# Patient Record
Sex: FEMALE | Race: WHITE | Employment: UNEMPLOYED | URBAN - METROPOLITAN AREA
[De-identification: names, ages, dates, MRNs, and addresses within clinical notes are randomized per-mention and may not be internally consistent; named-entity substitution may affect disease eponyms.]

---

## 2020-06-15 ENCOUNTER — HOSPITAL ENCOUNTER (INPATIENT)
Age: 45
LOS: 2 days | Discharge: HOSPICE | DRG: 751 | End: 2020-06-17
Attending: PSYCHIATRY & NEUROLOGY | Admitting: PSYCHIATRY & NEUROLOGY
Payer: MEDICARE

## 2020-06-15 PROBLEM — F23 PSYCHOTIC EPISODE (HCC): Status: ACTIVE | Noted: 2020-06-15

## 2020-06-15 PROCEDURE — 1240000000 HC EMOTIONAL WELLNESS R&B

## 2020-06-15 PROCEDURE — 6370000000 HC RX 637 (ALT 250 FOR IP): Performed by: NURSE PRACTITIONER

## 2020-06-15 RX ORDER — NICOTINE 21 MG/24HR
1 PATCH, TRANSDERMAL 24 HOURS TRANSDERMAL DAILY
Status: DISCONTINUED | OUTPATIENT
Start: 2020-06-16 | End: 2020-06-15

## 2020-06-15 RX ORDER — TRAZODONE HYDROCHLORIDE 50 MG/1
50 TABLET ORAL NIGHTLY PRN
Status: DISCONTINUED | OUTPATIENT
Start: 2020-06-15 | End: 2020-06-17 | Stop reason: HOSPADM

## 2020-06-15 RX ORDER — TRAZODONE HYDROCHLORIDE 50 MG/1
50 TABLET ORAL NIGHTLY PRN
Status: DISCONTINUED | OUTPATIENT
Start: 2020-06-16 | End: 2020-06-15

## 2020-06-15 RX ADMIN — TRAZODONE HYDROCHLORIDE 50 MG: 50 TABLET ORAL at 23:50

## 2020-06-15 ASSESSMENT — SLEEP AND FATIGUE QUESTIONNAIRES
DO YOU USE A SLEEP AID: COMMENT
DO YOU HAVE DIFFICULTY SLEEPING: COMMENT

## 2020-06-15 ASSESSMENT — LIFESTYLE VARIABLES: HISTORY_ALCOHOL_USE: COMMENT

## 2020-06-15 ASSESSMENT — PAIN SCALES - GENERAL: PAINLEVEL_OUTOF10: 0

## 2020-06-16 PROBLEM — Z85.038 H/O MALIGNANT NEOPLASM OF COLON: Status: ACTIVE | Noted: 2020-06-16

## 2020-06-16 PROBLEM — N30.00 ACUTE CYSTITIS WITHOUT HEMATURIA: Status: ACTIVE | Noted: 2020-06-16

## 2020-06-16 LAB
-: ABNORMAL
AMORPHOUS: ABNORMAL
AMPHETAMINE SCREEN URINE: NEGATIVE
BACTERIA: ABNORMAL
BARBITURATE SCREEN URINE: NEGATIVE
BENZODIAZEPINE SCREEN, URINE: NEGATIVE
BILIRUBIN URINE: NEGATIVE
BUPRENORPHINE URINE: NORMAL
CANNABINOID SCREEN URINE: NEGATIVE
CASTS UA: ABNORMAL /LPF
COCAINE METABOLITE, URINE: NEGATIVE
COLOR: YELLOW
COMMENT UA: ABNORMAL
CRYSTALS, UA: ABNORMAL /HPF
EPITHELIAL CELLS UA: ABNORMAL /HPF
GLUCOSE URINE: NEGATIVE
HCG(URINE) PREGNANCY TEST: NEGATIVE
KETONES, URINE: NEGATIVE
LEUKOCYTE ESTERASE, URINE: ABNORMAL
MDMA URINE: NORMAL
METHADONE SCREEN, URINE: NEGATIVE
METHAMPHETAMINE, URINE: NORMAL
MUCUS: ABNORMAL
NITRITE, URINE: NEGATIVE
OPIATES, URINE: NEGATIVE
OTHER OBSERVATIONS UA: ABNORMAL
OXYCODONE SCREEN URINE: NEGATIVE
PH UA: 7.5 (ref 5–8)
PHENCYCLIDINE, URINE: NEGATIVE
PROPOXYPHENE, URINE: NORMAL
PROTEIN UA: ABNORMAL
RBC UA: ABNORMAL /HPF
RENAL EPITHELIAL, UA: ABNORMAL /HPF
SPECIFIC GRAVITY UA: 1 (ref 1–1.03)
TEST INFORMATION: NORMAL
TRICHOMONAS: ABNORMAL
TRICYCLIC ANTIDEPRESSANTS, UR: NORMAL
TURBIDITY: ABNORMAL
URINE HGB: ABNORMAL
UROBILINOGEN, URINE: NORMAL
WBC UA: ABNORMAL /HPF
YEAST: ABNORMAL

## 2020-06-16 PROCEDURE — 6370000000 HC RX 637 (ALT 250 FOR IP): Performed by: NURSE PRACTITIONER

## 2020-06-16 PROCEDURE — 1240000000 HC EMOTIONAL WELLNESS R&B

## 2020-06-16 PROCEDURE — 90792 PSYCH DIAG EVAL W/MED SRVCS: CPT | Performed by: NURSE PRACTITIONER

## 2020-06-16 PROCEDURE — 99254 IP/OBS CNSLTJ NEW/EST MOD 60: CPT | Performed by: INTERNAL MEDICINE

## 2020-06-16 PROCEDURE — 81025 URINE PREGNANCY TEST: CPT

## 2020-06-16 PROCEDURE — 87086 URINE CULTURE/COLONY COUNT: CPT

## 2020-06-16 PROCEDURE — 80307 DRUG TEST PRSMV CHEM ANLYZR: CPT

## 2020-06-16 PROCEDURE — 81001 URINALYSIS AUTO W/SCOPE: CPT

## 2020-06-16 RX ORDER — OLANZAPINE 5 MG/1
5 TABLET ORAL NIGHTLY
Status: DISCONTINUED | OUTPATIENT
Start: 2020-06-16 | End: 2020-06-17 | Stop reason: HOSPADM

## 2020-06-16 RX ORDER — METRONIDAZOLE 500 MG/1
500 TABLET ORAL 3 TIMES DAILY
COMMUNITY
Start: 2020-06-12 | End: 2020-06-22

## 2020-06-16 RX ORDER — PANTOPRAZOLE SODIUM 40 MG/1
40 TABLET, DELAYED RELEASE ORAL DAILY
Status: ON HOLD | COMMUNITY
End: 2020-07-09 | Stop reason: HOSPADM

## 2020-06-16 RX ORDER — CIPROFLOXACIN 500 MG/1
500 TABLET, FILM COATED ORAL 2 TIMES DAILY
COMMUNITY
Start: 2020-06-12 | End: 2020-06-22

## 2020-06-16 RX ORDER — MIRTAZAPINE 15 MG/1
15 TABLET, FILM COATED ORAL NIGHTLY
COMMUNITY

## 2020-06-16 RX ORDER — HYDROXYZINE PAMOATE 50 MG/1
50 CAPSULE ORAL 3 TIMES DAILY PRN
COMMUNITY

## 2020-06-16 RX ORDER — TAMSULOSIN HYDROCHLORIDE 0.4 MG/1
0.4 CAPSULE ORAL DAILY
Status: ON HOLD | COMMUNITY
End: 2020-07-09 | Stop reason: HOSPADM

## 2020-06-16 RX ORDER — TAMSULOSIN HYDROCHLORIDE 0.4 MG/1
0.4 CAPSULE ORAL DAILY
Status: DISCONTINUED | OUTPATIENT
Start: 2020-06-16 | End: 2020-06-17 | Stop reason: HOSPADM

## 2020-06-16 RX ORDER — PAROXETINE HYDROCHLORIDE 20 MG/1
20 TABLET, FILM COATED ORAL EVERY MORNING
Status: ON HOLD | COMMUNITY
End: 2020-07-09 | Stop reason: HOSPADM

## 2020-06-16 RX ORDER — ACETAMINOPHEN 325 MG/1
650 TABLET ORAL EVERY 4 HOURS PRN
Status: DISCONTINUED | OUTPATIENT
Start: 2020-06-16 | End: 2020-06-17 | Stop reason: HOSPADM

## 2020-06-16 RX ORDER — PANTOPRAZOLE SODIUM 40 MG/1
40 TABLET, DELAYED RELEASE ORAL DAILY
Status: DISCONTINUED | OUTPATIENT
Start: 2020-06-16 | End: 2020-06-17 | Stop reason: HOSPADM

## 2020-06-16 RX ORDER — CIPROFLOXACIN 500 MG/1
500 TABLET, FILM COATED ORAL 2 TIMES DAILY
Status: DISCONTINUED | OUTPATIENT
Start: 2020-06-16 | End: 2020-06-17 | Stop reason: HOSPADM

## 2020-06-16 RX ORDER — MIRTAZAPINE 15 MG/1
15 TABLET, FILM COATED ORAL NIGHTLY
Status: DISCONTINUED | OUTPATIENT
Start: 2020-06-16 | End: 2020-06-17 | Stop reason: HOSPADM

## 2020-06-16 RX ORDER — MAGNESIUM HYDROXIDE/ALUMINUM HYDROXICE/SIMETHICONE 120; 1200; 1200 MG/30ML; MG/30ML; MG/30ML
30 SUSPENSION ORAL EVERY 6 HOURS PRN
Status: DISCONTINUED | OUTPATIENT
Start: 2020-06-16 | End: 2020-06-17 | Stop reason: HOSPADM

## 2020-06-16 RX ORDER — PAROXETINE 10 MG/1
10 TABLET, FILM COATED ORAL EVERY MORNING
Status: DISCONTINUED | OUTPATIENT
Start: 2020-06-17 | End: 2020-06-17 | Stop reason: HOSPADM

## 2020-06-16 RX ORDER — BUSPIRONE HYDROCHLORIDE 15 MG/1
15 TABLET ORAL 3 TIMES DAILY
Status: ON HOLD | COMMUNITY
End: 2020-07-09 | Stop reason: HOSPADM

## 2020-06-16 RX ORDER — PAROXETINE HYDROCHLORIDE 20 MG/1
20 TABLET, FILM COATED ORAL EVERY MORNING
Status: DISCONTINUED | OUTPATIENT
Start: 2020-06-16 | End: 2020-06-16

## 2020-06-16 RX ORDER — HYDROXYZINE 50 MG/1
50 TABLET, FILM COATED ORAL 3 TIMES DAILY PRN
Status: DISCONTINUED | OUTPATIENT
Start: 2020-06-16 | End: 2020-06-17 | Stop reason: HOSPADM

## 2020-06-16 RX ORDER — BUSPIRONE HYDROCHLORIDE 15 MG/1
15 TABLET ORAL 3 TIMES DAILY
Status: DISCONTINUED | OUTPATIENT
Start: 2020-06-16 | End: 2020-06-17 | Stop reason: HOSPADM

## 2020-06-16 RX ORDER — SULFAMETHOXAZOLE AND TRIMETHOPRIM 800; 160 MG/1; MG/1
1 TABLET ORAL 2 TIMES DAILY
Status: ON HOLD | COMMUNITY
Start: 2020-06-15 | End: 2020-06-16

## 2020-06-16 RX ORDER — METRONIDAZOLE 500 MG/1
500 TABLET ORAL 3 TIMES DAILY
Status: DISCONTINUED | OUTPATIENT
Start: 2020-06-16 | End: 2020-06-17 | Stop reason: HOSPADM

## 2020-06-16 RX ADMIN — CIPROFLOXACIN HYDROCHLORIDE 500 MG: 500 TABLET, FILM COATED ORAL at 15:36

## 2020-06-16 RX ADMIN — METRONIDAZOLE 500 MG: 500 TABLET ORAL at 21:27

## 2020-06-16 RX ADMIN — OLANZAPINE 5 MG: 5 TABLET, FILM COATED ORAL at 21:27

## 2020-06-16 RX ADMIN — TAMSULOSIN HYDROCHLORIDE 0.4 MG: 0.4 CAPSULE ORAL at 14:33

## 2020-06-16 RX ADMIN — BUSPIRONE HYDROCHLORIDE 15 MG: 15 TABLET ORAL at 21:27

## 2020-06-16 RX ADMIN — METRONIDAZOLE 500 MG: 500 TABLET ORAL at 14:33

## 2020-06-16 RX ADMIN — CIPROFLOXACIN HYDROCHLORIDE 500 MG: 500 TABLET, FILM COATED ORAL at 21:26

## 2020-06-16 RX ADMIN — PAROXETINE HYDROCHLORIDE 20 MG: 20 TABLET, FILM COATED ORAL at 14:33

## 2020-06-16 RX ADMIN — TRAZODONE HYDROCHLORIDE 50 MG: 50 TABLET ORAL at 21:26

## 2020-06-16 RX ADMIN — BUSPIRONE HYDROCHLORIDE 15 MG: 15 TABLET ORAL at 14:33

## 2020-06-16 RX ADMIN — PANTOPRAZOLE SODIUM 40 MG: 40 TABLET, DELAYED RELEASE ORAL at 14:33

## 2020-06-16 RX ADMIN — MIRTAZAPINE 15 MG: 15 TABLET, FILM COATED ORAL at 21:26

## 2020-06-16 NOTE — GROUP NOTE
Group Therapy Note    Date: 6/16/2020    Group Start Time: 0900  Group End Time: 0920  Group Topic: Sheila [id number] , [place]        Group Therapy Note    Attendees: 10    Pt did not attend [employer] d/t resting in room despite staff invitation to attend. 1:1 talk time offered as alternative to group session, pt declined.

## 2020-06-16 NOTE — GROUP NOTE
Group Therapy Note    Date: 6/16/2020    Group Start Time: 1330  Group End Time: 1400  Group Topic: Relaxation    [address], [place]        Group Therapy Note    Attendees: 7    Pt did not attend Therapeutic Recreation d/t resting in room despite staff invitation to attend. 1:1 talk time offered as alternative to group session, pt declined.

## 2020-06-16 NOTE — BH NOTE

## 2020-06-16 NOTE — BH NOTE
Gave patient a urine speciman cup and asked patient to provide a urine sample and bring it up to nurse.

## 2020-06-16 NOTE — PLAN OF CARE
[facility]  Initial Interdisciplinary Treatment Plan NO      Original treatment plan Date & Time: 6/16/2020 0841    Admission Type:  Admission Type: Involuntary    Reason for admission:   Reason for Admission:  Patient involuntary from [facility] ED for agitaiton, combative, and confusion. Patient is religiously preoccupied and thinks she is possessed by the devil. Estimated Length of Stay:  5-7days  Estimated Discharge Date: to be determined by physician    PATIENT STRENGTHS:  Patient Strengths:Strengths: Positive Support, Other(God gives her strength)  Patient Strengths and Limitations:Limitations: Unrealistic self-view, External locus of control  Addictive Behavior: Addictive Behavior  In the past 3 months, have you felt or has someone told you that you have a problem with:  : None  Do you have a history of Chemical Use?: Comment(IAN)  Do you have a history of Alcohol Use?: Comment(IAN)  Do you have a history of Street Drug Abuse?: Comment(IAN)  Histroy of Prescripton Drug Abuse?: Comment(IAN)  Medical Problems:No past medical history on file. Status EXAM:Status and Exam  Normal: No  Facial Expression: Worried  Affect: Unstable  Level of Consciousness: Confused  Mood:Normal: No  Mood: Ambivalent  Motor Activity:Normal: No  Motor Activity: Increased  Interview Behavior: Evasive  Preception: [place] to Person  Attention:Normal: No  Attention: Distractible  Thought Processes: Perseveration, Blocking  Thought Content:Normal: No  Thought Content: Preoccupations, Delusions  Hallucinations:  Auditory (Comment)(talks to god and the devil)  Delusions: Yes  Delusions: Reference  Memory:Normal: No  Memory: Confabulation  Insight and Judgment: No  Insight and Judgment: Poor Judgment, Poor Insight  Present Suicidal Ideation: Other(See comment)(IAN)  Present Homicidal Ideation: Other(See comment)(IAN)    EDUCATION:   Learner Progress Toward Treatment Goals: reviewed group plans and strategies for

## 2020-06-16 NOTE — GROUP NOTE
Group Therapy Note    Date: 6/16/2020    Group Start Time: 8195  Group End Time: 9493  Group Topic: Psychoeducation    [facility]    Patient refused to attend mental health stigma and advocacy group at  after encouragement from staff. 1:1 talk time offered by staff as alternative to group session.

## 2020-06-17 ENCOUNTER — HOSPITAL ENCOUNTER (INPATIENT)
Age: 45
LOS: 2 days | Discharge: PSYCHIATRIC OR SUBSTANCE USE FACILITY | DRG: 518 | End: 2020-06-20
Attending: INTERNAL MEDICINE | Admitting: INTERNAL MEDICINE
Payer: MEDICARE

## 2020-06-17 ENCOUNTER — APPOINTMENT (OUTPATIENT)
Dept: CT IMAGING | Age: 45
DRG: 751 | End: 2020-06-17
Attending: PSYCHIATRY & NEUROLOGY
Payer: MEDICARE

## 2020-06-17 VITALS
RESPIRATION RATE: 14 BRPM | DIASTOLIC BLOOD PRESSURE: 75 MMHG | WEIGHT: 100 LBS | TEMPERATURE: 97.8 F | HEIGHT: 60 IN | HEART RATE: 118 BPM | SYSTOLIC BLOOD PRESSURE: 116 MMHG | BODY MASS INDEX: 19.63 KG/M2

## 2020-06-17 LAB
ABSOLUTE EOS #: 0.1 K/UL (ref 0–0.4)
ABSOLUTE IMMATURE GRANULOCYTE: ABNORMAL K/UL (ref 0–0.3)
ABSOLUTE LYMPH #: 1.9 K/UL (ref 1–4.8)
ABSOLUTE MONO #: 1 K/UL (ref 0.1–1.3)
ALBUMIN SERPL-MCNC: 3.1 G/DL (ref 3.5–5.2)
ALBUMIN/GLOBULIN RATIO: ABNORMAL (ref 1–2.5)
ALP BLD-CCNC: 102 U/L (ref 35–104)
ALT SERPL-CCNC: 14 U/L (ref 5–33)
ANION GAP SERPL CALCULATED.3IONS-SCNC: 12 MMOL/L (ref 9–17)
AST SERPL-CCNC: 10 U/L
BASOPHILS # BLD: 1 % (ref 0–2)
BASOPHILS ABSOLUTE: 0.1 K/UL (ref 0–0.2)
BILIRUB SERPL-MCNC: 0.2 MG/DL (ref 0.3–1.2)
BUN BLDV-MCNC: 10 MG/DL (ref 6–20)
BUN/CREAT BLD: ABNORMAL (ref 9–20)
C-REACTIVE PROTEIN: 111.9 MG/L (ref 0–5)
CALCIUM SERPL-MCNC: 8.8 MG/DL (ref 8.6–10.4)
CHLORIDE BLD-SCNC: 101 MMOL/L (ref 98–107)
CHOLESTEROL/HDL RATIO: 3.6
CHOLESTEROL: 121 MG/DL
CO2: 27 MMOL/L (ref 20–31)
CREAT SERPL-MCNC: 0.86 MG/DL (ref 0.5–0.9)
DIFFERENTIAL TYPE: ABNORMAL
EOSINOPHILS RELATIVE PERCENT: 1 % (ref 0–4)
ESTIMATED AVERAGE GLUCOSE: 114 MG/DL
GFR AFRICAN AMERICAN: >60 ML/MIN
GFR NON-AFRICAN AMERICAN: >60 ML/MIN
GFR SERPL CREATININE-BSD FRML MDRD: ABNORMAL ML/MIN/{1.73_M2}
GFR SERPL CREATININE-BSD FRML MDRD: ABNORMAL ML/MIN/{1.73_M2}
GLUCOSE BLD-MCNC: 130 MG/DL (ref 70–99)
HBA1C MFR BLD: 5.6 % (ref 4–6)
HCT VFR BLD CALC: 30.4 % (ref 36–46)
HDLC SERPL-MCNC: 34 MG/DL
HEMOGLOBIN: 10.3 G/DL (ref 12–16)
IMMATURE GRANULOCYTES: ABNORMAL %
LDL CHOLESTEROL: 73 MG/DL (ref 0–130)
LYMPHOCYTES # BLD: 16 % (ref 24–44)
MAGNESIUM: 2.2 MG/DL (ref 1.6–2.6)
MCH RBC QN AUTO: 29.9 PG (ref 26–34)
MCHC RBC AUTO-ENTMCNC: 33.8 G/DL (ref 31–37)
MCV RBC AUTO: 88.6 FL (ref 80–100)
MONOCYTES # BLD: 8 % (ref 1–7)
NRBC AUTOMATED: ABNORMAL PER 100 WBC
PDW BLD-RTO: 14.6 % (ref 11.5–14.9)
PLATELET # BLD: 466 K/UL (ref 150–450)
PLATELET ESTIMATE: ABNORMAL
PMV BLD AUTO: 7.6 FL (ref 6–12)
POTASSIUM SERPL-SCNC: 3.5 MMOL/L (ref 3.7–5.3)
RBC # BLD: 3.43 M/UL (ref 4–5.2)
RBC # BLD: ABNORMAL 10*6/UL
SEG NEUTROPHILS: 74 % (ref 36–66)
SEGMENTED NEUTROPHILS ABSOLUTE COUNT: 8.9 K/UL (ref 1.3–9.1)
SODIUM BLD-SCNC: 140 MMOL/L (ref 135–144)
THYROXINE, FREE: 1.91 NG/DL (ref 0.93–1.7)
TOTAL PROTEIN: 6.6 G/DL (ref 6.4–8.3)
TRIGL SERPL-MCNC: 72 MG/DL
TSH SERPL DL<=0.05 MIU/L-ACNC: 2.67 MIU/L (ref 0.3–5)
VLDLC SERPL CALC-MCNC: ABNORMAL MG/DL (ref 1–30)
WBC # BLD: 11.9 K/UL (ref 3.5–11)
WBC # BLD: ABNORMAL 10*3/UL

## 2020-06-17 PROCEDURE — 2580000003 HC RX 258: Performed by: INTERNAL MEDICINE

## 2020-06-17 PROCEDURE — 2580000003 HC RX 258: Performed by: STUDENT IN AN ORGANIZED HEALTH CARE EDUCATION/TRAINING PROGRAM

## 2020-06-17 PROCEDURE — 6360000004 HC RX CONTRAST MEDICATION: Performed by: INTERNAL MEDICINE

## 2020-06-17 PROCEDURE — 84134 ASSAY OF PREALBUMIN: CPT

## 2020-06-17 PROCEDURE — 83036 HEMOGLOBIN GLYCOSYLATED A1C: CPT

## 2020-06-17 PROCEDURE — 80061 LIPID PANEL: CPT

## 2020-06-17 PROCEDURE — 83735 ASSAY OF MAGNESIUM: CPT

## 2020-06-17 PROCEDURE — 6360000002 HC RX W HCPCS: Performed by: INTERNAL MEDICINE

## 2020-06-17 PROCEDURE — 99239 HOSP IP/OBS DSCHRG MGMT >30: CPT | Performed by: PSYCHIATRY & NEUROLOGY

## 2020-06-17 PROCEDURE — 6370000000 HC RX 637 (ALT 250 FOR IP): Performed by: NURSE PRACTITIONER

## 2020-06-17 PROCEDURE — 84443 ASSAY THYROID STIM HORMONE: CPT

## 2020-06-17 PROCEDURE — 99232 SBSQ HOSP IP/OBS MODERATE 35: CPT | Performed by: INTERNAL MEDICINE

## 2020-06-17 PROCEDURE — G0379 DIRECT REFER HOSPITAL OBSERV: HCPCS

## 2020-06-17 PROCEDURE — 36415 COLL VENOUS BLD VENIPUNCTURE: CPT

## 2020-06-17 PROCEDURE — G0378 HOSPITAL OBSERVATION PER HR: HCPCS

## 2020-06-17 PROCEDURE — 80053 COMPREHEN METABOLIC PANEL: CPT

## 2020-06-17 PROCEDURE — 85025 COMPLETE CBC W/AUTO DIFF WBC: CPT

## 2020-06-17 PROCEDURE — 84439 ASSAY OF FREE THYROXINE: CPT

## 2020-06-17 PROCEDURE — 6370000000 HC RX 637 (ALT 250 FOR IP): Performed by: INTERNAL MEDICINE

## 2020-06-17 PROCEDURE — 74177 CT ABD & PELVIS W/CONTRAST: CPT

## 2020-06-17 PROCEDURE — 86140 C-REACTIVE PROTEIN: CPT

## 2020-06-17 RX ORDER — 0.9 % SODIUM CHLORIDE 0.9 %
80 INTRAVENOUS SOLUTION INTRAVENOUS ONCE
Status: COMPLETED | OUTPATIENT
Start: 2020-06-17 | End: 2020-06-17

## 2020-06-17 RX ORDER — SODIUM CHLORIDE 0.9 % (FLUSH) 0.9 %
10 SYRINGE (ML) INJECTION PRN
Status: DISCONTINUED | OUTPATIENT
Start: 2020-06-17 | End: 2020-06-20 | Stop reason: HOSPADM

## 2020-06-17 RX ORDER — TAMSULOSIN HYDROCHLORIDE 0.4 MG/1
0.4 CAPSULE ORAL DAILY
Status: DISCONTINUED | OUTPATIENT
Start: 2020-06-18 | End: 2020-06-20 | Stop reason: HOSPADM

## 2020-06-17 RX ORDER — BUSPIRONE HYDROCHLORIDE 15 MG/1
15 TABLET ORAL 3 TIMES DAILY
Status: CANCELLED | OUTPATIENT
Start: 2020-06-17

## 2020-06-17 RX ORDER — TAMSULOSIN HYDROCHLORIDE 0.4 MG/1
0.4 CAPSULE ORAL DAILY
Status: CANCELLED | OUTPATIENT
Start: 2020-06-17

## 2020-06-17 RX ORDER — ACETAMINOPHEN 325 MG/1
650 TABLET ORAL EVERY 6 HOURS PRN
Status: DISCONTINUED | OUTPATIENT
Start: 2020-06-17 | End: 2020-06-20 | Stop reason: HOSPADM

## 2020-06-17 RX ORDER — MIRTAZAPINE 15 MG/1
15 TABLET, FILM COATED ORAL NIGHTLY
Status: CANCELLED | OUTPATIENT
Start: 2020-06-17

## 2020-06-17 RX ORDER — ACETAMINOPHEN 325 MG/1
650 TABLET ORAL EVERY 4 HOURS PRN
Status: CANCELLED | OUTPATIENT
Start: 2020-06-17

## 2020-06-17 RX ORDER — HYDROXYZINE 50 MG/1
50 TABLET, FILM COATED ORAL 3 TIMES DAILY PRN
Status: CANCELLED | OUTPATIENT
Start: 2020-06-17

## 2020-06-17 RX ORDER — POLYETHYLENE GLYCOL 3350 17 G/17G
17 POWDER, FOR SOLUTION ORAL DAILY PRN
Status: DISCONTINUED | OUTPATIENT
Start: 2020-06-17 | End: 2020-06-20 | Stop reason: HOSPADM

## 2020-06-17 RX ORDER — HYDROXYZINE PAMOATE 50 MG/1
50 CAPSULE ORAL 3 TIMES DAILY PRN
Status: CANCELLED | OUTPATIENT
Start: 2020-06-17

## 2020-06-17 RX ORDER — HYDROXYZINE HYDROCHLORIDE 25 MG/1
50 TABLET, FILM COATED ORAL 3 TIMES DAILY PRN
Status: DISCONTINUED | OUTPATIENT
Start: 2020-06-17 | End: 2020-06-20 | Stop reason: HOSPADM

## 2020-06-17 RX ORDER — TAMSULOSIN HYDROCHLORIDE 0.4 MG/1
0.4 CAPSULE ORAL DAILY
Status: CANCELLED | OUTPATIENT
Start: 2020-06-18

## 2020-06-17 RX ORDER — TRAZODONE HYDROCHLORIDE 50 MG/1
50 TABLET ORAL NIGHTLY PRN
Status: CANCELLED | OUTPATIENT
Start: 2020-06-17

## 2020-06-17 RX ORDER — CIPROFLOXACIN 500 MG/1
500 TABLET, FILM COATED ORAL 2 TIMES DAILY
Status: CANCELLED | OUTPATIENT
Start: 2020-06-17

## 2020-06-17 RX ORDER — PAROXETINE HYDROCHLORIDE 20 MG/1
20 TABLET, FILM COATED ORAL EVERY MORNING
Status: DISCONTINUED | OUTPATIENT
Start: 2020-06-18 | End: 2020-06-20 | Stop reason: HOSPADM

## 2020-06-17 RX ORDER — OLANZAPINE 5 MG/1
5 TABLET ORAL NIGHTLY
Status: CANCELLED | OUTPATIENT
Start: 2020-06-17

## 2020-06-17 RX ORDER — SODIUM CHLORIDE 9 MG/ML
INJECTION, SOLUTION INTRAVENOUS CONTINUOUS
Status: DISCONTINUED | OUTPATIENT
Start: 2020-06-17 | End: 2020-06-20 | Stop reason: HOSPADM

## 2020-06-17 RX ORDER — CIPROFLOXACIN 500 MG/1
500 TABLET, FILM COATED ORAL 2 TIMES DAILY
Status: CANCELLED | OUTPATIENT
Start: 2020-06-17 | End: 2020-06-23

## 2020-06-17 RX ORDER — POTASSIUM CHLORIDE 20 MEQ/1
40 TABLET, EXTENDED RELEASE ORAL PRN
Status: DISCONTINUED | OUTPATIENT
Start: 2020-06-17 | End: 2020-06-20 | Stop reason: HOSPADM

## 2020-06-17 RX ORDER — TRAZODONE HYDROCHLORIDE 50 MG/1
50 TABLET ORAL NIGHTLY PRN
Status: DISCONTINUED | OUTPATIENT
Start: 2020-06-17 | End: 2020-06-20 | Stop reason: HOSPADM

## 2020-06-17 RX ORDER — SODIUM CHLORIDE 0.9 % (FLUSH) 0.9 %
10 SYRINGE (ML) INJECTION EVERY 12 HOURS SCHEDULED
Status: DISCONTINUED | OUTPATIENT
Start: 2020-06-17 | End: 2020-06-20 | Stop reason: HOSPADM

## 2020-06-17 RX ORDER — PROMETHAZINE HYDROCHLORIDE 25 MG/1
12.5 TABLET ORAL EVERY 6 HOURS PRN
Status: DISCONTINUED | OUTPATIENT
Start: 2020-06-17 | End: 2020-06-20 | Stop reason: HOSPADM

## 2020-06-17 RX ORDER — MIRTAZAPINE 15 MG/1
15 TABLET, FILM COATED ORAL NIGHTLY
Status: DISCONTINUED | OUTPATIENT
Start: 2020-06-17 | End: 2020-06-20 | Stop reason: HOSPADM

## 2020-06-17 RX ORDER — PANTOPRAZOLE SODIUM 40 MG/1
40 TABLET, DELAYED RELEASE ORAL DAILY
Status: CANCELLED | OUTPATIENT
Start: 2020-06-17

## 2020-06-17 RX ORDER — SODIUM CHLORIDE 0.9 % (FLUSH) 0.9 %
10 SYRINGE (ML) INJECTION PRN
Status: DISCONTINUED | OUTPATIENT
Start: 2020-06-17 | End: 2020-06-17 | Stop reason: HOSPADM

## 2020-06-17 RX ORDER — METRONIDAZOLE 500 MG/1
500 TABLET ORAL 3 TIMES DAILY
Status: CANCELLED | OUTPATIENT
Start: 2020-06-17

## 2020-06-17 RX ORDER — BUSPIRONE HYDROCHLORIDE 15 MG/1
15 TABLET ORAL 3 TIMES DAILY
Status: DISCONTINUED | OUTPATIENT
Start: 2020-06-17 | End: 2020-06-20 | Stop reason: HOSPADM

## 2020-06-17 RX ORDER — POTASSIUM CHLORIDE 7.45 MG/ML
10 INJECTION INTRAVENOUS PRN
Status: DISCONTINUED | OUTPATIENT
Start: 2020-06-17 | End: 2020-06-20 | Stop reason: HOSPADM

## 2020-06-17 RX ORDER — PAROXETINE 10 MG/1
10 TABLET, FILM COATED ORAL EVERY MORNING
Status: CANCELLED | OUTPATIENT
Start: 2020-06-18 | End: 2020-06-20

## 2020-06-17 RX ORDER — PANTOPRAZOLE SODIUM 40 MG/1
40 TABLET, DELAYED RELEASE ORAL DAILY
Status: CANCELLED | OUTPATIENT
Start: 2020-06-18

## 2020-06-17 RX ORDER — PAROXETINE HYDROCHLORIDE 20 MG/1
20 TABLET, FILM COATED ORAL EVERY MORNING
Status: CANCELLED | OUTPATIENT
Start: 2020-06-18

## 2020-06-17 RX ORDER — ACETAMINOPHEN 650 MG/1
650 SUPPOSITORY RECTAL EVERY 6 HOURS PRN
Status: DISCONTINUED | OUTPATIENT
Start: 2020-06-17 | End: 2020-06-20 | Stop reason: HOSPADM

## 2020-06-17 RX ORDER — SODIUM CHLORIDE 0.9 % (FLUSH) 0.9 %
10 SYRINGE (ML) INJECTION PRN
Status: CANCELLED | OUTPATIENT
Start: 2020-06-17

## 2020-06-17 RX ORDER — MAGNESIUM HYDROXIDE/ALUMINUM HYDROXICE/SIMETHICONE 120; 1200; 1200 MG/30ML; MG/30ML; MG/30ML
30 SUSPENSION ORAL EVERY 6 HOURS PRN
Status: CANCELLED | OUTPATIENT
Start: 2020-06-17

## 2020-06-17 RX ORDER — OLANZAPINE 10 MG/1
5 TABLET ORAL NIGHTLY
Status: DISCONTINUED | OUTPATIENT
Start: 2020-06-17 | End: 2020-06-20 | Stop reason: HOSPADM

## 2020-06-17 RX ORDER — ONDANSETRON 2 MG/ML
4 INJECTION INTRAMUSCULAR; INTRAVENOUS EVERY 6 HOURS PRN
Status: DISCONTINUED | OUTPATIENT
Start: 2020-06-17 | End: 2020-06-20 | Stop reason: HOSPADM

## 2020-06-17 RX ORDER — METRONIDAZOLE 500 MG/1
500 TABLET ORAL 3 TIMES DAILY
Status: CANCELLED | OUTPATIENT
Start: 2020-06-17 | End: 2020-06-23

## 2020-06-17 RX ADMIN — Medication 10 ML: at 08:44

## 2020-06-17 RX ADMIN — TAMSULOSIN HYDROCHLORIDE 0.4 MG: 0.4 CAPSULE ORAL at 08:54

## 2020-06-17 RX ADMIN — SODIUM CHLORIDE: 9 INJECTION, SOLUTION INTRAVENOUS at 18:31

## 2020-06-17 RX ADMIN — BUSPIRONE HYDROCHLORIDE 15 MG: 15 TABLET ORAL at 08:54

## 2020-06-17 RX ADMIN — BUSPIRONE HYDROCHLORIDE 15 MG: 15 TABLET ORAL at 20:30

## 2020-06-17 RX ADMIN — IOVERSOL 75 ML: 741 INJECTION INTRA-ARTERIAL; INTRAVENOUS at 08:44

## 2020-06-17 RX ADMIN — BUSPIRONE HYDROCHLORIDE 15 MG: 15 TABLET ORAL at 15:20

## 2020-06-17 RX ADMIN — MIRTAZAPINE 15 MG: 15 TABLET, FILM COATED ORAL at 20:30

## 2020-06-17 RX ADMIN — SODIUM CHLORIDE 80 ML: 9 INJECTION, SOLUTION INTRAVENOUS at 08:44

## 2020-06-17 RX ADMIN — OLANZAPINE 5 MG: 10 TABLET, FILM COATED ORAL at 20:30

## 2020-06-17 RX ADMIN — VORTIOXETINE 10 MG: 10 TABLET, FILM COATED ORAL at 08:54

## 2020-06-17 RX ADMIN — PAROXETINE HYDROCHLORIDE 10 MG: 10 TABLET, FILM COATED ORAL at 08:54

## 2020-06-17 RX ADMIN — METRONIDAZOLE 500 MG: 500 TABLET ORAL at 08:54

## 2020-06-17 RX ADMIN — METRONIDAZOLE 500 MG: 500 TABLET ORAL at 15:20

## 2020-06-17 RX ADMIN — PIPERACILLIN AND TAZOBACTAM 4.5 G: 4; .5 INJECTION, POWDER, LYOPHILIZED, FOR SOLUTION INTRAVENOUS; PARENTERAL at 19:59

## 2020-06-17 RX ADMIN — CIPROFLOXACIN HYDROCHLORIDE 500 MG: 500 TABLET, FILM COATED ORAL at 08:54

## 2020-06-17 RX ADMIN — PANTOPRAZOLE SODIUM 40 MG: 40 TABLET, DELAYED RELEASE ORAL at 08:54

## 2020-06-17 ASSESSMENT — ENCOUNTER SYMPTOMS
COUGH: 0
DIARRHEA: 0
APNEA: 0
SHORTNESS OF BREATH: 0
ABDOMINAL DISTENTION: 0
VOMITING: 0
NAUSEA: 0
ABDOMINAL PAIN: 0
CHEST TIGHTNESS: 0
BACK PAIN: 0

## 2020-06-17 ASSESSMENT — PAIN SCALES - WONG BAKER
WONGBAKER_NUMERICALRESPONSE: 0

## 2020-06-17 ASSESSMENT — PAIN SCALES - GENERAL: PAINLEVEL_OUTOF10: 0

## 2020-06-17 NOTE — PLAN OF CARE
[address] [place] [address]  Day 3 Interdisciplinary Treatment Plan NOTE    Review Date & Time: 6/17//2020 1345    Admission Type:   Admission Type: Involuntary    Reason for admission:  Reason for Admission:  Patient involuntary from [facility] ED for agitaiton, combative, and confusion. Patient is religiously preoccupied and thinks she is possessed by the devil. Estimated Length of Stay:  5-7 days  Estimated Discharge Date Update:   to be determined by physician    PATIENT STRENGTHS:  Patient Strengths:Strengths: (IAN)  Patient Strengths and Limitations:Limitations: (IAN)  Addictive Behavior:Addictive Behavior  In the past 3 months, have you felt or has someone told you that you have a problem with:  : (IAN)  Do you have a history of Chemical Use?: (IAN)  Do you have a history of Alcohol Use?: (IAN)  Do you have a history of Street Drug Abuse?: (IAN)  Histroy of Prescripton Drug Abuse?: (IAN)  Medical Problems:No past medical history on file.     Risk:  Fall RiskTotal: 70  Yoni Scale Yoni Scale Score: 22  BVC Total: 1  Change in scores:  No Changes to plan of Care:  No    Status EXAM:   Status and Exam  Normal: No  Facial Expression: Flat  Affect: Unstable  Level of Consciousness: Confused  Mood:Normal: No  Mood: Depressed, Empty  Motor Activity:Normal: Yes  Motor Activity: Increased  Interview Behavior: Cooperative  Preception: [place] to Person  Attention:Normal: No  Attention: Unable to Concentrate  Thought Processes: Blocking  Thought Content:Normal: No  Thought Content: Poverty of Content  Hallucinations: (patient denies)  Delusions: No  Delusions: Obsessions  Memory:Normal: No  Memory: Confabulation  Insight and Judgment: No  Insight and Judgment: Poor Judgment, Poor Insight  Present Suicidal Ideation: No  Present Homicidal Ideation: No    Daily Assessment Last Entry:   Daily Sleep (WDL): Within Defined Limits         Patient Currently in Pain: No  Daily Nutrition (WDL): Within Defined Limits    Patient Monitoring:  Frequency of Checks: 4 times per hour, close    Psychiatric Symptoms:   Depression Symptoms  Depression Symptoms: Loss of interest, Isolative  Anxiety Symptoms  Anxiety Symptoms: Generalized  Elizabeth Symptoms  Elizabeth Symptoms: No problems reported or observed. Psychosis Symptoms  Delusion Type: No problems reported or observed. Suicide Risk CSSR-S:  1) Within the past month, have you wished you were dead or wished you could go to sleep and not wake up? : No  2) Have you actually had any thoughts of killing yourself? : No  6) Have you ever done anything, started to do anything, or prepared to do anything to end your life?: No  Change in Result:  no Change in Plan of care:  no      EDUCATION:   Learner Progress Toward Treatment Goals:   Reviewed results and recommendations of this team, Reviewed group plan and strategies, Reviewed signs, symptoms and risk of self harm and violent behavior, Reviewed goals and plan of care    Method:  small group, individual verbal education    Outcome:   Verbalized by patient but needs reinforcement to obtain goals    PATIENT GOALS:  Short term:  Stabilize medications, decrease hallucinations. Long term:  Continue medications.      PLAN/TREATMENT RECOMMENDATIONS UPDATE:  continue with group therapies, increased socialization, continue planning for after discharge goals, continue with medication compliance    SHORT-TERM GOALS UPDATE:   Time frame for Short-Term Goals:  5-7 days    LONG-TERM GOALS UPDATE:   Time frame for Long-Term Goals:  6 months    Members Present in Team Meeting:     Nadege Carmen

## 2020-06-17 NOTE — H&P
[address]      [address] [facility]     HISTORY AND PHYSICAL EXAMINATION            Date:   6/17/2020  Patient name:  Cira Wood  Date of admission:  6/17/2020  4:33 PM  MRN:   [id number]  Account:  [de-identified]  YOB: 1975  PCP:    No primary care provider on file. Room:   [address] [place] [address]  Code Status:    Full Code    Chief Complaint:     No chief complaint on file. History Obtained From:     patient    History of Present Illness:     Patient is a 49-year-old  female who was admitted directly from [facility] to progressive unit today on 6/17 in the p.m. for treatment of a pelvic abscess. Patient was initially  admitted to [facility] due to suicidal ideation and disorientation. Patient was taking psych meds at home prior to admission. Today, CT scan of patient's abdomen showed pelvic abscess in the kobe-sacral area and cholelithiasis and a contracted gallbladder. Patient was started on IV Zosyn and IR consulted in order to drain abscess possibly. Patient is also malnourished, states her appetite is fine but when she tries to eat she feels like she cannot swallow her food, is able to drink normally. Patient states she has 2 children cannot remember their names currently patient states she does not have a name but realizes she is at [facility].  Patient states she has suicidal thoughts, and that she is given a name by her sister. Patient denies any chest pain, shortness of breath, abdominal pain, nausea, vomiting. States she sometimes feels like her hands are tightening up. States she has had some sleep disturbance recently. Patient will be continued on IV antibiotics in the hospital and possibly abscess will be drained by IR. Past Medical History:     History reviewed. No pertinent past medical history.      Past SurgicalHistory: History reviewed. No pertinent surgical history. Medications Prior to Admission:        Prior to Admission medications    Medication Sig Start Date End Date Taking? Authorizing Provider   busPIRone (BUSPAR) 15 MG tablet Take 15 mg by mouth 3 times daily    Historical Provider, MD   PARoxetine (PAXIL) 20 MG tablet Take 20 mg by mouth every morning    Historical Provider, MD   tamsulosin (FLOMAX) 0.4 MG capsule Take 0.4 mg by mouth daily    Historical Provider, MD   hydrOXYzine (VISTARIL) 50 MG capsule Take 50 mg by mouth 3 times daily as needed    Historical Provider, MD   mirtazapine (REMERON) 15 MG tablet Take 15 mg by mouth nightly    Historical Provider, MD   pantoprazole (PROTONIX) 40 MG tablet Take 40 mg by mouth daily    Historical Provider, MD   metroNIDAZOLE (FLAGYL) 500 MG tablet Take 500 mg by mouth 3 times daily 6/12/20 6/22/20  Historical Provider, MD   ciprofloxacin (CIPRO) 500 MG tablet Take 500 mg by mouth 2 times daily 6/12/20 6/22/20  Historical Provider, MD        Allergies:     Patient has no known allergies. Social History:     Tobacco:    reports that she has never smoked. She has never used smokeless tobacco.  Alcohol:      has no history on file for alcohol. Drug Use:  has no history on file for drug. Family History:     History reviewed. No pertinent family history. Review of Systems:     Positive and Negative as described in HPI. Review of Systems   Constitutional: Positive for fatigue. Respiratory: Negative for apnea, cough, chest tightness and shortness of breath. Cardiovascular: Negative for chest pain, palpitations and leg swelling. Gastrointestinal: Negative for abdominal distention, abdominal pain, diarrhea, nausea and vomiting. Genitourinary: Negative for difficulty urinating, frequency and urgency. Musculoskeletal: Negative for arthralgias, back pain and neck pain. Neurological: Negative for dizziness, tremors, light-headedness and headaches. NEGATIVE NEGATIVE    Oxycodone Screen, Ur NEGATIVE NEGATIVE    Methamphetamine, Urine NOT REPORTED NEGATIVE    Tricyclic Antidepressants, Urine NOT REPORTED NEGATIVE    MDMA, Urine NOT REPORTED NEGATIVE    Buprenorphine Urine NOT REPORTED NEGATIVE    Test Information       Assay provides medical screening only. The absence of expected drug(s) and/or metabolite(s) may indicate diluted or adulterated urine, limitations of testing or timing of collection. Urinalysis Reflex to Culture    Collection Time: 06/16/20  8:42 PM   Result Value Ref Range    Color, UA YELLOW YELLOW    Turbidity UA SLIGHTLY CLOUDY (A) CLEAR    Glucose, Ur NEGATIVE NEGATIVE    Bilirubin Urine NEGATIVE NEGATIVE    Ketones, Urine NEGATIVE NEGATIVE    Specific Gravity, UA 1.004 1.000 - 1.030    Urine Hgb SMALL (A) NEGATIVE    pH, UA 7.5 5.0 - 8.0    Protein, UA 1+ (A) NEGATIVE    Urobilinogen, Urine Normal Normal    Nitrite, Urine NEGATIVE NEGATIVE    Leukocyte Esterase, Urine LARGE (A) NEGATIVE    Urinalysis Comments NOT REPORTED    Microscopic Urinalysis    Collection Time: 06/16/20  8:42 PM   Result Value Ref Range    -          WBC, UA 20 TO 50 /HPF    RBC, UA 2 TO 5 /HPF    Casts UA NOT REPORTED /LPF    Crystals, UA NOT REPORTED None /HPF    Epithelial Cells UA 5 TO 10 /HPF    Renal Epithelial, UA NOT REPORTED 0 /HPF    Bacteria, UA FEW (A) None    Mucus, UA NOT REPORTED None    Trichomonas, UA NOT REPORTED None    Amorphous, UA NOT REPORTED None    Other Observations UA NOT REPORTED NOT REQ.     Yeast, UA NOT REPORTED None   Comprehensive Metabolic Panel w/ Reflex to MG    Collection Time: 06/17/20  6:25 AM   Result Value Ref Range    Glucose 130 (H) 70 - 99 mg/dL    BUN 10 6 - 20 mg/dL    CREATININE 0.86 0.50 - 0.90 mg/dL    Bun/Cre Ratio NOT REPORTED 9 - 20    Calcium 8.8 8.6 - 10.4 mg/dL    Sodium 140 135 - 144 mmol/L    Potassium 3.5 (L) 3.7 - 5.3 mmol/L    Chloride 101 98 - 107 mmol/L    CO2 27 20 - 31 mmol/L    Anion Gap 12 9 - 17 Morphology NOT REPORTED     RBC Morphology NOT REPORTED     Platelet Estimate NOT REPORTED    Magnesium    Collection Time: 06/17/20  6:25 AM   Result Value Ref Range    Magnesium 2.2 1.6 - 2.6 mg/dL       Imaging/Diagnostics:  Ct Abdomen Pelvis W Iv Contrast Additional Contrast? None    Result Date: 6/17/2020  EXAMINATION: CT OF THE ABDOMEN AND PELVIS WITH CONTRAST 6/17/2020 8:30 am TECHNIQUE: CT of the abdomen and pelvis was performed with the administration of intravenous contrast. Multiplanar reformatted images are provided for review. Dose modulation, iterative reconstruction, and/or weight based adjustment of the mA/kV was utilized to reduce the radiation dose to as low as reasonably achievable. COMPARISON: None. HISTORY: ORDERING SYSTEM PROVIDED HISTORY: Pelvic abcess / Hydronephrosis TECHNOLOGIST PROVIDED HISTORY: Pelvic abcess / Hydronephrosis Reason for Exam: Pelvic abcess / Hydronephrosis, has stent in place Acuity: Acute Type of Exam: Initial FINDINGS: Lower Chest: No acute findings. Organs: Cholelithiasis in a contracted gallbladder. No biliary dilatation. The liver, pancreas, spleen and adrenals appear unremarkable. Right double-J ureteral stent in place. Mild residual hydronephrosis and right renal edema. Mild urothelial enhancement on the right side. No renal calculi. The left kidney is remarkable for a subcentimeter intraparenchymal cyst. GI/Bowel: Status post distal colonic resection. No evidence for bowel obstruction. Moderate stool burden. Pelvis: Right pelvic abscess in the presacral space measures up to 5.7 cm in greatest transverse dimension, approximately 7 cm in craniocaudal dimension and 2.8 cm in AP dimension. The bladder is decompressed. Peritoneum/Retroperitoneum: No free air. No ascites. No lymphadenopathy. The aorta is normal in caliber. Bones/Soft Tissues: No acute osseous abnormality identified.      1.  Pelvic abscess predominates in the presacral space extending to the right side. 2.  Right double-J ureteral stent in place. Mild residual hydronephrosis, urothelial enhancement and right renal edema. 3.  Cholelithiasis in a contracted gallbladder. Assessment :      Primary Problem  <principal problem not specified>    There are no active hospital problems to display for this patient. Plan:     Patient status Admit as inpatient in the  Progressive Unit/Step down    Pelvis Abscess likely associated with radiation for rectal cancer s/p 2015   -Patient transferred from [facility] to inpatient unit  -CT  Scan showed pelvis abscess in presacral space extending to the right side   -IV Zosyn  -IR consulted for abscess drainage   - mL/h  -CRP    Malnourishment with BMI 16.1  -Patient clinically appears to have muscle wasting  -Consult dietitian, appreciate recommendations  -Prealbumin  -TSH unremarkable with free t4 1.91     Diet: NPO effective now  Dispo: social work      Consultations:   [address]    Patient is admitted as inpatient status because of co-morbiditieslisted above, severity of signs and symptoms as outlined, requirement for current medical therapies and most importantly because of direct risk to patient if care not provided in a hospital setting. Kathya Davidson MD  6/17/2020  5:18 PM    Copy sent to Dr. Holloway primary care provider on file. Attending Physician Statement  I have discussed the care of Malena Partida and I have examined the patient myselft and taken ros and hpi , including pertinent history and exam findings,  with the resident. I have reviewed the key elements of all parts of the encounter with the resident. I agree with the assessment, plan and orders as documented by the resident.    ir drain plan in am  surg consult  Iv abx  Check sed rate      Electronically signed by Zuleyka Schmitz MD

## 2020-06-17 NOTE — GROUP NOTE
Group Therapy Note    Date: 6/17/2020    Group Start Time: 1100  Group End Time: 2944  Group Topic: Recreational    [facility]    Moi Day    patient refused to attend recreational therapy group at 1100 after encouragement from staff.   1:1 talk time provided as alternative to group session     Signature:  Hollis Seo

## 2020-06-18 LAB
ABSOLUTE EOS #: 0.1 K/UL (ref 0–0.4)
ABSOLUTE IMMATURE GRANULOCYTE: ABNORMAL K/UL (ref 0–0.3)
ABSOLUTE LYMPH #: 2.9 K/UL (ref 1–4.8)
ABSOLUTE MONO #: 0.8 K/UL (ref 0.1–1.3)
ANION GAP SERPL CALCULATED.3IONS-SCNC: 13 MMOL/L (ref 9–17)
BASOPHILS # BLD: 1 % (ref 0–2)
BASOPHILS ABSOLUTE: 0.1 K/UL (ref 0–0.2)
BUN BLDV-MCNC: 10 MG/DL (ref 6–20)
BUN/CREAT BLD: ABNORMAL (ref 9–20)
CALCIUM SERPL-MCNC: 8.7 MG/DL (ref 8.6–10.4)
CHLORIDE BLD-SCNC: 102 MMOL/L (ref 98–107)
CO2: 27 MMOL/L (ref 20–31)
CREAT SERPL-MCNC: 0.87 MG/DL (ref 0.5–0.9)
CULTURE: NORMAL
DIFFERENTIAL TYPE: ABNORMAL
EOSINOPHILS RELATIVE PERCENT: 1 % (ref 0–4)
GFR AFRICAN AMERICAN: >60 ML/MIN
GFR NON-AFRICAN AMERICAN: >60 ML/MIN
GFR SERPL CREATININE-BSD FRML MDRD: ABNORMAL ML/MIN/{1.73_M2}
GFR SERPL CREATININE-BSD FRML MDRD: ABNORMAL ML/MIN/{1.73_M2}
GLUCOSE BLD-MCNC: 103 MG/DL (ref 70–99)
HCT VFR BLD CALC: 29.9 % (ref 36–46)
HEMOGLOBIN: 10.1 G/DL (ref 12–16)
IMMATURE GRANULOCYTES: ABNORMAL %
INR BLD: 1.1
LYMPHOCYTES # BLD: 27 % (ref 24–44)
Lab: NORMAL
MCH RBC QN AUTO: 29.8 PG (ref 26–34)
MCHC RBC AUTO-ENTMCNC: 33.6 G/DL (ref 31–37)
MCV RBC AUTO: 88.6 FL (ref 80–100)
MONOCYTES # BLD: 7 % (ref 1–7)
NRBC AUTOMATED: ABNORMAL PER 100 WBC
PARTIAL THROMBOPLASTIN TIME: 27.2 SEC (ref 24–36)
PDW BLD-RTO: 14.7 % (ref 11.5–14.9)
PLATELET # BLD: 492 K/UL (ref 150–450)
PLATELET ESTIMATE: ABNORMAL
PMV BLD AUTO: 7.6 FL (ref 6–12)
POTASSIUM SERPL-SCNC: 4.1 MMOL/L (ref 3.7–5.3)
PREALBUMIN: 10.8 MG/DL (ref 20–40)
PROTHROMBIN TIME: 14.5 SEC (ref 11.8–14.6)
RBC # BLD: 3.38 M/UL (ref 4–5.2)
RBC # BLD: ABNORMAL 10*6/UL
SEDIMENTATION RATE, ERYTHROCYTE: 120 MM (ref 0–20)
SEG NEUTROPHILS: 64 % (ref 36–66)
SEGMENTED NEUTROPHILS ABSOLUTE COUNT: 7 K/UL (ref 1.3–9.1)
SODIUM BLD-SCNC: 142 MMOL/L (ref 135–144)
SPECIMEN DESCRIPTION: NORMAL
WBC # BLD: 10.9 K/UL (ref 3.5–11)
WBC # BLD: ABNORMAL 10*3/UL

## 2020-06-18 PROCEDURE — U0003 INFECTIOUS AGENT DETECTION BY NUCLEIC ACID (DNA OR RNA); SEVERE ACUTE RESPIRATORY SYNDROME CORONAVIRUS 2 (SARS-COV-2) (CORONAVIRUS DISEASE [COVID-19]), AMPLIFIED PROBE TECHNIQUE, MAKING USE OF HIGH THROUGHPUT TECHNOLOGIES AS DESCRIBED BY CMS-2020-01-R: HCPCS

## 2020-06-18 PROCEDURE — 6360000002 HC RX W HCPCS: Performed by: STUDENT IN AN ORGANIZED HEALTH CARE EDUCATION/TRAINING PROGRAM

## 2020-06-18 PROCEDURE — 2060000000 HC ICU INTERMEDIATE R&B

## 2020-06-18 PROCEDURE — 80048 BASIC METABOLIC PNL TOTAL CA: CPT

## 2020-06-18 PROCEDURE — 6370000000 HC RX 637 (ALT 250 FOR IP): Performed by: INTERNAL MEDICINE

## 2020-06-18 PROCEDURE — 85651 RBC SED RATE NONAUTOMATED: CPT

## 2020-06-18 PROCEDURE — 85025 COMPLETE CBC W/AUTO DIFF WBC: CPT

## 2020-06-18 PROCEDURE — 85610 PROTHROMBIN TIME: CPT

## 2020-06-18 PROCEDURE — 85730 THROMBOPLASTIN TIME PARTIAL: CPT

## 2020-06-18 PROCEDURE — 99253 IP/OBS CNSLTJ NEW/EST LOW 45: CPT | Performed by: PHYSICIAN ASSISTANT

## 2020-06-18 PROCEDURE — 36415 COLL VENOUS BLD VENIPUNCTURE: CPT

## 2020-06-18 PROCEDURE — 2580000003 HC RX 258: Performed by: STUDENT IN AN ORGANIZED HEALTH CARE EDUCATION/TRAINING PROGRAM

## 2020-06-18 PROCEDURE — 2580000003 HC RX 258: Performed by: PHYSICIAN ASSISTANT

## 2020-06-18 PROCEDURE — 99254 IP/OBS CNSLTJ NEW/EST MOD 60: CPT | Performed by: NURSE PRACTITIONER

## 2020-06-18 RX ORDER — 0.9 % SODIUM CHLORIDE 0.9 %
500 INTRAVENOUS SOLUTION INTRAVENOUS ONCE
Status: COMPLETED | OUTPATIENT
Start: 2020-06-18 | End: 2020-06-18

## 2020-06-18 RX ORDER — HEPARIN SODIUM 5000 [USP'U]/ML
5000 INJECTION, SOLUTION INTRAVENOUS; SUBCUTANEOUS EVERY 8 HOURS SCHEDULED
Status: DISCONTINUED | OUTPATIENT
Start: 2020-06-18 | End: 2020-06-20 | Stop reason: HOSPADM

## 2020-06-18 RX ADMIN — SODIUM CHLORIDE: 9 INJECTION, SOLUTION INTRAVENOUS at 23:36

## 2020-06-18 RX ADMIN — SODIUM CHLORIDE: 9 INJECTION, SOLUTION INTRAVENOUS at 08:20

## 2020-06-18 RX ADMIN — MIRTAZAPINE 15 MG: 15 TABLET, FILM COATED ORAL at 19:57

## 2020-06-18 RX ADMIN — TAMSULOSIN HYDROCHLORIDE 0.4 MG: 0.4 CAPSULE ORAL at 11:40

## 2020-06-18 RX ADMIN — PIPERACILLIN AND TAZOBACTAM 3.38 G: 3; .375 INJECTION, POWDER, FOR SOLUTION INTRAVENOUS at 00:13

## 2020-06-18 RX ADMIN — PIPERACILLIN AND TAZOBACTAM 3.38 G: 3; .375 INJECTION, POWDER, FOR SOLUTION INTRAVENOUS at 08:16

## 2020-06-18 RX ADMIN — PIPERACILLIN AND TAZOBACTAM 3.38 G: 3; .375 INJECTION, POWDER, FOR SOLUTION INTRAVENOUS at 16:11

## 2020-06-18 RX ADMIN — PIPERACILLIN AND TAZOBACTAM 3.38 G: 3; .375 INJECTION, POWDER, FOR SOLUTION INTRAVENOUS at 23:35

## 2020-06-18 RX ADMIN — BUSPIRONE HYDROCHLORIDE 15 MG: 15 TABLET ORAL at 16:10

## 2020-06-18 RX ADMIN — PAROXETINE HYDROCHLORIDE 20 MG: 20 TABLET, FILM COATED ORAL at 11:40

## 2020-06-18 RX ADMIN — VORTIOXETINE 10 MG: 10 TABLET, FILM COATED ORAL at 11:39

## 2020-06-18 RX ADMIN — SODIUM CHLORIDE 500 ML: 9 INJECTION, SOLUTION INTRAVENOUS at 21:32

## 2020-06-18 RX ADMIN — BUSPIRONE HYDROCHLORIDE 15 MG: 15 TABLET ORAL at 19:57

## 2020-06-18 RX ADMIN — BUSPIRONE HYDROCHLORIDE 15 MG: 15 TABLET ORAL at 11:40

## 2020-06-18 RX ADMIN — OLANZAPINE 5 MG: 10 TABLET, FILM COATED ORAL at 19:57

## 2020-06-18 ASSESSMENT — ENCOUNTER SYMPTOMS
VOMITING: 0
BACK PAIN: 0
NAUSEA: 0
DIARRHEA: 0
SHORTNESS OF BREATH: 0
CHEST TIGHTNESS: 0
APNEA: 0
ABDOMINAL DISTENTION: 0
COUGH: 0
ABDOMINAL PAIN: 0

## 2020-06-18 ASSESSMENT — PAIN SCALES - GENERAL: PAINLEVEL_OUTOF10: 0

## 2020-06-18 NOTE — CARE COORDINATION
Name: Maria Luz Pretty    : 1975    Discharge Date:   Primary Auth/Cert #: [id number]  Pt discharged to medical unit at [facility]     Discharge Medications:      Medication List      ASK your doctor about these medications    busPIRone 15 MG tablet  Commonly known as:  BUSPAR     ciprofloxacin 500 MG tablet  Commonly known as:  CIPRO     hydrOXYzine 50 MG capsule  Commonly known as:  VISTARIL     metroNIDAZOLE 500 MG tablet  Commonly known as:  FLAGYL     mirtazapine 15 MG tablet  Commonly known as:  REMERON     pantoprazole 40 MG tablet  Commonly known as:  PROTONIX     PARoxetine 20 MG tablet  Commonly known as:  PAXIL     tamsulosin 0.4 MG capsule  Commonly known as:  FLOMAX            Follow Up Appointment: No follow-up provider specified.
patient was brought to ED by boyfriend, whom she lives with for confusion and odd behavior. Per [facility] patient is prescribed psychiatric medications by Mary Schmitt, ALEXANDRE @ [facility]. Patient urinalysis collected at [facility] indicates no recent drug or alcohol use. Patient unable to provide any further information; no further information available in chart.

## 2020-06-18 NOTE — PROGRESS NOTES
[facility]     PROGRESS NOTE             Date:   6/18/2020  Patient name:  Monserrat Strong  Date of admission:  6/17/2020  4:33 PM  MRN:   [id number]  Account:  [de-identified]  YOB: 1975  PCP:    No primary care provider on file. Room:   [address] [place] [address]  Code Status:    Full Code    Chief Complaint:     No chief complaint on file. History Obtained From:     patient    History of Present Illness:     Patient seen and examined at bedside this morning. No acute events overnight, patient resting comfortably in bed. COVID swab today so patient may get abscess drainage, will set up with IR. Otherwise hemodynamically stable, no mental status change. Afebrile. No current complaints of pain. Past Medical History:     History reviewed. No pertinent past medical history. Past SurgicalHistory:     History reviewed. No pertinent surgical history. Medications Prior to Admission:        Prior to Admission medications    Medication Sig Start Date End Date Taking?  Authorizing Provider   busPIRone (BUSPAR) 15 MG tablet Take 15 mg by mouth 3 times daily    Historical Provider, MD   PARoxetine (PAXIL) 20 MG tablet Take 20 mg by mouth every morning    Historical Provider, MD   tamsulosin (FLOMAX) 0.4 MG capsule Take 0.4 mg by mouth daily    Historical Provider, MD   hydrOXYzine (VISTARIL) 50 MG capsule Take 50 mg by mouth 3 times daily as needed    Historical Provider, MD   mirtazapine (REMERON) 15 MG tablet Take 15 mg by mouth nightly    Historical Provider, MD   pantoprazole (PROTONIX) 40 MG tablet Take 40 mg by mouth daily    Historical Provider, MD   metroNIDAZOLE (FLAGYL) 500 MG tablet Take 500 mg by mouth 3 times daily 6/12/20 6/22/20  Historical Provider, MD   ciprofloxacin (CIPRO) 500 MG tablet Take 500 mg by mouth 2 times daily 6/12/20 abnormality identified. 1.  Pelvic abscess predominates in the presacral space extending to the right side. 2.  Right double-J ureteral stent in place. Mild residual hydronephrosis, urothelial enhancement and right renal edema. 3.  Cholelithiasis in a contracted gallbladder. Assessment :      Primary Problem  <principal problem not specified>    There are no active hospital problems to display for this patient. Plan:     Patient status Admit as inpatient in the  Progressive Unit/Step down    Pelvis Abscess likely associated with radiation for rectal cancer s/p 2015   -Patient transferred from [facility] to inpatient unit  -CT  Scan showed pelvis abscess in presacral space extending to the right side   -IV Zosyn  -IR consulted for abscess drainage   - mL/h  -CRP  -COVID swab pending     Malnourishment with BMI 16.1  -Patient clinically appears to have muscle wasting  -Consult dietitian, appreciate recommendations  -Prealbumin 10.8   -TSH unremarkable with free t4 1.91     Diet: NPO effective now  Dispo: social work      Consultations:   IP CONSULT TO SOCIAL WORK  IP CONSULT TO DIETITIAN  IP CONSULT TO INFECTIOUS DISEASES  [facility]    Patient is admitted as inpatient status because of co-morbiditieslisted above, severity of signs and symptoms as outlined, requirement for current medical therapies and most importantly because of direct risk to patient if care not provided in a hospital setting. Erica Jain MD  6/18/2020  9:33 AM    Copy sent to Dr. Holloway primary care provider on file.

## 2020-06-18 NOTE — CONSULTS
of education: Not on file    Highest education level: Not on file   Occupational History    Not on file   Social Needs    Financial resource strain: Not on file    Food insecurity     Worry: Not on file     Inability: Not on file    Transportation needs     Medical: Not on file     Non-medical: Not on file   Tobacco Use    Smoking status: Never Smoker    Smokeless tobacco: Never Used    Tobacco comment: pt is a non smoker   Substance and Sexual Activity    Alcohol use: Not on file    Drug use: Not on file    Sexual activity: Not on file   Lifestyle    Physical activity     Days per week: Not on file     Minutes per session: Not on file    Stress: Not on file   Relationships    Social connections     Talks on phone: Not on file     Gets together: Not on file     Attends Faith service: Not on file     Active member of club or organization: Not on file     Attends meetings of clubs or organizations: Not on file     Relationship status: Not on file    Intimate partner violence     Fear of current or ex partner: Not on file     Emotionally abused: Not on file     Physically abused: Not on file     Forced sexual activity: Not on file   Other Topics Concern    Not on file   Social History Narrative    Not on file       Family History:   History reviewed. No pertinent family history. Allergies:   Patient has no known allergies. Review of Systems:     Review of Systems   Unable to perform ROS: Psychiatric disorder       Physical Examination :     Patient Vitals for the past 8 hrs:   BP Temp Temp src Pulse Resp SpO2 Weight   06/18/20 0745 135/84 97.7 °F (36.5 °C) Oral 100 20 98 % --   06/18/20 0503 -- -- -- -- -- -- 96 lb 12.5 oz (43.9 kg)       Physical Exam  Vitals signs and nursing note reviewed. Constitutional:       General: She is not in acute distress. Appearance: She is ill-appearing. Comments: Only arouseable with sternal rub. HENT:      Head: Normocephalic and atraumatic.

## 2020-06-18 NOTE — PLAN OF CARE
Pt remained free from injury and falls this shift. Call light within reach, non-slip socks on, bedside table within reach, 2/4 side rails up, and bed in lowest position. Will continue to monitor.     Problem: Infection:  Goal: Will remain free from infection  Description: Will remain free from infection  Outcome: Ongoing     Problem: Safety:  Goal: Free from accidental physical injury  Description: Free from accidental physical injury  Outcome: Ongoing  Goal: Free from intentional harm  Description: Free from intentional harm  Outcome: Ongoing     Problem: Daily Care:  Goal: Daily care needs are met  Description: Daily care needs are met  Outcome: Ongoing     Problem: Pain:  Goal: Patient's pain/discomfort is manageable  Description: Patient's pain/discomfort is manageable  Outcome: Ongoing

## 2020-06-18 NOTE — PROGRESS NOTES
mouth daily   Yes Historical Provider, MD   metroNIDAZOLE (FLAGYL) 500 MG tablet Take 500 mg by mouth 3 times daily 20 Yes Historical Provider, MD   ciprofloxacin (CIPRO) 500 MG tablet Take 500 mg by mouth 2 times daily 20 Yes Historical Provider, MD        Allergies:     Patient has no known allergies. Social History:     Tobacco:    reports that she has never smoked. She has never used smokeless tobacco.  Alcohol:      has no history on file for alcohol. Drug Use:  has no history on file for drug. Family History:     No family history on file. Review of Systems:   Cannot be done because of the patient condition  Physical Exam:     /75   Pulse 118   Temp 97.8 °F (36.6 °C) (Oral)   Resp 14   Ht 5' (1.524 m)   Wt 100 lb (45.4 kg)   BMI 19.53 kg/m²   Temp (24hrs), Av.1 °F (36.7 °C), Min:97.8 °F (36.6 °C), Max:98.3 °F (36.8 °C)    No results for input(s): POCGLU in the last 72 hours. No intake or output data in the 24 hours ending 20    General Appearance:  -Patient is almost nonverbal, thin build  Mental status: Not in acute time place person  Head:  normocephalic, atraumatic. Eye: no icterus, redness, pupils equal and reactive, extraocular eye movements intact, conjunctiva clear  Ear: normal external ear, no discharge, hearing intact  Nose:  no drainage noted  Mouth: mucous membranes moist  Neck: supple, no carotid bruits, thyroid not palpable  Lungs: Bilateral equal air entry, clear to ausculation, no wheezing, rales or rhonchi, normal effort  Cardiovascular: normal rate, regular rhythm, no murmur, gallop, rub.   Abdomen: Soft, nontender, nondistended, normal bowel sounds, no hepatomegaly or splenomegaly  Neurologic: Not cooperative for neurological exam  Skin: No gross lesions, rashes, bruising or bleeding on exposed skin area  Extremities:  peripheral pulses palpable, no pedal edema or calf pain with palpation  Psych: Flat affect    Investigations: Differential Type NOT REPORTED     Seg Neutrophils 74 (H) 36 - 66 %    Lymphocytes 16 (L) 24 - 44 %    Monocytes 8 (H) 1 - 7 %    Eosinophils % 1 0 - 4 %    Basophils 1 0 - 2 %    Immature Granulocytes NOT REPORTED 0 %    Segs Absolute 8.90 1.3 - 9.1 k/uL    Absolute Lymph # 1.90 1.0 - 4.8 k/uL    Absolute Mono # 1.00 0.1 - 1.3 k/uL    Absolute Eos # 0.10 0.0 - 0.4 k/uL    Basophils Absolute 0.10 0.0 - 0.2 k/uL    Absolute Immature Granulocyte NOT REPORTED 0.00 - 0.30 k/uL    WBC Morphology NOT REPORTED     RBC Morphology NOT REPORTED     Platelet Estimate NOT REPORTED    Magnesium    Collection Time: 06/17/20  6:25 AM   Result Value Ref Range    Magnesium 2.2 1.6 - 2.6 mg/dL   C-Reactive Protein    Collection Time: 06/17/20  5:33 PM   Result Value Ref Range    .9 (H) 0.0 - 5.0 mg/L       Imaging/Diagonstics:      Assessment :      Primary Problem  Psychotic episode [facility])    Active Hospital Problems    Diagnosis Date Noted    H/O malignant neoplasm of colon [Z85.038] 06/16/2020    Acute cystitis without hematuria [N30.00] 06/16/2020    Psychotic episode ([facility] [address] [F23] 06/15/2020       Plan:     1. I will repeat CT abdomen pelvis, with IV contrast, to look for extent of pelvic abscess  2. We will continue with ciprofloxacin and Flagyl for now, ordering urinalysis and urine culture, patient likely will need evaluation by urologist, if patient has hydronephrosis  History of colon cancer. 3.  Will follow closely. 6/17  CT abdomen pelvis, concerning for pelvic abscess  We will shift patient to progressive unit,  Started on IV Zosyn  IR guided drainage of pelvic abscess  ID consult  Discussed with RN  Consultations:   GERMÁN [address]  IP CONSULT TO INTERNAL MEDICINE      Loren Blandon MD  6/17/2020  10:21 PM    Copy sent to Dr. Cece Sanchez primary care provider on file. Please note that this chart was generated using voice recognition Dragon dictation software.   Although every effort was made to

## 2020-06-19 ENCOUNTER — APPOINTMENT (OUTPATIENT)
Dept: INTERVENTIONAL RADIOLOGY/VASCULAR | Age: 45
DRG: 518 | End: 2020-06-19
Attending: INTERNAL MEDICINE
Payer: MEDICARE

## 2020-06-19 PROBLEM — E43 SEVERE MALNUTRITION (HCC): Status: ACTIVE | Noted: 2020-06-19

## 2020-06-19 LAB
ABSOLUTE EOS #: 0.1 K/UL (ref 0–0.4)
ABSOLUTE IMMATURE GRANULOCYTE: ABNORMAL K/UL (ref 0–0.3)
ABSOLUTE LYMPH #: 2.7 K/UL (ref 1–4.8)
ABSOLUTE MONO #: 0.9 K/UL (ref 0.1–1.3)
ANION GAP SERPL CALCULATED.3IONS-SCNC: 11 MMOL/L (ref 9–17)
BASOPHILS # BLD: 1 % (ref 0–2)
BASOPHILS ABSOLUTE: 0.1 K/UL (ref 0–0.2)
BUN BLDV-MCNC: 9 MG/DL (ref 6–20)
BUN/CREAT BLD: ABNORMAL (ref 9–20)
CALCIUM SERPL-MCNC: 8.4 MG/DL (ref 8.6–10.4)
CHLORIDE BLD-SCNC: 106 MMOL/L (ref 98–107)
CO2: 26 MMOL/L (ref 20–31)
CREAT SERPL-MCNC: 0.79 MG/DL (ref 0.5–0.9)
DIFFERENTIAL TYPE: ABNORMAL
EOSINOPHILS RELATIVE PERCENT: 1 % (ref 0–4)
GFR AFRICAN AMERICAN: >60 ML/MIN
GFR NON-AFRICAN AMERICAN: >60 ML/MIN
GFR SERPL CREATININE-BSD FRML MDRD: ABNORMAL ML/MIN/{1.73_M2}
GFR SERPL CREATININE-BSD FRML MDRD: ABNORMAL ML/MIN/{1.73_M2}
GLUCOSE BLD-MCNC: 101 MG/DL (ref 70–99)
HCT VFR BLD CALC: 29.3 % (ref 36–46)
HEMOGLOBIN: 9.8 G/DL (ref 12–16)
IMMATURE GRANULOCYTES: ABNORMAL %
LYMPHOCYTES # BLD: 20 % (ref 24–44)
MCH RBC QN AUTO: 30.1 PG (ref 26–34)
MCHC RBC AUTO-ENTMCNC: 33.5 G/DL (ref 31–37)
MCV RBC AUTO: 89.9 FL (ref 80–100)
MONOCYTES # BLD: 6 % (ref 1–7)
NRBC AUTOMATED: ABNORMAL PER 100 WBC
PDW BLD-RTO: 14.8 % (ref 11.5–14.9)
PLATELET # BLD: 440 K/UL (ref 150–450)
PLATELET ESTIMATE: ABNORMAL
PMV BLD AUTO: 7.2 FL (ref 6–12)
POTASSIUM SERPL-SCNC: 3.9 MMOL/L (ref 3.7–5.3)
RBC # BLD: 3.26 M/UL (ref 4–5.2)
RBC # BLD: ABNORMAL 10*6/UL
SARS-COV-2, PCR: NORMAL
SARS-COV-2, RAPID: NORMAL
SARS-COV-2: NOT DETECTED
SEDIMENTATION RATE, ERYTHROCYTE: 87 MM (ref 0–20)
SEG NEUTROPHILS: 72 % (ref 36–66)
SEGMENTED NEUTROPHILS ABSOLUTE COUNT: 10 K/UL (ref 1.3–9.1)
SODIUM BLD-SCNC: 143 MMOL/L (ref 135–144)
SOURCE: NORMAL
WBC # BLD: 13.8 K/UL (ref 3.5–11)
WBC # BLD: ABNORMAL 10*3/UL

## 2020-06-19 PROCEDURE — 80048 BASIC METABOLIC PNL TOTAL CA: CPT

## 2020-06-19 PROCEDURE — 6360000002 HC RX W HCPCS: Performed by: STUDENT IN AN ORGANIZED HEALTH CARE EDUCATION/TRAINING PROGRAM

## 2020-06-19 PROCEDURE — 2580000003 HC RX 258: Performed by: STUDENT IN AN ORGANIZED HEALTH CARE EDUCATION/TRAINING PROGRAM

## 2020-06-19 PROCEDURE — 87077 CULTURE AEROBIC IDENTIFY: CPT

## 2020-06-19 PROCEDURE — 0W9J3ZX DRAINAGE OF PELVIC CAVITY, PERCUTANEOUS APPROACH, DIAGNOSTIC: ICD-10-PCS | Performed by: RADIOLOGY

## 2020-06-19 PROCEDURE — 36415 COLL VENOUS BLD VENIPUNCTURE: CPT

## 2020-06-19 PROCEDURE — 6370000000 HC RX 637 (ALT 250 FOR IP): Performed by: INTERNAL MEDICINE

## 2020-06-19 PROCEDURE — 87070 CULTURE OTHR SPECIMN AEROBIC: CPT

## 2020-06-19 PROCEDURE — 2060000000 HC ICU INTERMEDIATE R&B

## 2020-06-19 PROCEDURE — 2500000003 HC RX 250 WO HCPCS: Performed by: STUDENT IN AN ORGANIZED HEALTH CARE EDUCATION/TRAINING PROGRAM

## 2020-06-19 PROCEDURE — 85651 RBC SED RATE NONAUTOMATED: CPT

## 2020-06-19 PROCEDURE — 2709999900 CT ABSCESS DRAINAGE RETROPERITONEAL OPEN

## 2020-06-19 PROCEDURE — 87205 SMEAR GRAM STAIN: CPT

## 2020-06-19 PROCEDURE — 85025 COMPLETE CBC W/AUTO DIFF WBC: CPT

## 2020-06-19 PROCEDURE — 87186 SC STD MICRODIL/AGAR DIL: CPT

## 2020-06-19 RX ORDER — METOPROLOL TARTRATE 5 MG/5ML
2.5 INJECTION INTRAVENOUS EVERY 4 HOURS PRN
Status: DISCONTINUED | OUTPATIENT
Start: 2020-06-19 | End: 2020-06-20 | Stop reason: HOSPADM

## 2020-06-19 RX ORDER — AMOXICILLIN AND CLAVULANATE POTASSIUM 500; 125 MG/1; MG/1
1 TABLET, FILM COATED ORAL 2 TIMES DAILY
Status: DISCONTINUED | OUTPATIENT
Start: 2020-06-19 | End: 2020-06-20 | Stop reason: HOSPADM

## 2020-06-19 RX ORDER — LEVOFLOXACIN 750 MG/1
750 TABLET ORAL DAILY
Status: DISCONTINUED | OUTPATIENT
Start: 2020-06-19 | End: 2020-06-19

## 2020-06-19 RX ADMIN — PIPERACILLIN AND TAZOBACTAM 3.38 G: 3; .375 INJECTION, POWDER, FOR SOLUTION INTRAVENOUS at 10:18

## 2020-06-19 RX ADMIN — Medication 10 ML: at 20:45

## 2020-06-19 RX ADMIN — AMOXICILLIN AND CLAVULANATE POTASSIUM 1 TABLET: 500; 125 TABLET, FILM COATED ORAL at 23:47

## 2020-06-19 RX ADMIN — BUSPIRONE HYDROCHLORIDE 15 MG: 15 TABLET ORAL at 10:19

## 2020-06-19 RX ADMIN — VORTIOXETINE 10 MG: 10 TABLET, FILM COATED ORAL at 10:19

## 2020-06-19 RX ADMIN — TAMSULOSIN HYDROCHLORIDE 0.4 MG: 0.4 CAPSULE ORAL at 10:18

## 2020-06-19 RX ADMIN — METOPROLOL TARTRATE 2.5 MG: 1 INJECTION, SOLUTION INTRAVENOUS at 23:47

## 2020-06-19 RX ADMIN — SODIUM CHLORIDE: 9 INJECTION, SOLUTION INTRAVENOUS at 17:55

## 2020-06-19 RX ADMIN — MIRTAZAPINE 15 MG: 15 TABLET, FILM COATED ORAL at 20:45

## 2020-06-19 RX ADMIN — BUSPIRONE HYDROCHLORIDE 15 MG: 15 TABLET ORAL at 15:42

## 2020-06-19 RX ADMIN — Medication 10 ML: at 10:23

## 2020-06-19 RX ADMIN — PAROXETINE HYDROCHLORIDE 20 MG: 20 TABLET, FILM COATED ORAL at 10:19

## 2020-06-19 RX ADMIN — BUSPIRONE HYDROCHLORIDE 15 MG: 15 TABLET ORAL at 20:45

## 2020-06-19 RX ADMIN — OLANZAPINE 5 MG: 10 TABLET, FILM COATED ORAL at 20:45

## 2020-06-19 ASSESSMENT — ENCOUNTER SYMPTOMS
SHORTNESS OF BREATH: 0
ABDOMINAL PAIN: 0
APNEA: 0
ABDOMINAL DISTENTION: 0
DIARRHEA: 0
BACK PAIN: 0
VOMITING: 0
CHEST TIGHTNESS: 0
COUGH: 0
NAUSEA: 0

## 2020-06-19 ASSESSMENT — PAIN SCALES - GENERAL
PAINLEVEL_OUTOF10: 0

## 2020-06-19 NOTE — PROGRESS NOTES
Dr Oral Richards paged re: Katia Pickering for baseline EKG prior to Levaquin administration; await response

## 2020-06-19 NOTE — PROGRESS NOTES
acute findings.       Organs: Cholelithiasis in a contracted gallbladder.  No biliary dilatation. The liver, pancreas, spleen and adrenals appear unremarkable.       Right double-J ureteral stent in place.  Mild residual hydronephrosis and   right renal edema.  Mild urothelial enhancement on the right side.  No renal   calculi.  The left kidney is remarkable for a subcentimeter intraparenchymal   cyst.       GI/Bowel: Status post distal colonic resection.  No evidence for bowel   obstruction.  Moderate stool burden.       Pelvis: Right pelvic abscess in the presacral space measures up to 5.7 cm in   greatest transverse dimension, approximately 7 cm in craniocaudal dimension   and 2.8 cm in AP dimension.  The bladder is decompressed.       Peritoneum/Retroperitoneum: No free air.  No ascites.  No lymphadenopathy. The aorta is normal in caliber.       Bones/Soft Tissues: No acute osseous abnormality identified.           Impression   1.  Pelvic abscess predominates in the presacral space extending to the right   side.       2.  Right double-J ureteral stent in place.  Mild residual hydronephrosis,   urothelial enhancement and right renal edema.       3.  Cholelithiasis in a contracted gallbladder.             I have personally reviewed the past medical history, past surgical history, medications, social history, and family history, and I haveupdated the database accordingly. Past Medical History:   History reviewed. No pertinent past medical history. Past Surgical  History:   History reviewed. No pertinent surgical history.     Medications:      [Held by provider] heparin (porcine)  5,000 Units Subcutaneous 3 times per day    sodium chloride flush  10 mL Intravenous 2 times per day    piperacillin-tazobactam (ZOSYN) 3.375 g in dextrose 5% IVPB extended infusion (mini-bag)  3.375 g Intravenous Q8H    busPIRone  15 mg Oral TID    mirtazapine  15 mg Oral Nightly    OLANZapine  5 mg Oral Nightly    PARoxetine  20 mg Oral QAM    tamsulosin  0.4 mg Oral Daily    VORTIoxetine  10 mg Oral Daily       Social History:     Social History     Socioeconomic History    Marital status: Unknown     Spouse name: Not on file    Number of children: Not on file    Years of education: Not on file    Highest education level: Not on file   Occupational History    Not on file   Social Needs    Financial resource strain: Not on file    Food insecurity     Worry: Not on file     Inability: Not on file    Transportation needs     Medical: Not on file     Non-medical: Not on file   Tobacco Use    Smoking status: Never Smoker    Smokeless tobacco: Never Used    Tobacco comment: pt is a non smoker   Substance and Sexual Activity    Alcohol use: Not on file    Drug use: Not on file    Sexual activity: Not on file   Lifestyle    Physical activity     Days per week: Not on file     Minutes per session: Not on file    Stress: Not on file   Relationships    Social connections     Talks on phone: Not on file     Gets together: Not on file     Attends Religion service: Not on file     Active member of club or organization: Not on file     Attends meetings of clubs or organizations: Not on file     Relationship status: Not on file    Intimate partner violence     Fear of current or ex partner: Not on file     Emotionally abused: Not on file     Physically abused: Not on file     Forced sexual activity: Not on file   Other Topics Concern    Not on file   Social History Narrative    Not on file       Family History:   History reviewed. No pertinent family history. Allergies:   Patient has no known allergies.      Review of Systems:     Review of Systems   Unable to perform ROS: Psychiatric disorder       Physical Examination :     Patient Vitals for the past 8 hrs:   BP Temp Temp src Pulse Resp SpO2 Weight   06/19/20 0855 126/77 98.4 °F (36.9 °C) Oral 101 16 99 % --   06/19/20 0510 -- -- -- -- -- -- 95 lb 7.4 oz (43.3

## 2020-06-19 NOTE — PLAN OF CARE
Problem: Safety:  Goal: Free from accidental physical injury  Description: Free from accidental physical injury  6/19/2020 1545 by Jaime Spears RN  Outcome: Met This Shift  6/19/2020 1530 by Jaime Spears RN  Outcome: Met This Shift  6/19/2020 0233 by Nathanael Bray RN  Outcome: Ongoing  Goal: Free from intentional harm  Description: Free from intentional harm  6/19/2020 1545 by Jaime Spears RN  Outcome: Met This Shift  6/19/2020 1530 by Jaime Spears RN  Outcome: Met This Shift  6/19/2020 0233 by Nathanael Bray RN  Outcome: Ongoing     Problem: Daily Care:  Goal: Daily care needs are met  Description: Daily care needs are met  6/19/2020 1545 by Jaime Spears RN  Outcome: Met This Shift  Note: X3 episodes incontinent stool.  Is on clear liquid diet  6/19/2020 1530 by Jaime Spears RN  Outcome: Met This Shift  6/19/2020 0233 by Nathanael Bray RN  Outcome: Ongoing     Problem: Pain:  Goal: Patient's pain/discomfort is manageable  Description: Patient's pain/discomfort is manageable  6/19/2020 1545 by Jaime Spears RN  Outcome: Met This Shift  6/19/2020 1530 by Jaime Spears RN  Outcome: Met This Shift  6/19/2020 0233 by Nathanael Bray RN  Outcome: Ongoing

## 2020-06-19 NOTE — PROGRESS NOTES
[facility]                 PATIENT NAME: Yamile Fong     TODAY'S DATE: 6/19/2020, 9:33 AM    SUBJECTIVE:    Pt seen and examined. Afebrile, mild tachycardia otherwise VSS. Leukocytosis noted. Patient resting comfortably in bed, awakens to voice. She denies abdominal pain. No N/V. COVID negative. Patient to have abscess drained in IR. OBJECTIVE:   VITALS:  /77   Pulse 101   Temp 98.4 °F (36.9 °C) (Oral)   Resp 16   Ht 5' 4\" (1.626 m)   Wt 95 lb 7.4 oz (43.3 kg)   SpO2 99%   BMI 16.39 kg/m²      INTAKE/OUTPUT:      Intake/Output Summary (Last 24 hours) at 6/19/2020 0933  Last data filed at 6/19/2020 0030  Gross per 24 hour   Intake 300 ml   Output --   Net 300 ml                 CONSTITUTIONAL:  awake and alert. No acute distress  HEART:   Mild tachycardia, regular rhythm   LUNGS:   Decreased air entry at bases  ABDOMEN:   Abdomen soft, non-tender, non-distended  EXTREMITIES:   No pedal edema    Data:  CBC:   Lab Results   Component Value Date    WBC 13.8 06/19/2020    RBC 3.26 06/19/2020    HGB 9.8 06/19/2020    HCT 29.3 06/19/2020    MCV 89.9 06/19/2020    MCH 30.1 06/19/2020    MCHC 33.5 06/19/2020    RDW 14.8 06/19/2020     06/19/2020    MPV 7.2 06/19/2020     BMP:    Lab Results   Component Value Date     06/19/2020    K 3.9 06/19/2020     06/19/2020    CO2 26 06/19/2020    BUN 9 06/19/2020    LABALBU 3.1 06/17/2020    CREATININE 0.79 06/19/2020    CALCIUM 8.4 06/19/2020    GFRAA >60 06/19/2020    LABGLOM >60 06/19/2020    GLUCOSE 101 06/19/2020       Radiology Review:  No new images tor eview      ASSESSMENT     Principal Problem:    Pelvic abscess in female  Active Problems:    Calculus of gallbladder without cholecystitis without obstruction    Leukocytosis    Elevated C-reactive protein (CRP)  Resolved Problems:    * No resolved hospital problems. *      Plan  1. NPO  2. IV antibiotics  3.  IR to drain pelvic abscess

## 2020-06-19 NOTE — PLAN OF CARE
Pt remained free from injury and falls this shift. Call light within reach, non-slip socks on, bedside table within reach, 2/4 side rails up, and bed in lowest position. Will continue to monitor. Problem: Infection:  Goal: Will remain free from infection  Description: Will remain free from infection  6/19/2020 0233 by Claudell Hila, RN  Outcome: Ongoing  6/18/2020 1612 by Tatianna Renteria RN  Note: IV antbx continued as ordered.  Low grad tempt this a.m. only     Problem: Safety:  Goal: Free from accidental physical injury  Description: Free from accidental physical injury  6/19/2020 0233 by Claudell Hila, RN  Outcome: Ongoing  6/18/2020 1612 by Tatianna Renteria RN  Outcome: Met This Shift  Goal: Free from intentional harm  Description: Free from intentional harm  6/19/2020 0233 by Claudell Hila, RN  Outcome: Ongoing  6/18/2020 1612 by Tatianna Renteria RN  Outcome: Met This Shift  Note: 1:1 conintued     Problem: Daily Care:  Goal: Daily care needs are met  Description: Daily care needs are met  6/19/2020 0233 by Claudell Hila, RN  Outcome: Ongoing  6/18/2020 1612 by Tatianna Renteria RN  Outcome: Met This Shift     Problem: Pain:  Goal: Patient's pain/discomfort is manageable  Description: Patient's pain/discomfort is manageable  6/19/2020 0233 by Claudell Hila, RN  Outcome: Ongoing  6/18/2020 1612 by Tatianna Renteria RN  Outcome: Met This Shift

## 2020-06-19 NOTE — PROGRESS NOTES
[facility]     PROGRESS NOTE             Date:   6/19/2020  Patient name:  Mick Pierson  Date of admission:  6/17/2020  4:33 PM  MRN:   [id number]  Account:  [de-identified]  YOB: 1975  PCP:    No primary care provider on file. Room:   [address] [place] [id number]  Code Status:    Full Code    Chief Complaint:     No chief complaint on file. History Obtained From:     patient    History of Present Illness:     Patient seen and examined at bedside this morning. No acute events overnight, plan for abscess drainage today by IR. NPO since midnight ,no change is patients orientation to self. Elevated HR this morning, added Lopressor PRN. Past Medical History:     History reviewed. No pertinent past medical history. Past SurgicalHistory:     History reviewed. No pertinent surgical history. Medications Prior to Admission:        Prior to Admission medications    Medication Sig Start Date End Date Taking?  Authorizing Provider   busPIRone (BUSPAR) 15 MG tablet Take 15 mg by mouth 3 times daily    Historical Provider, MD   PARoxetine (PAXIL) 20 MG tablet Take 20 mg by mouth every morning    Historical Provider, MD   tamsulosin (FLOMAX) 0.4 MG capsule Take 0.4 mg by mouth daily    Historical Provider, MD   hydrOXYzine (VISTARIL) 50 MG capsule Take 50 mg by mouth 3 times daily as needed    Historical Provider, MD   mirtazapine (REMERON) 15 MG tablet Take 15 mg by mouth nightly    Historical Provider, MD   pantoprazole (PROTONIX) 40 MG tablet Take 40 mg by mouth daily    Historical Provider, MD   metroNIDAZOLE (FLAGYL) 500 MG tablet Take 500 mg by mouth 3 times daily 6/12/20 6/22/20  Historical Provider, MD   ciprofloxacin (CIPRO) 500 MG tablet Take 500 mg by mouth 2 times daily 6/12/20 6/22/20  Historical Provider, MD        Allergies:     Patient has Seg Neutrophils 72 (H) 36 - 66 %    Lymphocytes 20 (L) 24 - 44 %    Monocytes 6 1 - 7 %    Eosinophils % 1 0 - 4 %    Basophils 1 0 - 2 %    Immature Granulocytes NOT REPORTED 0 %    Segs Absolute 10.00 (H) 1.3 - 9.1 k/uL    Absolute Lymph # 2.70 1.0 - 4.8 k/uL    Absolute Mono # 0.90 0.1 - 1.3 k/uL    Absolute Eos # 0.10 0.0 - 0.4 k/uL    Basophils Absolute 0.10 0.0 - 0.2 k/uL    Absolute Immature Granulocyte NOT REPORTED 0.00 - 0.30 k/uL    WBC Morphology NOT REPORTED     RBC Morphology NOT REPORTED     Platelet Estimate NOT REPORTED    Sedimentation Rate    Collection Time: 06/19/20  5:34 AM   Result Value Ref Range    Sed Rate 87 (H) 0 - 20 mm       Imaging/Diagnostics:  Ct Abdomen Pelvis W Iv Contrast Additional Contrast? None    Result Date: 6/17/2020  EXAMINATION: CT OF THE ABDOMEN AND PELVIS WITH CONTRAST 6/17/2020 8:30 am TECHNIQUE: CT of the abdomen and pelvis was performed with the administration of intravenous contrast. Multiplanar reformatted images are provided for review. Dose modulation, iterative reconstruction, and/or weight based adjustment of the mA/kV was utilized to reduce the radiation dose to as low as reasonably achievable. COMPARISON: None. HISTORY: ORDERING SYSTEM PROVIDED HISTORY: Pelvic abcess / Hydronephrosis TECHNOLOGIST PROVIDED HISTORY: Pelvic abcess / Hydronephrosis Reason for Exam: Pelvic abcess / Hydronephrosis, has stent in place Acuity: Acute Type of Exam: Initial FINDINGS: Lower Chest: No acute findings. Organs: Cholelithiasis in a contracted gallbladder. No biliary dilatation. The liver, pancreas, spleen and adrenals appear unremarkable. Right double-J ureteral stent in place. Mild residual hydronephrosis and right renal edema. Mild urothelial enhancement on the right side. No renal calculi. The left kidney is remarkable for a subcentimeter intraparenchymal cyst. GI/Bowel: Status post distal colonic resection. No evidence for bowel obstruction. Moderate stool burden. Pelvis: Right pelvic abscess in the presacral space measures up to 5.7 cm in greatest transverse dimension, approximately 7 cm in craniocaudal dimension and 2.8 cm in AP dimension. The bladder is decompressed. Peritoneum/Retroperitoneum: No free air. No ascites. No lymphadenopathy. The aorta is normal in caliber. Bones/Soft Tissues: No acute osseous abnormality identified. 1.  Pelvic abscess predominates in the presacral space extending to the right side. 2.  Right double-J ureteral stent in place. Mild residual hydronephrosis, urothelial enhancement and right renal edema. 3.  Cholelithiasis in a contracted gallbladder.        Assessment :      Primary Problem  Pelvic abscess in female    Active Hospital Problems    Diagnosis Date Noted    Pelvic abscess in female [N73.9]     Calculus of gallbladder without cholecystitis without obstruction [K80.20]     Leukocytosis [D72.829]     Elevated C-reactive protein (CRP) [R79.82]        Plan:     Patient status Admit as inpatient in the  Progressive Unit/Step down    Pelvis Abscess likely associated with radiation for rectal cancer s/p 2015   -Patient transferred from [facility] to inpatient unit  -CT Scan showed pelvis abscess in presacral space extending to the right side   -IV Zosyn  -IR consulted for abscess drainage   - mL/h  -ESR 87  -WBC 13.8 from 10.9   -COVID swab negative     Malnourishment with BMI 16.1  -Patient clinically appears to have muscle wasting  -Consult dietitian, appreciate recommendations  -Prealbumin 10.8   -TSH unremarkable with free t4 1.91     Elevated HR likely due to multiple psych meds  -Lopressor 2.5 mg IV Q4h PRN    Diet: NPO effective now  Dispo: social work    Consultations:   IP CONSULT TO SOCIAL WORK  IP CONSULT TO DIETITIAN  IP CONSULT TO INFECTIOUS DISEASES  IP CONSULT TO UROLOGY  IP CONSULT TO GENERAL SURGERY    Patient is admitted as inpatient status because of co-morbiditieslisted above, severity of signs and symptoms as

## 2020-06-19 NOTE — PLAN OF CARE
Nutrition Problem: Severe malnutrition  Intervention: Food and/or Nutrient Delivery: Continue NPO  Nutritional Goals: Initiate oral intake or nutrition support

## 2020-06-19 NOTE — FLOWSHEET NOTE
06/18/20 2126   Provider Notification   Reason for Communication Evaluate   Provider Name Spring Abdi   Provider Notification Physician Assistant   Method of Communication Secure Message   Response See orders   Notification Time 2125   RN messaged Shanique Sutton d/t pt's heart rate staying in the 120-130s at rest and getting up to 160's on exertion. See orders.

## 2020-06-19 NOTE — CONSULTS
 Food insecurity     Worry: Not on file     Inability: Not on file    Transportation needs     Medical: Not on file     Non-medical: Not on file   Tobacco Use    Smoking status: Never Smoker    Smokeless tobacco: Never Used    Tobacco comment: pt is a non smoker   Substance and Sexual Activity    Alcohol use: Not on file    Drug use: Not on file    Sexual activity: Not on file   Lifestyle    Physical activity     Days per week: Not on file     Minutes per session: Not on file    Stress: Not on file   Relationships    Social connections     Talks on phone: Not on file     Gets together: Not on file     Attends Ep[employer]opalian service: Not on file     Active member of club or organization: Not on file     Attends meetings of clubs or organizations: Not on file     Relationship status: Not on file    Intimate partner violence     Fear of current or ex partner: Not on file     Emotionally abused: Not on file     Physically abused: Not on file     Forced sexual activity: Not on file   Other Topics Concern    Not on file   Social History Narrative    Not on file       Family History:  History reviewed. No pertinent family history. Previous Urologic Family history: none  REVIEW OF SYSTEMS:  Constitutional: negative  Eyes: negative  Respiratory: negative  Cardiovascular: negative  Gastrointestinal: negative  Genitourinary: see HPI  Musculoskeletal: negative  Skin: negative   Neurological: negative  Hematological/Lymphatic: negative  Psychological: negative and suicidal ideations.      Physical Exam:      This a 40 y.o. female   Patient Vitals for the past 24 hrs:   BP Temp Temp src Pulse Resp SpO2 Weight   06/19/20 1311 131/80 98.1 °F (36.7 °C) Oral 92 16 100 % --   06/19/20 1248 137/87 -- -- 76 24 100 % --   06/19/20 1235 137/87 -- -- 88 21 100 % --   06/19/20 1232 136/80 -- -- 92 23 100 % --   06/19/20 1227 136/80 -- -- 87 25 100 % --   06/19/20 1217 131/83 -- -- 105 20 100 % --   06/19/20 0855 126/77 98.4 °F

## 2020-06-20 ENCOUNTER — HOSPITAL ENCOUNTER (INPATIENT)
Age: 45
LOS: 5 days | Discharge: ANOTHER HOSPITAL | DRG: 751 | End: 2020-06-25
Attending: PSYCHIATRY & NEUROLOGY | Admitting: PSYCHIATRY & NEUROLOGY
Payer: MEDICARE

## 2020-06-20 ENCOUNTER — APPOINTMENT (OUTPATIENT)
Dept: CT IMAGING | Age: 45
DRG: 518 | End: 2020-06-20
Attending: INTERNAL MEDICINE
Payer: MEDICARE

## 2020-06-20 VITALS
SYSTOLIC BLOOD PRESSURE: 125 MMHG | RESPIRATION RATE: 18 BRPM | BODY MASS INDEX: 17.13 KG/M2 | TEMPERATURE: 98.4 F | HEART RATE: 100 BPM | DIASTOLIC BLOOD PRESSURE: 77 MMHG | OXYGEN SATURATION: 97 % | HEIGHT: 64 IN | WEIGHT: 100.31 LBS

## 2020-06-20 PROBLEM — F32.3 MAJOR DEPRESSION WITH PSYCHOTIC FEATURES (HCC): Status: ACTIVE | Noted: 2020-06-20

## 2020-06-20 LAB
ABSOLUTE EOS #: 0.1 K/UL (ref 0–0.4)
ABSOLUTE IMMATURE GRANULOCYTE: ABNORMAL K/UL (ref 0–0.3)
ABSOLUTE LYMPH #: 2.9 K/UL (ref 1–4.8)
ABSOLUTE MONO #: 0.8 K/UL (ref 0.1–1.3)
ANION GAP SERPL CALCULATED.3IONS-SCNC: 10 MMOL/L (ref 9–17)
BASOPHILS # BLD: 1 % (ref 0–2)
BASOPHILS ABSOLUTE: 0.1 K/UL (ref 0–0.2)
BUN BLDV-MCNC: 8 MG/DL (ref 6–20)
BUN/CREAT BLD: ABNORMAL (ref 9–20)
C DIFF AG + TOXIN: NEGATIVE
CALCIUM SERPL-MCNC: 8.5 MG/DL (ref 8.6–10.4)
CHLORIDE BLD-SCNC: 108 MMOL/L (ref 98–107)
CO2: 25 MMOL/L (ref 20–31)
CREAT SERPL-MCNC: 0.86 MG/DL (ref 0.5–0.9)
DIFFERENTIAL TYPE: ABNORMAL
EOSINOPHILS RELATIVE PERCENT: 1 % (ref 0–4)
GFR AFRICAN AMERICAN: >60 ML/MIN
GFR NON-AFRICAN AMERICAN: >60 ML/MIN
GFR SERPL CREATININE-BSD FRML MDRD: ABNORMAL ML/MIN/{1.73_M2}
GFR SERPL CREATININE-BSD FRML MDRD: ABNORMAL ML/MIN/{1.73_M2}
GLUCOSE BLD-MCNC: 95 MG/DL (ref 70–99)
HCT VFR BLD CALC: 29.8 % (ref 36–46)
HEMOGLOBIN: 9.6 G/DL (ref 12–16)
IMMATURE GRANULOCYTES: ABNORMAL %
LYMPHOCYTES # BLD: 26 % (ref 24–44)
MAGNESIUM: 2 MG/DL (ref 1.6–2.6)
MCH RBC QN AUTO: 28.8 PG (ref 26–34)
MCHC RBC AUTO-ENTMCNC: 32.2 G/DL (ref 31–37)
MCV RBC AUTO: 89.6 FL (ref 80–100)
MONOCYTES # BLD: 7 % (ref 1–7)
NRBC AUTOMATED: ABNORMAL PER 100 WBC
PDW BLD-RTO: 14.7 % (ref 11.5–14.9)
PLATELET # BLD: 482 K/UL (ref 150–450)
PLATELET ESTIMATE: ABNORMAL
PMV BLD AUTO: 7.4 FL (ref 6–12)
POTASSIUM SERPL-SCNC: 3.9 MMOL/L (ref 3.7–5.3)
RBC # BLD: 3.33 M/UL (ref 4–5.2)
RBC # BLD: ABNORMAL 10*6/UL
SEG NEUTROPHILS: 65 % (ref 36–66)
SEGMENTED NEUTROPHILS ABSOLUTE COUNT: 7.5 K/UL (ref 1.3–9.1)
SODIUM BLD-SCNC: 143 MMOL/L (ref 135–144)
SPECIMEN DESCRIPTION: NORMAL
WBC # BLD: 11.4 K/UL (ref 3.5–11)
WBC # BLD: ABNORMAL 10*3/UL

## 2020-06-20 PROCEDURE — 6370000000 HC RX 637 (ALT 250 FOR IP): Performed by: INTERNAL MEDICINE

## 2020-06-20 PROCEDURE — 93005 ELECTROCARDIOGRAM TRACING: CPT | Performed by: STUDENT IN AN ORGANIZED HEALTH CARE EDUCATION/TRAINING PROGRAM

## 2020-06-20 PROCEDURE — 71260 CT THORAX DX C+: CPT

## 2020-06-20 PROCEDURE — 87506 IADNA-DNA/RNA PROBE TQ 6-11: CPT

## 2020-06-20 PROCEDURE — 80048 BASIC METABOLIC PNL TOTAL CA: CPT

## 2020-06-20 PROCEDURE — 2580000003 HC RX 258: Performed by: STUDENT IN AN ORGANIZED HEALTH CARE EDUCATION/TRAINING PROGRAM

## 2020-06-20 PROCEDURE — 87449 NOS EACH ORGANISM AG IA: CPT

## 2020-06-20 PROCEDURE — 87324 CLOSTRIDIUM AG IA: CPT

## 2020-06-20 PROCEDURE — 99232 SBSQ HOSP IP/OBS MODERATE 35: CPT | Performed by: INTERNAL MEDICINE

## 2020-06-20 PROCEDURE — 85025 COMPLETE CBC W/AUTO DIFF WBC: CPT

## 2020-06-20 PROCEDURE — 6360000004 HC RX CONTRAST MEDICATION: Performed by: STUDENT IN AN ORGANIZED HEALTH CARE EDUCATION/TRAINING PROGRAM

## 2020-06-20 PROCEDURE — 36415 COLL VENOUS BLD VENIPUNCTURE: CPT

## 2020-06-20 PROCEDURE — 6370000000 HC RX 637 (ALT 250 FOR IP): Performed by: STUDENT IN AN ORGANIZED HEALTH CARE EDUCATION/TRAINING PROGRAM

## 2020-06-20 PROCEDURE — 90792 PSYCH DIAG EVAL W/MED SRVCS: CPT | Performed by: NURSE PRACTITIONER

## 2020-06-20 PROCEDURE — 83735 ASSAY OF MAGNESIUM: CPT

## 2020-06-20 PROCEDURE — 6370000000 HC RX 637 (ALT 250 FOR IP): Performed by: NURSE PRACTITIONER

## 2020-06-20 PROCEDURE — 1240000000 HC EMOTIONAL WELLNESS R&B

## 2020-06-20 PROCEDURE — 99232 SBSQ HOSP IP/OBS MODERATE 35: CPT | Performed by: NURSE PRACTITIONER

## 2020-06-20 RX ORDER — AMOXICILLIN AND CLAVULANATE POTASSIUM 500; 125 MG/1; MG/1
1 TABLET, FILM COATED ORAL 2 TIMES DAILY
Status: DISCONTINUED | OUTPATIENT
Start: 2020-06-20 | End: 2020-06-25 | Stop reason: HOSPADM

## 2020-06-20 RX ORDER — POTASSIUM CHLORIDE 20 MEQ/1
40 TABLET, EXTENDED RELEASE ORAL PRN
Status: CANCELLED | OUTPATIENT
Start: 2020-06-20

## 2020-06-20 RX ORDER — PANTOPRAZOLE SODIUM 40 MG/1
40 TABLET, DELAYED RELEASE ORAL DAILY
Status: DISCONTINUED | OUTPATIENT
Start: 2020-06-21 | End: 2020-06-25 | Stop reason: HOSPADM

## 2020-06-20 RX ORDER — ACETAMINOPHEN 325 MG/1
650 TABLET ORAL EVERY 6 HOURS PRN
Status: CANCELLED | OUTPATIENT
Start: 2020-06-20

## 2020-06-20 RX ORDER — OLANZAPINE 5 MG/1
5 TABLET ORAL NIGHTLY
Status: ON HOLD | COMMUNITY
End: 2020-07-09 | Stop reason: HOSPADM

## 2020-06-20 RX ORDER — AMOXICILLIN AND CLAVULANATE POTASSIUM 500; 125 MG/1; MG/1
1 TABLET, FILM COATED ORAL 2 TIMES DAILY
COMMUNITY
Start: 2020-06-20 | End: 2020-07-03

## 2020-06-20 RX ORDER — ONDANSETRON 2 MG/ML
4 INJECTION INTRAMUSCULAR; INTRAVENOUS EVERY 6 HOURS PRN
Status: CANCELLED | OUTPATIENT
Start: 2020-06-20

## 2020-06-20 RX ORDER — POTASSIUM CHLORIDE 20 MEQ/1
40 TABLET, EXTENDED RELEASE ORAL PRN
Status: DISCONTINUED | OUTPATIENT
Start: 2020-06-20 | End: 2020-06-25 | Stop reason: HOSPADM

## 2020-06-20 RX ORDER — PANTOPRAZOLE SODIUM 40 MG/1
40 TABLET, DELAYED RELEASE ORAL DAILY
Status: CANCELLED | OUTPATIENT
Start: 2020-06-20

## 2020-06-20 RX ORDER — TAMSULOSIN HYDROCHLORIDE 0.4 MG/1
0.4 CAPSULE ORAL DAILY
Status: DISCONTINUED | OUTPATIENT
Start: 2020-06-21 | End: 2020-06-25 | Stop reason: HOSPADM

## 2020-06-20 RX ORDER — POTASSIUM CHLORIDE 7.45 MG/ML
10 INJECTION INTRAVENOUS PRN
Status: DISCONTINUED | OUTPATIENT
Start: 2020-06-20 | End: 2020-06-25 | Stop reason: HOSPADM

## 2020-06-20 RX ORDER — POLYETHYLENE GLYCOL 3350 17 G/17G
17 POWDER, FOR SOLUTION ORAL DAILY PRN
Status: CANCELLED | OUTPATIENT
Start: 2020-06-20

## 2020-06-20 RX ORDER — SODIUM CHLORIDE 0.9 % (FLUSH) 0.9 %
10 SYRINGE (ML) INJECTION PRN
Status: CANCELLED | OUTPATIENT
Start: 2020-06-20

## 2020-06-20 RX ORDER — HYDROXYZINE HYDROCHLORIDE 25 MG/1
25 TABLET, FILM COATED ORAL 3 TIMES DAILY PRN
Status: DISCONTINUED | OUTPATIENT
Start: 2020-06-20 | End: 2020-06-25 | Stop reason: HOSPADM

## 2020-06-20 RX ORDER — POLYETHYLENE GLYCOL 3350 17 G/17G
17 POWDER, FOR SOLUTION ORAL DAILY PRN
Status: DISCONTINUED | OUTPATIENT
Start: 2020-06-20 | End: 2020-06-25 | Stop reason: HOSPADM

## 2020-06-20 RX ORDER — ONDANSETRON 2 MG/ML
4 INJECTION INTRAMUSCULAR; INTRAVENOUS EVERY 6 HOURS PRN
Status: DISCONTINUED | OUTPATIENT
Start: 2020-06-20 | End: 2020-06-25 | Stop reason: HOSPADM

## 2020-06-20 RX ORDER — SODIUM CHLORIDE 0.9 % (FLUSH) 0.9 %
10 SYRINGE (ML) INJECTION EVERY 12 HOURS SCHEDULED
Status: CANCELLED | OUTPATIENT
Start: 2020-06-20

## 2020-06-20 RX ORDER — SODIUM CHLORIDE 9 MG/ML
INJECTION, SOLUTION INTRAVENOUS CONTINUOUS
Status: CANCELLED | OUTPATIENT
Start: 2020-06-20

## 2020-06-20 RX ORDER — POTASSIUM CHLORIDE 7.45 MG/ML
10 INJECTION INTRAVENOUS PRN
Status: CANCELLED | OUTPATIENT
Start: 2020-06-20

## 2020-06-20 RX ORDER — ACETAMINOPHEN 650 MG/1
650 SUPPOSITORY RECTAL EVERY 6 HOURS PRN
Status: CANCELLED | OUTPATIENT
Start: 2020-06-20

## 2020-06-20 RX ORDER — HYDROXYZINE HYDROCHLORIDE 25 MG/1
50 TABLET, FILM COATED ORAL 3 TIMES DAILY PRN
Status: CANCELLED | OUTPATIENT
Start: 2020-06-20

## 2020-06-20 RX ORDER — OLANZAPINE 5 MG/1
5 TABLET ORAL NIGHTLY
Status: DISCONTINUED | OUTPATIENT
Start: 2020-06-20 | End: 2020-06-23

## 2020-06-20 RX ORDER — BUSPIRONE HYDROCHLORIDE 15 MG/1
15 TABLET ORAL 3 TIMES DAILY
Status: DISCONTINUED | OUTPATIENT
Start: 2020-06-20 | End: 2020-06-25 | Stop reason: HOSPADM

## 2020-06-20 RX ORDER — TRAZODONE HYDROCHLORIDE 50 MG/1
50 TABLET ORAL NIGHTLY PRN
Status: DISCONTINUED | OUTPATIENT
Start: 2020-06-20 | End: 2020-06-21

## 2020-06-20 RX ORDER — OLANZAPINE 10 MG/1
5 TABLET ORAL NIGHTLY
Status: CANCELLED | OUTPATIENT
Start: 2020-06-20

## 2020-06-20 RX ORDER — PROMETHAZINE HYDROCHLORIDE 12.5 MG/1
12.5 TABLET ORAL EVERY 6 HOURS PRN
Status: DISCONTINUED | OUTPATIENT
Start: 2020-06-20 | End: 2020-06-25 | Stop reason: HOSPADM

## 2020-06-20 RX ORDER — SODIUM CHLORIDE 0.9 % (FLUSH) 0.9 %
10 SYRINGE (ML) INJECTION PRN
Status: DISCONTINUED | OUTPATIENT
Start: 2020-06-20 | End: 2020-06-25 | Stop reason: HOSPADM

## 2020-06-20 RX ORDER — AMOXICILLIN AND CLAVULANATE POTASSIUM 500; 125 MG/1; MG/1
1 TABLET, FILM COATED ORAL 2 TIMES DAILY
Status: CANCELLED | OUTPATIENT
Start: 2020-06-20 | End: 2020-07-03

## 2020-06-20 RX ORDER — ACETAMINOPHEN 325 MG/1
650 TABLET ORAL EVERY 4 HOURS PRN
Status: DISCONTINUED | OUTPATIENT
Start: 2020-06-20 | End: 2020-06-25 | Stop reason: HOSPADM

## 2020-06-20 RX ORDER — TRAZODONE HYDROCHLORIDE 50 MG/1
50 TABLET ORAL NIGHTLY PRN
Status: CANCELLED | OUTPATIENT
Start: 2020-06-20

## 2020-06-20 RX ORDER — MIRTAZAPINE 15 MG/1
15 TABLET, FILM COATED ORAL NIGHTLY
Status: DISCONTINUED | OUTPATIENT
Start: 2020-06-20 | End: 2020-06-23

## 2020-06-20 RX ORDER — AMOXICILLIN AND CLAVULANATE POTASSIUM 500; 125 MG/1; MG/1
1 TABLET, FILM COATED ORAL 2 TIMES DAILY
Status: DISCONTINUED | OUTPATIENT
Start: 2020-06-20 | End: 2020-06-20

## 2020-06-20 RX ORDER — MIRTAZAPINE 15 MG/1
15 TABLET, FILM COATED ORAL NIGHTLY
Status: CANCELLED | OUTPATIENT
Start: 2020-06-20

## 2020-06-20 RX ORDER — TAMSULOSIN HYDROCHLORIDE 0.4 MG/1
0.4 CAPSULE ORAL DAILY
Status: CANCELLED | OUTPATIENT
Start: 2020-06-21

## 2020-06-20 RX ORDER — SODIUM CHLORIDE 0.9 % (FLUSH) 0.9 %
10 SYRINGE (ML) INJECTION PRN
Status: DISCONTINUED | OUTPATIENT
Start: 2020-06-20 | End: 2020-06-20 | Stop reason: HOSPADM

## 2020-06-20 RX ORDER — PAROXETINE HYDROCHLORIDE 20 MG/1
20 TABLET, FILM COATED ORAL EVERY MORNING
Status: CANCELLED | OUTPATIENT
Start: 2020-06-21

## 2020-06-20 RX ORDER — PROMETHAZINE HYDROCHLORIDE 25 MG/1
12.5 TABLET ORAL EVERY 6 HOURS PRN
Status: CANCELLED | OUTPATIENT
Start: 2020-06-20

## 2020-06-20 RX ORDER — MAGNESIUM HYDROXIDE/ALUMINUM HYDROXICE/SIMETHICONE 120; 1200; 1200 MG/30ML; MG/30ML; MG/30ML
30 SUSPENSION ORAL EVERY 6 HOURS PRN
Status: DISCONTINUED | OUTPATIENT
Start: 2020-06-20 | End: 2020-06-25 | Stop reason: HOSPADM

## 2020-06-20 RX ORDER — BUSPIRONE HYDROCHLORIDE 15 MG/1
15 TABLET ORAL 3 TIMES DAILY
Status: CANCELLED | OUTPATIENT
Start: 2020-06-20

## 2020-06-20 RX ORDER — 0.9 % SODIUM CHLORIDE 0.9 %
80 INTRAVENOUS SOLUTION INTRAVENOUS ONCE
Status: COMPLETED | OUTPATIENT
Start: 2020-06-20 | End: 2020-06-20

## 2020-06-20 RX ADMIN — PAROXETINE HYDROCHLORIDE 20 MG: 20 TABLET, FILM COATED ORAL at 09:11

## 2020-06-20 RX ADMIN — MIRTAZAPINE 15 MG: 15 TABLET, FILM COATED ORAL at 21:45

## 2020-06-20 RX ADMIN — BUSPIRONE HYDROCHLORIDE 15 MG: 15 TABLET ORAL at 21:45

## 2020-06-20 RX ADMIN — SODIUM CHLORIDE 80 ML: 9 INJECTION, SOLUTION INTRAVENOUS at 14:50

## 2020-06-20 RX ADMIN — AMOXICILLIN AND CLAVULANATE POTASSIUM 1 TABLET: 500; 125 TABLET, FILM COATED ORAL at 21:45

## 2020-06-20 RX ADMIN — IOVERSOL 75 ML: 741 INJECTION INTRA-ARTERIAL; INTRAVENOUS at 14:50

## 2020-06-20 RX ADMIN — Medication 10 ML: at 14:51

## 2020-06-20 RX ADMIN — OLANZAPINE 5 MG: 5 TABLET, FILM COATED ORAL at 21:45

## 2020-06-20 RX ADMIN — METOPROLOL TARTRATE 25 MG: 25 TABLET, FILM COATED ORAL at 13:58

## 2020-06-20 RX ADMIN — HYDROXYZINE HYDROCHLORIDE 25 MG: 25 TABLET, FILM COATED ORAL at 21:45

## 2020-06-20 RX ADMIN — BUSPIRONE HYDROCHLORIDE 15 MG: 15 TABLET ORAL at 13:58

## 2020-06-20 RX ADMIN — TAMSULOSIN HYDROCHLORIDE 0.4 MG: 0.4 CAPSULE ORAL at 09:12

## 2020-06-20 RX ADMIN — VORTIOXETINE 10 MG: 10 TABLET, FILM COATED ORAL at 09:13

## 2020-06-20 RX ADMIN — BUSPIRONE HYDROCHLORIDE 15 MG: 15 TABLET ORAL at 09:11

## 2020-06-20 RX ADMIN — TRAZODONE HYDROCHLORIDE 50 MG: 50 TABLET ORAL at 21:45

## 2020-06-20 RX ADMIN — AMOXICILLIN AND CLAVULANATE POTASSIUM 1 TABLET: 500; 125 TABLET, FILM COATED ORAL at 10:21

## 2020-06-20 ASSESSMENT — ENCOUNTER SYMPTOMS
NAUSEA: 0
EYES NEGATIVE: 1
APNEA: 0
ALLERGIC/IMMUNOLOGIC NEGATIVE: 1
BACK PAIN: 0
CONSTIPATION: 0
ABDOMINAL PAIN: 0
ABDOMINAL DISTENTION: 0
RESPIRATORY NEGATIVE: 1
GASTROINTESTINAL NEGATIVE: 1
DIARRHEA: 0
VOMITING: 0
CHEST TIGHTNESS: 0
SHORTNESS OF BREATH: 0

## 2020-06-20 ASSESSMENT — PAIN SCALES - WONG BAKER: WONGBAKER_NUMERICALRESPONSE: 0

## 2020-06-20 ASSESSMENT — LIFESTYLE VARIABLES: HISTORY_ALCOHOL_USE: NO

## 2020-06-20 ASSESSMENT — PAIN SCALES - GENERAL: PAINLEVEL_OUTOF10: 0

## 2020-06-20 NOTE — CARE COORDINATION
ONGOING DISCHARGE PLAN:    Patient to go back to [facility] once medically cleared. Pelvis Abscess likely associated with radiation for rectal cancer s/p  drainage catheter placement     POD #1 - Drain placed to right buttock    Remains on IV fluids    Will continue to follow for additional discharge needs.     Electronically signed by Evelia Romo RN on 6/20/2020 at 2:18 PM

## 2020-06-20 NOTE — PLAN OF CARE
Problem: Safety:  Goal: Free from accidental physical injury  Description: Free from accidental physical injury  6/20/2020 0420 by Shania Hatfield RN  Outcome: Met This Shift  Note: Patient will continue to be free from physical injury. Patient's bed alarm is on and hourly rounding is being completed to ensure all patient needs are met. 6/19/2020 1545 by Maggie Frost RN  Outcome: Met This Shift  6/19/2020 1530 by Maggie Frost RN  Outcome: Met This Shift  Goal: Free from intentional harm  Description: Free from intentional harm  6/20/2020 0420 by Shania Hatfield RN  Outcome: Met This Shift  Note: Patient has a one on one sitter. 6/19/2020 1545 by Maggie Frost RN  Outcome: Met This Shift  6/19/2020 1530 by Maggie Frost RN  Outcome: Met This Shift     Problem: Pain:  Goal: Patient's pain/discomfort is manageable  Description: Patient's pain/discomfort is manageable  6/20/2020 0420 by Shania Hatfield RN  Outcome: Met This Shift  Note: Patient will be monitored and assessed to ensure pain is being managed properly in order to ensure the pain level has decreased or remained at a tolerable level for patient acceptance. 6/19/2020 1545 by Maggie Frost RN  Outcome: Met This Shift  6/19/2020 1530 by Maggie Frost RN  Outcome: Met This Shift     Problem: Infection:  Goal: Will remain free from infection  Description: Will remain free from infection  6/20/2020 0420 by Shania Hatfield RN  Outcome: Ongoing  Note: Will continue to monitor and assess patient for any signs or symptoms of infection. Patient is currently on oral antibiotics.    6/19/2020 1545 by Maggie Frost RN  Outcome: Ongoing  6/19/2020 1530 by Maggie Frost RN  Outcome: Ongoing     Problem: Daily Care:  Goal: Daily care needs are met  Description: Daily care needs are met  6/20/2020 0420 by Shania Hatfield RN  Outcome: Ongoing  Note: Will assist patient with any and all daily care needs to ensure they are met.     6/19/2020 1545 by Roseanna Saucedo RN  Outcome: Met This Shift  Note: X3 episodes incontinent stool. Is on clear liquid diet  6/19/2020 1530 by Roseanna Saucedo RN  Outcome: Met This Shift     Problem: Nutrition  Goal: Optimal nutrition therapy  6/20/2020 0420 by Viki Collazo RN  Outcome: Ongoing  Note: Will continue to monitor patients nutritional status and will continue to educate patient on proper nutritional intake for their particular disease process. Patient has a loss of appetite recently.    6/19/2020 1545 by Roseanna Saucedo RN  Outcome: Not Met This Shift  6/19/2020 1530 by Roseanna Saucedo RN  Outcome: Ongoing  Note: On cler liquids  6/19/2020 1444 by Homer Henning RD, LD  Outcome: Ongoing

## 2020-06-20 NOTE — PROGRESS NOTES
Patient was seen and examined. No new changes. Afebrile vital signs are stable. Tolerating diet. Abdomen is benign. Extremity nontender. Drain in place. Continue oral antibiotics. Transfer to [facility] today.

## 2020-06-20 NOTE — PROGRESS NOTES
Decision Making:   I have independently reviewed/ordered the following labs:    CBC with Differential:   Recent Labs     06/19/20  0534 06/20/20  0523   WBC 13.8* 11.4*   HGB 9.8* 9.6*   HCT 29.3* 29.8*    482*   LYMPHOPCT 20* 26   MONOPCT 6 7     BMP:  Recent Labs     06/19/20  0534 06/20/20  0523    143   K 3.9 3.9    108*   CO2 26 25   BUN 9 8   CREATININE 0.79 0.86     Hepatic Function Panel:   No results for input(s): PROT, LABALBU, BILIDIR, IBILI, BILITOT, ALKPHOS, ALT, AST in the last 72 hours. No results for input(s): RPR in the last 72 hours. No results for input(s): HIV in the last 72 hours. No results for input(s): BC in the last 72 hours. Lab Results   Component Value Date    CREATININE 0.86 06/20/2020    GLUCOSE 95 06/20/2020       Detailed results: Thank you for allowing us to participate in the care of this patient. Please call with questions. This note is created with the assistance of a speech recognition program.  While intending to generate adocument that actually reflects the content of the visit, the document can still have some errors including those of syntax and sound a like substitutions which may escape proof reading. It such instances, actual meaningcan be extrapolated by contextual diversion.     GRAY Brock - ALEXANDRE  Office: [phone]  Perfect serve / office [phone]

## 2020-06-20 NOTE — PROGRESS NOTES
[address] [facility].    PROGRESS NOTE             6/20/2020    10:01 AM    Name:   Tg Newby  MRN:     [id number]     Acct:      [de-identified]   Room:   2098/2098-01  IP Day:  2  Admit Date:  6/17/2020  4:33 PM    PCP:  No primary care provider on file. Code Status:  Full Code    Subjective:     C/C: No chief complaint on file. Interval History Status: improved. Patient seen and examined at bedside this morning. No events overnight, no new complaints. Stent for abscess drainage placed by surgery yesterday, should be okay to discharge back to [facility] after consult today potentially. Patient has no complaints at this time, resting comfortably in bed. Being cooperative, still not oriented to self. Review of Systems:     Review of Systems   Constitutional: Negative for activity change, appetite change, chills and fever. Respiratory: Negative for apnea, chest tightness and shortness of breath. Cardiovascular: Negative for chest pain, palpitations and leg swelling. Gastrointestinal: Negative for abdominal distention, abdominal pain, constipation, diarrhea, nausea and vomiting. Genitourinary: Negative for difficulty urinating, frequency and hematuria. Musculoskeletal: Negative for arthralgias, back pain and neck pain. Neurological: Negative for dizziness, tremors, facial asymmetry, light-headedness and headaches. Psychiatric/Behavioral: Negative for agitation, behavioral problems and confusion. The patient is not nervous/anxious. Medications:      Allergies:  No Known Allergies    Current Meds:   Scheduled Meds:    amoxicillin-clavulanate  1 tablet Oral BID    [Held by provider] heparin (porcine)  5,000 Units Subcutaneous 3 times per day    sodium chloride flush  10 mL Intravenous 2 times per day    busPIRone  15 mg Oral TID    mirtazapine  15 mg Oral Nightly    OLANZapine  5 mg Oral Nightly    PARoxetine  20

## 2020-06-20 NOTE — PROGRESS NOTES
RN did bedside rounding with Dr. Cathlyn Habermann, keep patient on oral antibiotics when patient goes to [facility], and he will follow-patient.

## 2020-06-20 NOTE — VIRTUAL HEALTH
Consults  Patient Location:  [facility]    Provider Location ([facility]):   Shawn panda [place]      Department of Psychiatry  Consult Service  Attending Physician Psychiatric Assessment      Thank you very much for allowing us to participate in the care of this patient. Reason for Consult:  Readmission to [facility]      History obtained from:  patient, electronic medical record    HISTORY OF PRESENT ILLNESS:          The patient is a 40 y.o. female who is admitted medically for treatment of  Pelvic abscess. Patient was admitted to the [facility] on 6/15 from [place] ED on a pink slip. The patient was admitted after increased agitation and combative behaviors. The patient became agitated in the ED and required restraints. The patient has been religiously preoccupied, having conversations with God and statements that she is possessed by the devil, intermittent confusion with thought blocking. On 6/17 she was transferred to medical d/t a pelvic abscess. The abscess was drained and plan is to have patient return to [facility]. Patient is interviewed via telehealth with nurse present. Patient endorses S/I without a plan. Exactly one minute later patient denies S/I but stated she was having thoughts a minute ago  She denies any plans. She was able to sleep well last night and was sleeping so soundly that she was involuntary of stool. She endorses A/H of God and starts talking to God during the interview. She does not want to take medication as God told her that He will save her. Discussed importance of medication compliance. She denies anxiety.   Denies nightmares      Current Outpatient Psychiatric Medications:  Buspar, remeron, zyprexa, paxil, trintellix    Medications:    Current Facility-Administered Medications: metoprolol (LOPRESSOR) injection 2.5 mg, 2.5 mg, Intravenous, Q4H PRN  amoxicillin-clavulanate (AUGMENTIN) 500-125 MG per tablet 1 tablet, 1 tablet, Oral, BID  [Held by auditory  Cognition:  oriented to person, place, and time  Memory: intact  Insight & Judgement: limited   Medication side effects: none    DSM-V DIAGNOSIS: major depression with psychotic features    Impression    Patient Active Problem List   Diagnosis Code    Psychotic episode ([facility] F23    H/O malignant neoplasm of colon Z85.038    Acute cystitis without hematuria N30.00    Pelvic abscess in female N73.9    Calculus of gallbladder without cholecystitis without obstruction K80.20    Leukocytosis D72.829    Elevated C-reactive protein (CRP) R79.82    Severe malnutrition ([facility] E43               PLAN:    1. Continue medical intervention  2. Patient would benefit from readmission to [facility]. Once patient is medically cleared contact on call psychiatry to for consideration for admission to [facility]      Thank you very much for allowing us to participate in the care of this patient. Time spent > 60 min. Electronically signed by GRAY Teresa CNP on 6/20/2020 at 9:25 AM.    Dragon voice recognition software used in portions of this document. This virtual visit was conducted via interactive/real-time audio/video.

## 2020-06-21 LAB
ABSOLUTE EOS #: 0.2 K/UL (ref 0–0.4)
ABSOLUTE IMMATURE GRANULOCYTE: ABNORMAL K/UL (ref 0–0.3)
ABSOLUTE LYMPH #: 4.2 K/UL (ref 1–4.8)
ABSOLUTE MONO #: 1.1 K/UL (ref 0.1–1.3)
ANION GAP SERPL CALCULATED.3IONS-SCNC: 15 MMOL/L (ref 9–17)
BASOPHILS # BLD: 1 % (ref 0–2)
BASOPHILS ABSOLUTE: 0.1 K/UL (ref 0–0.2)
BUN BLDV-MCNC: 9 MG/DL (ref 6–20)
BUN/CREAT BLD: ABNORMAL (ref 9–20)
CALCIUM SERPL-MCNC: 9.4 MG/DL (ref 8.6–10.4)
CAMPYLOBACTER PCR: NORMAL
CHLORIDE BLD-SCNC: 99 MMOL/L (ref 98–107)
CO2: 26 MMOL/L (ref 20–31)
CREAT SERPL-MCNC: 0.76 MG/DL (ref 0.5–0.9)
DIFFERENTIAL TYPE: ABNORMAL
E COLI ENTEROTOXIGENIC PCR: NORMAL
EOSINOPHILS RELATIVE PERCENT: 1 % (ref 0–4)
GFR AFRICAN AMERICAN: >60 ML/MIN
GFR NON-AFRICAN AMERICAN: >60 ML/MIN
GFR SERPL CREATININE-BSD FRML MDRD: ABNORMAL ML/MIN/{1.73_M2}
GFR SERPL CREATININE-BSD FRML MDRD: ABNORMAL ML/MIN/{1.73_M2}
GLUCOSE BLD-MCNC: 110 MG/DL (ref 70–99)
HCT VFR BLD CALC: 33.6 % (ref 36–46)
HEMOGLOBIN: 11.5 G/DL (ref 12–16)
IMMATURE GRANULOCYTES: ABNORMAL %
LYMPHOCYTES # BLD: 29 % (ref 24–44)
MAGNESIUM: 2 MG/DL (ref 1.6–2.6)
MCH RBC QN AUTO: 30.4 PG (ref 26–34)
MCHC RBC AUTO-ENTMCNC: 34.1 G/DL (ref 31–37)
MCV RBC AUTO: 89.2 FL (ref 80–100)
MONOCYTES # BLD: 8 % (ref 1–7)
NRBC AUTOMATED: ABNORMAL PER 100 WBC
PDW BLD-RTO: 15 % (ref 11.5–14.9)
PLATELET # BLD: 569 K/UL (ref 150–450)
PLATELET ESTIMATE: ABNORMAL
PLESIOMONAS SHIGELLOIDES PCR: NORMAL
PMV BLD AUTO: 8 FL (ref 6–12)
POTASSIUM SERPL-SCNC: 3.4 MMOL/L (ref 3.7–5.3)
RBC # BLD: 3.77 M/UL (ref 4–5.2)
RBC # BLD: ABNORMAL 10*6/UL
SALMONELLA PCR: NORMAL
SEG NEUTROPHILS: 61 % (ref 36–66)
SEGMENTED NEUTROPHILS ABSOLUTE COUNT: 8.8 K/UL (ref 1.3–9.1)
SHIGATOXIN GENE PCR: NORMAL
SHIGELLA SP PCR: NORMAL
SODIUM BLD-SCNC: 140 MMOL/L (ref 135–144)
SPECIMEN DESCRIPTION: NORMAL
VIBRIO PCR: NORMAL
WBC # BLD: 14.4 K/UL (ref 3.5–11)
WBC # BLD: ABNORMAL 10*3/UL
YERSINIA ENTEROCOLITICA PCR: NORMAL

## 2020-06-21 PROCEDURE — 6370000000 HC RX 637 (ALT 250 FOR IP): Performed by: NURSE PRACTITIONER

## 2020-06-21 PROCEDURE — 6370000000 HC RX 637 (ALT 250 FOR IP): Performed by: STUDENT IN AN ORGANIZED HEALTH CARE EDUCATION/TRAINING PROGRAM

## 2020-06-21 PROCEDURE — 83735 ASSAY OF MAGNESIUM: CPT

## 2020-06-21 PROCEDURE — 36415 COLL VENOUS BLD VENIPUNCTURE: CPT

## 2020-06-21 PROCEDURE — 80048 BASIC METABOLIC PNL TOTAL CA: CPT

## 2020-06-21 PROCEDURE — 85025 COMPLETE CBC W/AUTO DIFF WBC: CPT

## 2020-06-21 PROCEDURE — 90792 PSYCH DIAG EVAL W/MED SRVCS: CPT | Performed by: NURSE PRACTITIONER

## 2020-06-21 PROCEDURE — 1240000000 HC EMOTIONAL WELLNESS R&B

## 2020-06-21 RX ADMIN — MIRTAZAPINE 15 MG: 15 TABLET, FILM COATED ORAL at 22:04

## 2020-06-21 RX ADMIN — AMOXICILLIN AND CLAVULANATE POTASSIUM 1 TABLET: 500; 125 TABLET, FILM COATED ORAL at 22:04

## 2020-06-21 RX ADMIN — BUSPIRONE HYDROCHLORIDE 15 MG: 15 TABLET ORAL at 22:04

## 2020-06-21 RX ADMIN — OLANZAPINE 5 MG: 5 TABLET, FILM COATED ORAL at 22:04

## 2020-06-21 ASSESSMENT — LIFESTYLE VARIABLES: HISTORY_ALCOHOL_USE: NO

## 2020-06-21 ASSESSMENT — PAIN SCALES - GENERAL: PAINLEVEL_OUTOF10: 0

## 2020-06-21 NOTE — GROUP NOTE
Group Therapy Note    Date: 6/21/2020    Group Start Time: 1330  Group End Time: 8493  Group Topic: Recreational    [address] [facility], [facility]    Patient refused to attend Recreational Therapy Group at 1330 after encouragement from staff. 1:1 talk time offered.     Signature:  Sybil Tucker

## 2020-06-21 NOTE — CARE COORDINATION
in [facility]. She informed writer there are Kenzie Dowling present and the end is near, Kenzie Dowling prepared to take \"you girls with us\" (Negrita Ortega present). She denied AOD concerns. Denied legal concerns. She denied abuse and trauma. Pt endorsed history of psych admissions in [place] and link to [facility] in [place], [place] after writer was able to speak with her fiance, Kranthi Strong. Kranthi Strong states this is the first time in 3 years he has observed this behavior. He states \"she scared our dogs even\". He is not aware of drug use. He is unsure if she will be able to discharge to their home due to her behavior (is his home), and her stating to writer she is moving to [place] with Amarjit from [place].      SW will continue engagement

## 2020-06-21 NOTE — VIRTUAL HEALTH
120 (H) 0 - 20 mm   Basic Metabolic Panel w/ Reflex to MG    Collection Time: 06/19/20  5:34 AM   Result Value Ref Range    Glucose 101 (H) 70 - 99 mg/dL    BUN 9 6 - 20 mg/dL    CREATININE 0.79 0.50 - 0.90 mg/dL    Bun/Cre Ratio NOT REPORTED 9 - 20    Calcium 8.4 (L) 8.6 - 10.4 mg/dL    Sodium 143 135 - 144 mmol/L    Potassium 3.9 3.7 - 5.3 mmol/L    Chloride 106 98 - 107 mmol/L    CO2 26 20 - 31 mmol/L    Anion Gap 11 9 - 17 mmol/L    GFR Non-African American >60 >60 mL/min    GFR African American >60 >60 mL/min    GFR Comment          GFR Staging NOT REPORTED    CBC auto differential    Collection Time: 06/19/20  5:34 AM   Result Value Ref Range    WBC 13.8 (H) 3.5 - 11.0 k/uL    RBC 3.26 (L) 4.0 - 5.2 m/uL    Hemoglobin 9.8 (L) 12.0 - 16.0 g/dL    Hematocrit 29.3 (L) 36 - 46 %    MCV 89.9 80 - 100 fL    MCH 30.1 26 - 34 pg    MCHC 33.5 31 - 37 g/dL    RDW 14.8 11.5 - 14.9 %    Platelets 880 416 - 308 k/uL    MPV 7.2 6.0 - 12.0 fL    NRBC Automated NOT REPORTED per 100 WBC    Differential Type NOT REPORTED     Seg Neutrophils 72 (H) 36 - 66 %    Lymphocytes 20 (L) 24 - 44 %    Monocytes 6 1 - 7 %    Eosinophils % 1 0 - 4 %    Basophils 1 0 - 2 %    Immature Granulocytes NOT REPORTED 0 %    Segs Absolute 10.00 (H) 1.3 - 9.1 k/uL    Absolute Lymph # 2.70 1.0 - 4.8 k/uL    Absolute Mono # 0.90 0.1 - 1.3 k/uL    Absolute Eos # 0.10 0.0 - 0.4 k/uL    Basophils Absolute 0.10 0.0 - 0.2 k/uL    Absolute Immature Granulocyte NOT REPORTED 0.00 - 0.30 k/uL    WBC Morphology NOT REPORTED     RBC Morphology NOT REPORTED     Platelet Estimate NOT REPORTED    Sedimentation Rate    Collection Time: 06/19/20  5:34 AM   Result Value Ref Range    Sed Rate 87 (H) 0 - 20 mm   Culture, Wound    Collection Time: 06/19/20 12:00 PM   Result Value Ref Range    Specimen Description . BUTTOCK, RIGHT     Special Requests NOT REPORTED     Direct Exam MANY NEUTROPHILS (A)     Direct Exam NO ORGANISMS SEEN     Culture CULTURE IN PROGRESS amoxicillin-clavulanate  1 tablet Oral BID     acetaminophen, traZODone, magnesium hydroxide, aluminum & magnesium hydroxide-simethicone, hydrOXYzine, polyethylene glycol, promethazine **OR** ondansetron, potassium chloride **OR** potassium alternative oral replacement **OR** potassium chloride, sodium chloride flush    Treatment Plan:    · Admit to inpatient psychiatric treatment  · Supportive therapy with medication management. Reviewed risks and benefits as well as potential side effects with patient. · Therapeutic activities and groups  · Follow up at [facility] after symptoms stabilized  · Discharge planning with social work. · Continue Zyprexa, Buspar, Remeron, Trintellix and titrate as clinically indicated    Estimated length of stay: 5-7 days    GRAY Acosta - CNP    Psychiatric Nurse Practitioner    06/21/20   8:09 AM    Dragon voice recognition software used in portions of this document. Occasionally words are mis-transcribed    Patient Location:  [address] [place] [address]    Provider Location (City/State):   [place], [place]    This virtual visit was conducted via interactive/real-time audio/video.

## 2020-06-22 LAB
ABSOLUTE EOS #: 0.1 K/UL (ref 0–0.4)
ABSOLUTE IMMATURE GRANULOCYTE: ABNORMAL K/UL (ref 0–0.3)
ABSOLUTE LYMPH #: 2.2 K/UL (ref 1–4.8)
ABSOLUTE MONO #: 0.7 K/UL (ref 0.1–1.3)
ANION GAP SERPL CALCULATED.3IONS-SCNC: 14 MMOL/L (ref 9–17)
BASOPHILS # BLD: 1 % (ref 0–2)
BASOPHILS ABSOLUTE: 0.1 K/UL (ref 0–0.2)
BUN BLDV-MCNC: 13 MG/DL (ref 6–20)
BUN/CREAT BLD: ABNORMAL (ref 9–20)
CALCIUM SERPL-MCNC: 9.3 MG/DL (ref 8.6–10.4)
CHLORIDE BLD-SCNC: 101 MMOL/L (ref 98–107)
CO2: 28 MMOL/L (ref 20–31)
CREAT SERPL-MCNC: 0.81 MG/DL (ref 0.5–0.9)
CULTURE: ABNORMAL
CULTURE: ABNORMAL
DIFFERENTIAL TYPE: ABNORMAL
DIRECT EXAM: ABNORMAL
DIRECT EXAM: ABNORMAL
EKG ATRIAL RATE: 100 BPM
EKG P AXIS: -178 DEGREES
EKG P-R INTERVAL: 118 MS
EKG Q-T INTERVAL: 344 MS
EKG QRS DURATION: 78 MS
EKG QTC CALCULATION (BAZETT): 443 MS
EKG R AXIS: 102 DEGREES
EKG T AXIS: 147 DEGREES
EKG VENTRICULAR RATE: 100 BPM
EOSINOPHILS RELATIVE PERCENT: 1 % (ref 0–4)
GFR AFRICAN AMERICAN: >60 ML/MIN
GFR NON-AFRICAN AMERICAN: >60 ML/MIN
GFR SERPL CREATININE-BSD FRML MDRD: ABNORMAL ML/MIN/{1.73_M2}
GFR SERPL CREATININE-BSD FRML MDRD: ABNORMAL ML/MIN/{1.73_M2}
GLUCOSE BLD-MCNC: 151 MG/DL (ref 70–99)
HCT VFR BLD CALC: 32.6 % (ref 36–46)
HEMOGLOBIN: 10.8 G/DL (ref 12–16)
IMMATURE GRANULOCYTES: ABNORMAL %
LYMPHOCYTES # BLD: 22 % (ref 24–44)
Lab: ABNORMAL
MCH RBC QN AUTO: 29.6 PG (ref 26–34)
MCHC RBC AUTO-ENTMCNC: 33.1 G/DL (ref 31–37)
MCV RBC AUTO: 89.5 FL (ref 80–100)
MONOCYTES # BLD: 7 % (ref 1–7)
NRBC AUTOMATED: ABNORMAL PER 100 WBC
PDW BLD-RTO: 15.1 % (ref 11.5–14.9)
PLATELET # BLD: 494 K/UL (ref 150–450)
PLATELET ESTIMATE: ABNORMAL
PMV BLD AUTO: 7.9 FL (ref 6–12)
POTASSIUM SERPL-SCNC: 3.8 MMOL/L (ref 3.7–5.3)
RBC # BLD: 3.64 M/UL (ref 4–5.2)
RBC # BLD: ABNORMAL 10*6/UL
SEG NEUTROPHILS: 69 % (ref 36–66)
SEGMENTED NEUTROPHILS ABSOLUTE COUNT: 7.1 K/UL (ref 1.3–9.1)
SODIUM BLD-SCNC: 143 MMOL/L (ref 135–144)
SPECIMEN DESCRIPTION: ABNORMAL
WBC # BLD: 10.2 K/UL (ref 3.5–11)
WBC # BLD: ABNORMAL 10*3/UL

## 2020-06-22 PROCEDURE — 1240000000 HC EMOTIONAL WELLNESS R&B

## 2020-06-22 PROCEDURE — 36415 COLL VENOUS BLD VENIPUNCTURE: CPT

## 2020-06-22 PROCEDURE — 6370000000 HC RX 637 (ALT 250 FOR IP): Performed by: STUDENT IN AN ORGANIZED HEALTH CARE EDUCATION/TRAINING PROGRAM

## 2020-06-22 PROCEDURE — 99232 SBSQ HOSP IP/OBS MODERATE 35: CPT | Performed by: PSYCHIATRY & NEUROLOGY

## 2020-06-22 PROCEDURE — 6370000000 HC RX 637 (ALT 250 FOR IP): Performed by: NURSE PRACTITIONER

## 2020-06-22 PROCEDURE — 93010 ELECTROCARDIOGRAM REPORT: CPT | Performed by: INTERNAL MEDICINE

## 2020-06-22 PROCEDURE — 80048 BASIC METABOLIC PNL TOTAL CA: CPT

## 2020-06-22 PROCEDURE — 85025 COMPLETE CBC W/AUTO DIFF WBC: CPT

## 2020-06-22 RX ORDER — BUSPIRONE HYDROCHLORIDE 15 MG/1
15 TABLET ORAL 3 TIMES DAILY
Status: DISCONTINUED | OUTPATIENT
Start: 2020-06-22 | End: 2020-06-22 | Stop reason: SDUPTHER

## 2020-06-22 RX ADMIN — AMOXICILLIN AND CLAVULANATE POTASSIUM 1 TABLET: 500; 125 TABLET, FILM COATED ORAL at 12:07

## 2020-06-22 RX ADMIN — OLANZAPINE 5 MG: 5 TABLET, FILM COATED ORAL at 22:14

## 2020-06-22 RX ADMIN — BUSPIRONE HYDROCHLORIDE 15 MG: 15 TABLET ORAL at 22:14

## 2020-06-22 RX ADMIN — AMOXICILLIN AND CLAVULANATE POTASSIUM 1 TABLET: 500; 125 TABLET, FILM COATED ORAL at 22:14

## 2020-06-22 RX ADMIN — MIRTAZAPINE 15 MG: 15 TABLET, FILM COATED ORAL at 22:14

## 2020-06-22 NOTE — GROUP NOTE
Group Therapy Note    Date: 6/22/2020    Group Start Time: 0900  Group End Time: 0920  Group Topic: Community Meeting    [facility], [facility] [address] [place] [address]    Patient refused to attend Goal Setting / Community Meeting Group at 0900 after encouragement from staff. 1:1 talk time offered.     Signature:  Rosalba Kim

## 2020-06-22 NOTE — GROUP NOTE
Group Therapy Note    Date: 6/22/2020    Group Start Time: 1430  Group End Time: 2711  Group Topic: Psychoeducation    ROMEO Caldwell, [address]         Group Therapy Note    Attendees: 6/21         Pt did not attend RT skills group d/t resting in room despite staff invitation to attend. 1:1 talk time offered as alternative to group session, pt declined.

## 2020-06-22 NOTE — H&P
member of club or organization: None     Attends meetings of clubs or organizations: None     Relationship status: None    Intimate partner violence     Fear of current or ex partner: None     Emotionally abused: None     Physically abused: None     Forced sexual activity: None   Other Topics Concern    None   Social History Narrative    None        REVIEW OF SYSTEMS      No Known Allergies    No current facility-administered medications on file prior to encounter. Current Outpatient Medications on File Prior to Encounter   Medication Sig Dispense Refill    amoxicillin-clavulanate (AUGMENTIN) 500-125 MG per tablet Take 1 tablet by mouth 2 times daily       VORTIoxetine (TRINTELLIX) 10 MG TABS tablet Take 10 mg by mouth daily      OLANZapine (ZYPREXA) 5 MG tablet Take 5 mg by mouth nightly      busPIRone (BUSPAR) 15 MG tablet Take 15 mg by mouth 3 times daily      PARoxetine (PAXIL) 20 MG tablet Take 20 mg by mouth every morning      tamsulosin (FLOMAX) 0.4 MG capsule Take 0.4 mg by mouth daily      hydrOXYzine (VISTARIL) 50 MG capsule Take 50 mg by mouth 3 times daily as needed      mirtazapine (REMERON) 15 MG tablet Take 15 mg by mouth nightly      pantoprazole (PROTONIX) 40 MG tablet Take 40 mg by mouth daily      metroNIDAZOLE (FLAGYL) 500 MG tablet Take 500 mg by mouth 3 times daily      ciprofloxacin (CIPRO) 500 MG tablet Take 500 mg by mouth 2 times daily                        General health:  Fairly good. No fever or chills. Skin:  No itching, redness or rash. Head, eyes, ears, nose, throat:  No headache, epistaxis, rhinorrhea hearing loss or sore throat. Neck:  No pain, stiffness or masses. Cardiovascular/Respiratory system:  No chest pain, palpitation, shortness of breath, coughing or expectoration. Gastrointestinal tract: No abdominal pain, nausea, vomiting, dysphagia, diarrhea or constipation. Genitourinary:  No burning on micturition.   No

## 2020-06-23 PROBLEM — F31.60 BIPOLAR I DISORDER, MOST RECENT EPISODE MIXED (HCC): Chronic | Status: ACTIVE | Noted: 2020-06-20

## 2020-06-23 PROBLEM — F31.60 BIPOLAR I DISORDER, MOST RECENT EPISODE MIXED (HCC): Status: ACTIVE | Noted: 2020-06-20

## 2020-06-23 LAB
ABSOLUTE EOS #: 0.2 K/UL (ref 0–0.4)
ABSOLUTE IMMATURE GRANULOCYTE: ABNORMAL K/UL (ref 0–0.3)
ABSOLUTE LYMPH #: 2.5 K/UL (ref 1–4.8)
ABSOLUTE MONO #: 0.9 K/UL (ref 0.1–1.3)
ANION GAP SERPL CALCULATED.3IONS-SCNC: 13 MMOL/L (ref 9–17)
BASOPHILS # BLD: 1 % (ref 0–2)
BASOPHILS ABSOLUTE: 0.1 K/UL (ref 0–0.2)
BUN BLDV-MCNC: 15 MG/DL (ref 6–20)
BUN/CREAT BLD: ABNORMAL (ref 9–20)
CALCIUM SERPL-MCNC: 9.2 MG/DL (ref 8.6–10.4)
CHLORIDE BLD-SCNC: 95 MMOL/L (ref 98–107)
CO2: 26 MMOL/L (ref 20–31)
CREAT SERPL-MCNC: 0.72 MG/DL (ref 0.5–0.9)
DIFFERENTIAL TYPE: ABNORMAL
EOSINOPHILS RELATIVE PERCENT: 2 % (ref 0–4)
GFR AFRICAN AMERICAN: >60 ML/MIN
GFR NON-AFRICAN AMERICAN: >60 ML/MIN
GFR SERPL CREATININE-BSD FRML MDRD: ABNORMAL ML/MIN/{1.73_M2}
GFR SERPL CREATININE-BSD FRML MDRD: ABNORMAL ML/MIN/{1.73_M2}
GLUCOSE BLD-MCNC: 101 MG/DL (ref 70–99)
HCT VFR BLD CALC: 33.7 % (ref 36–46)
HEMOGLOBIN: 10.9 G/DL (ref 12–16)
IMMATURE GRANULOCYTES: ABNORMAL %
LYMPHOCYTES # BLD: 21 % (ref 24–44)
MCH RBC QN AUTO: 29.1 PG (ref 26–34)
MCHC RBC AUTO-ENTMCNC: 32.4 G/DL (ref 31–37)
MCV RBC AUTO: 89.9 FL (ref 80–100)
MONOCYTES # BLD: 8 % (ref 1–7)
NRBC AUTOMATED: ABNORMAL PER 100 WBC
PDW BLD-RTO: 15.5 % (ref 11.5–14.9)
PLATELET # BLD: 462 K/UL (ref 150–450)
PLATELET ESTIMATE: ABNORMAL
PMV BLD AUTO: 7.9 FL (ref 6–12)
POTASSIUM SERPL-SCNC: 4 MMOL/L (ref 3.7–5.3)
RBC # BLD: 3.75 M/UL (ref 4–5.2)
RBC # BLD: ABNORMAL 10*6/UL
SEG NEUTROPHILS: 68 % (ref 36–66)
SEGMENTED NEUTROPHILS ABSOLUTE COUNT: 8.1 K/UL (ref 1.3–9.1)
SODIUM BLD-SCNC: 134 MMOL/L (ref 135–144)
WBC # BLD: 11.9 K/UL (ref 3.5–11)
WBC # BLD: ABNORMAL 10*3/UL

## 2020-06-23 PROCEDURE — 36415 COLL VENOUS BLD VENIPUNCTURE: CPT

## 2020-06-23 PROCEDURE — 6370000000 HC RX 637 (ALT 250 FOR IP): Performed by: NURSE PRACTITIONER

## 2020-06-23 PROCEDURE — 6370000000 HC RX 637 (ALT 250 FOR IP): Performed by: STUDENT IN AN ORGANIZED HEALTH CARE EDUCATION/TRAINING PROGRAM

## 2020-06-23 PROCEDURE — 80048 BASIC METABOLIC PNL TOTAL CA: CPT

## 2020-06-23 PROCEDURE — 85025 COMPLETE CBC W/AUTO DIFF WBC: CPT

## 2020-06-23 PROCEDURE — 6370000000 HC RX 637 (ALT 250 FOR IP): Performed by: PSYCHIATRY & NEUROLOGY

## 2020-06-23 PROCEDURE — 1240000000 HC EMOTIONAL WELLNESS R&B

## 2020-06-23 PROCEDURE — 99232 SBSQ HOSP IP/OBS MODERATE 35: CPT | Performed by: PSYCHIATRY & NEUROLOGY

## 2020-06-23 RX ADMIN — TAMSULOSIN HYDROCHLORIDE 0.4 MG: 0.4 CAPSULE ORAL at 09:37

## 2020-06-23 RX ADMIN — BUSPIRONE HYDROCHLORIDE 15 MG: 15 TABLET ORAL at 14:04

## 2020-06-23 RX ADMIN — PANTOPRAZOLE SODIUM 40 MG: 40 TABLET, DELAYED RELEASE ORAL at 09:36

## 2020-06-23 RX ADMIN — AMOXICILLIN AND CLAVULANATE POTASSIUM 1 TABLET: 500; 125 TABLET, FILM COATED ORAL at 09:37

## 2020-06-23 RX ADMIN — BUSPIRONE HYDROCHLORIDE 15 MG: 15 TABLET ORAL at 21:42

## 2020-06-23 RX ADMIN — VORTIOXETINE 10 MG: 10 TABLET, FILM COATED ORAL at 09:35

## 2020-06-23 RX ADMIN — HYDROXYZINE HYDROCHLORIDE 25 MG: 25 TABLET, FILM COATED ORAL at 04:57

## 2020-06-23 RX ADMIN — HYDROXYZINE HYDROCHLORIDE 25 MG: 25 TABLET, FILM COATED ORAL at 16:16

## 2020-06-23 RX ADMIN — BUSPIRONE HYDROCHLORIDE 15 MG: 15 TABLET ORAL at 09:36

## 2020-06-23 RX ADMIN — CARIPRAZINE 1.5 MG: 1.5 CAPSULE, GELATIN COATED ORAL at 09:36

## 2020-06-23 RX ADMIN — AMOXICILLIN AND CLAVULANATE POTASSIUM 1 TABLET: 500; 125 TABLET, FILM COATED ORAL at 21:41

## 2020-06-24 LAB
ABSOLUTE EOS #: 0.1 K/UL (ref 0–0.4)
ABSOLUTE IMMATURE GRANULOCYTE: ABNORMAL K/UL (ref 0–0.3)
ABSOLUTE LYMPH #: 2.4 K/UL (ref 1–4.8)
ABSOLUTE MONO #: 1.2 K/UL (ref 0.1–1.3)
ANION GAP SERPL CALCULATED.3IONS-SCNC: 13 MMOL/L (ref 9–17)
BASOPHILS # BLD: 1 % (ref 0–2)
BASOPHILS ABSOLUTE: 0.1 K/UL (ref 0–0.2)
BUN BLDV-MCNC: 14 MG/DL (ref 6–20)
BUN/CREAT BLD: ABNORMAL (ref 9–20)
CALCIUM SERPL-MCNC: 9.4 MG/DL (ref 8.6–10.4)
CHLORIDE BLD-SCNC: 96 MMOL/L (ref 98–107)
CO2: 26 MMOL/L (ref 20–31)
CREAT SERPL-MCNC: 0.85 MG/DL (ref 0.5–0.9)
DIFFERENTIAL TYPE: ABNORMAL
EOSINOPHILS RELATIVE PERCENT: 1 % (ref 0–4)
GFR AFRICAN AMERICAN: >60 ML/MIN
GFR NON-AFRICAN AMERICAN: >60 ML/MIN
GFR SERPL CREATININE-BSD FRML MDRD: ABNORMAL ML/MIN/{1.73_M2}
GFR SERPL CREATININE-BSD FRML MDRD: ABNORMAL ML/MIN/{1.73_M2}
GLUCOSE BLD-MCNC: 122 MG/DL (ref 70–99)
HCT VFR BLD CALC: 35.4 % (ref 36–46)
HEMOGLOBIN: 11.5 G/DL (ref 12–16)
IMMATURE GRANULOCYTES: ABNORMAL %
LYMPHOCYTES # BLD: 20 % (ref 24–44)
MCH RBC QN AUTO: 29 PG (ref 26–34)
MCHC RBC AUTO-ENTMCNC: 32.5 G/DL (ref 31–37)
MCV RBC AUTO: 89.3 FL (ref 80–100)
MONOCYTES # BLD: 10 % (ref 1–7)
NRBC AUTOMATED: ABNORMAL PER 100 WBC
PDW BLD-RTO: 15.2 % (ref 11.5–14.9)
PLATELET # BLD: 469 K/UL (ref 150–450)
PLATELET ESTIMATE: ABNORMAL
PMV BLD AUTO: 7.9 FL (ref 6–12)
POTASSIUM SERPL-SCNC: 4.1 MMOL/L (ref 3.7–5.3)
RBC # BLD: 3.96 M/UL (ref 4–5.2)
RBC # BLD: ABNORMAL 10*6/UL
SEG NEUTROPHILS: 68 % (ref 36–66)
SEGMENTED NEUTROPHILS ABSOLUTE COUNT: 8.3 K/UL (ref 1.3–9.1)
SODIUM BLD-SCNC: 135 MMOL/L (ref 135–144)
WBC # BLD: 12.1 K/UL (ref 3.5–11)
WBC # BLD: ABNORMAL 10*3/UL

## 2020-06-24 PROCEDURE — 6370000000 HC RX 637 (ALT 250 FOR IP): Performed by: PSYCHIATRY & NEUROLOGY

## 2020-06-24 PROCEDURE — 6370000000 HC RX 637 (ALT 250 FOR IP): Performed by: NURSE PRACTITIONER

## 2020-06-24 PROCEDURE — 1240000000 HC EMOTIONAL WELLNESS R&B

## 2020-06-24 PROCEDURE — 99232 SBSQ HOSP IP/OBS MODERATE 35: CPT | Performed by: PSYCHIATRY & NEUROLOGY

## 2020-06-24 PROCEDURE — 80048 BASIC METABOLIC PNL TOTAL CA: CPT

## 2020-06-24 PROCEDURE — 6370000000 HC RX 637 (ALT 250 FOR IP): Performed by: STUDENT IN AN ORGANIZED HEALTH CARE EDUCATION/TRAINING PROGRAM

## 2020-06-24 PROCEDURE — 36415 COLL VENOUS BLD VENIPUNCTURE: CPT

## 2020-06-24 PROCEDURE — 85025 COMPLETE CBC W/AUTO DIFF WBC: CPT

## 2020-06-24 RX ADMIN — CARIPRAZINE 3 MG: 3 CAPSULE, GELATIN COATED ORAL at 09:12

## 2020-06-24 RX ADMIN — ACETAMINOPHEN 650 MG: 325 TABLET, FILM COATED ORAL at 12:21

## 2020-06-24 RX ADMIN — VORTIOXETINE 10 MG: 10 TABLET, FILM COATED ORAL at 09:12

## 2020-06-24 RX ADMIN — HYDROXYZINE HYDROCHLORIDE 25 MG: 25 TABLET, FILM COATED ORAL at 21:00

## 2020-06-24 RX ADMIN — AMOXICILLIN AND CLAVULANATE POTASSIUM 1 TABLET: 500; 125 TABLET, FILM COATED ORAL at 20:59

## 2020-06-24 RX ADMIN — PROMETHAZINE HYDROCHLORIDE 12.5 MG: 12.5 TABLET ORAL at 21:01

## 2020-06-24 RX ADMIN — HYDROXYZINE HYDROCHLORIDE 25 MG: 25 TABLET, FILM COATED ORAL at 02:11

## 2020-06-24 RX ADMIN — TAMSULOSIN HYDROCHLORIDE 0.4 MG: 0.4 CAPSULE ORAL at 09:12

## 2020-06-24 RX ADMIN — AMOXICILLIN AND CLAVULANATE POTASSIUM 1 TABLET: 500; 125 TABLET, FILM COATED ORAL at 09:12

## 2020-06-24 RX ADMIN — PANTOPRAZOLE SODIUM 40 MG: 40 TABLET, DELAYED RELEASE ORAL at 09:12

## 2020-06-24 RX ADMIN — HYDROXYZINE HYDROCHLORIDE 25 MG: 25 TABLET, FILM COATED ORAL at 12:21

## 2020-06-24 RX ADMIN — PROMETHAZINE HYDROCHLORIDE 12.5 MG: 12.5 TABLET ORAL at 05:24

## 2020-06-24 RX ADMIN — PROMETHAZINE HYDROCHLORIDE 12.5 MG: 12.5 TABLET ORAL at 12:21

## 2020-06-24 ASSESSMENT — PAIN SCALES - GENERAL
PAINLEVEL_OUTOF10: 3
PAINLEVEL_OUTOF10: 0

## 2020-06-24 ASSESSMENT — PAIN DESCRIPTION - PAIN TYPE: TYPE: ACUTE PAIN

## 2020-06-24 ASSESSMENT — PAIN DESCRIPTION - LOCATION: LOCATION: OTHER (COMMENT)

## 2020-06-24 NOTE — GROUP NOTE
Group Therapy Note    Date: 6/24/2020    Group Start Time: 0900  Group End Time: 0920  Group Topic: Community Meeting    ROMEO Sutton, [address]    Attendees: 10         Patient's Goal:  To be informed of group programming schedule for today and to be informed on unit guidelines. To verbalize obtainable short term goal for today pertaining to mental health and stability. Notes:  Patient verbalized goal for today to eat meals and find out more about Bipolar Disorder. Status After Intervention:  Improved    Participation Level:  Active Listener and Interactive    Participation Quality: Appropriate, Attentive and Sharing      Speech:  normal      Thought Process/Content: Logical      Affective Functioning: Congruent      Mood: euthymic      Level of consciousness:  Alert, Oriented x4 and Attentive      Response to Learning: Able to verbalize current knowledge/experience, Able to verbalize/acknowledge new learning, Able to retain information, Capable of insight and Progressing to goal      Endings: None Reported       Modes of Intervention: Education, Support, Socialization, Exploration, Clarifying, Problem-solving, Confrontation, Limit-setting and Reality-testing      Discipline Responsible: Psychoeducational Specialist      Signature:  Madie Mancera

## 2020-06-24 NOTE — GROUP NOTE
Group Therapy Note    Date: 6/24/2020    Group Start Time: 1100  Group End Time: 2359  Group Topic: Psychotherapy    ROMEO MARTINEZ    JADA Benjamin LSW        Group Therapy Note    Attendees: 4/11         Patient's Goal: Interpersonal relationships and communication     Notes:  Group members challenged to review thoughts/feelings/coping skills and impact on personal relationships and communicatio    Status After Intervention:  Improved    Participation Level: Interactive    Participation Quality: Appropriate, Attentive, Sharing and Supportive      Speech:  normal      Thought Process/Content: Linear      Affective Functioning: Congruent      Mood: euthymic      Level of consciousness:  Alert, Oriented x4 and Attentive      Response to Learning: Able to verbalize current knowledge/experience      Endings: None Reported    Modes of Intervention: Support      Discipline Responsible: /Counselor      Signature:   JADA Benjamin LSW

## 2020-06-24 NOTE — GROUP NOTE
Group Therapy Note    Date: 6/24/2020    Group Start Time: 1430  Group End Time: 5299  Group Topic: Recreational    ROMEO Thomas, [facility]    Patient refused to attend Recreational Therapy Group at 1430 after encouragement from staff. 1:1 talk time offered.     Signature:  Rosalba Kim

## 2020-06-24 NOTE — GROUP NOTE
Group Therapy Note    Date: 6/24/2020    Group Start Time: 1330  Group End Time: 9234  Group Topic: Cognitive Skills    [facility] EARLE Pederson        Group Therapy Note    Attendees: 11/23         Patient's Goal:  To increase interpersonal interaction    Notes:  Pt attended and participated in group. Status After Intervention:  Improved    Participation Level:  Active Listener and Interactive    Participation Quality: Appropriate, Attentive, Sharing and Supportive      Speech:  normal      Thought Process/Content: Logical  Linear      Affective Functioning: Congruent      Mood: euthymic      Level of consciousness:  Alert and Attentive      Response to Learning: Progressing to goal      Endings: None Reported    Modes of Intervention: Support, Socialization, Exploration, Problem-solving and Reality-testing      Discipline Responsible: Psychoeducational Specialist      Signature:  Alec Pederson

## 2020-06-25 ENCOUNTER — APPOINTMENT (OUTPATIENT)
Dept: CT IMAGING | Age: 45
DRG: 710 | End: 2020-06-25
Attending: INTERNAL MEDICINE
Payer: MEDICARE

## 2020-06-25 ENCOUNTER — HOSPITAL ENCOUNTER (INPATIENT)
Age: 45
LOS: 8 days | Discharge: PSYCHIATRIC OR SUBSTANCE USE FACILITY | DRG: 710 | End: 2020-07-05
Attending: INTERNAL MEDICINE | Admitting: INTERNAL MEDICINE
Payer: MEDICARE

## 2020-06-25 VITALS
WEIGHT: 100 LBS | BODY MASS INDEX: 17.07 KG/M2 | RESPIRATION RATE: 14 BRPM | HEIGHT: 64 IN | TEMPERATURE: 99.9 F | DIASTOLIC BLOOD PRESSURE: 72 MMHG | OXYGEN SATURATION: 98 % | SYSTOLIC BLOOD PRESSURE: 115 MMHG | HEART RATE: 130 BPM

## 2020-06-25 PROBLEM — R00.0 TACHYCARDIA: Status: ACTIVE | Noted: 2020-06-25

## 2020-06-25 LAB
ABSOLUTE EOS #: 0 K/UL (ref 0–0.4)
ABSOLUTE EOS #: 0 K/UL (ref 0–0.4)
ABSOLUTE IMMATURE GRANULOCYTE: ABNORMAL K/UL (ref 0–0.3)
ABSOLUTE IMMATURE GRANULOCYTE: ABNORMAL K/UL (ref 0–0.3)
ABSOLUTE LYMPH #: 1.5 K/UL (ref 1–4.8)
ABSOLUTE LYMPH #: 2 K/UL (ref 1–4.8)
ABSOLUTE MONO #: 1.2 K/UL (ref 0.1–1.3)
ABSOLUTE MONO #: 1.3 K/UL (ref 0.1–1.3)
ANION GAP SERPL CALCULATED.3IONS-SCNC: 14 MMOL/L (ref 9–17)
ANION GAP SERPL CALCULATED.3IONS-SCNC: 14 MMOL/L (ref 9–17)
BASOPHILS # BLD: 1 % (ref 0–2)
BASOPHILS # BLD: 1 % (ref 0–2)
BASOPHILS ABSOLUTE: 0.1 K/UL (ref 0–0.2)
BASOPHILS ABSOLUTE: 0.1 K/UL (ref 0–0.2)
BUN BLDV-MCNC: 14 MG/DL (ref 6–20)
BUN BLDV-MCNC: 15 MG/DL (ref 6–20)
BUN/CREAT BLD: ABNORMAL (ref 9–20)
BUN/CREAT BLD: ABNORMAL (ref 9–20)
CALCIUM SERPL-MCNC: 9.4 MG/DL (ref 8.6–10.4)
CALCIUM SERPL-MCNC: 9.5 MG/DL (ref 8.6–10.4)
CHLORIDE BLD-SCNC: 89 MMOL/L (ref 98–107)
CHLORIDE BLD-SCNC: 93 MMOL/L (ref 98–107)
CO2: 26 MMOL/L (ref 20–31)
CO2: 26 MMOL/L (ref 20–31)
CREAT SERPL-MCNC: 0.72 MG/DL (ref 0.5–0.9)
CREAT SERPL-MCNC: 0.72 MG/DL (ref 0.5–0.9)
D-DIMER QUANTITATIVE: 3.49 MG/L FEU (ref 0–0.59)
DIFFERENTIAL TYPE: ABNORMAL
DIFFERENTIAL TYPE: ABNORMAL
EOSINOPHILS RELATIVE PERCENT: 0 % (ref 0–4)
EOSINOPHILS RELATIVE PERCENT: 0 % (ref 0–4)
GFR AFRICAN AMERICAN: >60 ML/MIN
GFR AFRICAN AMERICAN: >60 ML/MIN
GFR NON-AFRICAN AMERICAN: >60 ML/MIN
GFR NON-AFRICAN AMERICAN: >60 ML/MIN
GFR SERPL CREATININE-BSD FRML MDRD: ABNORMAL ML/MIN/{1.73_M2}
GLUCOSE BLD-MCNC: 121 MG/DL (ref 70–99)
GLUCOSE BLD-MCNC: 163 MG/DL (ref 70–99)
HCT VFR BLD CALC: 32.3 % (ref 36–46)
HCT VFR BLD CALC: 33.9 % (ref 36–46)
HEMOGLOBIN: 10.7 G/DL (ref 12–16)
HEMOGLOBIN: 11.1 G/DL (ref 12–16)
IMMATURE GRANULOCYTES: ABNORMAL %
IMMATURE GRANULOCYTES: ABNORMAL %
LACTIC ACID, SEPSIS WHOLE BLOOD: NORMAL MMOL/L (ref 0.5–1.9)
LACTIC ACID, SEPSIS: 1.6 MMOL/L (ref 0.5–1.9)
LYMPHOCYTES # BLD: 11 % (ref 24–44)
LYMPHOCYTES # BLD: 15 % (ref 24–44)
MCH RBC QN AUTO: 29.2 PG (ref 26–34)
MCH RBC QN AUTO: 29.3 PG (ref 26–34)
MCHC RBC AUTO-ENTMCNC: 32.7 G/DL (ref 31–37)
MCHC RBC AUTO-ENTMCNC: 33 G/DL (ref 31–37)
MCV RBC AUTO: 88.4 FL (ref 80–100)
MCV RBC AUTO: 89.5 FL (ref 80–100)
MONOCYTES # BLD: 9 % (ref 1–7)
MONOCYTES # BLD: 9 % (ref 1–7)
NRBC AUTOMATED: ABNORMAL PER 100 WBC
NRBC AUTOMATED: ABNORMAL PER 100 WBC
PDW BLD-RTO: 15 % (ref 11.5–14.9)
PDW BLD-RTO: 15.4 % (ref 11.5–14.9)
PLATELET # BLD: 415 K/UL (ref 150–450)
PLATELET # BLD: 427 K/UL (ref 150–450)
PLATELET ESTIMATE: ABNORMAL
PLATELET ESTIMATE: ABNORMAL
PMV BLD AUTO: 8.1 FL (ref 6–12)
PMV BLD AUTO: 8.3 FL (ref 6–12)
POTASSIUM SERPL-SCNC: 3.8 MMOL/L (ref 3.7–5.3)
POTASSIUM SERPL-SCNC: 4.3 MMOL/L (ref 3.7–5.3)
RBC # BLD: 3.65 M/UL (ref 4–5.2)
RBC # BLD: 3.79 M/UL (ref 4–5.2)
RBC # BLD: ABNORMAL 10*6/UL
RBC # BLD: ABNORMAL 10*6/UL
SEG NEUTROPHILS: 75 % (ref 36–66)
SEG NEUTROPHILS: 79 % (ref 36–66)
SEGMENTED NEUTROPHILS ABSOLUTE COUNT: 10.2 K/UL (ref 1.3–9.1)
SEGMENTED NEUTROPHILS ABSOLUTE COUNT: 10.6 K/UL (ref 1.3–9.1)
SODIUM BLD-SCNC: 129 MMOL/L (ref 135–144)
SODIUM BLD-SCNC: 133 MMOL/L (ref 135–144)
TSH SERPL DL<=0.05 MIU/L-ACNC: 1.86 MIU/L (ref 0.3–5)
WBC # BLD: 13.5 K/UL (ref 3.5–11)
WBC # BLD: 13.5 K/UL (ref 3.5–11)
WBC # BLD: ABNORMAL 10*3/UL
WBC # BLD: ABNORMAL 10*3/UL

## 2020-06-25 PROCEDURE — 2580000003 HC RX 258: Performed by: PHYSICIAN ASSISTANT

## 2020-06-25 PROCEDURE — 6370000000 HC RX 637 (ALT 250 FOR IP): Performed by: PSYCHIATRY & NEUROLOGY

## 2020-06-25 PROCEDURE — 93005 ELECTROCARDIOGRAM TRACING: CPT | Performed by: INTERNAL MEDICINE

## 2020-06-25 PROCEDURE — 83605 ASSAY OF LACTIC ACID: CPT

## 2020-06-25 PROCEDURE — 85025 COMPLETE CBC W/AUTO DIFF WBC: CPT

## 2020-06-25 PROCEDURE — 83930 ASSAY OF BLOOD OSMOLALITY: CPT

## 2020-06-25 PROCEDURE — 36415 COLL VENOUS BLD VENIPUNCTURE: CPT

## 2020-06-25 PROCEDURE — 6370000000 HC RX 637 (ALT 250 FOR IP): Performed by: STUDENT IN AN ORGANIZED HEALTH CARE EDUCATION/TRAINING PROGRAM

## 2020-06-25 PROCEDURE — 6360000004 HC RX CONTRAST MEDICATION: Performed by: INTERNAL MEDICINE

## 2020-06-25 PROCEDURE — 99254 IP/OBS CNSLTJ NEW/EST MOD 60: CPT | Performed by: INTERNAL MEDICINE

## 2020-06-25 PROCEDURE — 85379 FIBRIN DEGRADATION QUANT: CPT

## 2020-06-25 PROCEDURE — G0378 HOSPITAL OBSERVATION PER HR: HCPCS

## 2020-06-25 PROCEDURE — 80048 BASIC METABOLIC PNL TOTAL CA: CPT

## 2020-06-25 PROCEDURE — G0379 DIRECT REFER HOSPITAL OBSERV: HCPCS

## 2020-06-25 PROCEDURE — 84443 ASSAY THYROID STIM HORMONE: CPT

## 2020-06-25 PROCEDURE — 6370000000 HC RX 637 (ALT 250 FOR IP): Performed by: NURSE PRACTITIONER

## 2020-06-25 PROCEDURE — 87040 BLOOD CULTURE FOR BACTERIA: CPT

## 2020-06-25 PROCEDURE — 71260 CT THORAX DX C+: CPT

## 2020-06-25 PROCEDURE — 99238 HOSP IP/OBS DSCHRG MGMT 30/<: CPT | Performed by: NURSE PRACTITIONER

## 2020-06-25 PROCEDURE — 2580000003 HC RX 258: Performed by: INTERNAL MEDICINE

## 2020-06-25 RX ORDER — HYDROXYZINE HYDROCHLORIDE 25 MG/1
25 TABLET, FILM COATED ORAL 3 TIMES DAILY PRN
Status: CANCELLED | OUTPATIENT
Start: 2020-06-25

## 2020-06-25 RX ORDER — AMOXICILLIN AND CLAVULANATE POTASSIUM 500; 125 MG/1; MG/1
1 TABLET, FILM COATED ORAL 2 TIMES DAILY
Status: DISCONTINUED | OUTPATIENT
Start: 2020-06-25 | End: 2020-06-28

## 2020-06-25 RX ORDER — TAMSULOSIN HYDROCHLORIDE 0.4 MG/1
0.4 CAPSULE ORAL DAILY
Status: DISCONTINUED | OUTPATIENT
Start: 2020-06-26 | End: 2020-07-05 | Stop reason: HOSPADM

## 2020-06-25 RX ORDER — PANTOPRAZOLE SODIUM 40 MG/1
40 TABLET, DELAYED RELEASE ORAL DAILY
Status: DISCONTINUED | OUTPATIENT
Start: 2020-06-26 | End: 2020-07-05 | Stop reason: HOSPADM

## 2020-06-25 RX ORDER — AMOXICILLIN AND CLAVULANATE POTASSIUM 500; 125 MG/1; MG/1
1 TABLET, FILM COATED ORAL 2 TIMES DAILY
Status: CANCELLED | OUTPATIENT
Start: 2020-06-25 | End: 2020-07-03

## 2020-06-25 RX ORDER — BUSPIRONE HYDROCHLORIDE 15 MG/1
15 TABLET ORAL 3 TIMES DAILY
Status: CANCELLED | OUTPATIENT
Start: 2020-06-25

## 2020-06-25 RX ORDER — 0.9 % SODIUM CHLORIDE 0.9 %
80 INTRAVENOUS SOLUTION INTRAVENOUS ONCE
Status: COMPLETED | OUTPATIENT
Start: 2020-06-25 | End: 2020-06-25

## 2020-06-25 RX ORDER — 0.9 % SODIUM CHLORIDE 0.9 %
1000 INTRAVENOUS SOLUTION INTRAVENOUS ONCE
Status: COMPLETED | OUTPATIENT
Start: 2020-06-25 | End: 2020-06-26

## 2020-06-25 RX ORDER — HYDROXYZINE HYDROCHLORIDE 25 MG/1
25 TABLET, FILM COATED ORAL 3 TIMES DAILY PRN
Status: DISCONTINUED | OUTPATIENT
Start: 2020-06-25 | End: 2020-07-05 | Stop reason: HOSPADM

## 2020-06-25 RX ORDER — PANTOPRAZOLE SODIUM 40 MG/1
40 TABLET, DELAYED RELEASE ORAL DAILY
Status: CANCELLED | OUTPATIENT
Start: 2020-06-26

## 2020-06-25 RX ORDER — 0.9 % SODIUM CHLORIDE 0.9 %
1000 INTRAVENOUS SOLUTION INTRAVENOUS ONCE
Status: DISCONTINUED | OUTPATIENT
Start: 2020-06-25 | End: 2020-06-25 | Stop reason: HOSPADM

## 2020-06-25 RX ORDER — TAMSULOSIN HYDROCHLORIDE 0.4 MG/1
0.4 CAPSULE ORAL DAILY
Status: CANCELLED | OUTPATIENT
Start: 2020-06-26

## 2020-06-25 RX ORDER — BUSPIRONE HYDROCHLORIDE 15 MG/1
15 TABLET ORAL 3 TIMES DAILY
Status: DISCONTINUED | OUTPATIENT
Start: 2020-06-25 | End: 2020-07-05 | Stop reason: HOSPADM

## 2020-06-25 RX ORDER — SODIUM CHLORIDE 0.9 % (FLUSH) 0.9 %
10 SYRINGE (ML) INJECTION PRN
Status: DISCONTINUED | OUTPATIENT
Start: 2020-06-25 | End: 2020-07-05 | Stop reason: HOSPADM

## 2020-06-25 RX ADMIN — SODIUM CHLORIDE 80 ML: 9 INJECTION, SOLUTION INTRAVENOUS at 23:13

## 2020-06-25 RX ADMIN — AMOXICILLIN AND CLAVULANATE POTASSIUM 1 TABLET: 500; 125 TABLET, FILM COATED ORAL at 23:32

## 2020-06-25 RX ADMIN — VORTIOXETINE 10 MG: 10 TABLET, FILM COATED ORAL at 09:35

## 2020-06-25 RX ADMIN — TAMSULOSIN HYDROCHLORIDE 0.4 MG: 0.4 CAPSULE ORAL at 09:35

## 2020-06-25 RX ADMIN — AMOXICILLIN AND CLAVULANATE POTASSIUM 1 TABLET: 500; 125 TABLET, FILM COATED ORAL at 09:35

## 2020-06-25 RX ADMIN — CARIPRAZINE 3 MG: 3 CAPSULE, GELATIN COATED ORAL at 09:35

## 2020-06-25 RX ADMIN — PANTOPRAZOLE SODIUM 40 MG: 40 TABLET, DELAYED RELEASE ORAL at 09:35

## 2020-06-25 RX ADMIN — Medication 10 ML: at 23:13

## 2020-06-25 RX ADMIN — SODIUM CHLORIDE 1000 ML: 9 INJECTION, SOLUTION INTRAVENOUS at 23:23

## 2020-06-25 RX ADMIN — HYDROXYZINE HYDROCHLORIDE 25 MG: 25 TABLET, FILM COATED ORAL at 01:49

## 2020-06-25 RX ADMIN — IOVERSOL 75 ML: 741 INJECTION INTRA-ARTERIAL; INTRAVENOUS at 23:13

## 2020-06-25 ASSESSMENT — PAIN DESCRIPTION - LOCATION: LOCATION: BACK

## 2020-06-25 ASSESSMENT — PAIN SCALES - GENERAL: PAINLEVEL_OUTOF10: 3

## 2020-06-25 NOTE — GROUP NOTE
Group Therapy Note    Date: 6/25/2020    Group Start Time: 0900  Group End Time: 0920  Group Topic: Community Meeting    [facility]    [place], [place]        Group Therapy Note    Attendees: 6/22         Pt did not attend [employer] d/t resting in room despite staff invitation to attend. 1:1 talk time offered as alternative to group session, pt declined.

## 2020-06-25 NOTE — GROUP NOTE
Group Therapy Note    Date: 6/25/2020    Group Start Time: 1330  Group End Time: 2761  Group Topic: Recreational    [address] [facility], [facility]    Patient refused to attend Recreational Therapy Group at 1330 after encouragement from staff. 1:1 talk time offered.     Signature:  Cayetano Dixon

## 2020-06-25 NOTE — PLAN OF CARE
5 [facility]  Day 3 Interdisciplinary Treatment Plan NOTE    Review Date & Time: 6/22/2020 0924    Admission Type:   Admission Type: Voluntary    Reason for admission:  Reason for Admission: Patient was hearing voices and experincing fleeting suicidal ideation  Estimated Length of Stay: 5-7 days  Estimated Discharge Date Update: to be determined by physician    PATIENT STRENGTHS:  Patient Strengths Strengths: Positive Support  Patient Strengths and Limitations:Limitations: General negative or hopeless attitude about future/recovery  Addictive Behavior:Addictive Behavior  In the past 3 months, have you felt or has someone told you that you have a problem with:  : None  Do you have a history of Chemical Use?: No  Do you have a history of Alcohol Use?: No  Do you have a history of Street Drug Abuse?: No  Histroy of Prescripton Drug Abuse?: No  Medical Problems:  Past Medical History:   Diagnosis Date    Cancer ([address]     Pelvis, female abscess     pre sacral drain put in 6/19/2020    UTI (urinary tract infection)     stent place june 2020       Risk:  Fall RiskTotal: 53  Yoni Scale Yoni Scale Score: 22  BVC Total: 0  Change in scores no Changes to plan of Care no    Status EXAM:   Status and Exam  Normal: No  Facial Expression: Flat  Affect: Unstable  Level of Consciousness: Alert  Mood:Normal: No  Mood: Depressed, Sad  Motor Activity:Normal: No  Motor Activity: Decreased  Interview Behavior: Cooperative  Preception: [place] to Person, [place] to Time, [place] to Place, [place] to Situation  Attention:Normal: No  Attention: Distractible  Thought Processes: Blocking  Thought Content:Normal: No  Thought Content: Delusions, Preoccupations  Hallucinations:  Auditory (Comment), Visual (Comment)(Pt states she can see and hear angels)  Delusions: Yes  Delusions: Obsessions  Memory:Normal: No  Memory: Confabulation, Poor Recent, Poor Remote  Insight and Judgment: No  Insight and Judgment: Poor Judgment  Present
Problem: Depressive Behavior With or Without Suicide Precautions:  Goal: Ability to disclose and discuss suicidal ideas will improve  Description: STG Ability to disclose and discuss suicidal ideas will improve   6/25/2020 1057 by Brian Graham LPN  Outcome: Ongoing   Patient denies suicidal ideations and verbalizes if thoughts occur she will seek the help of staff. Problem: Depressive Behavior With or Without Suicide Precautions:  Goal: Able to verbalize and/or display a decrease in depressive symptoms  Description: LTG Able to verbalize and/or display a decrease in depressive symptoms   6/25/2020 1057 by Brian Graham LPN  Outcome: Ongoing   Patient denies depression and verbalizes if thoughts occur she will seek the help of staff. Patient denies auditory hallucinations, but is seen responding to internal stimuli. Patient aloof of peers when in day area, encouraged to attend unit programming and socialize with peers. Patient safety maintained and 15 minute visual checks continued.
Problem: Depressive Behavior With or Without Suicide Precautions:  Goal: Able to verbalize and/or display a decrease in depressive symptoms  Description: LTG Able to verbalize and/or display a decrease in depressive symptoms   6/22/2020 2312 by Abiola Carter RN  Outcome: Ongoing   Pt continues to express depression. Pt is isolative to room . Problem: Depressive Behavior With or Without Suicide Precautions:  Goal: Ability to disclose and discuss suicidal ideas will improve  Description: STG Ability to disclose and discuss suicidal ideas will improve   6/22/2020 2312 by Abiola Carter RN  Outcome: Ongoing   Pt denies suicidal thoughts.
Problem: Depressive Behavior With or Without Suicide Precautions:  Goal: Able to verbalize and/or display a decrease in depressive symptoms  Description: LTG Able to verbalize and/or display a decrease in depressive symptoms   6/24/2020 1037 by Valerie Haq LPN  Outcome: Ongoing   Patient denies depression and verbalizes if thoughts occur she will seek the help of staff. Problem: Depressive Behavior With or Without Suicide Precautions:  Goal: Ability to disclose and discuss suicidal ideas will improve  Description: STG Ability to disclose and discuss suicidal ideas will improve   6/24/2020 1037 by Valerie Haq LPN  Outcome: Ongoing   Patient denies suicidal ideations and verbalizes if thoughts occur she will seek the help of staff. Patient states God talks to her and she can read peoples minds. Patient attends unit programming and socializes with select peers. Patient safety maintained and 15 minute visual checks continued.
Problem: Depressive Behavior With or Without Suicide Precautions:  Goal: Able to verbalize and/or display a decrease in depressive symptoms  Description: LTG Able to verbalize and/or display a decrease in depressive symptoms   Outcome: Ongoing  Note: Patient is isolative to room for long intervals and out for needs only. Patient is complaint with medication interventions and is accepting of nourishment provided. Patient appears flat and worried. Problem: Depressive Behavior With or Without Suicide Precautions:  Goal: Ability to disclose and discuss suicidal ideas will improve  Description: STG Ability to disclose and discuss suicidal ideas will improve   Outcome: Ongoing  Note: Patient denies suicidal and homicidal ideation, denies hallucinations but is observed responding to internal stimuli stating, \"there are angels all around us and God is talking to me. \"
Problem: Depressive Behavior With or Without Suicide Precautions:  Goal: Able to verbalize and/or display a decrease in depressive symptoms  Description: LTG Able to verbalize and/or display a decrease in depressive symptoms   Outcome: Ongoing  Pt. Isolates in room. Pt out for breakfast but complains of upset stomach and does not eat lunch. Disheveled appearance. Pt is medication compliant. Pt. States she hears God talk to her and that she also communicates [facility]. Pt. Remains safe on the unit. Q 15 minute checks for safety maintained. Problem: Depressive Behavior With or Without Suicide Precautions:  Goal: Ability to disclose and discuss suicidal ideas will improve  Description: STG Ability to disclose and discuss suicidal ideas will improve   Outcome: Ongoing  Pt. Is sad and flat. Denies suicidal thoughts. Says she doesn't understand why people question her talking to God. Pt. Remains safe on the unit. Q 15 minute checks for safety maintained.
with patient within 72 hours    PLAN/TREATMENT RECOMMENDATIONS:     continue group therapy , medications compliance, goal setting, individualized assessments and care, continue to monitor pt on unit      SHORT-TERM GOALS:   Time frame for Short-Term Goals: 5-7 days    LONG-TERM GOALS:  Time frame for Long-Term Goals: 6 months  Members Present in Team Meeting: See Signature Sheet    Virgilio May, [address]

## 2020-06-25 NOTE — DISCHARGE SUMMARY
TECHNIQUE: CT of the abdomen and pelvis was performed with the administration of intravenous contrast. Multiplanar reformatted images are provided for review. Dose modulation, iterative reconstruction, and/or weight based adjustment of the mA/kV was utilized to reduce the radiation dose to as low as reasonably achievable. COMPARISON: None. HISTORY: ORDERING SYSTEM PROVIDED HISTORY: Pelvic abcess / Hydronephrosis TECHNOLOGIST PROVIDED HISTORY: Pelvic abcess / Hydronephrosis Reason for Exam: Pelvic abcess / Hydronephrosis, has stent in place Acuity: Acute Type of Exam: Initial FINDINGS: Lower Chest: No acute findings. Organs: Cholelithiasis in a contracted gallbladder. No biliary dilatation. The liver, pancreas, spleen and adrenals appear unremarkable. Right double-J ureteral stent in place. Mild residual hydronephrosis and right renal edema. Mild urothelial enhancement on the right side. No renal calculi. The left kidney is remarkable for a subcentimeter intraparenchymal cyst. GI/Bowel: Status post distal colonic resection. No evidence for bowel obstruction. Moderate stool burden. Pelvis: Right pelvic abscess in the presacral space measures up to 5.7 cm in greatest transverse dimension, approximately 7 cm in craniocaudal dimension and 2.8 cm in AP dimension. The bladder is decompressed. Peritoneum/Retroperitoneum: No free air. No ascites. No lymphadenopathy. The aorta is normal in caliber. Bones/Soft Tissues: No acute osseous abnormality identified. 1.  Pelvic abscess predominates in the presacral space extending to the right side. 2.  Right double-J ureteral stent in place. Mild residual hydronephrosis, urothelial enhancement and right renal edema. 3.  Cholelithiasis in a contracted gallbladder.      Ct Chest Pulmonary Embolism W Contrast    Result Date: 6/20/2020  EXAMINATION: CTA OF THE CHEST 6/20/2020 2:36 pm TECHNIQUE: CTA of the chest was performed after the administration of intravenous contrast.

## 2020-06-25 NOTE — CONSULTS
(TRINTELLIX) 10 MG TABS tablet Take 10 mg by mouth daily   Yes Historical Provider, MD   OLANZapine (ZYPREXA) 5 MG tablet Take 5 mg by mouth nightly   Yes Historical Provider, MD   busPIRone (BUSPAR) 15 MG tablet Take 15 mg by mouth 3 times daily    Historical Provider, MD   PARoxetine (PAXIL) 20 MG tablet Take 20 mg by mouth every morning    Historical Provider, MD   tamsulosin (FLOMAX) 0.4 MG capsule Take 0.4 mg by mouth daily    Historical Provider, MD   hydrOXYzine (VISTARIL) 50 MG capsule Take 50 mg by mouth 3 times daily as needed    Historical Provider, MD   mirtazapine (REMERON) 15 MG tablet Take 15 mg by mouth nightly    Historical Provider, MD   pantoprazole (PROTONIX) 40 MG tablet Take 40 mg by mouth daily    Historical Provider, MD        Allergies:     Patient has no known allergies. Social History:     Tobacco:    reports that she has never smoked. She has never used smokeless tobacco.  Alcohol:      has no history on file for alcohol. Drug Use:  has no history on file for drug. Family History:     History reviewed. No pertinent family history. Review of Systems:   Cannot be done , Very poor historian     Physical Exam:     /72   Pulse 130   Temp 99.9 °F (37.7 °C) (Oral)   Resp 14   Ht 5' 4\" (1.626 m)   Wt 100 lb (45.4 kg)   SpO2 98%   BMI 17.16 kg/m²   Temp (24hrs), Av.9 °F (37.2 °C), Min:98.3 °F (36.8 °C), Max:99.9 °F (37.7 °C)    No results for input(s): POCGLU in the last 72 hours. Intake/Output Summary (Last 24 hours) at 2020 1909  Last data filed at 2020 1337  Gross per 24 hour   Intake --   Output 20 ml   Net -20 ml       General Appearance:  THin built person   Mental status: oriented to person, place, and time with normal affect  Head:  normocephalic, atraumatic.   Eye: no icterus, redness, pupils equal and reactive, extraocular eye movements intact, conjunctiva clear  Ear: normal external ear, no discharge, hearing intact  Nose:  no drainage noted  Mouth: mucous membranes moist  Neck: supple, no carotid bruits, thyroid not palpable  Lungs: Bilateral equal air entry, clear to ausculation, no wheezing, rales or rhonchi, normal effort  Cardiovascular: normal rate, regular rhythm, no murmur, gallop, rub.   Abdomen: Soft, nontender, nondistended, normal bowel sounds, no hepatomegaly or splenomegaly, G tube in place   Neurologic: not cooperative with Neurological Exam   Skin: No gross lesions, rashes, bruising or bleeding on exposed skin area  Extremities:  peripheral pulses palpable, no pedal edema or calf pain with palpation  Psych: Flat effect     Investigations:      Laboratory Testing:  Recent Results (from the past 24 hour(s))   Basic Metabolic Panel w/ Reflex to MG    Collection Time: 06/25/20  7:45 AM   Result Value Ref Range    Glucose 121 (H) 70 - 99 mg/dL    BUN 15 6 - 20 mg/dL    CREATININE 0.72 0.50 - 0.90 mg/dL    Bun/Cre Ratio NOT REPORTED 9 - 20    Calcium 9.4 8.6 - 10.4 mg/dL    Sodium 129 (L) 135 - 144 mmol/L    Potassium 3.8 3.7 - 5.3 mmol/L    Chloride 89 (L) 98 - 107 mmol/L    CO2 26 20 - 31 mmol/L    Anion Gap 14 9 - 17 mmol/L    GFR Non-African American >60 >60 mL/min    GFR African American >60 >60 mL/min    GFR Comment          GFR Staging NOT REPORTED    CBC auto differential    Collection Time: 06/25/20  7:45 AM   Result Value Ref Range    WBC 13.5 (H) 3.5 - 11.0 k/uL    RBC 3.79 (L) 4.0 - 5.2 m/uL    Hemoglobin 11.1 (L) 12.0 - 16.0 g/dL    Hematocrit 33.9 (L) 36 - 46 %    MCV 89.5 80 - 100 fL    MCH 29.3 26 - 34 pg    MCHC 32.7 31 - 37 g/dL    RDW 15.4 (H) 11.5 - 14.9 %    Platelets 139 431 - 075 k/uL    MPV 8.1 6.0 - 12.0 fL    NRBC Automated NOT REPORTED per 100 WBC    Differential Type NOT REPORTED     Seg Neutrophils 75 (H) 36 - 66 %    Lymphocytes 15 (L) 24 - 44 %    Monocytes 9 (H) 1 - 7 %    Eosinophils % 0 0 - 4 %    Basophils 1 0 - 2 %    Immature Granulocytes NOT REPORTED 0 %    Segs Absolute 10.20 (H) 1.3 - 9.1 k/uL Absolute Lymph # 2.00 1.0 - 4.8 k/uL    Absolute Mono # 1.20 0.1 - 1.3 k/uL    Absolute Eos # 0.00 0.0 - 0.4 k/uL    Basophils Absolute 0.10 0.0 - 0.2 k/uL    Absolute Immature Granulocyte NOT REPORTED 0.00 - 0.30 k/uL    WBC Morphology NOT REPORTED     RBC Morphology NOT REPORTED     Platelet Estimate NOT REPORTED    CBC with DIFF    Collection Time: 06/25/20  6:46 PM   Result Value Ref Range    WBC 13.5 (H) 3.5 - 11.0 k/uL    RBC 3.65 (L) 4.0 - 5.2 m/uL    Hemoglobin 10.7 (L) 12.0 - 16.0 g/dL    Hematocrit 32.3 (L) 36 - 46 %    MCV 88.4 80 - 100 fL    MCH 29.2 26 - 34 pg    MCHC 33.0 31 - 37 g/dL    RDW 15.0 (H) 11.5 - 14.9 %    Platelets 350 373 - 620 k/uL    MPV 8.3 6.0 - 12.0 fL    NRBC Automated NOT REPORTED per 100 WBC    Differential Type NOT REPORTED     Seg Neutrophils 79 (H) 36 - 66 %    Lymphocytes 11 (L) 24 - 44 %    Monocytes 9 (H) 1 - 7 %    Eosinophils % 0 0 - 4 %    Basophils 1 0 - 2 %    Immature Granulocytes NOT REPORTED 0 %    Segs Absolute 10.60 (H) 1.3 - 9.1 k/uL    Absolute Lymph # 1.50 1.0 - 4.8 k/uL    Absolute Mono # 1.30 0.1 - 1.3 k/uL    Absolute Eos # 0.00 0.0 - 0.4 k/uL    Basophils Absolute 0.10 0.0 - 0.2 k/uL    Absolute Immature Granulocyte NOT REPORTED 0.00 - 0.30 k/uL    WBC Morphology NOT REPORTED     RBC Morphology NOT REPORTED     Platelet Estimate NOT REPORTED        Imaging/Diagonstics:      Assessment :      Primary Problem  Bipolar I disorder, most recent episode mixed [facility])    Active Hospital Problems    Diagnosis Date Noted    Major depression with psychotic features ([facility] [F32.3]     Bipolar I disorder, most recent episode mixed ([facility] [F31.60] 06/20/2020     Principal Problem:    Bipolar I disorder, most recent episode mixed ([facility] [address]  Active Problems:    H/O malignant neoplasm of colon    Severe malnutrition ([facility] [address]    Major depression with psychotic features ([facility] [address] [facility]    Tachycardia  Resolved Problems:    * No resolved hospital problems. *      Plan:     1.  Tachycardia, likely sinus tachycardia, ordering EKG, due to very poor oral intake, TSH tested recently is normal  2. Requesting dietary consult  3. Hyponatremia, due to poor oral intake, patient is on multiple psych medications  4. Ordering urine osmolality, serum osmolality, urine sodium. 5. Pelvic abscess, on antibiotics    Consultations:   IP CONSULT TO HOSPITALIST  IP CONSULT TO INTERNAL MEDICINE  IP CONSULT TO Gagan Fong MD  6/25/2020  7:09 PM    Copy sent to Dr. Godwin Singleton primary care provider on file. Please note that this chart was generated using voice recognition Dragon dictation software. Although every effort was made to ensure the accuracy of this automated transcription, some errors in transcription may have occurred.

## 2020-06-25 NOTE — VIRTUAL HEALTH
Assessment Medical Decision    Axis I: Bipolar Disorder        Precautions with Justification:   Drain from wound and confusion so placed on 1:1    Medication Review/Mgmt: Medications reviewed - no changes made    Medical Issues: See Chart    Assessment of Risk for Harm to Self/Others:  None    PLAN  · Continue inpatient psychiatric treatment  · Supportive therapy with medication management. Reviewed risks and benefits as well as potential side effects with patient. · Review medications for efficacy and side effects. · Therapeutic support and empathetic care provided  · Engage in therapeutic activities and groups. · Follow up at [facility] after symptoms stabilized. · IM consult with Dr. Priscilla Morales done on 6-  · Abnormal labs on 6- and  during interview - recommended stat page for Dr. Priscilla Morales  · Meagan Dowd 3 on 1:1 on 6-      Anticipated Discharge Date: TBD    Patient's Response to Treatment: Minimal    Finn Blanca  6/25/2020  12:27 PM           Patient Location:  [address] [place] [address]    Provider Location ([facility]/State): [place], [place]    This virtual visit was conducted via interactive/real-time audio/video.

## 2020-06-25 NOTE — BH NOTE
[id number] [facility]  Discharge Note    Pt discharged with followings belongings:   Dentures: None  Vision - Corrective Lenses: None  Hearing Aid: None  Jewelry: Ring, Bracelet(5 rings, 5 bracelets)  Body Piercings Removed: N/A  Clothing: Shirt, Socks  Were All Patient Medications Collected?: Not Applicable  Other Valuables: None   Valuables sent home with patient. Valuables retrieved from safe, Security envelope number:  [id number] and returned to patient. Patient education on aftercare instructions: Yes  Patient verbalize understanding of AVS:  Yes. Status EXAM upon discharge:  Status and Exam  Normal: No  Facial Expression: Avoids Gaze  Affect: Blunt  Level of Consciousness: Alert  Mood:Normal: No  Mood: Suspicious  Motor Activity:Normal: No  Motor Activity: Decreased  Interview Behavior: Cooperative  Preception: [place] to Person, Carmen Kranthi to Time, [place] to Place  Attention:Normal: No  Attention: Distractible  Thought Processes: Flt.of Ideas  Thought Content:Normal: No  Thought Content: Preoccupations  Hallucinations: Auditory (Comment)  Delusions: Yes  Delusions: Grandeur  Memory:Normal: No  Memory: Poor Recent  Insight and Judgment: No  Insight and Judgment: Poor Insight, Unrealistic, Poor Judgment  Present Suicidal Ideation: No  Present Homicidal Ideation: No      Metabolic Screening:    Lab Results   Component Value Date    LABA1C 5.6 06/17/2020       Lab Results   Component Value Date    CHOL 121 06/17/2020     Lab Results   Component Value Date    TRIG 72 06/17/2020     Lab Results   Component Value Date    HDL 34 (L) 06/17/2020     No components found for: LDLCAL  No results found for: LABVLDL     Patient transferred to medical room 2106. Report given to [place]. All belongings returned to patient.      Ruben Villegas RN
Dr Kerline Greenwood contacted via [employer] of abnormal EKG.  Orders obtained to transfer to Progressive Care Room 2106 and STAT CBC, BMP, D-Dimer
LOS  Note    Pt is in day room. Will continue to monitor for changes in condition.
LOS Note  Line of site continued. Patient is resting in room quietly with no signs of distress.  Will continue to monitor
LOS Note  Line of site maintained, patient is resting in room with eyes closed. Will continue to monitor.
LOS Note  Line of site maintained, patient is sleeping in room with eyes closed. Will continue to monitor.
LOS note    Patient is resting quietly in bed with eyes closed. Respirations are even and non labored. No distress noted. Will continue to monitor.
LOS note    Pt is in group. Will continue to monitor for changes in condition.
LOS note    Pt is in her room resting. Will continue to monitor for changes in condition.
LOS note    Pt is in room resting. Will continue to monitor for changes in condition.
LOS note    Pt was in her room resting. Will continue to monitor for changes in condition.
Medications effective, patient reports no nausea/vomitting, pain or anxiety. Heart rate remains high (see flow sheet for vitals). Last checked at 2pm . On call provider Inocencia Serrano NP notified via [employer]. N.O. Internal Medicine. On call Dr Enio Chi notified via perfect serve, and stated \"I left hospital will see tomorrow thanks\".
Patient resting in bed. Patient safety maintained.
Patient resting in bed. Patient safety maintained.
Patient was seen by Dr. Mateo Wang via Telehealth today.
Patient was seen by the provider via telehealth.
Pt is in room making her bed. Pt safety maintained.
Pt resting in room. No distress noted. Line of sight maintained per order.
Pt's MEL tube was drained of 20mL of dark red fluid. Pt tolerated well.
RN has reviewed all LPN and orienting RN documentation.
patient refused to attend Pharmacy group at 1630 after encouragement from staff.   1:1 talk time offered as alternative to group session
0211    VORTIoxetine (TRINTELLIX) tablet 10 mg  10 mg Oral Daily Serenity Jose, APRN - CNP   10 mg at 06/23/20 0935    pantoprazole (PROTONIX) tablet 40 mg  40 mg Oral Daily Trudy Pate MD   40 mg at 06/23/20 0936    polyethylene glycol (GLYCOLAX) packet 17 g  17 g Oral Daily PRN Trudy Pate MD        promethazine (PHENERGAN) tablet 12.5 mg  12.5 mg Oral Q6H PRN Trudy Pate MD   12.5 mg at 06/24/20 0524    Or    ondansetron (ZOFRAN) injection 4 mg  4 mg Intravenous Q6H PRN Trudy Pate MD        potassium chloride (KLOR-CON M) extended release tablet 40 mEq  40 mEq Oral PRN Trudy Pate MD        Or    potassium bicarb-citric acid (EFFER-K) effervescent tablet 40 mEq  40 mEq Oral PRN Trudy Pate MD        Or    potassium chloride 10 mEq/100 mL IVPB (Peripheral Line)  10 mEq Intravenous PRN Trudy Pate MD        busPIRone (BUSPAR) tablet 15 mg  15 mg Oral TID Trudy Pate MD   15 mg at 06/23/20 2142    sodium chloride flush 0.9 % injection 10 mL  10 mL Intravenous PRN Trudy Pate MD        tamsulosin [facility]) capsule 0.4 mg  0.4 mg Oral Daily Trudy Pate MD   0.4 mg at 06/23/20 3311    amoxicillin-clavulanate (AUGMENTIN) 500-125 MG per tablet 1 tablet  1 tablet Oral BID FrederickMinturn Jose APRN - CNP   1 tablet at 06/23/20 2141         cariprazine hcl  1.5 mg Oral Daily    VORTIoxetine  10 mg Oral Daily    pantoprazole  40 mg Oral Daily    busPIRone  15 mg Oral TID    tamsulosin  0.4 mg Oral Daily    amoxicillin-clavulanate  1 tablet Oral BID       ASSESSMENT  Bipolar I disorder, most recent episode mixed ([facility] [address]     Patient's Response to Treatment: negative    PLAN  Dragon voice recognition software used in portions of this document. To continue current management. Discussed about medication and side effects. She denies any. Increase Vraylar 3 mg po daily.
at 06/22/20 2214    VORTIoxetine (TRINTELLIX) tablet 10 mg  10 mg Oral Daily Hungantoine Garcia, APRN - CNP        pantoprazole (PROTONIX) tablet 40 mg  40 mg Oral Daily Jesusita Arredondo MD        polyethylene glycol (GLYCOLAX) packet 17 g  17 g Oral Daily PRN Jesusita Arredondo MD        promethazine (PHENERGAN) tablet 12.5 mg  12.5 mg Oral Q6H PRN Jesusita Arredondo MD        Or    ondansetron (ZOFRAN) injection 4 mg  4 mg Intravenous Q6H PRN Jesusita Arredondo MD        potassium chloride (KLOR-CON M) extended release tablet 40 mEq  40 mEq Oral PRN Jesusita Arredondo MD        Or    potassium bicarb-citric acid (EFFER-K) effervescent tablet 40 mEq  40 mEq Oral PRN Jesusita Arredondo MD        Or    potassium chloride 10 mEq/100 mL IVPB (Peripheral Line)  10 mEq Intravenous PRN Jesusita Arredondo MD        busPIRone (BUSPAR) tablet 15 mg  15 mg Oral TID Jesusita Arredondo MD   15 mg at 06/22/20 2214    mirtazapine (REMERON) tablet 15 mg  15 mg Oral Nightly Jesusita Arredondo MD   15 mg at 06/22/20 2214    sodium chloride flush 0.9 % injection 10 mL  10 mL Intravenous PRN Jesusita Arredondo MD        tamsulosin (FLOMAX) capsule 0.4 mg  0.4 mg Oral Daily Jesusita Arredondo MD        amoxicillin-clavulanate (AUGMENTIN) 500-125 MG per tablet 1 tablet  1 tablet Oral BID [facility] OSWALDO GarciaN - CNP   1 tablet at 06/22/20 2214         OLANZapine  5 mg Oral Nightly    VORTIoxetine  10 mg Oral Daily    pantoprazole  40 mg Oral Daily    busPIRone  15 mg Oral TID    mirtazapine  15 mg Oral Nightly    tamsulosin  0.4 mg Oral Daily    amoxicillin-clavulanate  1 tablet Oral BID       ASSESSMENT  Bipolar I disorder, most recent episode mixed ([facility] [address]     Patient's Response to Treatment: negative    PLAN  Dragon voice recognition software used in portions of this document. To continue current management.
quitting (benefits of quitting, techniques in how to quit, available resources  ( ) Referral for counseling faxed to [place]                                           ( ) Patient refused counseling  ( ) Patient has not smoked in the last 30 days    Metabolic Screening:    Lab Results   Component Value Date    LABA1C 5.6 06/17/2020       Lab Results   Component Value Date    CHOL 121 06/17/2020     Lab Results   Component Value Date    TRIG 72 06/17/2020     Lab Results   Component Value Date    HDL 34 (L) 06/17/2020     No components found for: LDLCAL  No results found for: LABVLDL      Body mass index is 17.16 kg/m². BP Readings from Last 2 Encounters:   06/20/20 125/77   06/17/20 116/75           Pt admitted with followings belongings:  Dentures: None  Vision - Corrective Lenses: None  Hearing Aid: None  Jewelry: Ring, Bracelet(5 rings, 5 bracelets)  Body Piercings Removed: N/A  Clothing: Shirt, Socks  Were All Patient Medications Collected?: Not Applicable  Other Valuables: None     Valuables sent home with N/A. Valuables placed in safe in security envelope, number:  [id number]. Patient's home medications were not collected. Patient oriented to surroundings and program expectations and copy of patient rights given. Received admission packet:  Yes. Consents reviewed, signed in. Patient verbalize understanding:  Yes. Patient education on precautions:  Yes                   Keith Mcdonald RN
voice recognition software used in portions of this document. To continue current management.

## 2020-06-25 NOTE — PROGRESS NOTES
HINA   Pt is resting in her room quietly
IHNA   Pt is resting in her room quietly
Prn atarax  Pt reports racing thoughts and feeling restless
Prn atarax  Pt reports racing thoughts and feeling restless
about quitting (benefits of quitting, techniques in how to quit, available resources  ( ) Referral for counseling faxed to [facility]                                           ( ) Patient refused counseling  ( ) Patient has not smoked in the last 30 days    Metabolic Screening:    Lab Results   Component Value Date    LABA1C 5.6 06/17/2020       Lab Results   Component Value Date    CHOL 121 06/17/2020     Lab Results   Component Value Date    TRIG 72 06/17/2020     Lab Results   Component Value Date    HDL 34 (L) 06/17/2020     No components found for: LDLCAL  No results found for: LABVLDL      Body mass index is 17.16 kg/m². BP Readings from Last 2 Encounters:   06/20/20 116/71   06/20/20 125/77           Pt admitted with followings belongings:  Dentures: None  Vision - Corrective Lenses: None  Hearing Aid: None  Jewelry: Ring, Bracelet(5 rings, 5 bracelets)  Body Piercings Removed: N/A  Clothing: Shirt, Socks  Were All Patient Medications Collected?: Not Applicable  Other Valuables: None     Valuables placed in safe in security envelope, number:  \[id number]\. Patient oriented to surroundings and program expectations and copy of patient rights given. Received admission packet:  yes. Consents reviewed, signed yes. Patient verbalize understanding:  yes. Patient education on precautions: yes           Admit to [facility] 6/15, transfered to medical 6/17 for abdominal pain and had a drain placed for an abscess. Hearing voices, religiously preoccupied, and having suicidal thoughts with no current plan. She denies drugs and alcohol use. She lives with her boyfriend and goes to the [facility]. She is pleasant and cooperative with the admission process.               Isrrael Majano RN

## 2020-06-26 PROBLEM — E43 SEVERE MALNUTRITION (HCC): Status: ACTIVE | Noted: 2020-06-26

## 2020-06-26 LAB
ABSOLUTE EOS #: 0 K/UL (ref 0–0.4)
ABSOLUTE IMMATURE GRANULOCYTE: ABNORMAL K/UL (ref 0–0.3)
ABSOLUTE LYMPH #: 2.6 K/UL (ref 1–4.8)
ABSOLUTE MONO #: 1.5 K/UL (ref 0.1–1.3)
ANION GAP SERPL CALCULATED.3IONS-SCNC: 11 MMOL/L (ref 9–17)
BASOPHILS # BLD: 1 % (ref 0–2)
BASOPHILS ABSOLUTE: 0.1 K/UL (ref 0–0.2)
BUN BLDV-MCNC: 13 MG/DL (ref 6–20)
BUN/CREAT BLD: ABNORMAL (ref 9–20)
C DIFF AG + TOXIN: NEGATIVE
CALCIUM SERPL-MCNC: 8.5 MG/DL (ref 8.6–10.4)
CHLORIDE BLD-SCNC: 98 MMOL/L (ref 98–107)
CO2: 26 MMOL/L (ref 20–31)
CREAT SERPL-MCNC: 0.74 MG/DL (ref 0.5–0.9)
DIFFERENTIAL TYPE: ABNORMAL
EKG ATRIAL RATE: 147 BPM
EKG P AXIS: 71 DEGREES
EKG P-R INTERVAL: 130 MS
EKG Q-T INTERVAL: 332 MS
EKG QRS DURATION: 70 MS
EKG QTC CALCULATION (BAZETT): 519 MS
EKG R AXIS: 91 DEGREES
EKG T AXIS: 52 DEGREES
EKG VENTRICULAR RATE: 147 BPM
EOSINOPHILS RELATIVE PERCENT: 0 % (ref 0–4)
GFR AFRICAN AMERICAN: >60 ML/MIN
GFR NON-AFRICAN AMERICAN: >60 ML/MIN
GFR SERPL CREATININE-BSD FRML MDRD: ABNORMAL ML/MIN/{1.73_M2}
GFR SERPL CREATININE-BSD FRML MDRD: ABNORMAL ML/MIN/{1.73_M2}
GLUCOSE BLD-MCNC: 110 MG/DL (ref 70–99)
HCT VFR BLD CALC: 29 % (ref 36–46)
HEMOGLOBIN: 9.4 G/DL (ref 12–16)
IMMATURE GRANULOCYTES: ABNORMAL %
LYMPHOCYTES # BLD: 20 % (ref 24–44)
MCH RBC QN AUTO: 28.9 PG (ref 26–34)
MCHC RBC AUTO-ENTMCNC: 32.3 G/DL (ref 31–37)
MCV RBC AUTO: 89.3 FL (ref 80–100)
MONOCYTES # BLD: 11 % (ref 1–7)
NRBC AUTOMATED: ABNORMAL PER 100 WBC
OSMOLALITY URINE: 361 MOSM/KG (ref 80–1300)
PDW BLD-RTO: 15 % (ref 11.5–14.9)
PLATELET # BLD: 360 K/UL (ref 150–450)
PLATELET ESTIMATE: ABNORMAL
PMV BLD AUTO: 8.3 FL (ref 6–12)
POTASSIUM SERPL-SCNC: 4.1 MMOL/L (ref 3.7–5.3)
RBC # BLD: 3.25 M/UL (ref 4–5.2)
RBC # BLD: ABNORMAL 10*6/UL
SEG NEUTROPHILS: 68 % (ref 36–66)
SEGMENTED NEUTROPHILS ABSOLUTE COUNT: 8.8 K/UL (ref 1.3–9.1)
SERUM OSMOLALITY: 284 MOSM/KG (ref 275–295)
SODIUM BLD-SCNC: 135 MMOL/L (ref 135–144)
SODIUM,UR: <20 MMOL/L
SPECIMEN DESCRIPTION: NORMAL
WBC # BLD: 13 K/UL (ref 3.5–11)
WBC # BLD: ABNORMAL 10*3/UL

## 2020-06-26 PROCEDURE — 85025 COMPLETE CBC W/AUTO DIFF WBC: CPT

## 2020-06-26 PROCEDURE — 6360000002 HC RX W HCPCS: Performed by: INTERNAL MEDICINE

## 2020-06-26 PROCEDURE — 2580000003 HC RX 258: Performed by: INTERNAL MEDICINE

## 2020-06-26 PROCEDURE — 84300 ASSAY OF URINE SODIUM: CPT

## 2020-06-26 PROCEDURE — 87449 NOS EACH ORGANISM AG IA: CPT

## 2020-06-26 PROCEDURE — 87324 CLOSTRIDIUM AG IA: CPT

## 2020-06-26 PROCEDURE — 2580000003 HC RX 258: Performed by: PHYSICIAN ASSISTANT

## 2020-06-26 PROCEDURE — 6370000000 HC RX 637 (ALT 250 FOR IP): Performed by: NURSE PRACTITIONER

## 2020-06-26 PROCEDURE — 87040 BLOOD CULTURE FOR BACTERIA: CPT

## 2020-06-26 PROCEDURE — 6370000000 HC RX 637 (ALT 250 FOR IP): Performed by: INTERNAL MEDICINE

## 2020-06-26 PROCEDURE — 6370000000 HC RX 637 (ALT 250 FOR IP): Performed by: PHYSICIAN ASSISTANT

## 2020-06-26 PROCEDURE — 80048 BASIC METABOLIC PNL TOTAL CA: CPT

## 2020-06-26 PROCEDURE — 83935 ASSAY OF URINE OSMOLALITY: CPT

## 2020-06-26 PROCEDURE — 99223 1ST HOSP IP/OBS HIGH 75: CPT | Performed by: INTERNAL MEDICINE

## 2020-06-26 PROCEDURE — G0378 HOSPITAL OBSERVATION PER HR: HCPCS

## 2020-06-26 PROCEDURE — 36415 COLL VENOUS BLD VENIPUNCTURE: CPT

## 2020-06-26 RX ORDER — ACETAMINOPHEN 325 MG/1
650 TABLET ORAL EVERY 4 HOURS PRN
Status: DISCONTINUED | OUTPATIENT
Start: 2020-06-26 | End: 2020-07-05 | Stop reason: HOSPADM

## 2020-06-26 RX ORDER — MIRTAZAPINE 15 MG/1
15 TABLET, FILM COATED ORAL NIGHTLY
Status: DISCONTINUED | OUTPATIENT
Start: 2020-06-26 | End: 2020-07-05 | Stop reason: HOSPADM

## 2020-06-26 RX ORDER — 0.9 % SODIUM CHLORIDE 0.9 %
500 INTRAVENOUS SOLUTION INTRAVENOUS ONCE
Status: COMPLETED | OUTPATIENT
Start: 2020-06-26 | End: 2020-06-26

## 2020-06-26 RX ORDER — OLANZAPINE 10 MG/1
5 TABLET ORAL NIGHTLY
Status: DISCONTINUED | OUTPATIENT
Start: 2020-06-26 | End: 2020-07-05 | Stop reason: HOSPADM

## 2020-06-26 RX ORDER — SODIUM CHLORIDE 9 MG/ML
INJECTION, SOLUTION INTRAVENOUS CONTINUOUS
Status: DISCONTINUED | OUTPATIENT
Start: 2020-06-26 | End: 2020-07-05 | Stop reason: HOSPADM

## 2020-06-26 RX ORDER — LOPERAMIDE HYDROCHLORIDE 2 MG/1
2 CAPSULE ORAL 4 TIMES DAILY PRN
Status: DISCONTINUED | OUTPATIENT
Start: 2020-06-26 | End: 2020-07-05 | Stop reason: HOSPADM

## 2020-06-26 RX ORDER — ONDANSETRON 2 MG/ML
4 INJECTION INTRAMUSCULAR; INTRAVENOUS EVERY 6 HOURS PRN
Status: DISCONTINUED | OUTPATIENT
Start: 2020-06-26 | End: 2020-07-05 | Stop reason: HOSPADM

## 2020-06-26 RX ADMIN — AMOXICILLIN AND CLAVULANATE POTASSIUM 1 TABLET: 500; 125 TABLET, FILM COATED ORAL at 21:23

## 2020-06-26 RX ADMIN — TAMSULOSIN HYDROCHLORIDE 0.4 MG: 0.4 CAPSULE ORAL at 08:50

## 2020-06-26 RX ADMIN — SODIUM CHLORIDE: 9 INJECTION, SOLUTION INTRAVENOUS at 00:33

## 2020-06-26 RX ADMIN — ACETAMINOPHEN 650 MG: 325 TABLET, FILM COATED ORAL at 06:26

## 2020-06-26 RX ADMIN — LOPERAMIDE HYDROCHLORIDE 2 MG: 2 CAPSULE ORAL at 13:34

## 2020-06-26 RX ADMIN — ACETAMINOPHEN 650 MG: 325 TABLET, FILM COATED ORAL at 19:40

## 2020-06-26 RX ADMIN — SODIUM CHLORIDE: 9 INJECTION, SOLUTION INTRAVENOUS at 17:36

## 2020-06-26 RX ADMIN — PANTOPRAZOLE SODIUM 40 MG: 40 TABLET, DELAYED RELEASE ORAL at 08:50

## 2020-06-26 RX ADMIN — ONDANSETRON 4 MG: 2 INJECTION INTRAMUSCULAR; INTRAVENOUS at 13:34

## 2020-06-26 RX ADMIN — CARIPRAZINE 3 MG: 3 CAPSULE, GELATIN COATED ORAL at 08:50

## 2020-06-26 RX ADMIN — MIRTAZAPINE 15 MG: 15 TABLET, FILM COATED ORAL at 21:23

## 2020-06-26 RX ADMIN — OLANZAPINE 5 MG: 10 TABLET, FILM COATED ORAL at 21:23

## 2020-06-26 RX ADMIN — VORTIOXETINE 10 MG: 10 TABLET, FILM COATED ORAL at 08:50

## 2020-06-26 RX ADMIN — AMOXICILLIN AND CLAVULANATE POTASSIUM 1 TABLET: 500; 125 TABLET, FILM COATED ORAL at 08:50

## 2020-06-26 RX ADMIN — ACETAMINOPHEN 650 MG: 325 TABLET, FILM COATED ORAL at 00:33

## 2020-06-26 RX ADMIN — HYDROXYZINE HYDROCHLORIDE 25 MG: 25 TABLET, FILM COATED ORAL at 13:59

## 2020-06-26 RX ADMIN — SODIUM CHLORIDE 500 ML: 9 INJECTION, SOLUTION INTRAVENOUS at 15:59

## 2020-06-26 RX ADMIN — Medication 10 ML: at 13:37

## 2020-06-26 ASSESSMENT — PAIN SCALES - GENERAL
PAINLEVEL_OUTOF10: 0
PAINLEVEL_OUTOF10: 3
PAINLEVEL_OUTOF10: 0
PAINLEVEL_OUTOF10: 0

## 2020-06-26 ASSESSMENT — PAIN DESCRIPTION - PAIN TYPE: TYPE: ACUTE PAIN

## 2020-06-26 ASSESSMENT — PAIN DESCRIPTION - LOCATION: LOCATION: GENERALIZED

## 2020-06-26 NOTE — CARE COORDINATION
Spoke with [employer], he said he found her port card and it is a purple card that says power port, mely, 19g. He believes placed about 2015 for chemo. I will try to access. CT notified. No answer at this time.

## 2020-06-26 NOTE — PROGRESS NOTES
Nutrition Assessment    Type and Reason for Visit: Initial, Positive Nutrition Screen, Consult(Poor appetite and intake)    Nutrition Recommendations: Continue current diet; attempt to honor preferences and tolerances. Continue Ensure Enlive twice daily. Nursing staff to attempt to recheck wt measurement. Nutrition Assessment: Pt originally admitted to [facility] on 6/15, then to hospital on 6/17 due to pelvic abscess; drain was placed. Pt was discharged from hospital and admitted to [facility] on 6/20. Pt now readmitted to hospital due to tachycardia. Recent issues with hyponatremia, although normal today. Diarrhea noted. Pt states her intake had been decreasing x approxmitely 2 weeks, with further decrease since 6/15. Pt states she is mostly only able to tolerate liquids due to \"stomach cancer\" and recent surgery, however, pt is selecting some solid foods from menu. Pt states she vomited this morning. Accepts Ensure Enlive. Malnutrition Assessment:  · Malnutrition Status: Meets the criteria for severe malnutrition  · Context: Acute illness or injury  · Findings of the 6 clinical characteristics of malnutrition (Minimum of 2 out of 6 clinical characteristics is required to make the diagnosis of moderate or severe Protein Calorie Malnutrition based on AND/ASPEN Guidelines):  1. Energy Intake-Less than or equal to 75% of estimated energy requirement, Greater than or equal to 7 days    2. Weight Loss-Unable to assess,    3. Fat Loss-Unable to assess,    4. Muscle Loss-Severe muscle mass loss, Clavicles (pectoralis and deltoids)  5. Fluid Accumulation-No significant fluid accumulation, Extremities  6.  Strength-Not measured    Nutrition Risk Level: High    Nutrient Needs:  · Estimated Daily Total Kcal: 9879-8621 based on [place]-Wt.  Roya Seo with 1.2-1.3 factor for maintenance using wt of 43.5kg. 9024-2475 for wt gain (additional 250-500 kcal)  · Estimated Daily Protein (g): 65 based on 1.5 gm per kg usual wt    Nutrition Diagnosis:   · Problem: Severe malnutrition  · Etiology: related to Insufficient energy/nutrient consumption, Psychological cause/life stress, Alteration in GI function     Signs and symptoms:  as evidenced by Severe muscle loss, Diet history of poor intake, Intake 0-25%, Intake 25-50%    Objective Information:  · Nutrition-Focused Physical Findings: No edema. Very thin. Diarrhea. N+V. Pelvic abscess drain placed 6/19. Past Hx of rectal cancer. Na: 135 (6/26), PAB: 10.8 (6/17)  · Wound Type:    · Current Nutrition Therapies:  · Oral Diet Orders: General   · Oral Diet intake: 1-25%  · Oral Nutrition Supplement (ONS) Orders: Standard High Calorie Oral Supplement  · ONS intake: 51-75%(estimated)  · Anthropometric Measures:  · Ht: 5' (152.4 cm)   · Current Body Wt: 72 lb 8.5 oz (32.9 kg)(Await re-check of wt)  · Usual Body Wt: 96 lb (43.5 kg)(43.5 kg approximately 1 month ago per pt; 52.3 kg approximately 1 year ago per pt)  · % Weight Change:  ,   deal Body Wt: 100 lb (45.4 kg), % Ideal Body    · BMI Classification: BMI <18.5 Underweight    Nutrition Interventions:   Continue current diet, Continue current ONS(Attempt to honor preferences, tolerances)  Continued Inpatient Monitoring, Education not appropriate at this time    Nutrition Evaluation:   · Evaluation: Goals set   · Goals: PO intake % of meals and supplements    · Monitoring: Nutrition Progression, Meal Intake, Supplement Intake, Diet Tolerance, Skin Integrity, Mental Status/Confusion, Weight, Pertinent Labs, Nausea or Vomiting, Diarrhea    Some areas of assessment may be incomplete due to standard COVID-19 Precautions. Elian Saldana R.D., L.D.   Phone: [phone]

## 2020-06-26 NOTE — PROGRESS NOTES
Patient admitted per wheelchair to room 2106. Oriented to room. VS taken and telemetry applied. One on one initiated.

## 2020-06-26 NOTE — DISCHARGE SUMMARY
as of 6/25/2020  7:49 PM      CONTINUE these medications which have NOT CHANGED    Details   amoxicillin-clavulanate (AUGMENTIN) 500-125 MG per tablet Take 1 tablet by mouth 2 times daily Historical Med      VORTIoxetine (TRINTELLIX) 10 MG TABS tablet Take 10 mg by mouth dailyHistorical Med      OLANZapine (ZYPREXA) 5 MG tablet Take 5 mg by mouth nightlyHistorical Med      busPIRone (BUSPAR) 15 MG tablet Take 15 mg by mouth 3 times dailyHistorical Med      PARoxetine (PAXIL) 20 MG tablet Take 20 mg by mouth every morningHistorical Med      tamsulosin (FLOMAX) 0.4 MG capsule Take 0.4 mg by mouth dailyHistorical Med      hydrOXYzine (VISTARIL) 50 MG capsule Take 50 mg by mouth 3 times daily as neededHistorical Med      mirtazapine (REMERON) 15 MG tablet Take 15 mg by mouth nightlyHistorical Med      pantoprazole (PROTONIX) 40 MG tablet Take 40 mg by mouth dailyHistorical Med         STOP taking these medications       metroNIDAZOLE (FLAGYL) 500 MG tablet Comments:   Reason for Stopping:         ciprofloxacin (CIPRO) 500 MG tablet Comments:   Reason for Stopping:              Core Measures statement:   Not applicable    Discharge Exam:  Level of consciousness:  Within normal limits  Appearance: Hospital clothes, seated, with fair grooming  Behavior/Motor: Anxious  Attitude toward examiner:  Cooperative, attentive, good eye contact  Speech: Slow to answer questions, soft spoken  Mood:  depressed  Affect:  mood congruent  Thought processes:  linear, goal directed and slightly slowed  Thought content:  Homocidal ideation denies  Suicidal Ideation:  denies suicidal ideation  Delusions:  no evidence of delusions  Perceptual Disturbance:  denies any perceptual disturbance however; she has been observed responding to internal stimuli by staff  Cognition:  In tact however; patient is reporting that she experienced a stroke last night  Memory: age appropriate  Insight & Judgement: fair  Medication side effects:  denies Disposition: [place] [id number] Unit    Patient Instructions: Activity as tolerated and as directed by receiving physician. Time Spent: 15 minutes    Engagement: Patient displayed a fair level of engagement with the treatments offered during this admission.      Discharge planning, findings, and recommendations were discussed with patient and treatment team.    Signed:  Bessie Johnson   6/26/2020  3:37 PM

## 2020-06-26 NOTE — FLOWSHEET NOTE
SC initial visit   Pt requested prayer and spoke in tongues and interpreted  Writer shared relevant info with nurse       06/26/20 1311   Encounter Summary   Services provided to: Patient   Referral/Consult From: [address] [place] [address] Family members   Continue Visiting   (6-26-20)   Complexity of Encounter High   Length of Encounter 30 minutes   Spiritual Assessment Completed Yes   Routine   Type Initial   Assessment Anxious; Spiritual struggle   Intervention Active listening;Explored feelings, thoughts, concerns;Nurtured hope;Prayer;Sustaining presence/ Ministry of presence; Discussed meaning/purpose;Discussed relationship with God;Discussed belief system/Tenriism practices/barbara   Outcome Comfort   Spiritual/Oriental orthodox   Type Spiritual support   Assessment Anxious   Intervention Active listening;Explored feelings, thoughts, concerns;Prayer;Nurtured hope;Sustaining presence/ Ministry of presence   Outcome Comfort

## 2020-06-26 NOTE — PLAN OF CARE
Nutrition Problem: Severe malnutrition  Intervention: Food and/or Nutrient Delivery: Continue current diet, Continue current ONS(Attempt to honor preferences, tolerances)  Nutritional Goals: PO intake % of meals and supplements

## 2020-06-26 NOTE — PROGRESS NOTES
Javier SIBLEY notified of CT results,  and temp 99.9. Also told of involuntary liquid brown stools. Order received.

## 2020-06-26 NOTE — H&P
7/3/20  Historical Provider, MD   VORTIoxetine (TRINTELLIX) 10 MG TABS tablet Take 10 mg by mouth daily    Historical Provider, MD   OLANZapine (ZYPREXA) 5 MG tablet Take 5 mg by mouth nightly    Historical Provider, MD   busPIRone (BUSPAR) 15 MG tablet Take 15 mg by mouth 3 times daily    Historical Provider, MD   PARoxetine (PAXIL) 20 MG tablet Take 20 mg by mouth every morning    Historical Provider, MD   tamsulosin (FLOMAX) 0.4 MG capsule Take 0.4 mg by mouth daily    Historical Provider, MD   hydrOXYzine (VISTARIL) 50 MG capsule Take 50 mg by mouth 3 times daily as needed    Historical Provider, MD   mirtazapine (REMERON) 15 MG tablet Take 15 mg by mouth nightly    Historical Provider, MD   pantoprazole (PROTONIX) 40 MG tablet Take 40 mg by mouth daily    Historical Provider, MD        Allergies:     Patient has no known allergies. Social History:     Tobacco:    reports that she has never smoked. She has never used smokeless tobacco.  Alcohol:      has no history on file for alcohol. Drug Use:  has no history on file for drug. Family History:     History reviewed. No pertinent family history. Review of Systems:     ROS:  Constitutional  Negative for fever and chills    HEENT  Negative for ear discharge, ear pain, nosebleed    Eyes  Negative for photophobia, pain and discharge    Respiratory  Negative for hemoptysis and sputum    Cardiovascular  Negative for orthopnea, claudication and PND    Gastrointestinal  Negative for abdominal pain, diarrhea, blood in stool    Musculoskeletal  Negative for joint pain, negative for myalgia    Skin  Negative for rash or itching    Endo/heme/allergies  Negative for polydipsia, environmental allergy    Psychiatric/behavioral  Negative for suicidal ideation.  Patient is not anxious        Physical Exam:   /72   Pulse 102   Temp 98.3 °F (36.8 °C)   Resp 16   Ht 5' (1.524 m)   Wt 72 lb 8.5 oz (32.9 kg)   LMP 06/25/2017   SpO2 100%   BMI 14.17 kg/m²   Temp (24hrs), Av.1 °F (37.3 °C), Min:98.3 °F (36.8 °C), Max:99.9 °F (37.7 °C)    No results for input(s): POCGLU in the last 72 hours. Intake/Output Summary (Last 24 hours) at 2020 1433  Last data filed at 2020 1316  Gross per 24 hour   Intake 1613 ml   Output 1833 ml   Net -220 ml           EXAMINATION:  GENERAL    SKIN  Appears comfortable and in no distress    No gross lesions, rashes, bruising or bleeding on exposed skin area   HEENT NC/ AT  Eyes:no icterus, redness, pupils equal and reactive, extraocular eye movements intact, conjunctiva clear  Ear: normal external ear, no discharge, hearing intact  Nose:  no drainage noted  Mouth: mucous membranes moist   NECK  LUNGS  Supple and no bruits heard  Clear to auscultation,b/l   Cardiovascular  Abdomen  S1, S2 heard; radial pulse intact,RRR  Soft,non-tender,no organomegaly,BS+   MENTAL STATUS:  Alert, oriented, intact memory, no confusion, normal speech, normal language, no hallucination or delusion   CRANIAL NERVES: II     -       Pupils reactive b/l visual acuity: 20/30 OU; Fundus exam: intact venous pulsations;  Visual fields intact to confrontation  III,IV,VI -  EOMs full, no afferent defect, no                      CJ, no ptosis  V     -     Normal facial sensation  VII    -     Normal facial symmetry  VIII   -     Intact hearing  IX,X -     Symmetrical palate  XI    -     Symmetrical shoulder shrug  XII   -     Midline tongue, no atrophy    MOTOR FUNCTION:  Bulk R side:Normal            L side:Normal  Tone  R side:Normal            L side:Normal   Power 5/5 in upper and lower extremties;      no involuntary movements, no tremor     SENSORY FUNCTION:                      Extremities: Touch     R side:Normal                 L side:Normal     Pain        R side:Normal                  L side:Normal  Vibration  R side:Normal                  L side:Normal    Proprioception    R side:Normal                             L side:Normal    Peripheral pulses palpable, no pedal edema or calf pain with palpation   CEREBELLAR FUNCTION:  Heel to Shin:     Right side:  normal                             Left side:  normal    Finger to Nose:   Right:  normal                              Left:  normal            REFLEX FUNCTION:  Symmetric, no perverted reflex,      Right Bicep:  2+  Left Bicep:  2+  Right Knee:  2+  Left Knee:  2+    Babinski sign   Right side:Downgoing                          Left side   :Downgoing   STATION and GAIT  Not tested               Investigations:      Laboratory Testing:  Recent Results (from the past 24 hour(s))   EKG 12 Lead    Collection Time: 06/25/20  4:14 PM   Result Value Ref Range    Ventricular Rate 147 BPM    Atrial Rate 147 BPM    P-R Interval 130 ms    QRS Duration 70 ms    Q-T Interval 332 ms    QTc Calculation (Bazett) 519 ms    P Axis 71 degrees    R Axis 91 degrees    T Axis 52 degrees   CBC with DIFF    Collection Time: 06/25/20  6:46 PM   Result Value Ref Range    WBC 13.5 (H) 3.5 - 11.0 k/uL    RBC 3.65 (L) 4.0 - 5.2 m/uL    Hemoglobin 10.7 (L) 12.0 - 16.0 g/dL    Hematocrit 32.3 (L) 36 - 46 %    MCV 88.4 80 - 100 fL    MCH 29.2 26 - 34 pg    MCHC 33.0 31 - 37 g/dL    RDW 15.0 (H) 11.5 - 14.9 %    Platelets 972 259 - 883 k/uL    MPV 8.3 6.0 - 12.0 fL    NRBC Automated NOT REPORTED per 100 WBC    Differential Type NOT REPORTED     Seg Neutrophils 79 (H) 36 - 66 %    Lymphocytes 11 (L) 24 - 44 %    Monocytes 9 (H) 1 - 7 %    Eosinophils % 0 0 - 4 %    Basophils 1 0 - 2 %    Immature Granulocytes NOT REPORTED 0 %    Segs Absolute 10.60 (H) 1.3 - 9.1 k/uL    Absolute Lymph # 1.50 1.0 - 4.8 k/uL    Absolute Mono # 1.30 0.1 - 1.3 k/uL    Absolute Eos # 0.00 0.0 - 0.4 k/uL    Basophils Absolute 0.10 0.0 - 0.2 k/uL    Absolute Immature Granulocyte NOT REPORTED 0.00 - 0.30 k/uL    WBC Morphology NOT REPORTED     RBC Morphology NOT REPORTED     Platelet Estimate NOT REPORTED    Basic Metabolic Prof    Collection Time: 06/25/20  6:46 PM Result Value Ref Range    Glucose 163 (H) 70 - 99 mg/dL    BUN 14 6 - 20 mg/dL    CREATININE 0.72 0.50 - 0.90 mg/dL    Bun/Cre Ratio NOT REPORTED 9 - 20    Calcium 9.5 8.6 - 10.4 mg/dL    Sodium 133 (L) 135 - 144 mmol/L    Potassium 4.3 3.7 - 5.3 mmol/L    Chloride 93 (L) 98 - 107 mmol/L    CO2 26 20 - 31 mmol/L    Anion Gap 14 9 - 17 mmol/L    GFR Non-African American >60 >60 mL/min    GFR African American >60 >60 mL/min    GFR Comment          GFR Staging NOT REPORTED    D-Dimer Test    Collection Time: 06/25/20  6:46 PM   Result Value Ref Range    D-Dimer, Quant 3.49 (H) 0.00 - 0.59 mg/L FEU   Culture, Blood 1    Collection Time: 06/25/20  8:25 PM   Result Value Ref Range    Specimen Description . BLOOD     Special Requests R AC 5 RED 5 PURPLE     Culture NO GROWTH 14 HOURS    TSH w/reflex to FT4    Collection Time: 06/25/20  8:29 PM   Result Value Ref Range    TSH 1.86 0.30 - 5.00 mIU/L   Lactate, Sepsis    Collection Time: 06/25/20  8:29 PM   Result Value Ref Range    Lactic Acid, Sepsis 1.6 0.5 - 1.9 mmol/L    Lactic Acid, Sepsis, Whole Blood NOT REPORTED 0.5 - 1.9 mmol/L   Osmolality    Collection Time: 06/25/20  8:29 PM   Result Value Ref Range    Serum Osmolality 284 275 - 295 mOsm/kg   Basic Metabolic Panel w/ Reflex to MG    Collection Time: 06/26/20  4:49 AM   Result Value Ref Range    Glucose 110 (H) 70 - 99 mg/dL    BUN 13 6 - 20 mg/dL    CREATININE 0.74 0.50 - 0.90 mg/dL    Bun/Cre Ratio NOT REPORTED 9 - 20    Calcium 8.5 (L) 8.6 - 10.4 mg/dL    Sodium 135 135 - 144 mmol/L    Potassium 4.1 3.7 - 5.3 mmol/L    Chloride 98 98 - 107 mmol/L    CO2 26 20 - 31 mmol/L    Anion Gap 11 9 - 17 mmol/L    GFR Non-African American >60 >60 mL/min    GFR African American >60 >60 mL/min    GFR Comment          GFR Staging NOT REPORTED    CBC auto differential    Collection Time: 06/26/20  4:49 AM   Result Value Ref Range    WBC 13.0 (H) 3.5 - 11.0 k/uL    RBC 3.25 (L) 4.0 - 5.2 m/uL    Hemoglobin 9.4 (L) 12.0 - 16.0 g/dL    Hematocrit 29.0 (L) 36 - 46 %    MCV 89.3 80 - 100 fL    MCH 28.9 26 - 34 pg    MCHC 32.3 31 - 37 g/dL    RDW 15.0 (H) 11.5 - 14.9 %    Platelets 828 907 - 677 k/uL    MPV 8.3 6.0 - 12.0 fL    NRBC Automated NOT REPORTED per 100 WBC    Differential Type NOT REPORTED     Seg Neutrophils 68 (H) 36 - 66 %    Lymphocytes 20 (L) 24 - 44 %    Monocytes 11 (H) 1 - 7 %    Eosinophils % 0 0 - 4 %    Basophils 1 0 - 2 %    Immature Granulocytes NOT REPORTED 0 %    Segs Absolute 8.80 1.3 - 9.1 k/uL    Absolute Lymph # 2.60 1.0 - 4.8 k/uL    Absolute Mono # 1.50 (H) 0.1 - 1.3 k/uL    Absolute Eos # 0.00 0.0 - 0.4 k/uL    Basophils Absolute 0.10 0.0 - 0.2 k/uL    Absolute Immature Granulocyte NOT REPORTED 0.00 - 0.30 k/uL    WBC Morphology NOT REPORTED     RBC Morphology NOT REPORTED     Platelet Estimate NOT REPORTED    Culture, Blood 1    Collection Time: 06/26/20  4:49 AM   Result Value Ref Range    Specimen Description . BLOOD     Special Requests LT HAND 4 PURPLE 1 RED     Culture NO GROWTH 3 HOURS    OSMOLALITY, URINE    Collection Time: 06/26/20  6:12 AM   Result Value Ref Range    Osmolality, Ur 361 80 - 1300 mOsm/kg   Sodium, Random Ur    Collection Time: 06/26/20  6:12 AM   Result Value Ref Range    Sodium,Ur <20 mmol/L   C DIFF TOXIN/ANTIGEN    Collection Time: 06/26/20  6:24 AM   Result Value Ref Range    Specimen Description . FECES     C DIFF AG + TOXIN NEGATIVE NEGATIVE       Imaging/Diagnostics:            Assessment and Plan:     Hypovolemia  Tachycardia  diarrhea  Will give fluid bolus       Consultations:   IP CONSULT TO DIETITIAN  IP CONSULT TO SOCIAL WORK  .     Angelique Mike MD

## 2020-06-26 NOTE — CARE COORDINATION
Attempted IV access left arm unsuccessfully. Very small veins and pt requires #20 above the wrist for CT scan. yesi Aguirre phoned and will arrive shortly to attempt. 2 other nurses have tried unsuccessfully also.

## 2020-06-26 NOTE — PLAN OF CARE
Problem: Falls - Risk of:  Goal: Will remain free from falls  Description: Will remain free from falls  Outcome: Ongoing  Note: Patient weak. Sitter at bedside. Assisted to bathroom. Gait steady. Bed alarm on. Goal: Absence of physical injury  Description: Absence of physical injury  Outcome: Ongoing     Problem: Cardiac Output - Decreased:  Goal: Hemodynamic stability will improve  Description: Hemodynamic stability will improve  Outcome: Ongoing  Note: Telemetry sinus tach. BP stable. Neg PE.  IV fluid bolus given. Problem: Mental Status - Impaired:  Goal: Mental status will improve  Description: Mental status will improve  Outcome: Ongoing  Note: Patient alert and oriented. Talks to self. Withdrawn and refuses to answer many questions when asked. Cooperative.

## 2020-06-26 NOTE — PLAN OF CARE
Problem: Falls - Risk of:  Goal: Will remain free from falls  Description: Will remain free from falls  6/26/2020 1404 by Ady Gutierrez RN  Outcome: Ongoing  Note: Patient weak. Sitter at bedside. Assisted to bathroom. Gait steady. Bed alarm on. Goal: Absence of physical injury  Description: Absence of physical injury  6/26/2020 1404 by Ady Gutierrez RN  Outcome: Ongoing     Problem: Cardiac Output - Decreased:  Goal: Hemodynamic stability will improve  Description: Hemodynamic stability will improve  6/26/2020 1404 by Ady Gutierrez RN  Outcome: Ongoing  Note: Telemetry sinus tach. BP stable. Neg PE.  IV fluid bolus given. Problem: Mental Status - Impaired:  Goal: Mental status will improve  Description: Mental status will improve  6/26/2020 1404 by Ady Gutierrez RN  Outcome: Ongoing  Note: Patient alert and oriented. Talks to self. Withdrawn and refuses to answer many questions when asked. Cooperative.

## 2020-06-26 NOTE — CARE COORDINATION
CASE MANAGEMENT NOTE:    Admission Date:  6/25/2020 Summer Mariscal is a 40 y.o.  female    Admitted for : Tachycardia [R00.0]    Met with:  Patient    PCP:  Corey Gallegos scheduled for 6/30                              Insurance:  [employer]      Current Residence/ Living Arrangements:  independently at home             Current Services PTA:  No    Is patient agreeable to VNS: No    Freedom of choice provided:  Yes    List of [address] provided: NA    VNS chosen:  NA    DME:  none    Home Oxygen: No    Nebulizer: No    CPAP/BIPAP: No    Supplier: N/A    Potential Assistance Needed: Will follow    SNF needed: No    Freedom of choice and list provided: NA    [facility]       Does Patient want to use MEDS to BEDS? No    Is the Patient an MARIANA RUIZ [facility] with Readmission Risk Score greater than 14%? No  If yes, pt needs a follow up appointment made within 7 days. Family Members/Caregivers that pt would like involved in their care:    Yes    If yes, list name here:  Rubia Cook     Transportation Provider:  Family             Is patient in Isolation/One on One/Altered Mental Status? Yes  If yes, skip next question. If no, would they like an I-Pad to  use? NA  If yes, call [phone]. Discharge Plan:  6/26/2020 [employer]; From home with spouse and children- stated she is independent and spouse drives; DME- None; Declines VNS; From [facility]-1:1 at bedside//LENNOX                  Electronically signed by:  Radha Fong RN on 6/26/2020 at 9:46 AM

## 2020-06-27 LAB
ABSOLUTE EOS #: 0.1 K/UL (ref 0–0.4)
ABSOLUTE IMMATURE GRANULOCYTE: ABNORMAL K/UL (ref 0–0.3)
ABSOLUTE LYMPH #: 2.1 K/UL (ref 1–4.8)
ABSOLUTE MONO #: 1.1 K/UL (ref 0.1–1.3)
ANION GAP SERPL CALCULATED.3IONS-SCNC: 9 MMOL/L (ref 9–17)
BASOPHILS # BLD: 1 % (ref 0–2)
BASOPHILS ABSOLUTE: 0.1 K/UL (ref 0–0.2)
BUN BLDV-MCNC: 7 MG/DL (ref 6–20)
BUN/CREAT BLD: ABNORMAL (ref 9–20)
CALCIUM SERPL-MCNC: 8.2 MG/DL (ref 8.6–10.4)
CHLORIDE BLD-SCNC: 104 MMOL/L (ref 98–107)
CO2: 26 MMOL/L (ref 20–31)
CREAT SERPL-MCNC: 0.71 MG/DL (ref 0.5–0.9)
DIFFERENTIAL TYPE: ABNORMAL
EOSINOPHILS RELATIVE PERCENT: 1 % (ref 0–4)
GFR AFRICAN AMERICAN: >60 ML/MIN
GFR NON-AFRICAN AMERICAN: >60 ML/MIN
GFR SERPL CREATININE-BSD FRML MDRD: ABNORMAL ML/MIN/{1.73_M2}
GFR SERPL CREATININE-BSD FRML MDRD: ABNORMAL ML/MIN/{1.73_M2}
GLUCOSE BLD-MCNC: 98 MG/DL (ref 70–99)
HCT VFR BLD CALC: 26.4 % (ref 36–46)
HEMOGLOBIN: 8.5 G/DL (ref 12–16)
IMMATURE GRANULOCYTES: ABNORMAL %
LYMPHOCYTES # BLD: 22 % (ref 24–44)
MCH RBC QN AUTO: 29 PG (ref 26–34)
MCHC RBC AUTO-ENTMCNC: 32.3 G/DL (ref 31–37)
MCV RBC AUTO: 89.6 FL (ref 80–100)
MONOCYTES # BLD: 12 % (ref 1–7)
NRBC AUTOMATED: ABNORMAL PER 100 WBC
PDW BLD-RTO: 14.9 % (ref 11.5–14.9)
PLATELET # BLD: 284 K/UL (ref 150–450)
PLATELET ESTIMATE: ABNORMAL
PMV BLD AUTO: 7.9 FL (ref 6–12)
POTASSIUM SERPL-SCNC: 3.7 MMOL/L (ref 3.7–5.3)
RBC # BLD: 2.94 M/UL (ref 4–5.2)
RBC # BLD: ABNORMAL 10*6/UL
SEG NEUTROPHILS: 64 % (ref 36–66)
SEGMENTED NEUTROPHILS ABSOLUTE COUNT: 6.2 K/UL (ref 1.3–9.1)
SODIUM BLD-SCNC: 139 MMOL/L (ref 135–144)
WBC # BLD: 9.5 K/UL (ref 3.5–11)
WBC # BLD: ABNORMAL 10*3/UL

## 2020-06-27 PROCEDURE — 96376 TX/PRO/DX INJ SAME DRUG ADON: CPT

## 2020-06-27 PROCEDURE — 6360000002 HC RX W HCPCS: Performed by: INTERNAL MEDICINE

## 2020-06-27 PROCEDURE — 36415 COLL VENOUS BLD VENIPUNCTURE: CPT

## 2020-06-27 PROCEDURE — 99254 IP/OBS CNSLTJ NEW/EST MOD 60: CPT | Performed by: INTERNAL MEDICINE

## 2020-06-27 PROCEDURE — 99233 SBSQ HOSP IP/OBS HIGH 50: CPT | Performed by: INTERNAL MEDICINE

## 2020-06-27 PROCEDURE — 2060000000 HC ICU INTERMEDIATE R&B

## 2020-06-27 PROCEDURE — 6370000000 HC RX 637 (ALT 250 FOR IP): Performed by: PHYSICIAN ASSISTANT

## 2020-06-27 PROCEDURE — 80048 BASIC METABOLIC PNL TOTAL CA: CPT

## 2020-06-27 PROCEDURE — 2580000003 HC RX 258: Performed by: PHYSICIAN ASSISTANT

## 2020-06-27 PROCEDURE — 85025 COMPLETE CBC W/AUTO DIFF WBC: CPT

## 2020-06-27 PROCEDURE — APPNB30 APP NON BILLABLE TIME 0-30 MINS: Performed by: NURSE PRACTITIONER

## 2020-06-27 PROCEDURE — 96372 THER/PROPH/DIAG INJ SC/IM: CPT

## 2020-06-27 PROCEDURE — 6370000000 HC RX 637 (ALT 250 FOR IP): Performed by: NURSE PRACTITIONER

## 2020-06-27 PROCEDURE — 6370000000 HC RX 637 (ALT 250 FOR IP): Performed by: INTERNAL MEDICINE

## 2020-06-27 RX ADMIN — BUSPIRONE HYDROCHLORIDE 15 MG: 15 TABLET ORAL at 13:39

## 2020-06-27 RX ADMIN — TAMSULOSIN HYDROCHLORIDE 0.4 MG: 0.4 CAPSULE ORAL at 09:59

## 2020-06-27 RX ADMIN — ACETAMINOPHEN 650 MG: 325 TABLET, FILM COATED ORAL at 01:56

## 2020-06-27 RX ADMIN — ACETAMINOPHEN 650 MG: 325 TABLET, FILM COATED ORAL at 17:19

## 2020-06-27 RX ADMIN — OLANZAPINE 5 MG: 10 TABLET, FILM COATED ORAL at 20:56

## 2020-06-27 RX ADMIN — BUSPIRONE HYDROCHLORIDE 15 MG: 15 TABLET ORAL at 20:56

## 2020-06-27 RX ADMIN — PANTOPRAZOLE SODIUM 40 MG: 40 TABLET, DELAYED RELEASE ORAL at 09:59

## 2020-06-27 RX ADMIN — CARIPRAZINE 3 MG: 3 CAPSULE, GELATIN COATED ORAL at 09:58

## 2020-06-27 RX ADMIN — SODIUM CHLORIDE: 9 INJECTION, SOLUTION INTRAVENOUS at 20:57

## 2020-06-27 RX ADMIN — AMOXICILLIN AND CLAVULANATE POTASSIUM 1 TABLET: 500; 125 TABLET, FILM COATED ORAL at 09:58

## 2020-06-27 RX ADMIN — BUSPIRONE HYDROCHLORIDE 15 MG: 15 TABLET ORAL at 10:04

## 2020-06-27 RX ADMIN — SODIUM CHLORIDE: 9 INJECTION, SOLUTION INTRAVENOUS at 01:49

## 2020-06-27 RX ADMIN — AMOXICILLIN AND CLAVULANATE POTASSIUM 1 TABLET: 500; 125 TABLET, FILM COATED ORAL at 21:04

## 2020-06-27 RX ADMIN — VORTIOXETINE 10 MG: 10 TABLET, FILM COATED ORAL at 09:59

## 2020-06-27 RX ADMIN — SODIUM CHLORIDE: 9 INJECTION, SOLUTION INTRAVENOUS at 11:28

## 2020-06-27 RX ADMIN — ENOXAPARIN SODIUM 40 MG: 40 INJECTION SUBCUTANEOUS at 09:59

## 2020-06-27 RX ADMIN — MIRTAZAPINE 15 MG: 15 TABLET, FILM COATED ORAL at 20:56

## 2020-06-27 RX ADMIN — ONDANSETRON 4 MG: 2 INJECTION INTRAMUSCULAR; INTRAVENOUS at 13:22

## 2020-06-27 ASSESSMENT — PAIN SCALES - GENERAL
PAINLEVEL_OUTOF10: 0

## 2020-06-27 NOTE — CONSULTS
[facility] -   Infectious diseases evaluation  admission date 6/25/2020        Impression :   Current:  · Fever and tachycardia  · Diarrhea  · Recent pelvic abscess drainage on 6/19/2020  · Rectal cancer      Recommendations   · Discontinue Po Augmentin  · IV ceftriaxone and Flagyl  · Follow blood cultures 6/26 no growth  · CT abdomen pelvis  · Follow CBC and renal function           History of Present Illness:   Initial history:  John Harmon is a 40y.o.-year-old female was transferred from [facility] for tachycardia . She had fever on 6/26 with a temperature max of 102, diarrhea for several days, stool for C. difficile was negative on 6/26/2020. CT chest 6/25 was negative for PE, showed incidental finding of cholelithiasis  Pathogen panel was negative on 6/20/2020  She was recently diagnosed with pelvic abscess required drainage 6/19/2020 with E. coli and Candida albicans growth on culture. 6/18/2020 COVID-19  test was negative  She had history of rectal cancer with T3 N1 M0 had surgery and chemotherapy in 2017. Interval changes  6/27/2020   WBC 13 yesterday down to 9.5 today  She is poor historian, denied any abdominal pain, no vomiting, denied cough or shortness of breath no other complaints. I have personally reviewed the past medical history, past surgical history, medications, social history, and family history, and I haveupdated the database accordingly.   Past Medical History:     Past Medical History:   Diagnosis Date    Cancer [facility])     Rectal    Pelvis, female abscess     pre sacral drain put in 6/19/2020    UTI (urinary tract infection)     stent place june 2020       Past Surgical  History:     Past Surgical History:   Procedure Laterality Date    ABSCESS DRAINAGE      pre sacral pelvic abscess with drain placement to right buttock    URETER STENT PLACEMENT  2020       Medications:      enoxaparin  40 mg Subcutaneous Daily    mirtazapine  15 mg Oral Nightly    OLANZapine  5 mg Oral Nightly    VORTIoxetine  10 mg Oral Daily    pantoprazole  40 mg Oral Daily    busPIRone  15 mg Oral TID    tamsulosin  0.4 mg Oral Daily    amoxicillin-clavulanate  1 tablet Oral BID    cariprazine hcl  3 mg Oral Daily       Social History:     Social History     Socioeconomic History    Marital status: Unknown     Spouse name: Not on file    Number of children: Not on file    Years of education: Not on file    Highest education level: Not on file   Occupational History    Not on file   Social Needs    Financial resource strain: Not on file    Food insecurity     Worry: Not on file     Inability: Not on file    Transportation needs     Medical: Not on file     Non-medical: Not on file   Tobacco Use    Smoking status: Never Smoker    Smokeless tobacco: Never Used    Tobacco comment: pt is a non smoker   Substance and Sexual Activity    Alcohol use: Not on file    Drug use: Not on file    Sexual activity: Not on file   Lifestyle    Physical activity     Days per week: Not on file     Minutes per session: Not on file    Stress: Not on file   Relationships    Social connections     Talks on phone: Not on file     Gets together: Not on file     Attends Orthodox service: Not on file     Active member of club or organization: Not on file     Attends meetings of clubs or organizations: Not on file     Relationship status: Not on file    Intimate partner violence     Fear of current or ex partner: Not on file     Emotionally abused: Not on file     Physically abused: Not on file     Forced sexual activity: Not on file   Other Topics Concern    Not on file   Social History Narrative    Not on file       Family History:   History reviewed. No pertinent family history. Allergies:   Patient has no known allergies.      Review of Systems:     Review of Systems   As per history present illness other than above 14 systems reviewed were negative  Physical Examination :     Patient Vitals for the past 8 hrs:   BP Temp Temp src Pulse Resp SpO2   06/27/20 1153 118/71 99.2 °F (37.3 °C) Oral 103 16 100 %   06/27/20 0852 128/78 98.5 °F (36.9 °C) Oral 114 17 100 %       Physical Exam  Constitutional:       Appearance: Normal appearance. HENT:      Head: Normocephalic and atraumatic. Eyes:      Conjunctiva/sclera: Conjunctivae normal.   Neck:      Musculoskeletal: No neck rigidity. Cardiovascular:      Rate and Rhythm: Tachycardia present. Heart sounds: Normal heart sounds. No murmur. Pulmonary:      Effort: Pulmonary effort is normal.      Breath sounds: Normal breath sounds. No wheezing. Abdominal:      General: Abdomen is flat. There is no distension. Palpations: Abdomen is soft. Tenderness: There is no abdominal tenderness. Comments: MEL drain in place with mild bloody, cloudy material   Musculoskeletal:      Right lower leg: No edema. Left lower leg: No edema. Skin:     General: Skin is warm. Coloration: Skin is not jaundiced. Neurological:      General: No focal deficit present. Mental Status: She is alert and oriented to person, place, and time. Psychiatric:         Mood and Affect: Mood normal.           Medical Decision Making:   I have independently reviewed/ordered the following labs:    CBC with Differential:   Recent Labs     06/26/20 0449 06/27/20  0453   WBC 13.0* 9.5   HGB 9.4* 8.5*   HCT 29.0* 26.4*    284   LYMPHOPCT 20* 22*   MONOPCT 11* 12*     BMP:  Recent Labs     06/26/20 0449 06/27/20  0453    139   K 4.1 3.7   CL 98 104   CO2 26 26   BUN 13 7   CREATININE 0.74 0.71     Hepatic Function Panel: No results for input(s): PROT, LABALBU, BILIDIR, IBILI, BILITOT, ALKPHOS, ALT, AST in the last 72 hours. No results for input(s): RPR in the last 72 hours. No results for input(s): HIV in the last 72 hours. No results for input(s): BC in the last 72 hours.   Lab Results

## 2020-06-27 NOTE — PLAN OF CARE
PRE CONSULT ROUNDING NOTE  HPI  40year old female with pmh of rectal cancer (T3N1) in 2015, recent pelvic abcess, UTI requiring stent placement, suicidal ideation who was a transfer from [facility] for a pelvic abscess that was drained on 6/19/20. Our service is consulted for diarrhea. She has had diarrhea for two days. c diff testing and stool studies are negative. She has been on antibiotics for her pelvic abscess. She had 7 episodes of diarrhea yesterday and one so far today. She did receive imodium once yesterday. She has not had melena hematochezia or abdominal pain. Ct chest abd pelvis on 6/25 showed  A gallstone in the gallbladder lumen near the fundus. hgb 8.5. she reports a 6-10# weight loss in the last month, no emesis fevers chills rash or new joint pain. She states she had surgery with chemo/radiation in 2017 for her rectal cancer. This was done in [facility] and the records are not available. She states she now see's a \"Dr Celestin\" in [facility], but no records of any office visits/procedures are available.  She states she has had stomach cancer in addition to the rectal cancer-no records are available to review    Endoscopy per dr Virgilio Block notes from 1/22/20: march 2018 last colonoscopy per notes showed no residual mass, she has had sessile polyps in the past, no egd per pt    Family reports sister with colon cancer no hx of liver pancreatic cancer no hx of UC/crohns ??? Stomach cancer per pt but no records of this  Social no smoking no etoh or illicit drugs   /23   Pulse 114   Temp 98.5 °F (36.9 °C) (Oral)   Resp 17   Ht 5' (1.524 m)   Wt 72 lb 8.5 oz (32.9 kg)   LMP 06/25/2017   SpO2 100%   BMI 14.17 kg/m²     ROS as above meds labs imaging and past medical records were reviewed    Exam  General Appearance: alert and oriented to person, place and time, well-developed and  mal-nourished, in no acute distress  Skin: warm and dry, no rash or erythema  Head: normocephalic and atraumatic  Eyes: pupils equal, round, and reactive to light, extraocular eye movements intact, conjunctivae normal  ENT: hearing grossly normal bilaterally  Neck: neck supple and non tender without mass, no thyromegaly or thyroid nodules, no cervical lymphadenopathy   Pulmonary/Chest: clear to auscultation bilaterally- no wheezes, rales or rhonchi, normal air movement, no respiratory distress  Cardiovascular: tachycardic rate, regular rhythm, normal S1 and S2, no murmurs, rubs, clicks or gallops, distal pulses intact, no carotid bruits  Abdomen: soft,  non tender, non-distended, normal bowel sounds, no masses or organomegaly no ascites diamond with scant serosanguinous drainage  Extremities: no cyanosis, clubbing or edema  Musculoskeletal: normal range of motion, no joint swelling, deformity or tenderness  Neurologic: no cranial nerve deficit and muscle strength normal    Assessment  Diarrhea c diff and culture negative  Hx of rectal cancer treated with surgery and chemotherapy/radiation  Weight loss  Anemia  Pelvic abscess  SI    Plan  Continue the imodium prn for the loose stool and will discuss endoscopy to r/o microscopic colitis with md  Antibiotics per surgery for pelvic abscess  Anemia profile ordered  Psych for SI  Trend hh and keep hgb >7  Formal gi consult to follow  . Quang Hairston, APRN - CNP

## 2020-06-27 NOTE — PLAN OF CARE
Problem: Falls - Risk of:  Goal: Will remain free from falls  Description: Will remain free from falls  6/27/2020 0145 by Maribel Donovan RN  Outcome: Ongoing  Note: Patient weak. Bed alarm on. One on one sitter continues. Assisted to bathroom. 6/26/2020 1404 by Alma Mansfield RN  Outcome: Ongoing  Goal: Absence of physical injury  Description: Absence of physical injury  6/27/2020 0145 by Maribel Donovan RN  Outcome: Ongoing  6/26/2020 1404 by Alma Mansfield RN  Outcome: Ongoing     Problem: Cardiac Output - Decreased:  Goal: Hemodynamic stability will improve  Description: Hemodynamic stability will improve  6/27/2020 0145 by Maribel Donovan RN  Outcome: Ongoing  Note: Telenetry sinus tach at times. BP stable. 6/26/2020 1404 by Alma Mansfield RN  Outcome: Ongoing     Problem: Mental Status - Impaired:  Goal: Mental status will improve  Description: Mental status will improve  6/27/2020 0145 by Maribel Donovan RN  Outcome: Ongoing  Note: Patient alert and oriented. Occasionally talks to self.     6/26/2020 1404 by Alma Mansfield RN  Outcome: Ongoing     Problem: Nutrition  Goal: Optimal nutrition therapy  6/27/2020 0145 by Maribel Donovan RN  Outcome: Ongoing  6/26/2020 1539 by Shea Roblero RD, LD  Outcome: Ongoing

## 2020-06-27 NOTE — H&P
[facility]   [address] [place] [address]    HISTORY AND PHYSICAL EXAMINATION            Date:   6/27/2020  Patient name:  Megan Garcia  Date of admission:  6/25/2020  7:58 PM  MRN:   [id number]  Account:  [de-identified]  YOB: 1975  PCP:    No primary care provider on file. Room:   [address] [place] [address]  Code Status:    Full Code    Chief Complaint:     No chief complaint on file. diarrhoea  Tachycardia    6/27/20  Patient spiked fever low-grade fevers blood cultures have been ordered and are negative so far patient has had a febrile illness in the past and was given Augmentin but at present time she has a diarrhea  There is no localizing signs  We will get ID consult  Patient has a drain and she had a she had a deep pelvic gluteal abscess     History Obtained From:     patient    History of Present Illness: The patient is a 40 y.o. Non-/non  female who presents with No chief complaint on file. and she is admitted to the hospital for the management of . Has severe diarrhea . Will consult GI . Patient transferred for tachycardia.   Vitals:    06/26/20 2239 06/27/20 0200 06/27/20 0852 06/27/20 1153   BP: (!) 106/53 108/64 128/78 118/71   Pulse: 112 100 114 103   Resp: 16 18 17 16   Temp: 99.3 °F (37.4 °C) 99.6 °F (37.6 °C) 98.5 °F (36.9 °C) 99.2 °F (37.3 °C)   TempSrc: Oral  Oral Oral   SpO2: 96% 99% 100% 100%   Weight:       Height:           BMP:   Recent Labs     06/26/20  0449 06/27/20  0453    139   K 4.1 3.7   CO2 26 26   BUN 13 7   CREATININE 0.74 0.71   LABGLOM >60 >60   GLUCOSE 110* 98                    Past Medical History:     Past Medical History:   Diagnosis Date    Cancer [facility])     Rectal    Pelvis, female abscess     pre sacral drain put in 6/19/2020    UTI (urinary tract infection)     stent place june 2020        Past Surgical History:     Past Surgical History:   Procedure Laterality Date    ABSCESS DRAINAGE      pre sacral pelvic abscess with drain placement to right buttock    URETER STENT PLACEMENT  2020        Medications Prior to Admission:     Prior to Admission medications    Medication Sig Start Date End Date Taking? Authorizing Provider   amoxicillin-clavulanate (AUGMENTIN) 500-125 MG per tablet Take 1 tablet by mouth 2 times daily  6/20/20 7/3/20  Historical Provider, MD   VORTIoxetine (TRINTELLIX) 10 MG TABS tablet Take 10 mg by mouth daily    Historical Provider, MD   OLANZapine (ZYPREXA) 5 MG tablet Take 5 mg by mouth nightly    Historical Provider, MD   busPIRone (BUSPAR) 15 MG tablet Take 15 mg by mouth 3 times daily    Historical Provider, MD   PARoxetine (PAXIL) 20 MG tablet Take 20 mg by mouth every morning    Historical Provider, MD   tamsulosin (FLOMAX) 0.4 MG capsule Take 0.4 mg by mouth daily    Historical Provider, MD   hydrOXYzine (VISTARIL) 50 MG capsule Take 50 mg by mouth 3 times daily as needed    Historical Provider, MD   mirtazapine (REMERON) 15 MG tablet Take 15 mg by mouth nightly    Historical Provider, MD   pantoprazole (PROTONIX) 40 MG tablet Take 40 mg by mouth daily    Historical Provider, MD        Allergies:     Patient has no known allergies. Social History:     Tobacco:    reports that she has never smoked. She has never used smokeless tobacco.  Alcohol:      has no history on file for alcohol. Drug Use:  has no history on file for drug. Family History:     History reviewed. No pertinent family history.     Review of Systems:     ROS:  Constitutional  Negative for fever and chills    HEENT  Negative for ear discharge, ear pain, nosebleed    Eyes  Negative for photophobia, pain and discharge    Respiratory  Negative for hemoptysis and sputum    Cardiovascular  Negative for orthopnea, claudication and PND    Gastrointestinal  Negative for abdominal pain, diarrhea, blood in stool    Musculoskeletal  Negative for joint pain, negative for myalgia    Skin  Negative for rash or itching Endo/heme/allergies  Negative for polydipsia, environmental allergy    Psychiatric/behavioral  Negative for suicidal ideation. Patient is not anxious        Physical Exam:   /71   Pulse 103   Temp 99.2 °F (37.3 °C) (Oral)   Resp 16   Ht 5' (1.524 m)   Wt 72 lb 8.5 oz (32.9 kg)   LMP 2017   SpO2 100%   BMI 14.17 kg/m²   Temp (24hrs), Av.7 °F (37.6 °C), Min:98.5 °F (36.9 °C), Max:102 °F (38.9 °C)    No results for input(s): POCGLU in the last 72 hours. Intake/Output Summary (Last 24 hours) at 2020 1229  Last data filed at 2020 1136  Gross per 24 hour   Intake 3479 ml   Output 2235 ml   Net 1244 ml           EXAMINATION:  GENERAL    SKIN  Appears comfortable and in no distress    No gross lesions, rashes, bruising or bleeding on exposed skin area   HEENT NC/ AT  Eyes:no icterus, redness, pupils equal and reactive, extraocular eye movements intact, conjunctiva clear  Ear: normal external ear, no discharge, hearing intact  Nose:  no drainage noted  Mouth: mucous membranes moist   NECK  LUNGS  Supple and no bruits heard  Clear to auscultation,b/l   Cardiovascular  Abdomen  S1, S2 heard; radial pulse intact,RRR  Soft,non-tender,no organomegaly,BS+   MENTAL STATUS:  Alert, oriented, intact memory, no confusion, normal speech, normal language, no hallucination or delusion   CRANIAL NERVES: II     -       Pupils reactive b/l visual acuity: 20/30 OU; Fundus exam: intact venous pulsations;  Visual fields intact to confrontation  III,IV,VI -  EOMs full, no afferent defect, no                      CJ, no ptosis  V     -     Normal facial sensation  VII    -     Normal facial symmetry  VIII   -     Intact hearing  IX,X -     Symmetrical palate  XI    -     Symmetrical shoulder shrug  XII   -     Midline tongue, no atrophy    MOTOR FUNCTION:  Bulk R side:Normal            L side:Normal  Tone  R side:Normal            L side:Normal   Power 5/5 in upper and lower extremties;      no involuntary movements, no tremor     SENSORY FUNCTION:                      Extremities: Touch     R side:Normal                 L side:Normal     Pain        R side:Normal                  L side:Normal  Vibration  R side:Normal                  L side:Normal    Proprioception    R side:Normal                             L side:Normal    Peripheral pulses palpable, no pedal edema or calf pain with palpation   CEREBELLAR FUNCTION:  Heel to Shin:     Right side:  normal                             Left side:  normal    Finger to Nose:   Right:  normal                              Left:  normal            REFLEX FUNCTION:  Symmetric, no perverted reflex,      Right Bicep:  2+  Left Bicep:  2+  Right Knee:  2+  Left Knee:  2+    Babinski sign   Right side:Downgoing                          Left side   :Downgoing   STATION and GAIT  Not tested               Investigations:      Laboratory Testing:  Recent Results (from the past 24 hour(s))   Basic Metabolic Panel w/ Reflex to MG    Collection Time: 06/27/20  4:53 AM   Result Value Ref Range    Glucose 98 70 - 99 mg/dL    BUN 7 6 - 20 mg/dL    CREATININE 0.71 0.50 - 0.90 mg/dL    Bun/Cre Ratio NOT REPORTED 9 - 20    Calcium 8.2 (L) 8.6 - 10.4 mg/dL    Sodium 139 135 - 144 mmol/L    Potassium 3.7 3.7 - 5.3 mmol/L    Chloride 104 98 - 107 mmol/L    CO2 26 20 - 31 mmol/L    Anion Gap 9 9 - 17 mmol/L    GFR Non-African American >60 >60 mL/min    GFR African American >60 >60 mL/min    GFR Comment          GFR Staging NOT REPORTED    CBC auto differential    Collection Time: 06/27/20  4:53 AM   Result Value Ref Range    WBC 9.5 3.5 - 11.0 k/uL    RBC 2.94 (L) 4.0 - 5.2 m/uL    Hemoglobin 8.5 (L) 12.0 - 16.0 g/dL    Hematocrit 26.4 (L) 36 - 46 %    MCV 89.6 80 - 100 fL    MCH 29.0 26 - 34 pg    MCHC 32.3 31 - 37 g/dL    RDW 14.9 11.5 - 14.9 %    Platelets 716 715 - 728 k/uL    MPV 7.9 6.0 - 12.0 fL    NRBC Automated NOT REPORTED per 100 WBC    Differential Type NOT REPORTED     Seg Neutrophils 64 36 - 66 %    Lymphocytes 22 (L) 24 - 44 %    Monocytes 12 (H) 1 - 7 %    Eosinophils % 1 0 - 4 %    Basophils 1 0 - 2 %    Immature Granulocytes NOT REPORTED 0 %    Segs Absolute 6.20 1.3 - 9.1 k/uL    Absolute Lymph # 2.10 1.0 - 4.8 k/uL    Absolute Mono # 1.10 0.1 - 1.3 k/uL    Absolute Eos # 0.10 0.0 - 0.4 k/uL    Basophils Absolute 0.10 0.0 - 0.2 k/uL    Absolute Immature Granulocyte NOT REPORTED 0.00 - 0.30 k/uL    WBC Morphology NOT REPORTED     RBC Morphology NOT REPORTED     Platelet Estimate NOT REPORTED        Imaging/Diagnostics:            Assessment and Plan:     Hypovolemia  Tachycardia  diarrhea  Will give fluid bolus     Patient has febrile illness low-grade fever  She does have a drain in the pelvic gluteal abscess we going to consult Dr. Edgar Pollard of infectious disease  Continues to have diarrhea    BMP:   Recent Labs     06/26/20  0449 06/27/20  0453    139   K 4.1 3.7   CO2 26 26   BUN 13 7   CREATININE 0.74 0.71   LABGLOM >60 >60   GLUCOSE 110* 98                   Consultations:   IP CONSULT TO DIETITIAN  IP CONSULT TO SOCIAL WORK  IP CONSULT TO GI  .     Catarino Santiago MD

## 2020-06-27 NOTE — PLAN OF CARE
Problem: Falls - Risk of:  Goal: Will remain free from falls  Description: Will remain free from falls  6/27/2020 1026 by Lamont Presley RN  Outcome: Ongoing  Note: Patient weak. Bed alarm on. One on one sitter continues. Assisted to bathroom. Does not try to get up without assistance. Goal: Absence of physical injury  Description: Absence of physical injury  6/27/2020 1026 by Lamont Presley RN  Outcome: Ongoing     Problem: Cardiac Output - Decreased:  Goal: Hemodynamic stability will improve  Description: Hemodynamic stability will improve  6/27/2020 1026 by Lamont Presley RN  Outcome: Ongoing  Note: Telemetry sinus tach at times. BP stable. Problem: Mental Status - Impaired:  Goal: Mental status will improve  Description: Mental status will improve  6/27/2020 1026 by Lamont Presley RN  Outcome: Ongoing  Note: Patient alert and oriented. Occasionally talks to self.        Problem: Nutrition  Goal: Optimal nutrition therapy  6/27/2020 1026 by Lamont Presley RN  Outcome: Ongoing

## 2020-06-28 ENCOUNTER — APPOINTMENT (OUTPATIENT)
Dept: CT IMAGING | Age: 45
DRG: 710 | End: 2020-06-28
Attending: INTERNAL MEDICINE
Payer: MEDICARE

## 2020-06-28 PROBLEM — E86.0 DEHYDRATION: Status: ACTIVE | Noted: 2020-06-28

## 2020-06-28 LAB
ABSOLUTE EOS #: 0 K/UL (ref 0–0.4)
ABSOLUTE IMMATURE GRANULOCYTE: ABNORMAL K/UL (ref 0–0.3)
ABSOLUTE LYMPH #: 1.7 K/UL (ref 1–4.8)
ABSOLUTE MONO #: 1.1 K/UL (ref 0.1–1.3)
ANION GAP SERPL CALCULATED.3IONS-SCNC: 12 MMOL/L (ref 9–17)
BASOPHILS # BLD: 1 % (ref 0–2)
BASOPHILS ABSOLUTE: 0.1 K/UL (ref 0–0.2)
BUN BLDV-MCNC: 3 MG/DL (ref 6–20)
BUN/CREAT BLD: ABNORMAL (ref 9–20)
CALCIUM SERPL-MCNC: 8.6 MG/DL (ref 8.6–10.4)
CHLORIDE BLD-SCNC: 103 MMOL/L (ref 98–107)
CO2: 26 MMOL/L (ref 20–31)
CREAT SERPL-MCNC: 0.64 MG/DL (ref 0.5–0.9)
DIFFERENTIAL TYPE: ABNORMAL
EOSINOPHILS RELATIVE PERCENT: 0 % (ref 0–4)
FERRITIN: 253 UG/L (ref 13–150)
FOLATE: 13.8 NG/ML
GFR AFRICAN AMERICAN: >60 ML/MIN
GFR NON-AFRICAN AMERICAN: >60 ML/MIN
GFR SERPL CREATININE-BSD FRML MDRD: ABNORMAL ML/MIN/{1.73_M2}
GFR SERPL CREATININE-BSD FRML MDRD: ABNORMAL ML/MIN/{1.73_M2}
GLUCOSE BLD-MCNC: 108 MG/DL (ref 70–99)
HCT VFR BLD CALC: 29.2 % (ref 36–46)
HEMOGLOBIN: 9.5 G/DL (ref 12–16)
IMMATURE GRANULOCYTES: ABNORMAL %
IRON SATURATION: 8 % (ref 20–55)
IRON: 13 UG/DL (ref 37–145)
LACTIC ACID: 0.9 MMOL/L (ref 0.5–2.2)
LYMPHOCYTES # BLD: 18 % (ref 24–44)
MAGNESIUM: 1.8 MG/DL (ref 1.6–2.6)
MCH RBC QN AUTO: 29.2 PG (ref 26–34)
MCHC RBC AUTO-ENTMCNC: 32.5 G/DL (ref 31–37)
MCV RBC AUTO: 89.7 FL (ref 80–100)
MONOCYTES # BLD: 11 % (ref 1–7)
NRBC AUTOMATED: ABNORMAL PER 100 WBC
PDW BLD-RTO: 15 % (ref 11.5–14.9)
PLATELET # BLD: 320 K/UL (ref 150–450)
PLATELET ESTIMATE: ABNORMAL
PMV BLD AUTO: 8.5 FL (ref 6–12)
POTASSIUM SERPL-SCNC: 3.5 MMOL/L (ref 3.7–5.3)
PROCALCITONIN: 0.29 NG/ML
RBC # BLD: 3.25 M/UL (ref 4–5.2)
RBC # BLD: ABNORMAL 10*6/UL
SEG NEUTROPHILS: 70 % (ref 36–66)
SEGMENTED NEUTROPHILS ABSOLUTE COUNT: 6.6 K/UL (ref 1.3–9.1)
SODIUM BLD-SCNC: 141 MMOL/L (ref 135–144)
TOTAL IRON BINDING CAPACITY: 157 UG/DL (ref 250–450)
UNSATURATED IRON BINDING CAPACITY: 144 UG/DL (ref 112–347)
VITAMIN B-12: 321 PG/ML (ref 232–1245)
WBC # BLD: 9.4 K/UL (ref 3.5–11)
WBC # BLD: ABNORMAL 10*3/UL

## 2020-06-28 PROCEDURE — 85025 COMPLETE CBC W/AUTO DIFF WBC: CPT

## 2020-06-28 PROCEDURE — 82607 VITAMIN B-12: CPT

## 2020-06-28 PROCEDURE — 84145 PROCALCITONIN (PCT): CPT

## 2020-06-28 PROCEDURE — 99232 SBSQ HOSP IP/OBS MODERATE 35: CPT | Performed by: INTERNAL MEDICINE

## 2020-06-28 PROCEDURE — 2580000003 HC RX 258: Performed by: INTERNAL MEDICINE

## 2020-06-28 PROCEDURE — 2580000003 HC RX 258: Performed by: PHYSICIAN ASSISTANT

## 2020-06-28 PROCEDURE — 83735 ASSAY OF MAGNESIUM: CPT

## 2020-06-28 PROCEDURE — 6370000000 HC RX 637 (ALT 250 FOR IP): Performed by: INTERNAL MEDICINE

## 2020-06-28 PROCEDURE — 82728 ASSAY OF FERRITIN: CPT

## 2020-06-28 PROCEDURE — U0003 INFECTIOUS AGENT DETECTION BY NUCLEIC ACID (DNA OR RNA); SEVERE ACUTE RESPIRATORY SYNDROME CORONAVIRUS 2 (SARS-COV-2) (CORONAVIRUS DISEASE [COVID-19]), AMPLIFIED PROBE TECHNIQUE, MAKING USE OF HIGH THROUGHPUT TECHNOLOGIES AS DESCRIBED BY CMS-2020-01-R: HCPCS

## 2020-06-28 PROCEDURE — 36415 COLL VENOUS BLD VENIPUNCTURE: CPT

## 2020-06-28 PROCEDURE — 6360000002 HC RX W HCPCS: Performed by: INTERNAL MEDICINE

## 2020-06-28 PROCEDURE — 96365 THER/PROPH/DIAG IV INF INIT: CPT

## 2020-06-28 PROCEDURE — 96368 THER/DIAG CONCURRENT INF: CPT

## 2020-06-28 PROCEDURE — 96372 THER/PROPH/DIAG INJ SC/IM: CPT

## 2020-06-28 PROCEDURE — 80048 BASIC METABOLIC PNL TOTAL CA: CPT

## 2020-06-28 PROCEDURE — 2060000000 HC ICU INTERMEDIATE R&B

## 2020-06-28 PROCEDURE — 96376 TX/PRO/DX INJ SAME DRUG ADON: CPT

## 2020-06-28 PROCEDURE — 99233 SBSQ HOSP IP/OBS HIGH 50: CPT | Performed by: INTERNAL MEDICINE

## 2020-06-28 PROCEDURE — 6370000000 HC RX 637 (ALT 250 FOR IP): Performed by: NURSE PRACTITIONER

## 2020-06-28 PROCEDURE — 83550 IRON BINDING TEST: CPT

## 2020-06-28 PROCEDURE — 6360000004 HC RX CONTRAST MEDICATION: Performed by: NURSE PRACTITIONER

## 2020-06-28 PROCEDURE — 82746 ASSAY OF FOLIC ACID SERUM: CPT

## 2020-06-28 PROCEDURE — 6370000000 HC RX 637 (ALT 250 FOR IP): Performed by: PHYSICIAN ASSISTANT

## 2020-06-28 PROCEDURE — 74177 CT ABD & PELVIS W/CONTRAST: CPT

## 2020-06-28 PROCEDURE — 6360000004 HC RX CONTRAST MEDICATION: Performed by: INTERNAL MEDICINE

## 2020-06-28 PROCEDURE — 83605 ASSAY OF LACTIC ACID: CPT

## 2020-06-28 PROCEDURE — APPNB30 APP NON BILLABLE TIME 0-30 MINS: Performed by: NURSE PRACTITIONER

## 2020-06-28 PROCEDURE — 2500000003 HC RX 250 WO HCPCS: Performed by: INTERNAL MEDICINE

## 2020-06-28 PROCEDURE — 83540 ASSAY OF IRON: CPT

## 2020-06-28 RX ORDER — POTASSIUM CHLORIDE 7.45 MG/ML
10 INJECTION INTRAVENOUS PRN
Status: DISCONTINUED | OUTPATIENT
Start: 2020-06-28 | End: 2020-07-05 | Stop reason: HOSPADM

## 2020-06-28 RX ORDER — 0.9 % SODIUM CHLORIDE 0.9 %
80 INTRAVENOUS SOLUTION INTRAVENOUS ONCE
Status: COMPLETED | OUTPATIENT
Start: 2020-06-28 | End: 2020-06-28

## 2020-06-28 RX ORDER — POTASSIUM CHLORIDE 20 MEQ/1
40 TABLET, EXTENDED RELEASE ORAL PRN
Status: DISCONTINUED | OUTPATIENT
Start: 2020-06-28 | End: 2020-07-05 | Stop reason: HOSPADM

## 2020-06-28 RX ORDER — SODIUM CHLORIDE 0.9 % (FLUSH) 0.9 %
10 SYRINGE (ML) INJECTION ONCE
Status: COMPLETED | OUTPATIENT
Start: 2020-06-28 | End: 2020-06-28

## 2020-06-28 RX ORDER — 0.9 % SODIUM CHLORIDE 0.9 %
1000 INTRAVENOUS SOLUTION INTRAVENOUS ONCE
Status: COMPLETED | OUTPATIENT
Start: 2020-06-28 | End: 2020-06-28

## 2020-06-28 RX ORDER — 0.9 % SODIUM CHLORIDE 0.9 %
500 INTRAVENOUS SOLUTION INTRAVENOUS ONCE
Status: COMPLETED | OUTPATIENT
Start: 2020-06-28 | End: 2020-06-28

## 2020-06-28 RX ADMIN — MIRTAZAPINE 15 MG: 15 TABLET, FILM COATED ORAL at 21:17

## 2020-06-28 RX ADMIN — Medication 10 ML: at 15:50

## 2020-06-28 RX ADMIN — ACETAMINOPHEN 650 MG: 325 TABLET, FILM COATED ORAL at 13:37

## 2020-06-28 RX ADMIN — VORTIOXETINE 10 MG: 10 TABLET, FILM COATED ORAL at 08:21

## 2020-06-28 RX ADMIN — CARIPRAZINE 3 MG: 3 CAPSULE, GELATIN COATED ORAL at 08:22

## 2020-06-28 RX ADMIN — AMOXICILLIN AND CLAVULANATE POTASSIUM 1 TABLET: 500; 125 TABLET, FILM COATED ORAL at 10:27

## 2020-06-28 RX ADMIN — BUSPIRONE HYDROCHLORIDE 15 MG: 15 TABLET ORAL at 08:22

## 2020-06-28 RX ADMIN — METRONIDAZOLE 500 MG: 500 INJECTION, SOLUTION INTRAVENOUS at 17:56

## 2020-06-28 RX ADMIN — PANTOPRAZOLE SODIUM 40 MG: 40 TABLET, DELAYED RELEASE ORAL at 08:22

## 2020-06-28 RX ADMIN — BUSPIRONE HYDROCHLORIDE 15 MG: 15 TABLET ORAL at 21:17

## 2020-06-28 RX ADMIN — ENOXAPARIN SODIUM 40 MG: 40 INJECTION SUBCUTANEOUS at 10:28

## 2020-06-28 RX ADMIN — LOPERAMIDE HYDROCHLORIDE 2 MG: 2 CAPSULE ORAL at 08:22

## 2020-06-28 RX ADMIN — POTASSIUM CHLORIDE 40 MEQ: 1500 TABLET, EXTENDED RELEASE ORAL at 13:09

## 2020-06-28 RX ADMIN — ACETAMINOPHEN 650 MG: 325 TABLET, FILM COATED ORAL at 06:18

## 2020-06-28 RX ADMIN — SODIUM CHLORIDE: 9 INJECTION, SOLUTION INTRAVENOUS at 17:56

## 2020-06-28 RX ADMIN — ONDANSETRON 4 MG: 2 INJECTION INTRAMUSCULAR; INTRAVENOUS at 21:33

## 2020-06-28 RX ADMIN — TAMSULOSIN HYDROCHLORIDE 0.4 MG: 0.4 CAPSULE ORAL at 08:22

## 2020-06-28 RX ADMIN — SODIUM CHLORIDE 500 ML: 9 INJECTION, SOLUTION INTRAVENOUS at 08:21

## 2020-06-28 RX ADMIN — SODIUM CHLORIDE 1000 ML: 9 INJECTION, SOLUTION INTRAVENOUS at 13:15

## 2020-06-28 RX ADMIN — IOVERSOL 75 ML: 741 INJECTION INTRA-ARTERIAL; INTRAVENOUS at 15:50

## 2020-06-28 RX ADMIN — LOPERAMIDE HYDROCHLORIDE 2 MG: 2 CAPSULE ORAL at 13:51

## 2020-06-28 RX ADMIN — IOHEXOL 50 ML: 240 INJECTION, SOLUTION INTRATHECAL; INTRAVASCULAR; INTRAVENOUS; ORAL at 14:08

## 2020-06-28 RX ADMIN — BUSPIRONE HYDROCHLORIDE 15 MG: 15 TABLET ORAL at 13:09

## 2020-06-28 RX ADMIN — SODIUM CHLORIDE 80 ML: 9 INJECTION, SOLUTION INTRAVENOUS at 15:49

## 2020-06-28 RX ADMIN — OLANZAPINE 5 MG: 10 TABLET, FILM COATED ORAL at 21:17

## 2020-06-28 RX ADMIN — CEFTRIAXONE SODIUM 1 G: 1 INJECTION, POWDER, FOR SOLUTION INTRAMUSCULAR; INTRAVENOUS at 17:11

## 2020-06-28 RX ADMIN — SODIUM CHLORIDE: 9 INJECTION, SOLUTION INTRAVENOUS at 06:19

## 2020-06-28 ASSESSMENT — PAIN SCALES - GENERAL
PAINLEVEL_OUTOF10: 0
PAINLEVEL_OUTOF10: 0

## 2020-06-28 NOTE — PLAN OF CARE
Patient assessment performed, vital signs and labs monitored, medications given as ordered. Patient safety provided through use of 1:1 sitter.

## 2020-06-28 NOTE — PROGRESS NOTES
0.4 k/uL    Basophils Absolute 0.10 0.0 - 0.2 k/uL    Absolute Immature Granulocyte NOT REPORTED 0.00 - 0.30 k/uL    WBC Morphology NOT REPORTED     RBC Morphology NOT REPORTED     Platelet Estimate NOT REPORTED    Iron and TIBC    Collection Time: 06/28/20  5:21 AM   Result Value Ref Range    Iron 13 (L) 37 - 145 ug/dL    TIBC 157 (L) 250 - 450 ug/dL    Iron Saturation 8 (L) 20 - 55 %    UIBC 144 112 - 347 ug/dL   Ferritin    Collection Time: 06/28/20  5:21 AM   Result Value Ref Range    Ferritin 253 (H) 13 - 150 ug/L   Folate    Collection Time: 06/28/20  5:21 AM   Result Value Ref Range    Folate 13.8 >4.8 ng/mL   Vitamin B12    Collection Time: 06/28/20  5:21 AM   Result Value Ref Range    Vitamin B-12 321 232 - 1245 pg/mL   Magnesium    Collection Time: 06/28/20  5:21 AM   Result Value Ref Range    Magnesium 1.8 1.6 - 2.6 mg/dL     No results for input(s): POCGLU in the last 72 hours. No results found. HPI:   See history in H and P      Review of Systems:     Constitutional:  negative for chills, fevers, sweats  Respiratory:  negative for cough, dyspnea on exertion, hemoptysis, shortness of breath, wheezing  Cardiovascular:  negative for chest pain, chest pressure/discomfort, lower extremity edema, palpitations  Gastrointestinal:  negative for abdominal pain, constipation, diarrhea, nausea, vomiting  Neurological:  negative for dizziness, headache  Data:     Past Medical History:  no change     Social History:  no change    Family History: @no change    Vitals:      I/O (24Hr):     Intake/Output Summary (Last 24 hours) at 6/28/2020 1256  Last data filed at 6/28/2020 1029  Gross per 24 hour   Intake 500 ml   Output 0 ml   Net 500 ml       Labs:    URINE ANALYSIS: No results found for: LABURIN     CBC:  Lab Results   Component Value Date    WBC 9.4 06/28/2020    HGB 9.5 06/28/2020     06/28/2020        BMP:    Lab Results   Component Value Date     06/28/2020    K 3.5 06/28/2020     06/28/2020    CO2 26 06/28/2020    BUN 3 06/28/2020    CREATININE 0.64 06/28/2020    GLUCOSE 108 06/28/2020      LIVER PROFILE:  Lab Results   Component Value Date    ALT 14 06/17/2020    AST 10 06/17/2020    PROT 6.6 06/17/2020    BILITOT 0.20 06/17/2020    LABALBU 3.1 06/17/2020               Radiology:      Physical Examination:        General appearance:  alert, cooperative and no distress  Mental Status:  oriented to person, place and time and normal affect  Lungs:  clear to auscultation bilaterally, normal effort  Heart:  regular rate and rhythm, no murmur  Abdomen:  soft, nontender, nondistended, normal bowel sounds, no masses, hepatomegaly, splenomegaly  Extremities:  no edema, redness, tenderness in the calves  Skin:  no gross lesions, rashes, induration    Assessment:        Primary Problem  <principal problem not specified>    Active Hospital Problems    Diagnosis Date Noted    Dehydration [E86.0] 06/28/2020    Diarrhea [R19.7]     Anemia [D64.9]     History of rectal cancer [Z85.048]     Severe malnutrition ([address] [E43] 06/26/2020    Tachycardia [R00.0] 06/25/2020       Plan:        1. Patient has tachycardia and blood pressure is trending down  2. We will give a liter of saline over 4 hours  3. And see response  4. Most likely she is dehydrated from diarrhea    Medications:      Allergies:  No Known Allergies    Current Meds:   Scheduled Meds:    enoxaparin  40 mg Subcutaneous Daily    mirtazapine  15 mg Oral Nightly    OLANZapine  5 mg Oral Nightly    VORTIoxetine  10 mg Oral Daily    pantoprazole  40 mg Oral Daily    busPIRone  15 mg Oral TID    tamsulosin  0.4 mg Oral Daily    amoxicillin-clavulanate  1 tablet Oral BID    cariprazine hcl  3 mg Oral Daily     Continuous Infusions:    sodium chloride 100 mL/hr at 06/28/20 0619     PRN Meds: acetaminophen, ondansetron, loperamide, hydrOXYzine, sodium chloride flush       Guanako Villegas MD  6/28/2020  12:56 PM None

## 2020-06-28 NOTE — PLAN OF CARE
Problem: Falls - Risk of:  Goal: Will remain free from falls  Description: Will remain free from falls  Outcome: Ongoing  Note: Patient remains free of incidence/ injury. Bed remains in low position. Sitter at bedside.

## 2020-06-28 NOTE — CONSULTS
Gastroenterology Consult Note      Patient: Jesusita Henry  : 1975  Acct#:  [id number]     Date:  2020    Subjective:       History of Present Illness  Patient is a 40 y.o.  female admitted with Tachycardia [R00.0]  Tachycardia [R00.0]  Tachycardia [R00.0] who is seen in consult for diarrhea  79-year-old lady with multiple medical and psychiatric issues  Has an unfortunate diagnosis of rectal cancer with T3 N1 M0 diagnosed in . Recently diagnosed with a pelvic abscess and had drainage  She did have also UTI requiring stenting  She was on the [facility] with suicide ideation and transferred to the medical floor for the drainage of the abscess which was done in   Patient was placed on antibiotics  And developed significant diarrhea  Stool studies including C. difficile are been negative  She was given Imodium and slowed it down although still significant. 1 would like me and the nurse she is not getting 4 doses a day she just got 1 dose today and it was not repeated of the Imodium. Diarrhea started to 3 days ago  Will be around 7-8 times a day  Watery  No blood  No severe cramps  No fever or chills  No bleeding  No melena  Hemoglobin is stable at 8.5  CT scan of the chest abdomen and pelvis done on  showed a gallstone in the gallbladder lumen next to the fundus  .   Labs otherwise not remarkable  She had chemoradiation therapy in 2017 for the rectal cancer and she had surgery all of this was done in [place] we do not have records of that  She follows with Dr. Cassandra Hector in [facility]  Patient also reports a history of stomach cancer??!  Were not sure about this history    None recently here          Past Medical History:   Diagnosis Date    Cancer [facility])     Rectal    Pelvis, female abscess     pre sacral drain put in 2020    UTI (urinary tract infection)     stent place 2020      Past Surgical History:   Procedure Laterality Date    ABSCESS DRAINAGE      pre sacral pelvic abscess with drain placement to right buttock    URETER STENT PLACEMENT  2020      Past Endoscopic HistoryNone    Admission Meds  No current facility-administered medications on file prior to encounter. Current Outpatient Medications on File Prior to Encounter   Medication Sig Dispense Refill    amoxicillin-clavulanate (AUGMENTIN) 500-125 MG per tablet Take 1 tablet by mouth 2 times daily       VORTIoxetine (TRINTELLIX) 10 MG TABS tablet Take 10 mg by mouth daily      OLANZapine (ZYPREXA) 5 MG tablet Take 5 mg by mouth nightly      busPIRone (BUSPAR) 15 MG tablet Take 15 mg by mouth 3 times daily      PARoxetine (PAXIL) 20 MG tablet Take 20 mg by mouth every morning      tamsulosin (FLOMAX) 0.4 MG capsule Take 0.4 mg by mouth daily      hydrOXYzine (VISTARIL) 50 MG capsule Take 50 mg by mouth 3 times daily as needed      mirtazapine (REMERON) 15 MG tablet Take 15 mg by mouth nightly      pantoprazole (PROTONIX) 40 MG tablet Take 40 mg by mouth daily         Patient   Does Use ASA, NSAID No  Allergies  No Known Allergies     Social   Social History     Tobacco Use    Smoking status: Never Smoker    Smokeless tobacco: Never Used    Tobacco comment: pt is a non smoker   Substance Use Topics    Alcohol use: Not on file        PSYCH HISTORY:  Depression No  Anxiety No  Suicide No       History reviewed. No pertinent family history. No family history of colon cancer, Crohn's disease, or ulcerative colitis. Review of Systems  Constitutional: negative  Eyes: negative  Ears, nose, mouth, throat, and face: negative  Respiratory: negative  Cardiovascular: negative  Gastrointestinal: negative  Genitourinary:negative  Integument/breast: negative  Hematologic/lymphatic: negative  Musculoskeletal:negative  Endocrine: negative           Physical Exam  Blood pressure (!) 105/59, pulse 107, temperature 99 °F (37.2 °C), temperature source Oral, resp.  rate 16, height 5' (1.524 m), weight 72 lb 8.5 oz (32.9 kg), last menstrual period 06/25/2017, SpO2 97 %. General Appearance: alert and oriented to person, place and time, well-developed and well-nourished, in no acute distress  Skin: warm and dry, no rash or erythema  Head: normocephalic and atraumatic  Eyes: pupils equal, round, and reactive to light, extraocular eye movements intact, conjunctivae normal  ENT: hearing grossly normal bilaterally  Neck: neck supple and non tender without mass, no thyromegaly or thyroid nodules, no cervical lymphadenopathy   Pulmonary/Chest: clear to auscultation bilaterally- no wheezes, rales or rhonchi, normal air movement, no respiratory distress  Cardiovascular: normal rate, regular rhythm, normal S1 and S2, no murmurs, rubs, clicks or gallops, distal pulses intact, no carotid bruits  Abdomen: soft, non-tender, non-distended, normal bowel sounds, no masses or organomegaly  Extremities: no cyanosis, clubbing or edema  Musculoskeletal: normal range of motion, no joint swelling, deformity or tenderness  Neurologic: no cranial nerve deficit and muscle strength normal    Data Review:    Recent Labs     06/25/20 1846 06/26/20 0449 06/27/20  0453   WBC 13.5* 13.0* 9.5   HGB 10.7* 9.4* 8.5*   HCT 32.3* 29.0* 26.4*   MCV 88.4 89.3 89.6    360 284     Recent Labs     06/25/20 1846 06/26/20 0449 06/27/20  0453   * 135 139   K 4.3 4.1 3.7   CL 93* 98 104   CO2 26 26 26   BUN 14 13 7   CREATININE 0.72 0.74 0.71     No results for input(s): AST, ALT, ALB, BILIDIR, BILITOT, ALKPHOS in the last 72 hours. No results for input(s): LIPASE, AMYLASE in the last 72 hours. No results for input(s): PROTIME, INR in the last 72 hours. No results for input(s): PTT in the last 72 hours. No results for input(s): OCCULTBLD in the last 72 hours.   CEA:  No results found for: CEA  Ca 125:  No results found for:   Ca 19-9:  No results found for:   Ca 15-3:  No results found for:   AFP:  No components found for: AFAFP  Beta HCG:  No components found for: BHCG  Neuron Specific Enolase:  No results found for: NSE  Imaging Studies:                           All appropriate imaging studies and reports reviewed: Yes                 Assessment:     Active Problems:    Severe malnutrition (HCC)    Tachycardia    Diarrhea    Anemia    History of rectal cancer  Resolved Problems:    * No resolved hospital problems. *    *Diarrhea acute most likely non-C. difficile antibiotic related diarrhea. As long the culture and the C. difficile are negative we cannot suppress it  IV fluid  If not any better she might need endoscopy with biopsies  We will get anemia profile  We have to evaluate if there is any recurrence of the cancer  Psych follow-up  H&H monitoring  PPI    Recommendations:   As above  She might need a colonoscopy will evaluate the progress  Will suppress for now and increase IV fluid and p.o. intake                      Thank you for allowing me to participate in the care of your patient. Please feel free to contact me with any questions or concerns.      Spring Bourne MD

## 2020-06-28 NOTE — PROGRESS NOTES
[facility] -   Infectious diseases evaluation  admission date 6/25/2020        Impression :   Current:  · Fever and tachycardia  · Diarrhea  · Recent pelvic abscess drainage on 6/19/2020  · Rectal cancer      Recommendations     · IV ceftriaxone and Flagyl  · Follow blood cultures 6/26 no growth  · CT abdomen pelvis pending  · Follow CBC and renal function  · COVID-19 pending  · Procalcitonin lactic acid level           History of Present Illness:   Initial history:  Sriram Retana is a 40y.o.-year-old female was transferred from [facility] for tachycardia . She had fever on 6/26 with a temperature max of 102, diarrhea for several days, stool for C. difficile was negative on 6/26/2020. CT chest 6/25 was negative for PE, showed incidental finding of cholelithiasis  Pathogen panel was negative on 6/20/2020  She was recently diagnosed with pelvic abscess required drainage 6/19/2020 with E. coli and Candida albicans growth on culture. 6/18/2020 COVID-19  test was negative  She had history of rectal cancer with T3 N1 M0 had surgery and chemotherapy in 2017. Interval changes  6/28/2020     She  still have diarrhea, denied any abdominal pain, no nausea or vomiting. He did have a fever with temperature max of 101 today, still tachycardic, also tachypneic. WBC normal today    I have personally reviewed the past medical history, past surgical history, medications, social history, and family history, and I haveupdated the database accordingly.   Past Medical History:     Past Medical History:   Diagnosis Date    Cancer [facility])     Rectal    Pelvis, female abscess     pre sacral drain put in 6/19/2020    UTI (urinary tract infection)     stent place june 2020       Past Surgical  History:     Past Surgical History:   Procedure Laterality Date    ABSCESS DRAINAGE      pre sacral pelvic abscess with drain placement to right buttock    URETER STENT PLACEMENT  2020 Systems:     Review of Systems   As per history present illness other than above 14 systems reviewed were negative  Physical Examination :     Patient Vitals for the past 8 hrs:   BP Temp Temp src Pulse Resp SpO2   06/28/20 1434 120/68 99.5 °F (37.5 °C) Oral 113 26 99 %   06/28/20 1414 125/66 100.9 °F (38.3 °C) Oral 117 16 100 %   06/28/20 1334 129/67 99.1 °F (37.3 °C) Oral 115 16 98 %   06/28/20 1315 (!) 125/51 100.8 °F (38.2 °C) -- 125 22 99 %   06/28/20 1008 102/61 98.2 °F (36.8 °C) Oral 115 16 98 %       Physical Exam  Constitutional:       Appearance: Normal appearance. HENT:      Head: Normocephalic and atraumatic. Eyes:      Conjunctiva/sclera: Conjunctivae normal.   Neck:      Musculoskeletal: No neck rigidity. Cardiovascular:      Rate and Rhythm: Tachycardia present. Heart sounds: Normal heart sounds. No murmur. Pulmonary:      Effort: Pulmonary effort is normal.      Breath sounds: Normal breath sounds. No wheezing. Abdominal:      General: Abdomen is flat. There is no distension. Palpations: Abdomen is soft. Tenderness: There is no abdominal tenderness. Comments: MEL drain in place with mild bloody, cloudy material   Musculoskeletal:      Right lower leg: No edema. Left lower leg: No edema. Skin:     General: Skin is warm. Coloration: Skin is not jaundiced. Neurological:      General: No focal deficit present. Mental Status: She is alert and oriented to person, place, and time.    Psychiatric:         Mood and Affect: Mood normal.           Medical Decision Making:   I have independently reviewed/ordered the following labs:    CBC with Differential:   Recent Labs     06/27/20  0453 06/28/20  0521   WBC 9.5 9.4   HGB 8.5* 9.5*   HCT 26.4* 29.2*    320   LYMPHOPCT 22* 18*   MONOPCT 12* 11*     BMP:  Recent Labs     06/27/20  0453 06/28/20  0521    141   K 3.7 3.5*    103   CO2 26 26   BUN 7 3*   CREATININE 0.71 0.64   MG  --  1.8     Hepatic Function Panel: No results for input(s): PROT, LABALBU, BILIDIR, IBILI, BILITOT, ALKPHOS, ALT, AST in the last 72 hours. No results for input(s): RPR in the last 72 hours. No results for input(s): HIV in the last 72 hours. No results for input(s): BC in the last 72 hours. Lab Results   Component Value Date    CREATININE 0.64 06/28/2020    GLUCOSE 108 06/28/2020       Detailed results: Thank you for allowing us to participate in the care of this patient. Please call with questions. This note is created with the assistance of a speech recognition program.  While intending to generate adocument that actually reflects the content of the visit, the document can still have some errors including those of syntax and sound a like substitutions which may escape proof reading. It such instances, actual meaningcan be extrapolated by contextual diversion.     Antoine Howard MD  Office: [phone]  Perfect serve / office [phone]

## 2020-06-28 NOTE — PROGRESS NOTES
rate, regular rhythm, normal S1 and S2, no murmurs, rubs, clicks or gallops, distal pulses intact, no carotid bruits  Abdomen: soft,  non tender, non-distended, normal bowel sounds, no masses or organomegaly no ascites diamond with scant serosanguinous drainage  Extremities: no cyanosis, clubbing or edema  Musculoskeletal: normal range of motion, no joint swelling, deformity or tenderness  Neurologic: no cranial nerve deficit and muscle strength normal    Lab and Imaging Review     CBC  Recent Labs     06/26/20 0449 06/27/20 0453 06/28/20  0521   WBC 13.0* 9.5 9.4   HGB 9.4* 8.5* 9.5*   HCT 29.0* 26.4* 29.2*   MCV 89.3 89.6 89.7    284 320       BMP  Recent Labs     06/26/20 0449 06/27/20 0453 06/28/20  0521    139 141   K 4.1 3.7 3.5*   CL 98 104 103   CO2 26 26 26   BUN 13 7 3*   CREATININE 0.74 0.71 0.64   GLUCOSE 110* 98 108*   CALCIUM 8.5* 8.2* 8.6         ASSESSMENT/PLAN:  1. Acute diarrhea, c diff and stool studies negative for infection  -d/w RN about imodium prn  -will discuss colonoscopy with md  -consider repeat ct abd scan since pt is now febrile, ID managing antibiotics for pelvic abscess      2.anemia; no overt bleeding  -anemia profile pending  -trend hh and keep hgb >7  -continue ppi    3.hx of rectal cancer treated with surgery and chemoradiation several years ago    4. SI mgt per psych    This plan was formulated in collaboration with Dr. Peyton Allen . Electronically signed by: GRAY Toney - CNP on 6/28/2020 at 7:37 AM       Attending Physician Statement  I have discussed the care of Sean Alarcon and   I have examined the patient myselft independently, and taken ros and hpi , including pertinent history and exam findings,  with the author of this note . I have reviewed the key elements of all parts of the encounter with the nurse practitioner/resident.     I agree with the assessment, plan and orders as documented by the above health care provider       Patient is having fever and tachycardia  Diarrhea is better stool start forming she said  Had 2 bowel movement this morning  We will repeat the CAT scan and see if there is any further complication if not we will proceed with a colonoscopy if the diarrhea persists especially with the fever    Electronically signed by Monique Chanel MD

## 2020-06-29 ENCOUNTER — APPOINTMENT (OUTPATIENT)
Dept: INTERVENTIONAL RADIOLOGY/VASCULAR | Age: 45
DRG: 710 | End: 2020-06-29
Attending: INTERNAL MEDICINE
Payer: MEDICARE

## 2020-06-29 LAB
ABSOLUTE BANDS #: 0.31 K/UL (ref 0–1)
ABSOLUTE EOS #: 0 K/UL (ref 0–0.4)
ABSOLUTE IMMATURE GRANULOCYTE: ABNORMAL K/UL (ref 0–0.3)
ABSOLUTE LYMPH #: 1.43 K/UL (ref 1–4.8)
ABSOLUTE MONO #: 0.51 K/UL (ref 0.1–1.3)
ANION GAP SERPL CALCULATED.3IONS-SCNC: 10 MMOL/L (ref 9–17)
ANION GAP SERPL CALCULATED.3IONS-SCNC: 11 MMOL/L (ref 9–17)
BANDS: 3 % (ref 0–10)
BASOPHILS # BLD: 0 % (ref 0–2)
BASOPHILS ABSOLUTE: 0 K/UL (ref 0–0.2)
BUN BLDV-MCNC: 3 MG/DL (ref 6–20)
BUN BLDV-MCNC: 3 MG/DL (ref 6–20)
BUN/CREAT BLD: ABNORMAL (ref 9–20)
BUN/CREAT BLD: ABNORMAL (ref 9–20)
CALCIUM SERPL-MCNC: 8.3 MG/DL (ref 8.6–10.4)
CALCIUM SERPL-MCNC: 8.4 MG/DL (ref 8.6–10.4)
CHLORIDE BLD-SCNC: 102 MMOL/L (ref 98–107)
CHLORIDE BLD-SCNC: 103 MMOL/L (ref 98–107)
CO2: 26 MMOL/L (ref 20–31)
CO2: 27 MMOL/L (ref 20–31)
CREAT SERPL-MCNC: 0.67 MG/DL (ref 0.5–0.9)
CREAT SERPL-MCNC: 0.7 MG/DL (ref 0.5–0.9)
DIFFERENTIAL TYPE: ABNORMAL
EOSINOPHILS RELATIVE PERCENT: 0 % (ref 0–4)
GFR AFRICAN AMERICAN: >60 ML/MIN
GFR AFRICAN AMERICAN: >60 ML/MIN
GFR NON-AFRICAN AMERICAN: >60 ML/MIN
GFR NON-AFRICAN AMERICAN: >60 ML/MIN
GFR SERPL CREATININE-BSD FRML MDRD: ABNORMAL ML/MIN/{1.73_M2}
GLUCOSE BLD-MCNC: 103 MG/DL (ref 70–99)
GLUCOSE BLD-MCNC: 105 MG/DL (ref 70–99)
HCT VFR BLD CALC: 26.9 % (ref 36–46)
HEMOGLOBIN: 8.9 G/DL (ref 12–16)
IMMATURE GRANULOCYTES: ABNORMAL %
LACTIC ACID: 1.1 MMOL/L (ref 0.5–2.2)
LACTIC ACID: 2.1 MMOL/L (ref 0.5–2.2)
LYMPHOCYTES # BLD: 14 % (ref 24–44)
MCH RBC QN AUTO: 29.7 PG (ref 26–34)
MCHC RBC AUTO-ENTMCNC: 33.2 G/DL (ref 31–37)
MCV RBC AUTO: 89.6 FL (ref 80–100)
MONOCYTES # BLD: 5 % (ref 1–7)
MORPHOLOGY: ABNORMAL
NRBC AUTOMATED: ABNORMAL PER 100 WBC
PDW BLD-RTO: 14.8 % (ref 11.5–14.9)
PLATELET # BLD: 279 K/UL (ref 150–450)
PLATELET ESTIMATE: ABNORMAL
PMV BLD AUTO: 8.6 FL (ref 6–12)
POTASSIUM SERPL-SCNC: 3.6 MMOL/L (ref 3.7–5.3)
POTASSIUM SERPL-SCNC: 3.7 MMOL/L (ref 3.7–5.3)
RBC # BLD: 3 M/UL (ref 4–5.2)
RBC # BLD: ABNORMAL 10*6/UL
SARS-COV-2, PCR: NORMAL
SARS-COV-2, RAPID: NORMAL
SARS-COV-2: NOT DETECTED
SEG NEUTROPHILS: 78 % (ref 36–66)
SEGMENTED NEUTROPHILS ABSOLUTE COUNT: 7.95 K/UL (ref 1.3–9.1)
SODIUM BLD-SCNC: 139 MMOL/L (ref 135–144)
SODIUM BLD-SCNC: 140 MMOL/L (ref 135–144)
SOURCE: NORMAL
WBC # BLD: 10.2 K/UL (ref 3.5–11)
WBC # BLD: ABNORMAL 10*3/UL

## 2020-06-29 PROCEDURE — 99233 SBSQ HOSP IP/OBS HIGH 50: CPT | Performed by: INTERNAL MEDICINE

## 2020-06-29 PROCEDURE — 6370000000 HC RX 637 (ALT 250 FOR IP): Performed by: NURSE PRACTITIONER

## 2020-06-29 PROCEDURE — 2709999900 IR ABSCESS EVAL THRU EXISTING CATH

## 2020-06-29 PROCEDURE — APPNB30 APP NON BILLABLE TIME 0-30 MINS: Performed by: NURSE PRACTITIONER

## 2020-06-29 PROCEDURE — 36415 COLL VENOUS BLD VENIPUNCTURE: CPT

## 2020-06-29 PROCEDURE — 80048 BASIC METABOLIC PNL TOTAL CA: CPT

## 2020-06-29 PROCEDURE — 2500000003 HC RX 250 WO HCPCS: Performed by: INTERNAL MEDICINE

## 2020-06-29 PROCEDURE — 76080 X-RAY EXAM OF FISTULA: CPT | Performed by: RADIOLOGY

## 2020-06-29 PROCEDURE — 85025 COMPLETE CBC W/AUTO DIFF WBC: CPT

## 2020-06-29 PROCEDURE — 2060000000 HC ICU INTERMEDIATE R&B

## 2020-06-29 PROCEDURE — 87205 SMEAR GRAM STAIN: CPT

## 2020-06-29 PROCEDURE — 93005 ELECTROCARDIOGRAM TRACING: CPT

## 2020-06-29 PROCEDURE — 6360000002 HC RX W HCPCS: Performed by: INTERNAL MEDICINE

## 2020-06-29 PROCEDURE — 6370000000 HC RX 637 (ALT 250 FOR IP): Performed by: PHYSICIAN ASSISTANT

## 2020-06-29 PROCEDURE — 87077 CULTURE AEROBIC IDENTIFY: CPT

## 2020-06-29 PROCEDURE — 2580000003 HC RX 258: Performed by: INTERNAL MEDICINE

## 2020-06-29 PROCEDURE — 83605 ASSAY OF LACTIC ACID: CPT

## 2020-06-29 PROCEDURE — 96366 THER/PROPH/DIAG IV INF ADDON: CPT

## 2020-06-29 PROCEDURE — 6370000000 HC RX 637 (ALT 250 FOR IP): Performed by: INTERNAL MEDICINE

## 2020-06-29 PROCEDURE — BW111ZZ FLUOROSCOPY OF ABDOMEN AND PELVIS USING LOW OSMOLAR CONTRAST: ICD-10-PCS | Performed by: RADIOLOGY

## 2020-06-29 PROCEDURE — 87070 CULTURE OTHR SPECIMN AEROBIC: CPT

## 2020-06-29 PROCEDURE — 87186 SC STD MICRODIL/AGAR DIL: CPT

## 2020-06-29 PROCEDURE — 2580000003 HC RX 258: Performed by: PHYSICIAN ASSISTANT

## 2020-06-29 PROCEDURE — 96376 TX/PRO/DX INJ SAME DRUG ADON: CPT

## 2020-06-29 PROCEDURE — 99232 SBSQ HOSP IP/OBS MODERATE 35: CPT | Performed by: INTERNAL MEDICINE

## 2020-06-29 PROCEDURE — 49424 ASSESS CYST CONTRAST INJECT: CPT | Performed by: RADIOLOGY

## 2020-06-29 RX ORDER — 0.9 % SODIUM CHLORIDE 0.9 %
1000 INTRAVENOUS SOLUTION INTRAVENOUS ONCE
Status: COMPLETED | OUTPATIENT
Start: 2020-06-29 | End: 2020-06-29

## 2020-06-29 RX ADMIN — METRONIDAZOLE 500 MG: 500 INJECTION, SOLUTION INTRAVENOUS at 17:30

## 2020-06-29 RX ADMIN — ACETAMINOPHEN 650 MG: 325 TABLET, FILM COATED ORAL at 12:06

## 2020-06-29 RX ADMIN — Medication 10 ML: at 20:56

## 2020-06-29 RX ADMIN — ACETAMINOPHEN 650 MG: 325 TABLET, FILM COATED ORAL at 21:11

## 2020-06-29 RX ADMIN — BUSPIRONE HYDROCHLORIDE 15 MG: 15 TABLET ORAL at 15:27

## 2020-06-29 RX ADMIN — CARIPRAZINE 3 MG: 3 CAPSULE, GELATIN COATED ORAL at 08:10

## 2020-06-29 RX ADMIN — OLANZAPINE 5 MG: 10 TABLET, FILM COATED ORAL at 20:56

## 2020-06-29 RX ADMIN — PANTOPRAZOLE SODIUM 40 MG: 40 TABLET, DELAYED RELEASE ORAL at 08:04

## 2020-06-29 RX ADMIN — BUSPIRONE HYDROCHLORIDE 15 MG: 15 TABLET ORAL at 08:04

## 2020-06-29 RX ADMIN — TAMSULOSIN HYDROCHLORIDE 0.4 MG: 0.4 CAPSULE ORAL at 08:04

## 2020-06-29 RX ADMIN — SODIUM CHLORIDE 1000 ML: 9 INJECTION, SOLUTION INTRAVENOUS at 11:09

## 2020-06-29 RX ADMIN — ENOXAPARIN SODIUM 40 MG: 40 INJECTION SUBCUTANEOUS at 08:04

## 2020-06-29 RX ADMIN — MIRTAZAPINE 15 MG: 15 TABLET, FILM COATED ORAL at 20:56

## 2020-06-29 RX ADMIN — CEFTRIAXONE SODIUM 1 G: 1 INJECTION, POWDER, FOR SOLUTION INTRAMUSCULAR; INTRAVENOUS at 18:47

## 2020-06-29 RX ADMIN — SODIUM CHLORIDE: 9 INJECTION, SOLUTION INTRAVENOUS at 08:05

## 2020-06-29 RX ADMIN — VORTIOXETINE 10 MG: 10 TABLET, FILM COATED ORAL at 08:10

## 2020-06-29 RX ADMIN — METRONIDAZOLE 500 MG: 500 INJECTION, SOLUTION INTRAVENOUS at 08:04

## 2020-06-29 RX ADMIN — METRONIDAZOLE 500 MG: 500 INJECTION, SOLUTION INTRAVENOUS at 01:55

## 2020-06-29 ASSESSMENT — PAIN SCALES - WONG BAKER

## 2020-06-29 ASSESSMENT — PAIN SCALES - GENERAL
PAINLEVEL_OUTOF10: 0

## 2020-06-29 NOTE — PROGRESS NOTES
NOT REPORTED    CBC auto differential    Collection Time: 06/29/20  4:43 AM   Result Value Ref Range    WBC 10.2 3.5 - 11.0 k/uL    RBC 3.00 (L) 4.0 - 5.2 m/uL    Hemoglobin 8.9 (L) 12.0 - 16.0 g/dL    Hematocrit 26.9 (L) 36 - 46 %    MCV 89.6 80 - 100 fL    MCH 29.7 26 - 34 pg    MCHC 33.2 31 - 37 g/dL    RDW 14.8 11.5 - 14.9 %    Platelets 735 917 - 144 k/uL    MPV 8.6 6.0 - 12.0 fL    NRBC Automated NOT REPORTED per 100 WBC    Differential Type NOT REPORTED     Immature Granulocytes NOT REPORTED 0 %    Absolute Immature Granulocyte NOT REPORTED 0.00 - 0.30 k/uL    WBC Morphology NOT REPORTED     RBC Morphology NOT REPORTED     Platelet Estimate NOT REPORTED     Seg Neutrophils 78 (H) 36 - 66 %    Lymphocytes 14 (L) 24 - 44 %    Monocytes 5 1 - 7 %    Eosinophils % 0 0 - 4 %    Basophils 0 0 - 2 %    Bands 3 0 - 10 %    Segs Absolute 7.95 1.3 - 9.1 k/uL    Absolute Lymph # 1.43 1.0 - 4.8 k/uL    Absolute Mono # 0.51 0.1 - 1.3 k/uL    Absolute Eos # 0.00 0.0 - 0.4 k/uL    Basophils Absolute 0.00 0.0 - 0.2 k/uL    Absolute Bands # 0.31 0.0 - 1.0 k/uL    Morphology FEW GIANT PLATELETS     Morphology HYPOCHROMIA PRESENT     Morphology ANISOCYTOSIS PRESENT    Basic Metabolic Panel w/ Reflex to MG    Collection Time: 06/29/20  6:43 AM   Result Value Ref Range    Glucose 105 (H) 70 - 99 mg/dL    BUN 3 (L) 6 - 20 mg/dL    CREATININE 0.67 0.50 - 0.90 mg/dL    Bun/Cre Ratio NOT REPORTED 9 - 20    Calcium 8.3 (L) 8.6 - 10.4 mg/dL    Sodium 139 135 - 144 mmol/L    Potassium 3.6 (L) 3.7 - 5.3 mmol/L    Chloride 102 98 - 107 mmol/L    CO2 27 20 - 31 mmol/L    Anion Gap 10 9 - 17 mmol/L    GFR Non-African American >60 >60 mL/min    GFR African American >60 >60 mL/min    GFR Comment          GFR Staging NOT REPORTED    Lactic Acid    Collection Time: 06/29/20  6:43 AM   Result Value Ref Range    Lactic Acid 1.1 0.5 - 2.2 mmol/L     No results for input(s): POCGLU in the last 72 hours.      Ct Abdomen Pelvis W Iv Contrast the right. The amount of fluid has decreased, however the amount of gas has increased status post drainage catheter insertion             HPI:   See history in H and P      Review of Systems:     Patient has borderline intelligence unable to get a review of system  Data:     Past Medical History:  no change     Social History:  no change    Family History: @no change    Vitals:      I/O (24Hr):     Intake/Output Summary (Last 24 hours) at 6/29/2020 1226  Last data filed at 6/28/2020 1806  Gross per 24 hour   Intake 3187 ml   Output --   Net 3187 ml       Labs:    URINE ANALYSIS: No results found for: LABURIN     CBC:  Lab Results   Component Value Date    WBC 10.2 06/29/2020    HGB 8.9 06/29/2020     06/29/2020        BMP:    Lab Results   Component Value Date     06/29/2020    K 3.6 06/29/2020     06/29/2020    CO2 27 06/29/2020    BUN 3 06/29/2020    CREATININE 0.67 06/29/2020    GLUCOSE 105 06/29/2020      LIVER PROFILE:  Lab Results   Component Value Date    ALT 14 06/17/2020    AST 10 06/17/2020    PROT 6.6 06/17/2020    BILITOT 0.20 06/17/2020    LABALBU 3.1 06/17/2020               Radiology:      Physical Examination:        General appearance:  alert, cooperative and no distress  Mental Status: Alert lungs:  clear to auscultation bilaterally, normal effort  Heart:  regular rate and rhythm, no murmur  Abdomen:  soft, nontender, nondistended, normal bowel sounds, no masses, hepatomegaly, splenomegaly  Extremities:  no edema, redness, tenderness in the calves  Skin:  no gross lesions, rashes, induration  Right gluteal drain noted brown collection looks like a loose stool  Assessment:        Primary Problem  <principal problem not specified>    Active Hospital Problems    Diagnosis Date Noted    Dehydration [E86.0] 06/28/2020    Diarrhea [R19.7]     Anemia [D64.9]     History of rectal cancer [Z85.048]     Severe malnutrition ([address] [E43] 06/26/2020    Tachycardia [R00.0] 06/25/2020 Plan:        1. History of for rectal cancer with pelvic abscesses and air in the pelvic area status post drain placed on last admission patient running persistent low-grade fever high as 101 today tachycardia pulse up to 122 drain noted coming from the right gluteus brown discharge and suspicious of fecal matter  2. CT scan done yesterday shows increasing gas but less of fluid collection  3. I discussed case on phone with surgeon dr Jolene Phan  4. On IV antibiotics ID and GI input noted plan is for repeat CT abdomen pelvis with rectal contrast and IV contrast  5. Patient may need be surgery possibly have perforated large bowel  6. At risk of getting septic  7. Medications:      Allergies:  No Known Allergies    Current Meds:   Scheduled Meds:    [START ON 6/30/2020] enoxaparin  30 mg Subcutaneous Daily    cefTRIAXone (ROCEPHIN) IV  1 g Intravenous Q24H    metroNIDAZOLE  500 mg Intravenous Q8H    mirtazapine  15 mg Oral Nightly    OLANZapine  5 mg Oral Nightly    VORTIoxetine  10 mg Oral Daily    pantoprazole  40 mg Oral Daily    busPIRone  15 mg Oral TID    tamsulosin  0.4 mg Oral Daily    cariprazine hcl  3 mg Oral Daily     Continuous Infusions:    sodium chloride 100 mL/hr at 06/29/20 0805     PRN Meds: potassium chloride **OR** potassium alternative oral replacement **OR** potassium chloride, acetaminophen, ondansetron, loperamide, hydrOXYzine, sodium chloride flush       Bebeto Pittman MD  6/29/2020  12:26 PM

## 2020-06-29 NOTE — CARE COORDINATION
ONGOING DISCHARGE PLAN:    Reviewed patients chart regarding discharge plan and chart still confirms the plan is to discharge to home with no needs   Pt is still having fevers 101  Tachy at 122  Pt on iv atb   Has pelvic drain   repeat CT abdomen pelvis with rectal contrast and IV contrast    Will review plan with patient and or family      Will continue to follow for additional discharge needs.      Electronically signed by Hardeep Greer RN on 6/29/2020 at 1:55 PM

## 2020-06-29 NOTE — PROGRESS NOTES
Nutrition Assessment    Type and Reason for Visit: Reassess    Nutrition Recommendations: Continue diet and supplements as ordered. Nutrition Assessment: Patient states her appetite & diarrhea \"comes & goes\" but the nausea is gone. Pt states she usually drinks the Ensure but forgot it today, would like to try strawberry. Malnutrition Assessment:  · Malnutrition Status: Meets the criteria for severe malnutrition  · Context: Acute illness or injury  · Findings of the 6 clinical characteristics of malnutrition (Minimum of 2 out of 6 clinical characteristics is required to make the diagnosis of moderate or severe Protein Calorie Malnutrition based on AND/ASPEN Guidelines):  1. Energy Intake-Less than or equal to 75% of estimated energy requirement, Greater than or equal to 7 days    2. Weight Loss-Unable to assess,    3. Fat Loss-Unable to assess,    4. Muscle Loss-Severe muscle mass loss, Clavicles (pectoralis and deltoids)  5. Fluid Accumulation-No significant fluid accumulation, Extremities  6.  Strength-Not measured    Nutrition Risk Level: High    Nutrient Needs:  · Estimated Daily Total Kcal: 6656-0403 based on [place] with 1.2-1.3 factor for maintenance using wt of 43.5kg. 4608-8674 for wt gain (additional 250-500 kcal)  · Estimated Daily Protein (g): 65 based on 1.5 gm per kg usual wt    Nutrition Diagnosis:   · Problem: Severe malnutrition  · Etiology: related to Insufficient energy/nutrient consumption, Psychological cause/life stress, Alteration in GI function     Signs and symptoms:  as evidenced by Severe muscle loss, Diet history of poor intake, Intake 0-25%, Intake 25-50%    Objective Information:  · Nutrition-Focused Physical Findings: No edema. Very thin. Diarrhea. N+V. Pelvic abscess drain placed 6/19. Past Hx of rectal cancer.  Na: 135 (6/26), PAB: 10.8 (6/17)  · Current Nutrition Therapies:  · Oral Diet Orders: General   · Oral Diet intake: 26-50%  · Oral Nutrition Supplement (ONS) Orders: Standard High Calorie Oral Supplement  · ONS intake: 51-75%(per patient)  · Anthropometric Measures:  · Ht: 5' (152.4 cm)   · Current Body Wt: 91 lb (41.3 kg)  · Usual Body Wt: 96 lb (43.5 kg)(43.5 kg approximately 1 month ago per pt; 52.3 kg approximately 1 year ago per pt)  · Ideal Body Wt: 100 lb (45.4 kg), % Ideal Body 91%  · BMI Classification: BMI <18.5 Underweight    Nutrition Interventions:   Continue current diet, Continue current ONS(Attempt to honor preferences, tolerances)  Continued Inpatient Monitoring, Education not appropriate at this time    Nutrition Evaluation:   · Evaluation: Progressing toward goals   · Goals: PO intake % of meals and supplements    · Monitoring: Nutrition Progression, Meal Intake, Supplement Intake, Diet Tolerance, Skin Integrity, Mental Status/Confusion, Weight, Pertinent Labs, Nausea or Vomiting, Diarrhea      Some areas of assessment may be incomplete due to COVID-19 precautions. Joseluis Matthew R.D., L.D.   Clinical Dietitian  Office: [phone]

## 2020-06-29 NOTE — BRIEF OP NOTE
Brief Postoperative Note    Zora Melo  YOB: 1975  [id number]    Pre-operative Diagnosis: Rectal fistula    Post-operative Diagnosis: Same    Procedure: Abscessogram    Medications Given: none    Anesthesia: Local    Surgeons/Assistants: Fermin Lombard MD    Estimated Blood Loss: minimal    Complications: none    Specimens: were not obtained    Findings: 40 mls dilute contrast injected via pelvic drain show fistula with rectum.       Electronically signed by Fermin Lombard on 6/29/2020 at 4:05 PM

## 2020-06-29 NOTE — PROGRESS NOTES
Rectal fistula noted on abscessogram  Nursing staff to approach Dr. Adenike Chen  For update  Jerman Maillakaroline  6/29/2020

## 2020-06-29 NOTE — PLAN OF CARE
Problem: Falls - Risk of:  Goal: Will remain free from falls  Description: Will remain free from falls  6/29/2020 0639 by Socorro Spann RN  Outcome: Ongoing  6/28/2020 1937 by Griselda Seltzer, RN  Outcome: Ongoing  Goal: Absence of physical injury  Description: Absence of physical injury  6/29/2020 0639 by Socorro Spann RN  Outcome: Ongoing  6/28/2020 1937 by Griselda Seltzer, RN  Outcome: Ongoing     Problem: Cardiac Output - Decreased:  Goal: Hemodynamic stability will improve  Description: Hemodynamic stability will improve  6/29/2020 0639 by Socorro Spann RN  Outcome: Ongoing  6/28/2020 1937 by Griselda Seltzer, RN  Outcome: Ongoing     Problem: Mental Status - Impaired:  Goal: Mental status will improve  Description: Mental status will improve  6/29/2020 0639 by Socorro Spann RN  Outcome: Ongoing  6/28/2020 1937 by Griselda Seltzer, RN  Outcome: Ongoing     Problem: Nutrition  Goal: Optimal nutrition therapy  6/28/2020 1937 by Griselda Seltzer, RN  Outcome: Ongoing   Pt had periods of SVT throughout the shift never sustained and only occurred when Patient was up to bathroom. However patient remained Tachycardic throughout entire shift ranging in the 120's -140's. Dr. Karen Schwab made aware of cardiac changes and new orders given. Pt remained alert and oriented throughout shift. Remained free from falls or injury. Safety maintained. Call light within reach. Continue to Monitor.

## 2020-06-29 NOTE — PROGRESS NOTES
Patient had runs of SVT while up to bathroom on numerous occasions throughout shift. A couple of strips showed 180-190 heart rate. As of right now patient is sustaining sinus tachycardia 130's -140's.

## 2020-06-29 NOTE — PLAN OF CARE
Problem: Falls - Risk of:  Goal: Will remain free from falls  Description: Will remain free from falls  6/29/2020 1735 by Kirk Benitez RN  Outcome: Ongoing  Note: Patient remained free from falls. Call light within reach. Problem: Mental Status - Impaired:  Goal: Mental status will improve  Description: Mental status will improve  6/29/2020 1735 by Kirk Benitez RN  Outcome: Ongoing  Note: Mental state worked towards improvement this shift. Problem: Nutrition  Goal: Optimal nutrition therapy  6/29/2020 1735 by Kirk Benitez RN  Outcome: Ongoing  Note: Patient moved towards optimal nutrition this shift.

## 2020-06-29 NOTE — PROGRESS NOTES
[facility] -   Infectious diseases evaluation  admission date 6/25/2020        Impression :   Current:  · Fever and tachycardia  · Diarrhea  · Persistent pelvic abscess S/P drainage on 6/19/2020 concerning for fistula  · Rectal cancer      Recommendations     · IV ceftriaxone and Flagyl  · Follow blood cultures 6/26 no growth  · CT abdomen pelvis reviewed  · Follow CBC and renal function  · COVID-19 rapid test was negative  · Procalcitonin 0.29 lactic acid level was normal yesterday. · Case was discussed with GI/barium enema planned           History of Present Illness:   Initial history:  Christoph Hawkins is a 40y.o.-year-old female was transferred from [facility] for tachycardia . She had fever on 6/26 with a temperature max of 102, diarrhea for several days, stool for C. difficile was negative on 6/26/2020. CT chest 6/25 was negative for PE, showed incidental finding of cholelithiasis  Pathogen panel was negative on 6/20/2020  She was recently diagnosed with pelvic abscess required drainage 6/19/2020 with E. coli and Candida albicans growth on culture. 6/18/2020 COVID-19  test was negative  She had history of rectal cancer with T3 N1 M0 had surgery and chemotherapy in 2017. Interval changes  6/29/2020   She had a fever with a temperature of 101.1 today, still tachycardic, occasional cough, denied abdominal pain. She denied any nausea or vomiting, still complaining of loose stool. MEL drain with brown dark drainage  CT abdomen pelvis showed decrease previous fluid collection with increased gas. WBC normal today    I have personally reviewed the past medical history, past surgical history, medications, social history, and family history, and I haveupdated the database accordingly.   Past Medical History:     Past Medical History:   Diagnosis Date    Cancer [facility])     Rectal    Pelvis, female abscess     pre sacral drain put in 6/19/2020    UTI (urinary abused: Not on file     Forced sexual activity: Not on file   Other Topics Concern    Not on file   Social History Narrative    Not on file       Family History:   History reviewed. No pertinent family history. Allergies:   Patient has no known allergies. Review of Systems:     Review of Systems   As per history present illness other than above 14 systems reviewed were negative  Physical Examination :     Patient Vitals for the past 8 hrs:   BP Temp Temp src Pulse Resp SpO2   06/29/20 1150 127/68 101.1 °F (38.4 °C) Axillary 122 15 99 %   06/29/20 0928 112/69 99.7 °F (37.6 °C) Oral 117 16 100 %       Physical Exam  Constitutional:       Appearance: Normal appearance. HENT:      Head: Normocephalic and atraumatic. Eyes:      Conjunctiva/sclera: Conjunctivae normal.   Neck:      Musculoskeletal: No neck rigidity. Cardiovascular:      Rate and Rhythm: Tachycardia present. Heart sounds: Normal heart sounds. No murmur. Pulmonary:      Effort: Pulmonary effort is normal.      Breath sounds: Normal breath sounds. No wheezing. Abdominal:      General: Abdomen is flat. There is no distension. Palpations: Abdomen is soft. Tenderness: There is no abdominal tenderness. Comments: MEL drain in place with dark brown material   Musculoskeletal:      Right lower leg: No edema. Left lower leg: No edema. Skin:     General: Skin is warm. Coloration: Skin is not jaundiced. Neurological:      General: No focal deficit present. Mental Status: She is alert and oriented to person, place, and time.    Psychiatric:         Mood and Affect: Mood normal.           Medical Decision Making:   I have independently reviewed/ordered the following labs:    CBC with Differential:   Recent Labs     06/28/20  0521 06/29/20  0443   WBC 9.4 10.2   HGB 9.5* 8.9*   HCT 29.2* 26.9*    279   LYMPHOPCT 18* 14*   MONOPCT 11* 5     BMP:  Recent Labs     06/28/20  0521 06/29/20  0443 06/29/20  4007

## 2020-06-29 NOTE — PROGRESS NOTES
[facility]    Gastroenterology Daily Progress Note      Patient:   Michael Juárez   :    1975   [facility]:   [facility]  Date:     2020  Consultant:   Sabina Lara CNP      SUBJECTIVE  40 y.o. female admitted 2020 with Tachycardia [R00.0]  Tachycardia [R00.0]  Tachycardia [R00.0] and seen for diarrhea. The pt was seen and examined. Has had 3-4 BM overnight, non bloody. Continues to have runs of SVT but no fevers overnight. Has nausea but no abdominal pain. Follow up CT abd shows decreased pelvic fluid collection but gas extending into the right anterior pelvis and right gluteal region. RN reports that the MEL is draining green/brown fluid.          OBJECTIVE  Scheduled Meds:   sodium chloride  1,000 mL Intravenous Once    cefTRIAXone (ROCEPHIN) IV  1 g Intravenous Q24H    metroNIDAZOLE  500 mg Intravenous Q8H    enoxaparin  40 mg Subcutaneous Daily    mirtazapine  15 mg Oral Nightly    OLANZapine  5 mg Oral Nightly    VORTIoxetine  10 mg Oral Daily    pantoprazole  40 mg Oral Daily    busPIRone  15 mg Oral TID    tamsulosin  0.4 mg Oral Daily    cariprazine hcl  3 mg Oral Daily       Vital Signs:  /64   Pulse 122   Temp 98.3 °F (36.8 °C) (Oral)   Resp 18   Ht 5' (1.524 m)   Wt 91 lb 11.4 oz (41.6 kg)   LMP 2017   SpO2 100%   BMI 17.91 kg/m²      Physical Exam:     General Appearance: alert and oriented to person, place and time, well-developed and  mal-nourished, in no acute distress sitter at bedside  Skin: warm and dry, no rash or erythema  Head: normocephalic and atraumatic  Eyes: pupils equal, round, and reactive to light, extraocular eye movements intact, conjunctivae normal  ENT: hearing grossly normal bilaterally  Neck: neck supple and non tender without mass, no thyromegaly or thyroid nodules, no cervical lymphadenopathy   Pulmonary/Chest: clear to auscultation bilaterally- no wheezes, rales or rhonchi, normal air movement, no respiratory distress  Cardiovascular: tachycardic rate, regular rhythm, normal S1 and S2, no murmurs, rubs, clicks or gallops, distal pulses intact, no carotid bruits  Abdomen: soft,  non tender, non-distended, normal bowel sounds, no masses or organomegaly no ascites diamond with scant serosanguinous drainage  Extremities: no cyanosis, clubbing or edema  Musculoskeletal: normal range of motion, no joint swelling, deformity or tenderness  Neurologic: no cranial nerve deficit and muscle strength normal      Lab and Imaging Review     CBC  Recent Labs     06/27/20  0453 06/28/20  0521 06/29/20  0443   WBC 9.5 9.4 10.2   HGB 8.5* 9.5* 8.9*   HCT 26.4* 29.2* 26.9*   MCV 89.6 89.7 89.6    320 279       BMP  Recent Labs     06/28/20  0521 06/29/20  0443 06/29/20  0643    140 139   K 3.5* 3.7 3.6*    103 102   CO2 26 26 27   BUN 3* 3* 3*   CREATININE 0.64 0.70 0.67   GLUCOSE 108* 103* 105*   CALCIUM 8.6 8.4* 8.3*         ANEMIA STUDIES  Recent Labs     06/28/20  0521   LABIRON 8*   TIBC 157*   FERRITIN 253*   [id number] 321   FOLATE 13.8     FINDINGS:ct abd 6/28/20   Lower Chest: No focal consolidation is seen at the lung bases.       Organs: No splenomegaly.       Adrenal glands appear normal.       Hypodense nodule seen in the left kidney measuring 4 mm.  This most likely   represents cyst.       There is mild right-sided hydronephrosis and hydroureter.  Right ureteral   stent is seen in this expected location.  Gallstones are seen.  No   significant inflammatory change surrounding the gallbladder.  Liver appears   unchanged       Pancreas appears unchanged       GI/Bowel: Stomach is unremarkable.  No significant small or large bowel   distention noted       Large stool load seen in the colon       Pelvis: Drainage catheter has been is dysuria to in the posterior pelvis on   the right since prior.  The amount of fluid in the collection is slightly   smaller.  However, there is increasing gas seen in the collection, extending   anteriorly into the pelvis on the right, abutting the bladder, and also into   the gluteal region on the right.       Anastomotic staple line seen in the rectosigmoid colon       Peritoneum/Retroperitoneum: Small retroperitoneal nodes are seen.  No   aneurysm.       Bones/Soft Tissues: Bones are unchanged.           Impression   Persistent collection of fluid and gas in the pelvis, centered in the   presacral space on the right.  The amount of fluid has decreased, however the   amount of gas has increased status post drainage catheter insertion                 ASSESSMENT/PLAN:  1. Acute diarrhea, c diff and stool studies negative for infection  - imodium prn   discussed colonoscopy with md; will hold at this time, need to r/o fistula  -repeat abd ct shows continued pelvic fluid collection with gas and she has green/brown drainage in diamond, discussed with dr Arsen Salgado who recommends re consulting surgery and ordering barium enema, d/w pt's RN Neil Ruvalcaba who states ct abd with rectal contrast and iv contrast has been ordered and surgery to see today, to clarify if barium enema also needs to be done with surgery   -ID following; on antibiotics     2.anemia; no overt bleeding  -anemia profile shows decreased iron, consider iron replacement  -trend hh and keep hgb >7  -continue ppi     3.hx of rectal cancer treated with surgery and chemoradiation several years ago     4. SI mgt per psych      This plan was formulated in collaboration with Dr. Mónica Saul . Electronically signed by: GRAY Martinez CNP on 6/29/2020 at 7:46 AM     Attending Physician Statement  I have discussed the care of Sha Ray and   I have examined the patient myselft independently, and taken ros and hpi , including pertinent history and exam findings,  with the author of this note . I have reviewed the key elements of all parts of the encounter with the nurse practitioner/resident.     I agree with the assessment, plan and orders as documented by the above health care provider       We will proceed with barium enema to see if there is any fistula  I will avoid colonoscopy because that would make the abscess worse by pushing air in fluid into that abscess if there is communication with the colon  Continue antibiotics  Please have surgery back on board in case this patient would need an open surgery down the road    Electronically signed by Ramiro Martines MD

## 2020-06-30 LAB
ABSOLUTE EOS #: 0 K/UL (ref 0–0.4)
ABSOLUTE IMMATURE GRANULOCYTE: ABNORMAL K/UL (ref 0–0.3)
ABSOLUTE LYMPH #: 1.5 K/UL (ref 1–4.8)
ABSOLUTE MONO #: 1.4 K/UL (ref 0.1–1.3)
ANION GAP SERPL CALCULATED.3IONS-SCNC: 11 MMOL/L (ref 9–17)
BASOPHILS # BLD: 1 % (ref 0–2)
BASOPHILS ABSOLUTE: 0.1 K/UL (ref 0–0.2)
BUN BLDV-MCNC: 3 MG/DL (ref 6–20)
BUN/CREAT BLD: ABNORMAL (ref 9–20)
CALCIUM SERPL-MCNC: 8 MG/DL (ref 8.6–10.4)
CHLORIDE BLD-SCNC: 105 MMOL/L (ref 98–107)
CO2: 26 MMOL/L (ref 20–31)
CREAT SERPL-MCNC: 0.64 MG/DL (ref 0.5–0.9)
DIFFERENTIAL TYPE: ABNORMAL
EOSINOPHILS RELATIVE PERCENT: 0 % (ref 0–4)
GFR AFRICAN AMERICAN: >60 ML/MIN
GFR NON-AFRICAN AMERICAN: >60 ML/MIN
GFR SERPL CREATININE-BSD FRML MDRD: ABNORMAL ML/MIN/{1.73_M2}
GFR SERPL CREATININE-BSD FRML MDRD: ABNORMAL ML/MIN/{1.73_M2}
GLUCOSE BLD-MCNC: 101 MG/DL (ref 70–99)
HCT VFR BLD CALC: 26.5 % (ref 36–46)
HEMOGLOBIN: 8.6 G/DL (ref 12–16)
IMMATURE GRANULOCYTES: ABNORMAL %
LYMPHOCYTES # BLD: 15 % (ref 24–44)
MAGNESIUM: 1.7 MG/DL (ref 1.6–2.6)
MCH RBC QN AUTO: 28.8 PG (ref 26–34)
MCHC RBC AUTO-ENTMCNC: 32.3 G/DL (ref 31–37)
MCV RBC AUTO: 89.2 FL (ref 80–100)
MONOCYTES # BLD: 15 % (ref 1–7)
NRBC AUTOMATED: ABNORMAL PER 100 WBC
PDW BLD-RTO: 15.1 % (ref 11.5–14.9)
PLATELET # BLD: 288 K/UL (ref 150–450)
PLATELET ESTIMATE: ABNORMAL
PMV BLD AUTO: 8.2 FL (ref 6–12)
POTASSIUM SERPL-SCNC: 3.3 MMOL/L (ref 3.7–5.3)
RBC # BLD: 2.97 M/UL (ref 4–5.2)
RBC # BLD: ABNORMAL 10*6/UL
SEG NEUTROPHILS: 69 % (ref 36–66)
SEGMENTED NEUTROPHILS ABSOLUTE COUNT: 6.7 K/UL (ref 1.3–9.1)
SODIUM BLD-SCNC: 142 MMOL/L (ref 135–144)
WBC # BLD: 9.7 K/UL (ref 3.5–11)
WBC # BLD: ABNORMAL 10*3/UL

## 2020-06-30 PROCEDURE — 6360000002 HC RX W HCPCS: Performed by: INTERNAL MEDICINE

## 2020-06-30 PROCEDURE — 80048 BASIC METABOLIC PNL TOTAL CA: CPT

## 2020-06-30 PROCEDURE — 85025 COMPLETE CBC W/AUTO DIFF WBC: CPT

## 2020-06-30 PROCEDURE — 83735 ASSAY OF MAGNESIUM: CPT

## 2020-06-30 PROCEDURE — 96376 TX/PRO/DX INJ SAME DRUG ADON: CPT

## 2020-06-30 PROCEDURE — 99232 SBSQ HOSP IP/OBS MODERATE 35: CPT | Performed by: INTERNAL MEDICINE

## 2020-06-30 PROCEDURE — 2060000000 HC ICU INTERMEDIATE R&B

## 2020-06-30 PROCEDURE — APPNB30 APP NON BILLABLE TIME 0-30 MINS: Performed by: NURSE PRACTITIONER

## 2020-06-30 PROCEDURE — 2580000003 HC RX 258: Performed by: INTERNAL MEDICINE

## 2020-06-30 PROCEDURE — 36415 COLL VENOUS BLD VENIPUNCTURE: CPT

## 2020-06-30 PROCEDURE — 6370000000 HC RX 637 (ALT 250 FOR IP): Performed by: INTERNAL MEDICINE

## 2020-06-30 PROCEDURE — 96366 THER/PROPH/DIAG IV INF ADDON: CPT

## 2020-06-30 PROCEDURE — 99233 SBSQ HOSP IP/OBS HIGH 50: CPT | Performed by: INTERNAL MEDICINE

## 2020-06-30 PROCEDURE — 2580000003 HC RX 258: Performed by: PHYSICIAN ASSISTANT

## 2020-06-30 PROCEDURE — 2500000003 HC RX 250 WO HCPCS: Performed by: INTERNAL MEDICINE

## 2020-06-30 PROCEDURE — 6370000000 HC RX 637 (ALT 250 FOR IP): Performed by: PHYSICIAN ASSISTANT

## 2020-06-30 PROCEDURE — 6370000000 HC RX 637 (ALT 250 FOR IP): Performed by: NURSE PRACTITIONER

## 2020-06-30 RX ORDER — FLUCONAZOLE 2 MG/ML
200 INJECTION, SOLUTION INTRAVENOUS EVERY 24 HOURS
Status: DISCONTINUED | OUTPATIENT
Start: 2020-06-30 | End: 2020-07-05 | Stop reason: HOSPADM

## 2020-06-30 RX ADMIN — BUSPIRONE HYDROCHLORIDE 15 MG: 15 TABLET ORAL at 21:00

## 2020-06-30 RX ADMIN — PANTOPRAZOLE SODIUM 40 MG: 40 TABLET, DELAYED RELEASE ORAL at 09:44

## 2020-06-30 RX ADMIN — Medication 10 ML: at 08:53

## 2020-06-30 RX ADMIN — VORTIOXETINE 10 MG: 10 TABLET, FILM COATED ORAL at 09:44

## 2020-06-30 RX ADMIN — ACETAMINOPHEN 650 MG: 325 TABLET, FILM COATED ORAL at 15:42

## 2020-06-30 RX ADMIN — OLANZAPINE 5 MG: 10 TABLET, FILM COATED ORAL at 21:00

## 2020-06-30 RX ADMIN — SODIUM CHLORIDE: 9 INJECTION, SOLUTION INTRAVENOUS at 14:42

## 2020-06-30 RX ADMIN — METRONIDAZOLE 500 MG: 500 INJECTION, SOLUTION INTRAVENOUS at 02:40

## 2020-06-30 RX ADMIN — ACETAMINOPHEN 650 MG: 325 TABLET, FILM COATED ORAL at 22:30

## 2020-06-30 RX ADMIN — POTASSIUM CHLORIDE 40 MEQ: 1500 TABLET, EXTENDED RELEASE ORAL at 09:44

## 2020-06-30 RX ADMIN — CARIPRAZINE 3 MG: 3 CAPSULE, GELATIN COATED ORAL at 09:44

## 2020-06-30 RX ADMIN — ENOXAPARIN SODIUM 30 MG: 30 INJECTION SUBCUTANEOUS at 09:44

## 2020-06-30 RX ADMIN — LOPERAMIDE HYDROCHLORIDE 2 MG: 2 CAPSULE ORAL at 12:29

## 2020-06-30 RX ADMIN — ONDANSETRON 4 MG: 2 INJECTION INTRAMUSCULAR; INTRAVENOUS at 10:27

## 2020-06-30 RX ADMIN — TAMSULOSIN HYDROCHLORIDE 0.4 MG: 0.4 CAPSULE ORAL at 09:44

## 2020-06-30 RX ADMIN — SODIUM CHLORIDE: 9 INJECTION, SOLUTION INTRAVENOUS at 02:40

## 2020-06-30 RX ADMIN — METRONIDAZOLE 500 MG: 500 INJECTION, SOLUTION INTRAVENOUS at 17:44

## 2020-06-30 RX ADMIN — CEFTRIAXONE SODIUM 1 G: 1 INJECTION, POWDER, FOR SOLUTION INTRAMUSCULAR; INTRAVENOUS at 17:44

## 2020-06-30 RX ADMIN — METRONIDAZOLE 500 MG: 500 INJECTION, SOLUTION INTRAVENOUS at 09:43

## 2020-06-30 RX ADMIN — MIRTAZAPINE 15 MG: 15 TABLET, FILM COATED ORAL at 21:00

## 2020-06-30 RX ADMIN — FLUCONAZOLE 200 MG: 200 INJECTION, SOLUTION INTRAVENOUS at 15:38

## 2020-06-30 ASSESSMENT — PAIN SCALES - WONG BAKER

## 2020-06-30 ASSESSMENT — PAIN SCALES - GENERAL
PAINLEVEL_OUTOF10: 0

## 2020-06-30 NOTE — PROGRESS NOTES
[facility] -   Infectious diseases evaluation  admission date 6/25/2020        Impression :   Current:    · Persistent pelvic abscess S/P drainage on 6/19/2020   · Rectal fistula  · H/O rectal cancer      Recommendations     · Continue IV ceftriaxone and Flagyl  · Add Diflucan  · Surgery has been consulted  · Follow blood cultures 6/26 no growth  · Follow CBC and renal function  · COVID-19 rapid test was negative               History of Present Illness:   Initial history:  Jessica Clark is a 40y.o.-year-old female was transferred from [facility] for tachycardia . She had fever on 6/26 with a temperature max of 102, diarrhea for several days, stool for C. difficile was negative on 6/26/2020. CT chest 6/25 was negative for PE, showed incidental finding of cholelithiasis  Pathogen panel was negative on 6/20/2020  She was recently diagnosed with pelvic abscess required drainage 6/19/2020 with E. coli and Candida albicans growth on culture. 6/18/2020 COVID-19  test was negative  She had history of rectal cancer with T3 N1 M0 had surgery and chemotherapy in 2017. Interval changes  6/30/2020   IR fluoroscopy through existing right pelvic drain suggestive of rectal abscess  She had a fever with a temperature of 100.6 today, still tachycardic, occasional cough, still have diarrhea. She had 2 episodes of vomiting today. Pelvic abscess drainage from yesterday showed gram-negative rods and rare yeast on Gram stain  MEL drain with brown dark drainage  6/28/2020 CT abdomen pelvis showed decrease previous fluid collection with increased gas. Procalcitonin 0.29 lactic acid level was normal 6/28/2020. WBC normal today    I have personally reviewed the past medical history, past surgical history, medications, social history, and family history, and I haveupdated the database accordingly.   Past Medical History:     Past Medical History:   Diagnosis Date    Cancer ([address] Rectal    Pelvis, female abscess     pre sacral drain put in 6/19/2020    UTI (urinary tract infection)     stent place june 2020       Past Surgical  History:     Past Surgical History:   Procedure Laterality Date    ABSCESS DRAINAGE      pre sacral pelvic abscess with drain placement to right buttock    URETER STENT PLACEMENT  2020       Medications:      enoxaparin  30 mg Subcutaneous Daily    cefTRIAXone (ROCEPHIN) IV  1 g Intravenous Q24H    metroNIDAZOLE  500 mg Intravenous Q8H    mirtazapine  15 mg Oral Nightly    OLANZapine  5 mg Oral Nightly    VORTIoxetine  10 mg Oral Daily    pantoprazole  40 mg Oral Daily    busPIRone  15 mg Oral TID    tamsulosin  0.4 mg Oral Daily    cariprazine hcl  3 mg Oral Daily       Social History:     Social History     Socioeconomic History    Marital status: Unknown     Spouse name: Not on file    Number of children: Not on file    Years of education: Not on file    Highest education level: Not on file   Occupational History    Not on file   Social Needs    Financial resource strain: Not on file    Food insecurity     Worry: Not on file     Inability: Not on file    Transportation needs     Medical: Not on file     Non-medical: Not on file   Tobacco Use    Smoking status: Never Smoker    Smokeless tobacco: Never Used    Tobacco comment: pt is a non smoker   Substance and Sexual Activity    Alcohol use: Not on file    Drug use: Not on file    Sexual activity: Not on file   Lifestyle    Physical activity     Days per week: Not on file     Minutes per session: Not on file    Stress: Not on file   Relationships    Social connections     Talks on phone: Not on file     Gets together: Not on file     Attends Sabian[employer] service: Not on file     Active member of club or organization: Not on file     Attends meetings of clubs or organizations: Not on file     Relationship status: Not on file    Intimate partner violence     Fear of current or ex partner: 26.9* 26.5*    288   LYMPHOPCT 14* 15*   MONOPCT 5 15*     BMP:  Recent Labs     06/28/20  0521  06/29/20  0643 06/30/20  0533      < > 139 142   K 3.5*   < > 3.6* 3.3*      < > 102 105   CO2 26   < > 27 26   BUN 3*   < > 3* 3*   CREATININE 0.64   < > 0.67 0.64   MG 1.8  --   --  1.7    < > = values in this interval not displayed. Hepatic Function Panel: No results for input(s): PROT, LABALBU, BILIDIR, IBILI, BILITOT, ALKPHOS, ALT, AST in the last 72 hours. No results for input(s): RPR in the last 72 hours. No results for input(s): HIV in the last 72 hours. No results for input(s): BC in the last 72 hours. Lab Results   Component Value Date    CREATININE 0.64 06/30/2020    GLUCOSE 101 06/30/2020       Detailed results: Thank you for allowing us to participate in the care of this patient. Please call with questions. This note is created with the assistance of a speech recognition program.  While intending to generate adocument that actually reflects the content of the visit, the document can still have some errors including those of syntax and sound a like substitutions which may escape proof reading. It such instances, actual meaningcan be extrapolated by contextual diversion.     Naomi Turner MD  Office: [phone]  Perfect serve / office [phone]

## 2020-06-30 NOTE — PLAN OF CARE
Problem: Falls - Risk of:  Goal: Will remain free from falls  Description: Will remain free from falls  6/30/2020 0510 by Marcie Perez RN  Outcome: Ongoing   Pt assessed as a fall risk this shift. Remains free from falls and accidental injury at this time. Fall precautions in place, including falling star sign and fall risk band on pt. Floor free from obstacles, and bed is locked and in lowest position. Adequate lighting provided. Pt encouraged to call before getting OOB for any need.    Problem: Cardiac Output - Decreased:  Goal: Hemodynamic stability will improve  Description: Hemodynamic stability will improve  Outcome: Ongoing   Pt remained tachycardic during this shift  Problem: Mental Status - Impaired:  Goal: Mental status will improve  Description: Mental status will improve  6/30/2020 0510 by Marcie Perez RN  Outcome: Ongoing   Pt states she had no suicidal thoughts and she states she is at peace with herself, family and God

## 2020-06-30 NOTE — PROGRESS NOTES
Pt had two occurrences of emesis. One immediately after receiving her pills at 0945 and another at 1030. Oral medications were observed in the emesis. Zofran was administered through IV. Will continue to monitor.

## 2020-06-30 NOTE — CARE COORDINATION
ONGOING DISCHARGE PLAN:    Reviewed patients chart regarding discharge plan and chart still confirms the plan is to discharge to home vs [facility]  Patient remains on iv atb   Pt has fistula   Pt still having temps   Heart rate in the 170's EKG ordered       Will review plan with patient and or family      Will continue to follow for additional discharge needs.      Electronically signed by Louis Blanco RN on 6/30/2020 at 12:00 PM

## 2020-06-30 NOTE — PROGRESS NOTES
RN called and sp/w Dr. Casa Carmona regarding pt's heart rate in 170's when up to restroom. Pt is usually around 150's-160's when up. Order given for stat 12 lead ekg and will be rounding soon.

## 2020-06-30 NOTE — PROGRESS NOTES
[facility]    Gastroenterology Daily Progress Note      Patient:   Mick Ho   :    1975   [facility]ility[facility]  Date:     2020  Consultant:   Víctor Leahy CNP      SUBJECTIVE  40 y.o. female admitted 2020 with Tachycardia [R00.0]  Tachycardia [R00.0]  Tachycardia [R00.0] and seen for diarrhea. The pt was seen and examined. Reports one non bloody episode of diarrhea last night. Reports nausea but no abdominal pain. Cyndielouise Velazquez continues to drain brown drainage and she continues to have low grade fevers. Abscessogram yesterday showed a rectal fistula.           OBJECTIVE  Scheduled Meds:   enoxaparin  30 mg Subcutaneous Daily    cefTRIAXone (ROCEPHIN) IV  1 g Intravenous Q24H    metroNIDAZOLE  500 mg Intravenous Q8H    mirtazapine  15 mg Oral Nightly    OLANZapine  5 mg Oral Nightly    VORTIoxetine  10 mg Oral Daily    pantoprazole  40 mg Oral Daily    busPIRone  15 mg Oral TID    tamsulosin  0.4 mg Oral Daily    cariprazine hcl  3 mg Oral Daily       Vital Signs:  /60   Pulse 99   Temp 99.2 °F (37.3 °C) (Oral)   Resp 16   Ht 5' (1.524 m)   Wt 85 lb 12.1 oz (38.9 kg)   LMP 2017   SpO2 94%   BMI 16.75 kg/m²      Physical Exam:       General Appearance: alert and oriented to person, place and time, well-developed and  mal-nourished, in no acute distress sitter at bedside  Skin: warm and dry, no rash or erythema  Head: normocephalic and atraumatic  Eyes: pupils equal, round, and reactive to light, extraocular eye movements intact, conjunctivae normal  ENT: hearing grossly normal bilaterally  Neck: neck supple and non tender without mass, no thyromegaly or thyroid nodules, no cervical lymphadenopathy   Pulmonary/Chest: clear to auscultation bilaterally- no wheezes, rales or rhonchi, normal air movement, no respiratory distress  Cardiovascular: tachycardic rate, regular rhythm, normal S1 and S2, no murmurs, rubs, clicks or gallops, distal pulses intact, no carotid bruits  Abdomen: soft,  non tender, non-distended, normal bowel sounds, no masses or organomegaly no ascites diamond with brown drainage  Extremities: no cyanosis, clubbing or edema  Musculoskeletal: normal range of motion, no joint swelling, deformity or tenderness  Neurologic: no cranial nerve deficit and muscle strength normal     Lab and Imaging Review     CBC  Recent Labs     06/28/20  0521 06/29/20  0443 06/30/20  0533   WBC 9.4 10.2 9.7   HGB 9.5* 8.9* 8.6*   HCT 29.2* 26.9* 26.5*   MCV 89.7 89.6 89.2    279 288       BMP  Recent Labs     06/29/20  0443 06/29/20  0643 06/30/20  0533    139 142   K 3.7 3.6* 3.3*    102 105   CO2 26 27 26   BUN 3* 3* 3*   CREATININE 0.70 0.67 0.64   GLUCOSE 103* 105* 101*   CALCIUM 8.4* 8.3* 8.0*         ANEMIA STUDIES  Recent Labs     06/28/20  0521   LABIRON 8*   TIBC 157*   FERRITIN 253*   [id number] 321   FOLATE 13.8     FINDINGS:ct abd 6/28/20   Lower Chest: No focal consolidation is seen at the lung bases.       Organs: No splenomegaly.       Adrenal glands appear normal.       Hypodense nodule seen in the left kidney measuring 4 mm.  This most likely   represents cyst.       There is mild right-sided hydronephrosis and hydroureter.  Right ureteral   stent is seen in this expected location.  Gallstones are seen.  No   significant inflammatory change surrounding the gallbladder.  Liver appears   unchanged       Pancreas appears unchanged       GI/Bowel: Stomach is unremarkable.  No significant small or large bowel   distention noted       Large stool load seen in the colon       Pelvis: Drainage catheter has been is dysuria to in the posterior pelvis on   the right since prior.  The amount of fluid in the collection is slightly   smaller.  However, there is increasing gas seen in the collection, extending   anteriorly into the pelvis on the right, abutting the bladder, and also into   the gluteal region on the right.     Anastomotic staple line seen in the rectosigmoid colon       Peritoneum/Retroperitoneum: Small retroperitoneal nodes are seen.  No   aneurysm.       Bones/Soft Tissues: Bones are unchanged.           Impression   Persistent collection of fluid and gas in the pelvis, centered in the   presacral space on the right.  The amount of fluid has decreased, however the   amount of gas has increased status post drainage catheter insertion              DESCRIPTION OF PROCEDURE:abscessogram 6/29/20   Informed consent was obtained after a detailed explanation of the procedure   including risks, benefits, and alternatives.  Universal protocol was   observed.  With the patient prone, contrast was injected via the existing   right-sided pelvic drain and last fluoro hold digital images obtained. Contrast was then aspirated.       FINDINGS:   Images demonstrate a relatively large abscess cavity in connection with the   rectum.           Impression   Rectal fistula from an abscess cavity in the pelvis.             ASSESSMENT/PLAN:  1. Acute diarrhea, negative stool studies -improved  -continue prn imodium    2.rectal fistula; hx of rectal cancer treated with surgery and chemoradiation several years ago   -mgt per surgery  -antibiotics per ID    3.anemia; no overt bleeding  -anemia profile shows decreased iron, consider iron replacement  -trend hh and keep hgb >7  -continue ppi    4. SI mgt per psych      This plan was formulated in collaboration with Dr. Beulah Warner . Electronically signed by: GRAY Wade - CNP on 6/30/2020 at 7:47 AM     Attending Physician Statement  I have discussed the care of Mahsa Mckeon and   I have examined the patient myselft independently, and taken ros and hpi , including pertinent history and exam findings,  with the author of this note . I have reviewed the key elements of all parts of the encounter with the nurse practitioner/resident.     I agree with the assessment, plan and orders as documented by the above health care provider       We will discuss with surgery  Prefer open surgery with cleaning of the abscess and closing of the fistula  Otherwise we will entertain colonoscopy with attempt to close the fistula with a [place] clip  Electronically signed by Jaxson Cha MD

## 2020-06-30 NOTE — PROGRESS NOTES
differential    Collection Time: 06/30/20  5:33 AM   Result Value Ref Range    WBC 9.7 3.5 - 11.0 k/uL    RBC 2.97 (L) 4.0 - 5.2 m/uL    Hemoglobin 8.6 (L) 12.0 - 16.0 g/dL    Hematocrit 26.5 (L) 36 - 46 %    MCV 89.2 80 - 100 fL    MCH 28.8 26 - 34 pg    MCHC 32.3 31 - 37 g/dL    RDW 15.1 (H) 11.5 - 14.9 %    Platelets 786 667 - 956 k/uL    MPV 8.2 6.0 - 12.0 fL    NRBC Automated NOT REPORTED per 100 WBC    Differential Type NOT REPORTED     Immature Granulocytes NOT REPORTED 0 %    Absolute Immature Granulocyte NOT REPORTED 0.00 - 0.30 k/uL    WBC Morphology NOT REPORTED     RBC Morphology NOT REPORTED     Platelet Estimate NOT REPORTED     Seg Neutrophils 69 (H) 36 - 66 %    Lymphocytes 15 (L) 24 - 44 %    Monocytes 15 (H) 1 - 7 %    Eosinophils % 0 0 - 4 %    Basophils 1 0 - 2 %    Segs Absolute 6.70 1.3 - 9.1 k/uL    Absolute Lymph # 1.50 1.0 - 4.8 k/uL    Absolute Mono # 1.40 (H) 0.1 - 1.3 k/uL    Absolute Eos # 0.00 0.0 - 0.4 k/uL    Basophils Absolute 0.10 0.0 - 0.2 k/uL   Magnesium    Collection Time: 06/30/20  5:33 AM   Result Value Ref Range    Magnesium 1.7 1.6 - 2.6 mg/dL   EKG 12 Lead    Collection Time: 06/30/20 12:16 PM   Result Value Ref Range    Ventricular Rate 118 BPM    Atrial Rate 118 BPM    P-R Interval 134 ms    QRS Duration 80 ms    Q-T Interval 304 ms    QTc Calculation (Bazett) 426 ms    P Axis 63 degrees    R Axis 70 degrees    T Axis 33 degrees     No results for input(s): POCGLU in the last 72 hours. Ct Abdomen Pelvis W Iv Contrast Additional Contrast? Radiologist Recommendation    Result Date: 6/28/2020  EXAMINATION: CT OF THE ABDOMEN AND PELVIS WITH CONTRAST 6/28/2020 3:43 pm TECHNIQUE: CT of the abdomen and pelvis was performed with the administration of intravenous contrast. Multiplanar reformatted images are provided for review.  Dose modulation, iterative reconstruction, and/or weight based adjustment of the mA/kV was utilized to reduce the radiation dose to as low as reasonably achievable. COMPARISON: 06/17/2020 HISTORY: ORDERING SYSTEM PROVIDED HISTORY: fever tachycardia recent pelvic abscess TECHNOLOGIST PROVIDED HISTORY: fever tachycardia recent pelvic abscess Reason for Exam: fever tachycardia recent pelvic abscess Acuity: Unknown Type of Exam: Unknown FINDINGS: Lower Chest: No focal consolidation is seen at the lung bases. Organs: No splenomegaly. Adrenal glands appear normal. Hypodense nodule seen in the left kidney measuring 4 mm. This most likely represents cyst. There is mild right-sided hydronephrosis and hydroureter. Right ureteral stent is seen in this expected location. Gallstones are seen. No significant inflammatory change surrounding the gallbladder. Liver appears unchanged Pancreas appears unchanged GI/Bowel: Stomach is unremarkable. No significant small or large bowel distention noted Large stool load seen in the colon Pelvis: Drainage catheter has been is dysuria to in the posterior pelvis on the right since prior. The amount of fluid in the collection is slightly smaller. However, there is increasing gas seen in the collection, extending anteriorly into the pelvis on the right, abutting the bladder, and also into the gluteal region on the right. Anastomotic staple line seen in the rectosigmoid colon Peritoneum/Retroperitoneum: Small retroperitoneal nodes are seen. No aneurysm. Bones/Soft Tissues: Bones are unchanged. Persistent collection of fluid and gas in the pelvis, centered in the presacral space on the right. The amount of fluid has decreased, however the amount of gas has increased status post drainage catheter insertion             HPI:   See history in H and P      Review of Systems:     Patient has borderline intelligence unable to get a review of system  Data:     Past Medical History:  no change     Social History:  no change    Family History: @no change    Vitals:      I/O (24Hr):     Intake/Output Summary (Last 24 hours) at 6/30/2020 1246  Last data filed at 6/30/2020 0509  Gross per 24 hour   Intake 1550 ml   Output --   Net 1550 ml       Labs:    URINE ANALYSIS: No results found for: LABURIN     CBC:  Lab Results   Component Value Date    WBC 9.7 06/30/2020    HGB 8.6 06/30/2020     06/30/2020        BMP:    Lab Results   Component Value Date     06/30/2020    K 3.3 06/30/2020     06/30/2020    CO2 26 06/30/2020    BUN 3 06/30/2020    CREATININE 0.64 06/30/2020    GLUCOSE 101 06/30/2020      LIVER PROFILE:  Lab Results   Component Value Date    ALT 14 06/17/2020    AST 10 06/17/2020    PROT 6.6 06/17/2020    BILITOT 0.20 06/17/2020    LABALBU 3.1 06/17/2020               Radiology:      Physical Examination:        General appearance:  alert, cooperative and no distress  Mental Status: Alert lungs:  clear to auscultation bilaterally, normal effort  Heart:  regular rate and rhythm, no murmur  Abdomen:  soft, nontender, nondistended, normal bowel sounds, no masses, hepatomegaly, splenomegaly  Extremities:  no edema, redness, tenderness in the calves  Skin:  no gross lesions, rashes, induration  Right gluteal drain noted brown collection looks like a loose stool  Assessment:        Primary Problem  <principal problem not specified>    Active Hospital Problems    Diagnosis Date Noted    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 History of for rectal cancer with pelvic abscesses and air in the pelvic area status post drain placed on last admission patient running persistent low-grade fever high as 101 today tachycardia pulse up to 122 drain noted coming from the right gluteus brown discharge and suspicious of fecal matter  2. CT scan done yesterday shows increasing gas but less of fluid collection  3.  I discussed case on phone with surgeon dr Rosa Vora  4. On IV antibiotics ID and GI input noted plan is for repeat CT abdomen pelvis with rectal contrast and IV contrast  5. Patient may need be surgery possibly have perforated large bowel  6. At risk of getting septic  7. June 30  8. Rectal fistula noted  9. IR DRAIN is draining fecal matter  10. Patient is still running fever needs surgical opinion  11. Heart rate goes in the 120s to 180s at risk of sepsis broad-spectrum antibiotic for gram-negative coverage  12. Sinus tachy on ekg  13. Medications:      Allergies:  No Known Allergies    Current Meds:   Scheduled Meds:    enoxaparin  30 mg Subcutaneous Daily    cefTRIAXone (ROCEPHIN) IV  1 g Intravenous Q24H    metroNIDAZOLE  500 mg Intravenous Q8H    mirtazapine  15 mg Oral Nightly    OLANZapine  5 mg Oral Nightly    VORTIoxetine  10 mg Oral Daily    pantoprazole  40 mg Oral Daily    busPIRone  15 mg Oral TID    tamsulosin  0.4 mg Oral Daily    cariprazine hcl  3 mg Oral Daily     Continuous Infusions:    sodium chloride 100 mL/hr at 06/30/20 0240     PRN Meds: potassium chloride **OR** potassium alternative oral replacement **OR** potassium chloride, acetaminophen, ondansetron, loperamide, hydrOXYzine, sodium chloride flush       Betsy Duong MD  6/30/2020  12:46 PM

## 2020-06-30 NOTE — PLAN OF CARE
Problem: Falls - Risk of:  Goal: Will remain free from falls  Description: Will remain free from falls  6/30/2020 1401 by Sher Stuart RN  Outcome: Ongoing  Goal: Absence of physical injury  Description: Absence of physical injury  6/30/2020 1401 by Sher Stuart RN  Outcome: Ongoing     Problem: Cardiac Output - Decreased:  Goal: Hemodynamic stability will improve  Description: Hemodynamic stability will improve  6/30/2020 1401 by Sher Stuart RN  Outcome: Ongoing     Problem: Mental Status - Impaired:  Goal: Mental status will improve  Description: Mental status will improve  6/30/2020 1401 by Sher Stuart RN  Outcome: Ongoing    Problem: Nutrition  Goal: Optimal nutrition therapy  6/30/2020 1401 by Sher Stuart RN  Outcome: Ongoing  Note: Pt continues experiencing nausea and vomiting and has not been able to eat much food today

## 2020-07-01 LAB
ABSOLUTE EOS #: 0 K/UL (ref 0–0.4)
ABSOLUTE IMMATURE GRANULOCYTE: ABNORMAL K/UL (ref 0–0.3)
ABSOLUTE LYMPH #: 1.3 K/UL (ref 1–4.8)
ABSOLUTE MONO #: 1.1 K/UL (ref 0.1–1.3)
ANION GAP SERPL CALCULATED.3IONS-SCNC: 9 MMOL/L (ref 9–17)
BASOPHILS # BLD: 0 % (ref 0–2)
BASOPHILS ABSOLUTE: 0 K/UL (ref 0–0.2)
BUN BLDV-MCNC: 4 MG/DL (ref 6–20)
BUN/CREAT BLD: ABNORMAL (ref 9–20)
CALCIUM SERPL-MCNC: 8.2 MG/DL (ref 8.6–10.4)
CHLORIDE BLD-SCNC: 106 MMOL/L (ref 98–107)
CO2: 28 MMOL/L (ref 20–31)
CREAT SERPL-MCNC: 0.64 MG/DL (ref 0.5–0.9)
CULTURE: NORMAL
DIFFERENTIAL TYPE: ABNORMAL
EKG ATRIAL RATE: 118 BPM
EKG P AXIS: 63 DEGREES
EKG P-R INTERVAL: 134 MS
EKG Q-T INTERVAL: 304 MS
EKG QRS DURATION: 80 MS
EKG QTC CALCULATION (BAZETT): 426 MS
EKG R AXIS: 70 DEGREES
EKG T AXIS: 33 DEGREES
EKG VENTRICULAR RATE: 118 BPM
EOSINOPHILS RELATIVE PERCENT: 0 % (ref 0–4)
GFR AFRICAN AMERICAN: >60 ML/MIN
GFR NON-AFRICAN AMERICAN: >60 ML/MIN
GFR SERPL CREATININE-BSD FRML MDRD: ABNORMAL ML/MIN/{1.73_M2}
GFR SERPL CREATININE-BSD FRML MDRD: ABNORMAL ML/MIN/{1.73_M2}
GLUCOSE BLD-MCNC: 112 MG/DL (ref 70–99)
HCT VFR BLD CALC: 26.3 % (ref 36–46)
HEMOGLOBIN: 8.5 G/DL (ref 12–16)
IMMATURE GRANULOCYTES: ABNORMAL %
LYMPHOCYTES # BLD: 11 % (ref 24–44)
Lab: NORMAL
MAGNESIUM: 1.8 MG/DL (ref 1.6–2.6)
MCH RBC QN AUTO: 28.5 PG (ref 26–34)
MCHC RBC AUTO-ENTMCNC: 32.2 G/DL (ref 31–37)
MCV RBC AUTO: 88.5 FL (ref 80–100)
MONOCYTES # BLD: 9 % (ref 1–7)
NRBC AUTOMATED: ABNORMAL PER 100 WBC
PDW BLD-RTO: 15.4 % (ref 11.5–14.9)
PLATELET # BLD: 331 K/UL (ref 150–450)
PLATELET ESTIMATE: ABNORMAL
PMV BLD AUTO: 8.5 FL (ref 6–12)
POTASSIUM SERPL-SCNC: 3.2 MMOL/L (ref 3.7–5.3)
RBC # BLD: 2.97 M/UL (ref 4–5.2)
RBC # BLD: ABNORMAL 10*6/UL
SEG NEUTROPHILS: 80 % (ref 36–66)
SEGMENTED NEUTROPHILS ABSOLUTE COUNT: 9.4 K/UL (ref 1.3–9.1)
SODIUM BLD-SCNC: 143 MMOL/L (ref 135–144)
SPECIMEN DESCRIPTION: NORMAL
TROPONIN INTERP: NORMAL
TROPONIN T: NORMAL NG/ML
TROPONIN, HIGH SENSITIVITY: <6 NG/L (ref 0–14)
WBC # BLD: 11.8 K/UL (ref 3.5–11)
WBC # BLD: ABNORMAL 10*3/UL

## 2020-07-01 PROCEDURE — 99232 SBSQ HOSP IP/OBS MODERATE 35: CPT | Performed by: INTERNAL MEDICINE

## 2020-07-01 PROCEDURE — 6370000000 HC RX 637 (ALT 250 FOR IP): Performed by: NURSE PRACTITIONER

## 2020-07-01 PROCEDURE — 2060000000 HC ICU INTERMEDIATE R&B

## 2020-07-01 PROCEDURE — 6370000000 HC RX 637 (ALT 250 FOR IP): Performed by: INTERNAL MEDICINE

## 2020-07-01 PROCEDURE — 6370000000 HC RX 637 (ALT 250 FOR IP): Performed by: PHYSICIAN ASSISTANT

## 2020-07-01 PROCEDURE — 2580000003 HC RX 258: Performed by: INTERNAL MEDICINE

## 2020-07-01 PROCEDURE — 96366 THER/PROPH/DIAG IV INF ADDON: CPT

## 2020-07-01 PROCEDURE — 83735 ASSAY OF MAGNESIUM: CPT

## 2020-07-01 PROCEDURE — APPNB30 APP NON BILLABLE TIME 0-30 MINS: Performed by: NURSE PRACTITIONER

## 2020-07-01 PROCEDURE — 80048 BASIC METABOLIC PNL TOTAL CA: CPT

## 2020-07-01 PROCEDURE — 93005 ELECTROCARDIOGRAM TRACING: CPT | Performed by: INTERNAL MEDICINE

## 2020-07-01 PROCEDURE — 2580000003 HC RX 258: Performed by: PHYSICIAN ASSISTANT

## 2020-07-01 PROCEDURE — 2500000003 HC RX 250 WO HCPCS: Performed by: INTERNAL MEDICINE

## 2020-07-01 PROCEDURE — 84484 ASSAY OF TROPONIN QUANT: CPT

## 2020-07-01 PROCEDURE — 6360000002 HC RX W HCPCS: Performed by: INTERNAL MEDICINE

## 2020-07-01 PROCEDURE — 36415 COLL VENOUS BLD VENIPUNCTURE: CPT

## 2020-07-01 PROCEDURE — 96376 TX/PRO/DX INJ SAME DRUG ADON: CPT

## 2020-07-01 PROCEDURE — 85025 COMPLETE CBC W/AUTO DIFF WBC: CPT

## 2020-07-01 RX ADMIN — VORTIOXETINE 10 MG: 10 TABLET, FILM COATED ORAL at 08:13

## 2020-07-01 RX ADMIN — MIRTAZAPINE 15 MG: 15 TABLET, FILM COATED ORAL at 20:08

## 2020-07-01 RX ADMIN — ACETAMINOPHEN 650 MG: 325 TABLET, FILM COATED ORAL at 19:00

## 2020-07-01 RX ADMIN — TAMSULOSIN HYDROCHLORIDE 0.4 MG: 0.4 CAPSULE ORAL at 08:10

## 2020-07-01 RX ADMIN — BUSPIRONE HYDROCHLORIDE 15 MG: 15 TABLET ORAL at 08:10

## 2020-07-01 RX ADMIN — METRONIDAZOLE 500 MG: 500 INJECTION, SOLUTION INTRAVENOUS at 16:51

## 2020-07-01 RX ADMIN — OLANZAPINE 5 MG: 10 TABLET, FILM COATED ORAL at 20:08

## 2020-07-01 RX ADMIN — CEFTRIAXONE SODIUM 1 G: 1 INJECTION, POWDER, FOR SOLUTION INTRAMUSCULAR; INTRAVENOUS at 16:02

## 2020-07-01 RX ADMIN — BUSPIRONE HYDROCHLORIDE 15 MG: 15 TABLET ORAL at 20:08

## 2020-07-01 RX ADMIN — CARIPRAZINE 3 MG: 3 CAPSULE, GELATIN COATED ORAL at 08:13

## 2020-07-01 RX ADMIN — SODIUM CHLORIDE: 9 INJECTION, SOLUTION INTRAVENOUS at 13:05

## 2020-07-01 RX ADMIN — ACETAMINOPHEN 650 MG: 325 TABLET, FILM COATED ORAL at 08:24

## 2020-07-01 RX ADMIN — FLUCONAZOLE 200 MG: 200 INJECTION, SOLUTION INTRAVENOUS at 14:13

## 2020-07-01 RX ADMIN — METRONIDAZOLE 500 MG: 500 INJECTION, SOLUTION INTRAVENOUS at 08:10

## 2020-07-01 RX ADMIN — BUSPIRONE HYDROCHLORIDE 15 MG: 15 TABLET ORAL at 13:05

## 2020-07-01 RX ADMIN — POTASSIUM CHLORIDE 40 MEQ: 1500 TABLET, EXTENDED RELEASE ORAL at 16:01

## 2020-07-01 RX ADMIN — SODIUM CHLORIDE: 9 INJECTION, SOLUTION INTRAVENOUS at 01:25

## 2020-07-01 RX ADMIN — PANTOPRAZOLE SODIUM 40 MG: 40 TABLET, DELAYED RELEASE ORAL at 08:10

## 2020-07-01 RX ADMIN — METRONIDAZOLE 500 MG: 500 INJECTION, SOLUTION INTRAVENOUS at 01:25

## 2020-07-01 ASSESSMENT — PAIN SCALES - WONG BAKER

## 2020-07-01 ASSESSMENT — PAIN DESCRIPTION - ORIENTATION
ORIENTATION: RIGHT
ORIENTATION: RIGHT

## 2020-07-01 ASSESSMENT — PAIN SCALES - GENERAL
PAINLEVEL_OUTOF10: 0
PAINLEVEL_OUTOF10: 0
PAINLEVEL_OUTOF10: 2
PAINLEVEL_OUTOF10: 7
PAINLEVEL_OUTOF10: 0

## 2020-07-01 ASSESSMENT — PAIN DESCRIPTION - PAIN TYPE: TYPE: ACUTE PAIN

## 2020-07-01 ASSESSMENT — PAIN DESCRIPTION - LOCATION
LOCATION: BUTTOCKS
LOCATION: BUTTOCKS;LEG

## 2020-07-01 NOTE — PROGRESS NOTES
Pt seen and examined  Previous noted  Contrast study noted  abd soft  vsstable  Drain minimal brown OP  Ext nontender    Will need / schedule diverting loop colostomy in the next few days  PICC/ ? TPN   Will need to obtain consent from pt if she is deemed competent otherwise from POA  NPO post MN  Check covid status  Late entry

## 2020-07-01 NOTE — FLOWSHEET NOTE
07/01/20 1545   Vital Signs   Pulse 88   /70   Patient complaining of intermitent chest pain rating it a 3/10 intensity. bp and heart rate obtained. Nurse notified Dr. Yissel Patiño of chest pain. Received order for ekg and troponin.

## 2020-07-01 NOTE — CONSULTS
General Surgery Consult      Pt Name: Akash Livingston  MRN: [id number]  YOB: 1975  Date of evaluation: 7/1/2020  Primary Care Physician: No primary care provider on file. Patient evaluated at the request of  Dr. Dawson Fontana  Reason for evaluation: Pelvis abscess    SUBJECTIVE:   History of Chief Complaint:    Akash Livingston is a 40 y.o. female with PMHx rectal cancer s/p resection and chemoradiation who presents with pelvic abscess. Patient recently admitted 06/17 from [facility] for pelvic abscess, at which time a gluteal drain was placed by IR 06/19. Patient returns with complaints of diarrhea, tachycardia and fever, Tmax 101F. C. Diff and stool studies negative. Occasional runs of SVT. Abscess evaluation performed by IR, revealing relatively large pelvic abscess with rectal fistula. Patient denies fevers/chills currently, SOB, URI symptoms, abdominal pain, N/V, hematochezia, or melena. Gluteal drain has minimal purulent output. Symptom onset has been acute for a time period of 4 day(s). Severity is described as moderate. Course of her symptoms over time is worsening. Past Medical History   has a past medical history of Cancer ([address] Pelvis, female abscess, and UTI (urinary tract infection). Past Surgical History   has a past surgical history that includes Ureter stent placement (2020) and Abscess Drainage. Medications  Prior to Admission medications    Medication Sig Start Date End Date Taking?  Authorizing Provider   amoxicillin-clavulanate (AUGMENTIN) 500-125 MG per tablet Take 1 tablet by mouth 2 times daily  6/20/20 7/3/20  Historical Provider, MD   VORTIoxetine (TRINTELLIX) 10 MG TABS tablet Take 10 mg by mouth daily    Historical Provider, MD   OLANZapine (ZYPREXA) 5 MG tablet Take 5 mg by mouth nightly    Historical Provider, MD   busPIRone (BUSPAR) 15 MG tablet Take 15 mg by mouth 3 times daily    Historical Provider, MD   PARoxetine (PAXIL) 20 MG tablet Take 20 mg by mouth every morning Historical Provider, MD   tamsulosin (FLOMAX) 0.4 MG capsule Take 0.4 mg by mouth daily    Historical Provider, MD   hydrOXYzine (VISTARIL) 50 MG capsule Take 50 mg by mouth 3 times daily as needed    Historical Provider, MD   mirtazapine (REMERON) 15 MG tablet Take 15 mg by mouth nightly    Historical Provider, MD   pantoprazole (PROTONIX) 40 MG tablet Take 40 mg by mouth daily    Historical Provider, MD     Allergies  has No Known Allergies. Family History  family history is not on file. Social History   reports that she has never smoked. She has never used smokeless tobacco.    Review of Systems:  General Denies any fever or chills  HEENT Denies any diplopia, tinnitus or vertigo  Resp Denies any shortness of breath, cough or wheezing  Cardiac Denies any chest pain, palpitations, claudication or edema  GI Denies any melena, hematochezia, hematemesis or pyrosis   Denies any frequency, urgency, hesitancy or incontinence  Heme Denies bruising or bleeding easily  Endocrine Denies any history of diabetes or thyroid disease  Neuro Denies any focal motor or sensory deficits    OBJECTIVE:   VITALS:  height is 5' (1.524 m) and weight is 82 lb 10.8 oz (37.5 kg). Her oral temperature is 98.6 °F (37 °C). Her blood pressure is 117/66 and her pulse is 96. Her respiration is 18 and oxygen saturation is 100%. CONSTITUTIONAL: Alert and oriented times 3, no acute distress and cooperative to examination. Blunted affect. SKIN: Skin color, texture, turgor normal. No rashes or lesions. LYMPH: no cervical nodes, no inguinal nodes  HEENT: Head is normocephalic, atraumatic. EOMI, PERRLA  NECK: Supple, symmetrical, trachea midline, no adenopathy, thyroid symmetric, not enlarged and no tenderness, skin normal  CHEST/LUNGS: chest symmetric with normal A/P diameter, normal respiratory rate and rhythm, lungs clear to auscultation without wheezes, rales or rhonchi. No accessory muscle use.  Scars None   CARDIOVASCULAR: Heart rhythm regular, tachycardic. Normal S1 and S2. . Carotid and femoral pulses 2+/4 and equal bilaterally  ABDOMEN: Normal shape. Rectal cancer resection scar(s) present. Normal bowel sounds. No bruits. Soft, nondistended, no masses or organomegaly. no evidence of hernia. Percussion: Normal without hepatosplenomegally. Tenderness: absent  RECTAL: deferred, not clinically indicated. Gluteal drain intact, minimal brown purulent drainage in bulb. Dressing clean and dry. NEUROLOGIC: There are no focalizing motor or sensory deficits. CN II-XII are grossly intact.   EXTREMITIES: no cyanosis, no clubbing and no edema    LABS:   CBC with Differential:    Lab Results   Component Value Date    WBC 11.8 07/01/2020    RBC 2.97 07/01/2020    HGB 8.5 07/01/2020    HCT 26.3 07/01/2020     07/01/2020    MCV 88.5 07/01/2020    MCH 28.5 07/01/2020    MCHC 32.2 07/01/2020    RDW 15.4 07/01/2020    LYMPHOPCT 11 07/01/2020    MONOPCT 9 07/01/2020    BASOPCT 0 07/01/2020    MONOSABS 1.10 07/01/2020    LYMPHSABS 1.30 07/01/2020    EOSABS 0.00 07/01/2020    BASOSABS 0.00 07/01/2020    DIFFTYPE NOT REPORTED 07/01/2020     BMP:    Lab Results   Component Value Date     07/01/2020    K 3.2 07/01/2020     07/01/2020    CO2 28 07/01/2020    BUN 4 07/01/2020    LABALBU 3.1 06/17/2020    CREATININE 0.64 07/01/2020    CALCIUM 8.2 07/01/2020    GFRAA >60 07/01/2020    LABGLOM >60 07/01/2020    GLUCOSE 112 07/01/2020     Hepatic Function Panel:    Lab Results   Component Value Date    ALKPHOS 102 06/17/2020    ALT 14 06/17/2020    AST 10 06/17/2020    PROT 6.6 06/17/2020    BILITOT 0.20 06/17/2020    LABALBU 3.1 06/17/2020     Calcium:    Lab Results   Component Value Date    CALCIUM 8.2 07/01/2020     Magnesium:    Lab Results   Component Value Date    MG 1.8 07/01/2020     Phosphorus:  No results found for: PHOS  PT/INR:    Lab Results   Component Value Date    PROTIME 14.5 06/18/2020    INR 1.1 06/18/2020     ABG:  No results found for: PHART, PH, JNH1KAV, PCO2, PO2ART, PO2, EDO1LIL, HCO3, BEART, BE, THGBART, THB, MAP6ULX, D2BCAXKB, O2SAT  Urine Culture:  No components found for: CURINE  Blood Culture:  No components found for: CBLOOD, CFUNGUSBL  Stool Culture:  No components found for: CSTOOL    RADIOLOGY:   I have personally reviewed the following films:  Ct Chest Pulmonary Embolism W Contrast    Result Date: 6/25/2020  EXAMINATION: CTA OF THE CHEST 6/25/2020 10:48 pm TECHNIQUE: CTA of the chest was performed after the administration of intravenous contrast.  Multiplanar reformatted images are provided for review. MIP images are provided for review. Dose modulation, iterative reconstruction, and/or weight based adjustment of the mA/kV was utilized to reduce the radiation dose to as low as reasonably achievable. COMPARISON: None. HISTORY: ORDERING SYSTEM PROVIDED HISTORY: tachycardia elevated d dimer TECHNOLOGIST PROVIDED HISTORY: tachycardia elevated d dimer Reason for Exam: tachycardia, elevated d-dimer Acuity: Acute Type of Exam: Initial FINDINGS: Pulmonary Arteries: Pulmonary arteries are adequately opacified for evaluation. No evidence of intraluminal filling defect to suggest pulmonary embolism. Main pulmonary artery is normal in caliber. Mediastinum: No evidence of mediastinal lymphadenopathy. The heart and pericardium demonstrate no acute abnormality. There is no acute abnormality of the thoracic aorta. Lungs/pleura: The lungs are without acute process. No focal consolidation or pulmonary edema. No evidence of pleural effusion or pneumothorax. Upper Abdomen: Peripherally dense gallstone is seen within the gallbladder lumen near the fundus which measures up to 18 mm in diameter. Limited images of the upper abdomen are otherwise unremarkable. Soft Tissues/Bones: No acute bone or soft tissue abnormality. No evidence of pulmonary embolism or acute pulmonary abnormality. Incidental finding of cholelithiasis, as discussed above. IMPRESSION:   1. Pelvic abscess with rectal fistula  2. Diarrhea, stool studies negative  3. S/p IR gluteal drain, minimal brown output  4. PMHx rectal cancer s/p resection and chemotherapy  5. Suicidal ideations, [facility] admission    does not have any pertinent problems on file. PLAN:   1. NPO for now  2. COVID negative 06/28 @ 1623  3. Patient will need diverting loop colostomy this admission  1. Consent to be obtained by patient if deemed competent, otherwise POA  4. IV antibiotics per ID  5. Imodium prn for diarrhea per GI  6. Continue medical management      Thank you for this interesting consult and for allowing us to participate in the care of this patient. If you have any questions please don't hesitate to call.           Electronically signed by TOÑITO Garcia  on 7/1/2020 at 6:57 AM

## 2020-07-01 NOTE — PROGRESS NOTES
k/uL    MPV 8.5 6.0 - 12.0 fL    NRBC Automated NOT REPORTED per 100 WBC    Differential Type NOT REPORTED     Seg Neutrophils 80 (H) 36 - 66 %    Lymphocytes 11 (L) 24 - 44 %    Monocytes 9 (H) 1 - 7 %    Eosinophils % 0 0 - 4 %    Basophils 0 0 - 2 %    Immature Granulocytes NOT REPORTED 0 %    Segs Absolute 9.40 (H) 1.3 - 9.1 k/uL    Absolute Lymph # 1.30 1.0 - 4.8 k/uL    Absolute Mono # 1.10 0.1 - 1.3 k/uL    Absolute Eos # 0.00 0.0 - 0.4 k/uL    Basophils Absolute 0.00 0.0 - 0.2 k/uL    Absolute Immature Granulocyte NOT REPORTED 0.00 - 0.30 k/uL    WBC Morphology NOT REPORTED     RBC Morphology NOT REPORTED     Platelet Estimate NOT REPORTED    Magnesium    Collection Time: 07/01/20  4:47 AM   Result Value Ref Range    Magnesium 1.8 1.6 - 2.6 mg/dL     No results for input(s): POCGLU in the last 72 hours. Ct Abdomen Pelvis W Iv Contrast Additional Contrast? Radiologist Recommendation    Result Date: 6/28/2020  EXAMINATION: CT OF THE ABDOMEN AND PELVIS WITH CONTRAST 6/28/2020 3:43 pm TECHNIQUE: CT of the abdomen and pelvis was performed with the administration of intravenous contrast. Multiplanar reformatted images are provided for review. Dose modulation, iterative reconstruction, and/or weight based adjustment of the mA/kV was utilized to reduce the radiation dose to as low as reasonably achievable. COMPARISON: 06/17/2020 HISTORY: ORDERING SYSTEM PROVIDED HISTORY: fever tachycardia recent pelvic abscess TECHNOLOGIST PROVIDED HISTORY: fever tachycardia recent pelvic abscess Reason for Exam: fever tachycardia recent pelvic abscess Acuity: Unknown Type of Exam: Unknown FINDINGS: Lower Chest: No focal consolidation is seen at the lung bases. Organs: No splenomegaly. Adrenal glands appear normal. Hypodense nodule seen in the left kidney measuring 4 mm. This most likely represents cyst. There is mild right-sided hydronephrosis and hydroureter. Right ureteral stent is seen in this expected location. Gallstones are seen. No significant inflammatory change surrounding the gallbladder. Liver appears unchanged Pancreas appears unchanged GI/Bowel: Stomach is unremarkable. No significant small or large bowel distention noted Large stool load seen in the colon Pelvis: Drainage catheter has been is dysuria to in the posterior pelvis on the right since prior. The amount of fluid in the collection is slightly smaller. However, there is increasing gas seen in the collection, extending anteriorly into the pelvis on the right, abutting the bladder, and also into the gluteal region on the right. Anastomotic staple line seen in the rectosigmoid colon Peritoneum/Retroperitoneum: Small retroperitoneal nodes are seen. No aneurysm. Bones/Soft Tissues: Bones are unchanged. Persistent collection of fluid and gas in the pelvis, centered in the presacral space on the right. The amount of fluid has decreased, however the amount of gas has increased status post drainage catheter insertion             HPI:   See history in H and P      Review of Systems:     Patient has borderline intelligence unable to get a review of system  Data:     Past Medical History:  no change     Social History:  no change    Family History: @no change    Vitals:      I/O (24Hr):     Intake/Output Summary (Last 24 hours) at 7/1/2020 1224  Last data filed at 7/1/2020 0435  Gross per 24 hour   Intake 1000 ml   Output 640 ml   Net 360 ml       Labs:    URINE ANALYSIS: No results found for: LABURIN     CBC:  Lab Results   Component Value Date    WBC 11.8 07/01/2020    HGB 8.5 07/01/2020     07/01/2020        BMP:    Lab Results   Component Value Date     07/01/2020    K 3.2 07/01/2020     07/01/2020    CO2 28 07/01/2020    BUN 4 07/01/2020    CREATININE 0.64 07/01/2020    GLUCOSE 112 07/01/2020      LIVER PROFILE:  Lab Results   Component Value Date    ALT 14 06/17/2020    AST 10 06/17/2020    PROT 6.6 06/17/2020    BILITOT 0.20 06/17/2020    LABALBU 3.1 06/17/2020               Radiology:      Physical Examination:        General appearance:  alert, cooperative and no distress  Mental Status: Alert lungs:  clear to auscultation bilaterally, normal effort  Heart:  regular rate and rhythm, no murmur  Abdomen:  soft, nontender, nondistended, normal bowel sounds, no masses, hepatomegaly, splenomegaly  Extremities:  no edema, redness, tenderness in the calves  Skin:  no gross lesions, rashes, induration  Right gluteal drain noted brown collection looks like a loose stool  Assessment:        Primary Problem  Rectal fistula    Active Hospital Problems    Diagnosis Date Noted    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 History of for rectal cancer with pelvic abscesses and air in the pelvic area status post drain placed on last admission patient running persistent low-grade fever high as 101 today tachycardia pulse up to 122 drain noted coming from the right gluteus brown discharge and suspicious of fecal matter  2. CT scan done yesterday shows increasing gas but less of fluid collection  3. I discussed case on phone with surgeon dr Shanna Hill  4. On IV antibiotics ID and GI input noted plan is for repeat CT abdomen pelvis with rectal contrast and IV contrast  5. Patient may need be surgery possibly have perforated large bowel  6. At risk of getting septic  7. June 30  8. Rectal fistula noted  9. IR DRAIN is draining fecal matter  10. Patient is still running fever needs surgical opinion  11. Heart rate goes in the 120s to 180s at risk of sepsis broad-spectrum antibiotic for gram-negative coverage  12. Sinus tachy on ekg  13.   14. July 1  15. surg plan noted  16.  Family is available to give the consent for the above procedure diverting loop colostomy planned  17. Tachycardia is improving with IV antibiotics definitive treatment is surgical  18. Medications:      Allergies:  No Known Allergies    Current Meds:   Scheduled Meds:    fluconazole  200 mg Intravenous Q24H    cefTRIAXone (ROCEPHIN) IV  1 g Intravenous Q24H    metroNIDAZOLE  500 mg Intravenous Q8H    mirtazapine  15 mg Oral Nightly    OLANZapine  5 mg Oral Nightly    VORTIoxetine  10 mg Oral Daily    pantoprazole  40 mg Oral Daily    busPIRone  15 mg Oral TID    tamsulosin  0.4 mg Oral Daily    cariprazine hcl  3 mg Oral Daily     Continuous Infusions:    sodium chloride 100 mL/hr at 07/01/20 0125     PRN Meds: potassium chloride **OR** potassium alternative oral replacement **OR** potassium chloride, acetaminophen, ondansetron, loperamide, hydrOXYzine, sodium chloride flush       Mohan Zacarias MD  7/1/2020  12:24 PM

## 2020-07-01 NOTE — PROGRESS NOTES
[facility]    Gastroenterology Daily Progress Note      Patient:   Lisandra Serna   :    1975   Facility:   [facility]  Date:     2020  Consultant:   Sharlene Eddy CNP      SUBJECTIVE  40 y.o. female admitted 2020 with Tachycardia [R00.0]  Tachycardia [R00.0]  Tachycardia [R00.0] and seen for diarrhea, rectal fistula. The pt was seen and examined. Still having brown drainage around the diamond drainage. Currently denies nausea and abdominal pain. No BM overnight. Going for surgery in the next few days.          OBJECTIVE  Scheduled Meds:   fluconazole  200 mg Intravenous Q24H    cefTRIAXone (ROCEPHIN) IV  1 g Intravenous Q24H    metroNIDAZOLE  500 mg Intravenous Q8H    mirtazapine  15 mg Oral Nightly    OLANZapine  5 mg Oral Nightly    VORTIoxetine  10 mg Oral Daily    pantoprazole  40 mg Oral Daily    busPIRone  15 mg Oral TID    tamsulosin  0.4 mg Oral Daily    cariprazine hcl  3 mg Oral Daily       Vital Signs:  /66   Pulse 96   Temp 98.6 °F (37 °C) (Oral)   Resp 18   Ht 5' (1.524 m)   Wt 82 lb 10.8 oz (37.5 kg)   LMP 2017   SpO2 100%   BMI 16.15 kg/m²      Physical Exam:     General Appearance: alert and oriented to person, place and time, well-developed and  mal-nourished, in no acute distress sitter at bedside  Skin: warm and dry, no rash or erythema  Head: normocephalic and atraumatic  Eyes: pupils equal, round, and reactive to light, extraocular eye movements intact, conjunctivae normal  ENT: hearing grossly normal bilaterally  Neck: neck supple and non tender without mass, no thyromegaly or thyroid nodules, no cervical lymphadenopathy   Pulmonary/Chest: clear to auscultation bilaterally- no wheezes, rales or rhonchi, normal air movement, no respiratory distress  Cardiovascular: tachycardic rate, regular rhythm, normal S1 and S2, no murmurs, rubs, clicks or gallops, distal pulses intact, no carotid bruits  Abdomen: soft,  non tender, non-distended, normal bowel sounds, no masses or organomegaly no ascites diamond with brown drainage  Extremities: no cyanosis, clubbing or edema  Musculoskeletal: normal range of motion, no joint swelling, deformity or tenderness  Neurologic: no cranial nerve deficit and muscle strength normal    Lab and Imaging Review     CBC  Recent Labs     06/29/20  0443 06/30/20  0533 07/01/20  0447   WBC 10.2 9.7 11.8*   HGB 8.9* 8.6* 8.5*   HCT 26.9* 26.5* 26.3*   MCV 89.6 89.2 88.5    288 331       BMP  Recent Labs     06/29/20  0643 06/30/20  0533 07/01/20  0447    142 143   K 3.6* 3.3* 3.2*    105 106   CO2 27 26 28   BUN 3* 3* 4*   CREATININE 0.67 0.64 0.64   GLUCOSE 105* 101* 112*   CALCIUM 8.3* 8.0* 8.2*     FINDINGS:ct abd 6/28/20   Lower Chest: No focal consolidation is seen at the lung bases.       Organs: No splenomegaly.       Adrenal glands appear normal.       Hypodense nodule seen in the left kidney measuring 4 mm.  This most likely   represents cyst.       There is mild right-sided hydronephrosis and hydroureter.  Right ureteral   stent is seen in this expected location.  Gallstones are seen.  No   significant inflammatory change surrounding the gallbladder.  Liver appears   unchanged       Pancreas appears unchanged       GI/Bowel: Stomach is unremarkable.  No significant small or large bowel   distention noted       Large stool load seen in the colon       Pelvis: Drainage catheter has been is dysuria to in the posterior pelvis on   the right since prior.  The amount of fluid in the collection is slightly   smaller.  However, there is increasing gas seen in the collection, extending   anteriorly into the pelvis on the right, abutting the bladder, and also into   the gluteal region on the right.       Anastomotic staple line seen in the rectosigmoid colon       Peritoneum/Retroperitoneum: Small retroperitoneal nodes are seen.  No   aneurysm.       Bones/Soft Tissues: Bones are unchanged.         Impression   Persistent collection of fluid and gas in the pelvis, centered in the   presacral space on the right.  The amount of fluid has decreased, however the   amount of gas has increased status post drainage catheter insertion              DESCRIPTION OF PROCEDURE:abscessogram 6/29/20   Informed consent was obtained after a detailed explanation of the procedure   including risks, benefits, and alternatives.  Universal protocol was   observed.  With the patient prone, contrast was injected via the existing   right-sided pelvic drain and last fluoro hold digital images obtained. Contrast was then aspirated.       FINDINGS:   Images demonstrate a relatively large abscess cavity in connection with the   rectum.           Impression   Rectal fistula from an abscess cavity in the pelvis.             ASSESSMENT/PLAN:  1. Acute diarrhea, negative stool studies -improved  -continue prn imodium     2.rectal fistula; hx of rectal cancer treated with surgery and chemoradiation several years ago   -mgt per surgery, to have surgery in the next few days  -antibiotics per ID     3.anemia; no overt bleeding  -anemia profile shows decreased iron, consider iron replacement  -trend hh and keep hgb >7  -continue ppi     4.SI mgt per psych      This plan was formulated in collaboration with Dr. Kena Restrepo . Electronically signed by: GRAY Treviño - CNP on 7/1/2020 at 8:06 AM     Attending Physician Statement  I have discussed the care of Jessica Clark and   I have examined the patient myselft independently, and taken ros and hpi , including pertinent history and exam findings,  with the author of this note . I have reviewed the key elements of all parts of the encounter with the nurse practitioner/resident.     I agree with the assessment, plan and orders as documented by the above health care provider         Electronically signed by Geremias Agrawal MD

## 2020-07-01 NOTE — PROGRESS NOTES
[facility] -   Infectious diseases evaluation  admission date 6/25/2020        Impression :   Current:    · Persistent pelvic abscess S/P drainage on 6/19/2020   · Rectal fistula  · H/O rectal cancer      Recommendations     · Continue IV ceftriaxone ,Flagyl and Diflucan  · Surgery following with a plan for diverting loop colostomy in the next few days  · Follow blood cultures 6/26 no growth  · Follow CBC and renal function  · COVID-19 rapid test was negative               History of Present Illness:   Initial history:  Brenda Suarez is a 40y.o.-year-old female was transferred from [facility] for tachycardia . She had fever on 6/26 with a temperature max of 102, diarrhea for several days, stool for C. difficile was negative on 6/26/2020. CT chest 6/25 was negative for PE, showed incidental finding of cholelithiasis  Pathogen panel was negative on 6/20/2020  She was recently diagnosed with pelvic abscess required drainage 6/19/2020 with E. coli and Candida albicans growth on culture. 6/18/2020 COVID-19  test was negative  She had history of rectal cancer with T3 N1 M0 had surgery and chemotherapy in 2017. Interval changes  7/1/2020   IR fluoroscopy through existing right pelvic drain suggestive of rectal fistula  She had a fever with a temperature of 100.6 yesterday, afebrile today, less diarrhea today, no vomiting, tachycardia improved occasional cough. Pelvic abscess drainage from 6/29/2020 grew E. coli gram-negative rods and found to have Candida albicans  MEL drain with brown dark drainage  6/28/2020 CT abdomen pelvis showed decrease previous fluid collection with increased gas. I have personally reviewed the past medical history, past surgical history, medications, social history, and family history, and I haveupdated the database accordingly.   Past Medical History:     Past Medical History:   Diagnosis Date    Cancer [facility])     Rectal    Pelvis, female abscess     pre sacral drain put in 6/19/2020    UTI (urinary tract infection)     stent place june 2020       Past Surgical  History:     Past Surgical History:   Procedure Laterality Date    ABSCESS DRAINAGE      pre sacral pelvic abscess with drain placement to right buttock    URETER STENT PLACEMENT  2020       Medications:      fluconazole  200 mg Intravenous Q24H    cefTRIAXone (ROCEPHIN) IV  1 g Intravenous Q24H    metroNIDAZOLE  500 mg Intravenous Q8H    mirtazapine  15 mg Oral Nightly    OLANZapine  5 mg Oral Nightly    VORTIoxetine  10 mg Oral Daily    pantoprazole  40 mg Oral Daily    busPIRone  15 mg Oral TID    tamsulosin  0.4 mg Oral Daily    cariprazine hcl  3 mg Oral Daily       Social History:     Social History     Socioeconomic History    Marital status: Unknown     Spouse name: Not on file    Number of children: Not on file    Years of education: Not on file    Highest education level: Not on file   Occupational History    Not on file   Social Needs    Financial resource strain: Not on file    Food insecurity     Worry: Not on file     Inability: Not on file    Transportation needs     Medical: Not on file     Non-medical: Not on file   Tobacco Use    Smoking status: Never Smoker    Smokeless tobacco: Never Used    Tobacco comment: pt is a non smoker   Substance and Sexual Activity    Alcohol use: Not on file    Drug use: Not on file    Sexual activity: Not on file   Lifestyle    Physical activity     Days per week: Not on file     Minutes per session: Not on file    Stress: Not on file   Relationships    Social connections     Talks on phone: Not on file     Gets together: Not on file     Attends Baptism service: Not on file     Active member of club or organization: Not on file     Attends meetings of clubs or organizations: Not on file     Relationship status: Not on file    Intimate partner violence     Fear of current or ex partner: Not on file Emotionally abused: Not on file     Physically abused: Not on file     Forced sexual activity: Not on file   Other Topics Concern    Not on file   Social History Narrative    Not on file       Family History:   History reviewed. No pertinent family history. Allergies:   Patient has no known allergies. Review of Systems:     Review of Systems   As per history present illness other than above 14 systems reviewed were negative  Physical Examination :     Patient Vitals for the past 8 hrs:   BP Temp Temp src Pulse Resp SpO2 Weight   07/01/20 0817 130/75 98.9 °F (37.2 °C) Oral 100 18 100 % --   07/01/20 0330 -- -- -- -- -- -- 82 lb 10.8 oz (37.5 kg)       Physical Exam  Constitutional:       Appearance: Normal appearance. HENT:      Head: Normocephalic and atraumatic. Eyes:      Conjunctiva/sclera: Conjunctivae normal.   Neck:      Musculoskeletal: No neck rigidity. Cardiovascular:      Heart sounds: Normal heart sounds. No murmur. Pulmonary:      Effort: Pulmonary effort is normal.      Breath sounds: Normal breath sounds. No wheezing. Abdominal:      General: Abdomen is flat. There is no distension. Palpations: Abdomen is soft. Tenderness: There is no abdominal tenderness. Comments: MEL drain in place with dark brown material   Musculoskeletal:      Right lower leg: No edema. Left lower leg: No edema. Skin:     General: Skin is warm. Coloration: Skin is not jaundiced. Neurological:      General: No focal deficit present. Mental Status: She is alert and oriented to person, place, and time.    Psychiatric:         Mood and Affect: Mood normal.       Right upper chest port in place    Medical Decision Making:   I have independently reviewed/ordered the following labs:    CBC with Differential:   Recent Labs     06/30/20  0533 07/01/20  0447   WBC 9.7 11.8*   HGB 8.6* 8.5*   HCT 26.5* 26.3*    331   LYMPHOPCT 15* 11*   MONOPCT 15* 9*     BMP:  Recent Labs 06/30/20  0533 07/01/20  0447    143   K 3.3* 3.2*    106   CO2 26 28   BUN 3* 4*   CREATININE 0.64 0.64   MG 1.7 1.8       Lab Results   Component Value Date    CREATININE 0.64 07/01/2020    GLUCOSE 112 07/01/2020       Detailed results: Thank you for allowing us to participate in the care of this patient. Please call with questions. This note is created with the assistance of a speech recognition program.  While intending to generate adocument that actually reflects the content of the visit, the document can still have some errors including those of syntax and sound a like substitutions which may escape proof reading. It such instances, actual meaningcan be extrapolated by contextual diversion.     Shanika Umana MD  Office: [phone]  Perfect serve / office [phone]

## 2020-07-01 NOTE — PLAN OF CARE
Problem: Falls - Risk of:  Goal: Will remain free from falls  Description: Will remain free from falls  Outcome: Met This Shift  Goal: Absence of physical injury  Description: Absence of physical injury  Outcome: Met This Shift     Problem: Skin Integrity:  Goal: Absence of new skin breakdown  Description: Absence of new skin breakdown  Outcome: Met This Shift     Problem: Pain:  Goal: Control of acute pain  Description: Control of acute pain  Outcome: Met This Shift  Goal: Control of chronic pain  Description: Control of chronic pain  Outcome: Met This Shift

## 2020-07-01 NOTE — FLOWSHEET NOTE
07/01/20 1100   Provider Notification   Reason for Communication Review case   Provider Name Dr. Keyla Pritchett   Provider Notification Physician   Method of Communication Face to face   Response At bedside   Notification Time 1100   Dr. Keyla Pritchett in to see patient. Nurse reviewed with Dr. Keyla Pritchett that patient is going to have a telepysch eval tomorrow at 0945 to evaluate if she is competent to make informed decisions regarding surgery. Nurse also reviewed with Dr. Keyla Pritchett that wound culture preliminary results are back regarding antibiotics. Dr. Keyla Pritchett states she has seen these.

## 2020-07-01 NOTE — PROGRESS NOTES
RN was called into room by PCT. Patient's dressing was leaking. The leakage had a brown/bloody appearance. RN called house supervisor to verify that dressing could be changed. RN changed the 4x4 dressing and cleaned up the brown/bloody stool.

## 2020-07-01 NOTE — DISCHARGE INSTR - COC
Continuity of Care Form    Patient Name: Michael Juárez   :  1975  MRN:  [id number]    Admit date:  2020  Discharge date:  ***    Code Status Order: Full Code   Advance Directives:   Advance Care Flowsheet Documentation     Date/Time Healthcare Directive Type of Healthcare Directive Copy in [address] Agent's Name Healthcare Agent's Phone Number    20  No, patient does not have an advance directive for healthcare treatment -- -- -- -- --          Admitting Physician:  Ortiz Rivera MD  PCP: No primary care provider on file. Discharging Nurse: [facility] Unit/Room#: 2106/2106-01  Discharging Unit Phone Number: ***    Emergency Contact:   Extended Emergency Contact Information  Primary Emergency Contact: Shante Anthony, [address] Phone: [phone]  Mobile Phone: [phone]  Relation: Other    Past Surgical History:  Past Surgical History:   Procedure Laterality Date    ABSCESS DRAINAGE      pre sacral pelvic abscess with drain placement to right buttock    URETER STENT PLACEMENT         Immunization History: There is no immunization history on file for this patient.     Active Problems:  Patient Active Problem List   Diagnosis Code    [facility] F23    H/O malignant neoplasm of colon Z85.038    Acute cystitis without hematuria N30.00    Pelvic abscess in female N73.9    Calculus of gallbladder without cholecystitis without obstruction K80.20    Leukocytosis D72.829    Elevated C-reactive protein (CRP) R79.82    Severe malnutrition (HCC) E43    Bipolar I disorder, most recent episode mixed ([facility] F31.60    Major depression with psychotic features ([facility] F32.3    Tachycardia R00.0    Diarrhea R19.7    Anemia D64.9    History of rectal cancer Z85.048    Dehydration E86.0    Rectal fistula K60.4       Isolation/Infection:   Isolation          No Isolation        Patient Infection Status     Infection Onset Added {CHP DME Yes amt example:037108373}  Last Modified Barium Swallow with Video (Video Swallowing Test): {Done Not Done ZB:558231305}    Treatments at the Time of Hospital Discharge:   Respiratory Treatments: ***  Oxygen Therapy:  {Therapy; copd oxygen:41353}  Ventilator:    {MH CC Vent MBLL:239277380}    Rehab Therapies: Physical Therapy and Occupational Therapy  Weight Bearing Status/Restrictions: 50Krysten Olson CC Weight Bearin}  Other Medical Equipment (for information only, NOT a DME order):  {EQUIPMENT:092456472}  Other Treatments: Skilled Nursing Assessment; Medication Education and Monitor    Colostomy site assessment and care- Please order needed supplies for the patient     Home health care agency's  to evaluate patient two weeks prior to discharge from home health to determine post-discharge services.        Patient's personal belongings (please select all that are sent with patient):  {CHP DME Belongings:823723377}    RN SIGNATURE:  {Esignature:167691440}    CASE MANAGEMENT/SOCIAL WORK SECTION    Inpatient Status Date: ***    Readmission Risk Assessment Score:  Readmission Risk              Risk of Unplanned Readmission:        18           Discharging to Facility/ . [facility] [address] [address]  Phone [phone]  Fax  [phone]      / signature: Electronically signed by Pedro Cuellar RN on 20 at 8:31 AM EDT    PHYSICIAN SECTION    Prognosis: {Prognosis:5202142210}    Condition at Discharge: [address] Patient Condition:231039852}    Rehab Potential (if transferring to Rehab): {Prognosis:7098992189}    Recommended Labs or Other Treatments After Discharge: ***    Physician Certification: I certify the above information and transfer of Tiarra Davis  is necessary for the continuing treatment of the diagnosis listed and that she requires {Admit to Appropriate Level of Care:70397} for {GREATER/LESS:537610186} 30 days.     Update Admission H&P: {CHP DME Changes in PHXLA:342968371}    PHYSICIAN SIGNATURE:  {Esignature:637036233}

## 2020-07-01 NOTE — PROGRESS NOTES
Nurse contacted [facility] to see if past pysch physician could verify that patient is competent to make own medical decisions prior to surgery being scheduled. Nurse was informed to reach out to [facility] as that is who patient was seen by. Nurse iverson served Nanc[facility] and informed her that patient was a direct admit from [facility] for rectal fistula. NP set up a time with a pysch physician to do a telehealth consult at [address] [place] [address] on 7/2 to evaluate. Patient is agreeable to sign consent for telepysch consult.

## 2020-07-01 NOTE — PROGRESS NOTES
[facility] [address]                 PATIENT NAME: Courtney Vale     TODAY'S DATE: 7/1/2020, 3:43 PM    SUBJECTIVE:    Pt seen and examined. Afebrile, VSS. Patient is confused but pleasant and compliant with exam. Gluteal drain dressing saturated with stool. Dressing was removed and site cleaned with assistance from RN. Drain leaking stool around tubing site. Clean dressing applied. Patient tolerated this well. She denies abdominal pain, N/V.      OBJECTIVE:   VITALS:  /77   Pulse 100   Temp 98.6 °F (37 °C) (Oral)   Resp 18   Ht 5' (1.524 m)   Wt 82 lb 10.8 oz (37.5 kg)   LMP 06/25/2017   SpO2 100%   BMI 16.15 kg/m²      INTAKE/OUTPUT:      Intake/Output Summary (Last 24 hours) at 7/1/2020 1543  Last data filed at 7/1/2020 0435  Gross per 24 hour   Intake 1000 ml   Output 10 ml   Net 990 ml                 CONSTITUTIONAL:  awake and alert.   No acute distress  HEART:   Regular rhythm, tachycardic  LUNGS:   Decreased air entry at bases, no wheezing  ABDOMEN:   Abdomen soft, non-tender, non-distended  EXTREMITIES:   No pedal edema    Data:  CBC:   Lab Results   Component Value Date    WBC 11.8 07/01/2020    RBC 2.97 07/01/2020    HGB 8.5 07/01/2020    HCT 26.3 07/01/2020    MCV 88.5 07/01/2020    MCH 28.5 07/01/2020    MCHC 32.2 07/01/2020    RDW 15.4 07/01/2020     07/01/2020    MPV 8.5 07/01/2020     BMP:    Lab Results   Component Value Date     07/01/2020    K 3.2 07/01/2020     07/01/2020    CO2 28 07/01/2020    BUN 4 07/01/2020    LABALBU 3.1 06/17/2020    CREATININE 0.64 07/01/2020    CALCIUM 8.2 07/01/2020    GFRAA >60 07/01/2020    LABGLOM >60 07/01/2020    GLUCOSE 112 07/01/2020       Radiology Review:  No new images to review      ASSESSMENT     Principal Problem:    Rectal fistula  Active Problems:    Severe malnutrition (HCC)    Tachycardia    Diarrhea    Anemia    History of rectal cancer    Dehydration  Resolved Problems:    * No resolved hospital problems. *      Plan  1. Diverting loop colostomy tomorrow  2. NPO after midnight  3. Hold anticoagulation  4. Antibiotics per ID  5. Telepsych consult tomorrow morning at 0945 to determine if patient is fit to sign her own consent  6. Continue medical management  7. Patient was seen and examined. Draining fecal material around the drain tube itself. No cellulitis. Dressing was changed. Minimal drain output itself in the drain. 8. Awaiting psych evaluation tomorrow regarding competency to obtain consent for surgery.       Electronically signed by Ivon Stevens PA-C  [id number] [address]

## 2020-07-02 ENCOUNTER — ANESTHESIA EVENT (OUTPATIENT)
Dept: OPERATING ROOM | Age: 45
DRG: 710 | End: 2020-07-02
Payer: MEDICARE

## 2020-07-02 LAB
ABSOLUTE EOS #: 0 K/UL (ref 0–0.4)
ABSOLUTE IMMATURE GRANULOCYTE: ABNORMAL K/UL (ref 0–0.3)
ABSOLUTE LYMPH #: 1.8 K/UL (ref 1–4.8)
ABSOLUTE MONO #: 1.4 K/UL (ref 0.1–1.3)
ANION GAP SERPL CALCULATED.3IONS-SCNC: 6 MMOL/L (ref 9–17)
BASOPHILS # BLD: 1 % (ref 0–2)
BASOPHILS ABSOLUTE: 0.1 K/UL (ref 0–0.2)
BUN BLDV-MCNC: 4 MG/DL (ref 6–20)
BUN/CREAT BLD: ABNORMAL (ref 9–20)
CALCIUM SERPL-MCNC: 8.2 MG/DL (ref 8.6–10.4)
CHLORIDE BLD-SCNC: 108 MMOL/L (ref 98–107)
CO2: 29 MMOL/L (ref 20–31)
CREAT SERPL-MCNC: 0.61 MG/DL (ref 0.5–0.9)
CULTURE: ABNORMAL
CULTURE: NORMAL
DIFFERENTIAL TYPE: ABNORMAL
DIRECT EXAM: ABNORMAL
EKG ATRIAL RATE: 86 BPM
EKG P AXIS: 4 DEGREES
EKG P-R INTERVAL: 114 MS
EKG Q-T INTERVAL: 362 MS
EKG QRS DURATION: 78 MS
EKG QTC CALCULATION (BAZETT): 433 MS
EKG R AXIS: 83 DEGREES
EKG T AXIS: 30 DEGREES
EKG VENTRICULAR RATE: 86 BPM
EOSINOPHILS RELATIVE PERCENT: 0 % (ref 0–4)
GFR AFRICAN AMERICAN: >60 ML/MIN
GFR NON-AFRICAN AMERICAN: >60 ML/MIN
GFR SERPL CREATININE-BSD FRML MDRD: ABNORMAL ML/MIN/{1.73_M2}
GFR SERPL CREATININE-BSD FRML MDRD: ABNORMAL ML/MIN/{1.73_M2}
GLUCOSE BLD-MCNC: 114 MG/DL (ref 70–99)
HCT VFR BLD CALC: 26 % (ref 36–46)
HEMOGLOBIN: 8.3 G/DL (ref 12–16)
IMMATURE GRANULOCYTES: ABNORMAL %
LYMPHOCYTES # BLD: 13 % (ref 24–44)
Lab: ABNORMAL
Lab: NORMAL
MCH RBC QN AUTO: 28 PG (ref 26–34)
MCHC RBC AUTO-ENTMCNC: 31.8 G/DL (ref 31–37)
MCV RBC AUTO: 88 FL (ref 80–100)
MONOCYTES # BLD: 10 % (ref 1–7)
NRBC AUTOMATED: ABNORMAL PER 100 WBC
PDW BLD-RTO: 15.3 % (ref 11.5–14.9)
PLATELET # BLD: 376 K/UL (ref 150–450)
PLATELET ESTIMATE: ABNORMAL
PMV BLD AUTO: 8.4 FL (ref 6–12)
POTASSIUM SERPL-SCNC: 3.9 MMOL/L (ref 3.7–5.3)
PREALBUMIN: 5.1 MG/DL (ref 20–40)
RBC # BLD: 2.95 M/UL (ref 4–5.2)
RBC # BLD: ABNORMAL 10*6/UL
SARS-COV-2, PCR: NORMAL
SARS-COV-2, RAPID: NOT DETECTED
SARS-COV-2: NORMAL
SEG NEUTROPHILS: 76 % (ref 36–66)
SEGMENTED NEUTROPHILS ABSOLUTE COUNT: 11 K/UL (ref 1.3–9.1)
SODIUM BLD-SCNC: 143 MMOL/L (ref 135–144)
SOURCE: NORMAL
SPECIMEN DESCRIPTION: ABNORMAL
SPECIMEN DESCRIPTION: NORMAL
WBC # BLD: 14.3 K/UL (ref 3.5–11)
WBC # BLD: ABNORMAL 10*3/UL

## 2020-07-02 PROCEDURE — 2580000003 HC RX 258: Performed by: INTERNAL MEDICINE

## 2020-07-02 PROCEDURE — 6370000000 HC RX 637 (ALT 250 FOR IP): Performed by: INTERNAL MEDICINE

## 2020-07-02 PROCEDURE — 85025 COMPLETE CBC W/AUTO DIFF WBC: CPT

## 2020-07-02 PROCEDURE — 6360000002 HC RX W HCPCS: Performed by: INTERNAL MEDICINE

## 2020-07-02 PROCEDURE — 6370000000 HC RX 637 (ALT 250 FOR IP): Performed by: NURSE PRACTITIONER

## 2020-07-02 PROCEDURE — 90792 PSYCH DIAG EVAL W/MED SRVCS: CPT | Performed by: PSYCHIATRY & NEUROLOGY

## 2020-07-02 PROCEDURE — APPNB30 APP NON BILLABLE TIME 0-30 MINS: Performed by: NURSE PRACTITIONER

## 2020-07-02 PROCEDURE — U0002 COVID-19 LAB TEST NON-CDC: HCPCS

## 2020-07-02 PROCEDURE — 99232 SBSQ HOSP IP/OBS MODERATE 35: CPT | Performed by: INTERNAL MEDICINE

## 2020-07-02 PROCEDURE — 2500000003 HC RX 250 WO HCPCS: Performed by: INTERNAL MEDICINE

## 2020-07-02 PROCEDURE — 84134 ASSAY OF PREALBUMIN: CPT

## 2020-07-02 PROCEDURE — 2580000003 HC RX 258: Performed by: PHYSICIAN ASSISTANT

## 2020-07-02 PROCEDURE — 80048 BASIC METABOLIC PNL TOTAL CA: CPT

## 2020-07-02 PROCEDURE — 36415 COLL VENOUS BLD VENIPUNCTURE: CPT

## 2020-07-02 PROCEDURE — 93010 ELECTROCARDIOGRAM REPORT: CPT | Performed by: INTERNAL MEDICINE

## 2020-07-02 PROCEDURE — 2060000000 HC ICU INTERMEDIATE R&B

## 2020-07-02 RX ADMIN — PANTOPRAZOLE SODIUM 40 MG: 40 TABLET, DELAYED RELEASE ORAL at 09:06

## 2020-07-02 RX ADMIN — METRONIDAZOLE 500 MG: 500 INJECTION, SOLUTION INTRAVENOUS at 00:40

## 2020-07-02 RX ADMIN — OLANZAPINE 5 MG: 10 TABLET, FILM COATED ORAL at 20:19

## 2020-07-02 RX ADMIN — MIRTAZAPINE 15 MG: 15 TABLET, FILM COATED ORAL at 20:19

## 2020-07-02 RX ADMIN — BUSPIRONE HYDROCHLORIDE 15 MG: 15 TABLET ORAL at 20:19

## 2020-07-02 RX ADMIN — TAMSULOSIN HYDROCHLORIDE 0.4 MG: 0.4 CAPSULE ORAL at 09:05

## 2020-07-02 RX ADMIN — CEFTRIAXONE SODIUM 1 G: 1 INJECTION, POWDER, FOR SOLUTION INTRAMUSCULAR; INTRAVENOUS at 20:22

## 2020-07-02 RX ADMIN — BUSPIRONE HYDROCHLORIDE 15 MG: 15 TABLET ORAL at 09:05

## 2020-07-02 RX ADMIN — FLUCONAZOLE 200 MG: 200 INJECTION, SOLUTION INTRAVENOUS at 15:06

## 2020-07-02 RX ADMIN — SODIUM CHLORIDE: 9 INJECTION, SOLUTION INTRAVENOUS at 00:41

## 2020-07-02 RX ADMIN — METRONIDAZOLE 500 MG: 500 INJECTION, SOLUTION INTRAVENOUS at 17:55

## 2020-07-02 RX ADMIN — VORTIOXETINE 10 MG: 10 TABLET, FILM COATED ORAL at 09:07

## 2020-07-02 RX ADMIN — BUSPIRONE HYDROCHLORIDE 15 MG: 15 TABLET ORAL at 14:46

## 2020-07-02 RX ADMIN — METRONIDAZOLE 500 MG: 500 INJECTION, SOLUTION INTRAVENOUS at 09:05

## 2020-07-02 RX ADMIN — ONDANSETRON 4 MG: 2 INJECTION INTRAMUSCULAR; INTRAVENOUS at 10:20

## 2020-07-02 RX ADMIN — CARIPRAZINE 3 MG: 3 CAPSULE, GELATIN COATED ORAL at 09:07

## 2020-07-02 ASSESSMENT — PAIN SCALES - GENERAL
PAINLEVEL_OUTOF10: 0

## 2020-07-02 NOTE — VIRTUAL HEALTH
Inpatient consult to Psychiatry  Consult performed by: Dixie Sanchez MD  Consult ordered by: Jerry Acevedo MD  Reason for consult: Capacity to make medical decisions  Assessment/Recommendations:     After some technical difficulties with [employer]hsan and Epic, consult was completed (full report to follow)    Assessment:  -Bipolar Disorder, severe, with psychosis - stable    Recommendations:  -At this time, pt demonstrates capacity to make her own medical decisions. Thank you for involving psychiatry in the care of this patient      Patient Location:  [address] [place] [address]    Provider Location ([facility]): Stanley Falcon  This virtual visit was conducted via interactive/real-time audio/video. Department of Psychiatry  Consult Service  Attending Physician Psychiatric Assessment      Thank you very much for allowing us to participate in the care of this patient. Reason for Consult:  Capacity to make medical decisions. History obtained from:  patient, electronic medical record    HISTORY OF PRESENT ILLNESS:          The patient is a 40 y.o. female with significant past medical history of bipolar disorder, severe, with psychosis, who is admitted medically for treatment of rectal fistula. This consult regards capacity to make her own medical decisions at this time. It is clear that the patient is delusional, several Catholic beliefs and preoccupations, her affect and mannerisms congruent with overt preoccupation with internal stimuli, some of which she volunteers, but quickly becomes guarded. She is, however, able to tell me about her fistula, the benefits and risks of surgery, and consequences of not having surgery, which she very much would like to avoid. She is ready for the surgery. Current Outpatient Psychiatric Medications:  Pt's recollection matched the record. Medications:    No current facility-administered medications for this encounter.       PAST PSYCHIATRIC HISTORY:  +    Past psychiatric medications include: several, did not explore all of them today. Adverse reactions from psychotropic medications:  Not reported    Lifetime Psychiatric Review of Systems:   Elizabeth and depression: +  Panic and anxiety: +  Phobia: denies  Hallucinations: +  Delusions: + (Episcopalian, grandiose)    Past Medical History:        Diagnosis Date    Cancer [facility])     Rectal    Pelvis, female abscess     pre sacral drain put in 6/19/2020    UTI (urinary tract infection)     stent place june 2020       Past Surgical History:        Procedure Laterality Date    ABSCESS DRAINAGE      pre sacral pelvic abscess with drain placement to right buttock    SMALL INTESTINE SURGERY N/A 7/3/2020    TRANSVERSE COLOSTOMY LOOP performed by Radha Galvez MD at [address]       Allergies: Patient has no known allergies.       Social History:    Social History     Socioeconomic History    Marital status: Unknown     Spouse name: None    Number of children: None    Years of education: None    Highest education level: None   Occupational History    None   Social Needs    Financial resource strain: None    Food insecurity     Worry: None     Inability: None    Transportation needs     Medical: None     Non-medical: None   Tobacco Use    Smoking status: Never Smoker    Smokeless tobacco: Never Used    Tobacco comment: pt is a non smoker   Substance and Sexual Activity    Alcohol use: None    Drug use: None    Sexual activity: None   Lifestyle    Physical activity     Days per week: None     Minutes per session: None    Stress: None   Relationships    Social connections     Talks on phone: None     Gets together: None     Attends Episcopalian service: None     Active member of club or organization: None     Attends meetings of clubs or organizations: None     Relationship status: None    Intimate partner violence     Fear of current or ex partner: None Emotionally abused: None     Physically abused: None     Forced sexual activity: None   Other Topics Concern    None   Social History Narrative    None     Family Psychiatric History: not the focus of today's visit. Family History:   History reviewed. No pertinent family history.     Physical  BP (!) 145/83   Pulse 91   Temp 98.1 °F (36.7 °C) (Oral)   Resp 18   Ht 5' (1.524 m)   Wt 82 lb 10.8 oz (37.5 kg)   LMP 06/25/2017   SpO2 99%   BMI 16.15 kg/m²     MENTAL STATUS EXAM:  Level of consciousness:Mild dysattention (reduced clarity of awareness with impaired ability to focus, sustain, or shift attention)  Appearance:  ill-appearing, hospital attire and lying in bed  Behavior/Motor:  psychomotor retardation  Attitude toward examiner:  cooperative  Speech:  slow, whispered and increased latency  Mood:  \"I'm ready for surgery\"  Affect:  flat  Thought processes:  circumstantial  Thought content:  No homicidal ideation   Suicidal Ideation:  denies suicidal ideation  Delusions:  grandiose  Perceptual Disturbance:  denies any perceptual disturbance  Cognition:  oriented to person, place, and time  Memory: impaired globally by illness, but no formal testing  Insight & Judgement: impaired due to illness   Medication side effects: none     DSM-V DIAGNOSIS:      Impression: Bipolar Disorder, severe, with psychosis     Patient Active Problem List   Diagnosis Code    Psychosis ([facility] F29    H/O malignant neoplasm of colon Z85.038    Acute cystitis without hematuria N30.00    Pelvic abscess in female N73.9    Calculus of gallbladder without cholecystitis without obstruction K80.20    Leukocytosis D72.829    Elevated C-reactive protein (CRP) R79.82    Severe malnutrition ([facility] E43    Bipolar I disorder, most recent episode mixed ([facility] F31.60    Major depression with psychotic features ([facility] F32.3    Tachycardia R00.0    Diarrhea R19.7    Anemia D64.9    History of rectal cancer Z85.048    Rectal fistula K60.4    Schizoaffective disorder (HCC) F25.9    Intellectual disability F68    Anorexia nervosa F50.00     PLAN:    1. At this time, she is cooperative with treatment team, understands the pros and cons of surgery and has capacity to make her own medical decisions: she wants to have surgery;  2. Obtain collateral information from family and friends to see if this is pt's baseline for her psychosis of if can improve from this. If improved status can be achieved, then maybe re-consult for further recommendations, such as medication changes or BHI. Thank you very much for allowing us to participate in the care of this patient. Time spent > 60 min. Physicians Signature:  Electronically signed by Analy Arreguin MD on 8/25/2020 at 3:12 PM.    Dragon voice recognition software used in portions of this document.

## 2020-07-02 NOTE — PROGRESS NOTES
[facility]    Gastroenterology Daily Progress Note      Patient:   Mahsa Mckeon   :    1975   Facility:   [facility]  Date:     2020  Consultant:   Carolyn Myrick CNP      SUBJECTIVE  40 y.o. female admitted 2020 with Tachycardia [R00.0]  Tachycardia [R00.0]  Tachycardia [R00.0] and seen for diarrhea and rectal fistula. The pt was seen and examined. Having psych consult today to determine competency for surgery. No c/o abdominal pain or nausea.  One loose stool overnight but still having stool drain around MEL         OBJECTIVE  Scheduled Meds:   fluconazole  200 mg Intravenous Q24H    cefTRIAXone (ROCEPHIN) IV  1 g Intravenous Q24H    metroNIDAZOLE  500 mg Intravenous Q8H    mirtazapine  15 mg Oral Nightly    OLANZapine  5 mg Oral Nightly    VORTIoxetine  10 mg Oral Daily    pantoprazole  40 mg Oral Daily    busPIRone  15 mg Oral TID    tamsulosin  0.4 mg Oral Daily    cariprazine hcl  3 mg Oral Daily       Vital Signs:  /72   Pulse 91   Temp 98.4 °F (36.9 °C) (Oral)   Resp 16   Ht 5' (1.524 m)   Wt 78 lb 11.3 oz (35.7 kg)   LMP 2017   SpO2 100%   BMI 15.37 kg/m²      Physical Exam:   General Appearance: alert and oriented to person, place and time, well-developed and  mal-nourished, in no acute distress sitter at bedside  Skin: warm and dry, no rash or erythema  Head: normocephalic and atraumatic  Eyes: pupils equal, round, and reactive to light, extraocular eye movements intact, conjunctivae normal  ENT: hearing grossly normal bilaterally  Neck: neck supple and non tender without mass, no thyromegaly or thyroid nodules, no cervical lymphadenopathy   Pulmonary/Chest: clear to auscultation bilaterally- no wheezes, rales or rhonchi, normal air movement, no respiratory distress  Cardiovascular: tachycardic rate, regular rhythm, normal S1 and S2, no murmurs, rubs, clicks or gallops, distal pulses intact, no carotid bruits  Abdomen: soft,  non tender, non-distended, normal bowel sounds, no masses or organomegaly no ascites diamond with brown drainage, leaking at site  Extremities: no cyanosis, clubbing or edema  Musculoskeletal: normal range of motion, no joint swelling, deformity or tenderness  Neurologic: no cranial nerve deficit and muscle strength normal         Lab and Imaging Review     CBC  Recent Labs     06/30/20  0533 07/01/20  0447 07/02/20  0507   WBC 9.7 11.8* 14.3*   HGB 8.6* 8.5* 8.3*   HCT 26.5* 26.3* 26.0*   MCV 89.2 88.5 88.0    331 376       BMP  Recent Labs     06/30/20  0533 07/01/20  0447 07/02/20  0507    143 143   K 3.3* 3.2* 3.9    106 108*   CO2 26 28 29   BUN 3* 4* 4*   CREATININE 0.64 0.64 0.61   GLUCOSE 101* 112* 114*   CALCIUM 8.0* 8.2* 8.2*     FINDINGS:ct abd 6/28/20   Lower Chest: No focal consolidation is seen at the lung bases.       Organs: No splenomegaly.       Adrenal glands appear normal.       Hypodense nodule seen in the left kidney measuring 4 mm.  This most likely   represents cyst.       There is mild right-sided hydronephrosis and hydroureter.  Right ureteral   stent is seen in this expected location.  Gallstones are seen.  No   significant inflammatory change surrounding the gallbladder.  Liver appears   unchanged       Pancreas appears unchanged       GI/Bowel: Stomach is unremarkable.  No significant small or large bowel   distention noted       Large stool load seen in the colon       Pelvis: Drainage catheter has been is dysuria to in the posterior pelvis on   the right since prior.  The amount of fluid in the collection is slightly   smaller.  However, there is increasing gas seen in the collection, extending   anteriorly into the pelvis on the right, abutting the bladder, and also into   the gluteal region on the right.       Anastomotic staple line seen in the rectosigmoid colon       Peritoneum/Retroperitoneum: Small retroperitoneal nodes are seen.  No   aneurysm.       Bones/Soft

## 2020-07-02 NOTE — PROGRESS NOTES
[facility] -   Infectious diseases evaluation  admission date 6/25/2020        Impression :   Current:    · Persistent pelvic abscess S/P drainage on 6/19/2020   · Rectal fistula  · H/O rectal cancer      Recommendations     · Continue IV ceftriaxone ,Flagyl and Diflucan  · Surgery following with a plan for diverting loop colostomy tomorrow. · This was discussed with Dr. Donis Reeves, plan for CTA rule out PE. · Follow blood cultures 6/26 no growth  · Follow MEL drain cultures and adjust antibiotics as needed  · Follow CBC and renal function  · COVID-19 rapid test was negative               History of Present Illness:   Initial history:  Eliu Marino is a 40y.o.-year-old female was transferred from [facility] for tachycardia . She had fever on 6/26 with a temperature max of 102, diarrhea for several days, stool for C. difficile was negative on 6/26/2020. CT chest 6/25 was negative for PE, showed incidental finding of cholelithiasis  Pathogen panel was negative on 6/20/2020  She was recently diagnosed with pelvic abscess required drainage 6/19/2020 with E. coli and Candida albicans growth on culture. 6/18/2020 COVID-19  test was negative  6/28/2020 CT abdomen pelvis showed decrease previous fluid collection with increased gas. IR fluoroscopy through existing right pelvic drain suggestive of rectal fistula  She had history of rectal cancer with T3 N1 M0 had surgery and chemotherapy in 2017. Interval changes  7/2/2020   She is still tachycardic, loose stool, denied any abdominal pain, denied nausea or vomiting, no other new complaints  Pelvic abscess drainage from 6/29/2020 grew E. coli and Candida albicans  MEL drain with brown dark drainage        I have personally reviewed the past medical history, past surgical history, medications, social history, and family history, and I haveupdated the database accordingly.   Past Medical History:     Past Medical History: Diagnosis Date    Cancer [facility])     Rectal    Pelvis, female abscess     pre sacral drain put in 6/19/2020    UTI (urinary tract infection)     stent place june 2020       Past Surgical  History:     Past Surgical History:   Procedure Laterality Date    ABSCESS DRAINAGE      pre sacral pelvic abscess with drain placement to right buttock    URETER STENT PLACEMENT  2020       Medications:      fluconazole  200 mg Intravenous Q24H    cefTRIAXone (ROCEPHIN) IV  1 g Intravenous Q24H    metroNIDAZOLE  500 mg Intravenous Q8H    mirtazapine  15 mg Oral Nightly    OLANZapine  5 mg Oral Nightly    VORTIoxetine  10 mg Oral Daily    pantoprazole  40 mg Oral Daily    busPIRone  15 mg Oral TID    tamsulosin  0.4 mg Oral Daily    cariprazine hcl  3 mg Oral Daily       Social History:     Social History     Socioeconomic History    Marital status: Unknown     Spouse name: Not on file    Number of children: Not on file    Years of education: Not on file    Highest education level: Not on file   Occupational History    Not on file   Social Needs    Financial resource strain: Not on file    Food insecurity     Worry: Not on file     Inability: Not on file    Transportation needs     Medical: Not on file     Non-medical: Not on file   Tobacco Use    Smoking status: Never Smoker    Smokeless tobacco: Never Used    Tobacco comment: pt is a non smoker   Substance and Sexual Activity    Alcohol use: Not on file    Drug use: Not on file    Sexual activity: Not on file   Lifestyle    Physical activity     Days per week: Not on file     Minutes per session: Not on file    Stress: Not on file   Relationships    Social connections     Talks on phone: Not on file     Gets together: Not on file     Attends Scientologist service: Not on file     Active member of club or organization: Not on file     Attends meetings of clubs or organizations: Not on file     Relationship status: Not on file    Intimate partner violence     Fear of current or ex partner: Not on file     Emotionally abused: Not on file     Physically abused: Not on file     Forced sexual activity: Not on file   Other Topics Concern    Not on file   Social History Narrative    Not on file       Family History:   History reviewed. No pertinent family history. Allergies:   Patient has no known allergies. Review of Systems:     Review of Systems   As per history present illness other than above 14 systems reviewed were negative  Physical Examination :     Patient Vitals for the past 8 hrs:   BP Temp Temp src Pulse Resp SpO2   07/02/20 1319 (!) 143/85 98.6 °F (37 °C) Oral 100 16 99 %   07/02/20 0907 126/75 98.8 °F (37.1 °C) Oral 108 16 98 %   07/02/20 0900 -- -- -- 108 -- --       Physical Exam  Constitutional:       Appearance: Normal appearance. HENT:      Head: Normocephalic and atraumatic. Eyes:      Conjunctiva/sclera: Conjunctivae normal.   Neck:      Musculoskeletal: No neck rigidity. Cardiovascular:      Heart sounds: Normal heart sounds. No murmur. Pulmonary:      Effort: Pulmonary effort is normal.      Breath sounds: Normal breath sounds. No wheezing. Abdominal:      General: Abdomen is flat. There is no distension. Palpations: Abdomen is soft. Tenderness: There is no abdominal tenderness. Comments: MEL drain in place with dark brown material   Musculoskeletal:      Right lower leg: No edema. Left lower leg: No edema. Skin:     General: Skin is warm. Coloration: Skin is not jaundiced. Neurological:      General: No focal deficit present. Mental Status: She is alert and oriented to person, place, and time.    Psychiatric:         Mood and Affect: Mood normal.       Right upper chest port in place    Medical Decision Making:   I have independently reviewed/ordered the following labs:    CBC with Differential:   Recent Labs     07/01/20  0447 07/02/20  0507   WBC 11.8* 14.3*   HGB 8.5* 8.3*   HCT 26.3* 26.0*    376   LYMPHOPCT 11* 13*   MONOPCT 9* 10*     BMP:  Recent Labs     06/30/20  0533 07/01/20  0447 07/02/20  0507    143 143   K 3.3* 3.2* 3.9    106 108*   CO2 26 28 29   BUN 3* 4* 4*   CREATININE 0.64 0.64 0.61   MG 1.7 1.8  --        Lab Results   Component Value Date    CREATININE 0.61 07/02/2020    GLUCOSE 114 07/02/2020       Detailed results: Thank you for allowing us to participate in the care of this patient. Please call with questions. This note is created with the assistance of a speech recognition program.  While intending to generate adocument that actually reflects the content of the visit, the document can still have some errors including those of syntax and sound a like substitutions which may escape proof reading. It such instances, actual meaningcan be extrapolated by contextual diversion.     Shanika Umana MD  Office: [phone]  Perfect serve / office [phone]

## 2020-07-02 NOTE — CARE COORDINATION
DISCHARGE PLANNING NOTE:    Plan is for this patient is undetermined at this time. Patient is going for diverting loop colostomy tomorrow and we will await to see what needs are post-op. On IV flagyl, IV diflucan and IV rocephin. Will continue to follow along.      Electronically signed by Shanon Palacios RN on 7/2/2020 at 1:45 PM

## 2020-07-02 NOTE — PLAN OF CARE
Nutrition Problem: Severe malnutrition  Intervention: Food and/or Nutrient Delivery: Continue current diet, Modify current ONS  Nutritional Goals: PO intake % of meals and supplements

## 2020-07-02 NOTE — PROGRESS NOTES
Nutrition Assessment    Type and Reason for Visit: Reassess    Nutrition Recommendations: Continue Clear Liquid diet as ordered. Provide Ensure Clear each meal. Suggest TPN post-op due to severe malnutrition and had poor intake. Nutrition Assessment: Patient is on clear liquid diet and NPO after midnight for surgery tomorrow diverting loop colostomy. Oral intake has been poor, would suggest starting TPN post-op. Malnutrition Assessment:  · Malnutrition Status: Meets the criteria for severe malnutrition  · Context: Acute illness or injury  · Findings of the 6 clinical characteristics of malnutrition (Minimum of 2 out of 6 clinical characteristics is required to make the diagnosis of moderate or severe Protein Calorie Malnutrition based on AND/ASPEN Guidelines):  1. Energy Intake-Less than or equal to 75% of estimated energy requirement, Greater than or equal to 7 days    2. Weight Loss-Unable to assess,    3. Fat Loss-Unable to assess,    4. Muscle Loss-Severe muscle mass loss, Clavicles (pectoralis and deltoids)  5. Fluid Accumulation-No significant fluid accumulation, Extremities  6.  Strength-Not measured    Nutrition Risk Level: High    Nutrient Needs:  · Estimated Daily Total Kcal: 6626-7395 based on [place] with 1.2-1.3 factor for maintenance using wt of 43.5kg. 4805-7996 for wt gain (additional 250-500 kcal)  · Estimated Daily Protein (g): 65 based on 1.5 gm per kg usual wt    Nutrition Diagnosis:   · Problem: Severe malnutrition  · Etiology: related to Insufficient energy/nutrient consumption, Psychological cause/life stress, Alteration in GI function     Signs and symptoms:  as evidenced by Severe muscle loss, Diet history of poor intake, Intake 0-25%, Intake 25-50%    Objective Information:  · Nutrition-Focused Physical Findings: No edema. Very thin. Diarrhea. N+V. Pelvic abscess drain placed 6/19. Past Hx of rectal cancer.  Na: 135 (6/26), PAB: 10.8 (6/17)  · Current Nutrition Therapies:  · Oral Diet Orders: Clear Liquid   · Oral Diet intake: Unable to assess  · Oral Nutrition Supplement (ONS) Orders: None  · ONS intake: 51-75%(per patient)  · Anthropometric Measures:  · Ht: 5' (152.4 cm)   · Current Body Wt: 78 lb (35.4 kg)  · Admission Body Wt:    · Usual Body Wt: 96 lb (43.5 kg)(43.5 kg approximately 1 month ago per pt; 52.3 kg approximately 1 year ago per pt)  · Ideal Body Wt: 100 lb (45.4 kg), % Ideal Body 78%  · BMI Classification: BMI <18.5 Underweight    Nutrition Interventions:   Continue current diet, Modify current ONS  Continued Inpatient Monitoring, Education not appropriate at this time    Nutrition Evaluation:   · Evaluation: No progress toward goals   · Goals: PO intake % of meals and supplements    · Monitoring: Nutrition Progression, Meal Intake, Supplement Intake, Diet Tolerance, Skin Integrity, Mental Status/Confusion, Weight, Pertinent Labs, Nausea or Vomiting, Diarrhea      Some areas of assessment may be incomplete due to COVID-19 precautions. Jessica Way R.D., L.D.   Clinical Dietitian  Office: [phone]

## 2020-07-02 NOTE — PROGRESS NOTES
FINDINGS: Lower Chest: No focal consolidation is seen at the lung bases. Organs: No splenomegaly. Adrenal glands appear normal. Hypodense nodule seen in the left kidney measuring 4 mm. This most likely represents cyst. There is mild right-sided hydronephrosis and hydroureter. Right ureteral stent is seen in this expected location. Gallstones are seen. No significant inflammatory change surrounding the gallbladder. Liver appears unchanged Pancreas appears unchanged GI/Bowel: Stomach is unremarkable. No significant small or large bowel distention noted Large stool load seen in the colon Pelvis: Drainage catheter has been is dysuria to in the posterior pelvis on the right since prior. The amount of fluid in the collection is slightly smaller. However, there is increasing gas seen in the collection, extending anteriorly into the pelvis on the right, abutting the bladder, and also into the gluteal region on the right. Anastomotic staple line seen in the rectosigmoid colon Peritoneum/Retroperitoneum: Small retroperitoneal nodes are seen. No aneurysm. Bones/Soft Tissues: Bones are unchanged. Persistent collection of fluid and gas in the pelvis, centered in the presacral space on the right. The amount of fluid has decreased, however the amount of gas has increased status post drainage catheter insertion             HPI:   See history in H and P      Review of Systems:     Patient has borderline intelligence unable to get a review of system  Data:     Past Medical History:  no change     Social History:  no change    Family History: @no change    Vitals:      I/O (24Hr):     Intake/Output Summary (Last 24 hours) at 7/2/2020 1306  Last data filed at 7/2/2020 0037  Gross per 24 hour   Intake 150 ml   Output --   Net 150 ml       Labs:    URINE ANALYSIS: No results found for: LABURIN     CBC:  Lab Results   Component Value Date    WBC 14.3 07/02/2020    HGB 8.3 07/02/2020     07/02/2020        BMP:    Lab matter  10. Patient is still running fever needs surgical opinion  11. Heart rate goes in the 120s to 180s at risk of sepsis broad-spectrum antibiotic for gram-negative coverage  12. Sinus tachy on ekg  13.   14. July 2  15. surg plan noted  16. Family is available to give the consent for the above procedure diverting loop colostomy planned  17. Tachycardia is improving with IV antibiotics definitive treatment is surgical planned Friday am  18. Npo mid night tonight  19. cta rule out pe  20. Still sinus tach with minimal exertion  21.   22.     Medications:      Allergies:  No Known Allergies    Current Meds:   Scheduled Meds:    fluconazole  200 mg Intravenous Q24H    cefTRIAXone (ROCEPHIN) IV  1 g Intravenous Q24H    metroNIDAZOLE  500 mg Intravenous Q8H    mirtazapine  15 mg Oral Nightly    OLANZapine  5 mg Oral Nightly    VORTIoxetine  10 mg Oral Daily    pantoprazole  40 mg Oral Daily    busPIRone  15 mg Oral TID    tamsulosin  0.4 mg Oral Daily    cariprazine hcl  3 mg Oral Daily     Continuous Infusions:    sodium chloride 100 mL/hr at 07/02/20 0041     PRN Meds: potassium chloride **OR** potassium alternative oral replacement **OR** potassium chloride, acetaminophen, ondansetron, loperamide, hydrOXYzine, sodium chloride jamey Browne MD  7/2/2020  1:06 PM

## 2020-07-02 NOTE — ANESTHESIA PRE PROCEDURE
Department of Anesthesiology  Preprocedure Note       Name:  Patel Garnica   Age:  40 y.o.  :  1975                                          MRN:  [id number]         Date:  2020      Surgeon: Satish Khan):  Mateo Cho MD    Procedure: Procedure(s):  TRANSVERSE COLOSTOMY LOOP    Medications prior to admission:   Prior to Admission medications    Medication Sig Start Date End Date Taking?  Authorizing Provider   amoxicillin-clavulanate (AUGMENTIN) 500-125 MG per tablet Take 1 tablet by mouth 2 times daily  6/20/20 7/3/20  Historical Provider, MD   VORTIoxetine (TRINTELLIX) 10 MG TABS tablet Take 10 mg by mouth daily    Historical Provider, MD   OLANZapine (ZYPREXA) 5 MG tablet Take 5 mg by mouth nightly    Historical Provider, MD   busPIRone (BUSPAR) 15 MG tablet Take 15 mg by mouth 3 times daily    Historical Provider, MD   PARoxetine (PAXIL) 20 MG tablet Take 20 mg by mouth every morning    Historical Provider, MD   tamsulosin (FLOMAX) 0.4 MG capsule Take 0.4 mg by mouth daily    Historical Provider, MD   hydrOXYzine (VISTARIL) 50 MG capsule Take 50 mg by mouth 3 times daily as needed    Historical Provider, MD   mirtazapine (REMERON) 15 MG tablet Take 15 mg by mouth nightly    Historical Provider, MD   pantoprazole (PROTONIX) 40 MG tablet Take 40 mg by mouth daily    Historical Provider, MD       Current medications:    Current Facility-Administered Medications   Medication Dose Route Frequency Provider Last Rate Last Dose    fluconazole (DIFLUCAN) in 0.9 % sodium chloride IVPB 200 mg  200 mg Intravenous Q24H Emmett Lujan  mL/hr at 20 1506 200 mg at 20 1506    potassium chloride (KLOR-CON M) extended release tablet 40 mEq  40 mEq Oral PRN Jad Nam MD   40 mEq at 20 1601    Or    potassium bicarb-citric acid (EFFER-K) effervescent tablet 40 mEq  40 mEq Oral PRN Jad Nam MD        Or    potassium chloride 10 mEq/100 mL IVPB (Peripheral Line)  10 mEq Intravenous PRN Conchis Walters MD        cefTRIAXone (ROCEPHIN) 1 g IVPB in 50 mL D5W minibag  1 g Intravenous Q24H Eliecer Cooper MD   Stopped at 07/01/20 1640    metronidazole (FLAGYL) 500 mg in NaCl 100 mL IVPB premix  500 mg Intravenous Q8H Eliecer Cooper MD   Stopped at 07/02/20 1005    acetaminophen (TYLENOL) tablet 650 mg  650 mg Oral Q4H PRN TOÑITO Pate   650 mg at 07/01/20 1900    0.9 % sodium chloride infusion   Intravenous Continuous TOÑITO Pate 100 mL/hr at 07/02/20 0041      ondansetron (ZOFRAN) injection 4 mg  4 mg Intravenous Q6H PRN Conchis Walters MD   4 mg at 07/02/20 1020    loperamide (IMODIUM) capsule 2 mg  2 mg Oral 4x Daily PRN Conchis Walters MD   2 mg at 06/30/20 1229    mirtazapine (REMERON) tablet 15 mg  15 mg Oral Nightly Conchis Walters MD   15 mg at 07/01/20 2008    OLANZapine (ZYPREXA) tablet 5 mg  5 mg Oral Nightly Conchis Walters MD   5 mg at 07/01/20 2008    hydrOXYzine (ATARAX) tablet 25 mg  25 mg Oral TID PRN GRAY Marrero CNP   25 mg at 06/26/20 1359    VORTIoxetine (TRINTELLIX) tablet 10 mg  10 mg Oral Daily GRAY Marrero CNP   10 mg at 07/02/20 6831    pantoprazole (PROTONIX) tablet 40 mg  40 mg Oral Daily GRAY Marrero CNP   40 mg at 07/02/20 1531    busPIRone (BUSPAR) tablet 15 mg  15 mg Oral TID GRAY Marrero CNP   15 mg at 07/02/20 1446    tamsulosin (FLOMAX) capsule 0.4 mg  0.4 mg Oral Daily GRAY Marrero CNP   0.4 mg at 07/02/20 2800    cariprazine hcl (VRAYLAR) capsule 3 mg  3 mg Oral Daily GRAY Marrero CNP   3 mg at 07/02/20 8653    sodium chloride flush 0.9 % injection 10 mL  10 mL Intravenous PRN Eduardo Claros MD   10 mL at 06/30/20 1325       Allergies:  No Known Allergies    Problem List:    Patient Active Problem List   Diagnosis Code    [facility] H/O malignant neoplasm of colon Z85.038    Acute cystitis without hematuria N30.00    Pelvic abscess in female N73.9    Calculus of gallbladder without cholecystitis without obstruction K80.20    Leukocytosis D72.829    Elevated C-reactive protein (CRP) R79.82    Severe malnutrition (HCC) E43    Bipolar I disorder, most recent episode mixed (HCC) F31.60    Major depression with psychotic features ([facility] F32.3    Tachycardia R00.0    Diarrhea R19.7    Anemia D64.9    History of rectal cancer Z85.048    Dehydration E86.0    Rectal fistula K60.4       Past Medical History:        Diagnosis Date    Cancer [facility])     Rectal    Pelvis, female abscess     pre sacral drain put in 6/19/2020    UTI (urinary tract infection)     stent place june 2020       Past Surgical History:        Procedure Laterality Date    ABSCESS DRAINAGE      pre sacral pelvic abscess with drain placement to right buttock    URETER STENT PLACEMENT  2020       Social History:    Social History     Tobacco Use    Smoking status: Never Smoker    Smokeless tobacco: Never Used    Tobacco comment: pt is a non smoker   Substance Use Topics    Alcohol use: Not on file                                Counseling given: Not Answered  Comment: pt is a non smoker      Vital Signs (Current):   Vitals:    07/02/20 0409 07/02/20 0900 07/02/20 0907 07/02/20 1319   BP:   126/75 (!) 143/85   Pulse:  108 108 100   Resp:   16 16   Temp:   98.8 °F (37.1 °C) 98.6 °F (37 °C)   TempSrc:   Oral Oral   SpO2:   98% 99%   Weight: 78 lb 11.3 oz (35.7 kg)      Height:                                                  BP Readings from Last 3 Encounters:   07/02/20 (!) 143/85   06/20/20 125/77       NPO Status:                                                                                 BMI:   Wt Readings from Last 3 Encounters:   07/02/20 78 lb 11.3 oz (35.7 kg)   06/20/20 100 lb 5 oz (45.5 kg)     Body mass index is 15.37 kg/m².     CBC:   Lab Results   Component Value Date    WBC 14.3 07/02/2020    RBC 2.95 07/02/2020    HGB 8.3 07/02/2020    HCT 26.0 07/02/2020    MCV 88.0 07/02/2020    RDW 15.3 07/02/2020     07/02/2020       CMP:   Lab Results   Component Value Date     07/02/2020    K 3.9 07/02/2020     07/02/2020    CO2 29 07/02/2020    BUN 4 07/02/2020    CREATININE 0.61 07/02/2020    GFRAA >60 07/02/2020    LABGLOM >60 07/02/2020    GLUCOSE 114 07/02/2020    PROT 6.6 06/17/2020    CALCIUM 8.2 07/02/2020    BILITOT 0.20 06/17/2020    ALKPHOS 102 06/17/2020    AST 10 06/17/2020    ALT 14 06/17/2020       POC Tests: No results for input(s): POCGLU, POCNA, POCK, POCCL, POCBUN, POCHEMO, POCHCT in the last 72 hours. Coags:   Lab Results   Component Value Date    PROTIME 14.5 06/18/2020    INR 1.1 06/18/2020    APTT 27.2 06/18/2020       HCG (If Applicable):   Lab Results   Component Value Date    PREGTESTUR NEGATIVE 06/16/2020        ABGs: No results found for: PHART, PO2ART, ELZ0QYU, TJD3FTP, BEART, W3HZCTMD     Type & Screen (If Applicable):  No results found for: LABABO, LABRH    Drug/Infectious Status (If Applicable):  No results found for: HIV, HEPCAB    COVID-19 Screening (If Applicable):   Lab Results   Component Value Date    COVID19 Not Detected 06/28/2020         Anesthesia Evaluation  Patient summary reviewed and Nursing notes reviewed   history of anesthetic complications: PONV. Airway: Mallampati: II  TM distance: >3 FB   Neck ROM: full  Mouth opening: > = 3 FB Dental: normal exam         Pulmonary:Negative Pulmonary ROS and normal exam  breath sounds clear to auscultation                             Cardiovascular:    (+) dysrhythmias (sinus tach):,       ECG reviewed  Rhythm: regular  Rate: normal                 ROS comment: June states she's had several heart attacks in the past and was seen by the cardiologist during this admission but this was not documented anywhere! Denies any CP, GUALBERTO/SOB or Dizziness.      Neuro/Psych:   (+) psychiatric history:depression/anxiety              ROS comment: Bipolar I disorder  Recently admitted to [facility] due to suicidal ideation and disorientation GI/Hepatic/Renal:   (+) renal disease (states \"inflammed kidney\" s/p stent):,          ROS comment: H/O malignant neoplasm of colon  Rectal fistula and Pelvic abscess s/p drainage by IR. Endo/Other:    (+) blood dyscrasia (Hgb -8.3): anemia:., electrolyte abnormalities (Hypocalcemia), malignancy/cancer (H/O malignant neoplasm of colon). ROS comment: Severe malnutrition     She states she tries to walk but very unsteady Abdominal:           Vascular: negative vascular ROS. Anesthesia Plan      general     ASA 3       Induction: intravenous. MIPS: Postoperative opioids intended and Prophylactic antiemetics administered. Anesthetic plan and risks discussed with patient. Plan discussed with CRNA.                 Rahul Morris MD   7/2/2020

## 2020-07-02 NOTE — PROGRESS NOTES
[facility]                 PATIENT NAME: Ladan Lombardo     TODAY'S DATE: 7/2/2020, 9:46 AM    SUBJECTIVE:    Pt seen and examined. Afebrile, tachycardic but stable. Patient denies any abdominal pain or N/V. Dressing surrounding gluteal drain changed again overnight due to stool leakage. Current dressing clean, dry, intact. Drain bulb with minimal output. Patient to have Telepsych consult today at 0945. OBJECTIVE:   VITALS:  /75   Pulse 108   Temp 98.8 °F (37.1 °C)   Resp 16   Ht 5' (1.524 m)   Wt 78 lb 11.3 oz (35.7 kg)   LMP 06/25/2017   SpO2 98%   BMI 15.37 kg/m²      INTAKE/OUTPUT:      Intake/Output Summary (Last 24 hours) at 7/2/2020 0946  Last data filed at 7/2/2020 0037  Gross per 24 hour   Intake 996.67 ml   Output --   Net 996.67 ml                 CONSTITUTIONAL:  awake and alert. No acute distress  HEART:   Regular rhythm, tachycardic  LUNGS:   CTA, no wheezing  ABDOMEN:   Abdomen soft, non-tender, non-distended  EXTREMITIES:   No pedal edema    Data:  CBC:   Lab Results   Component Value Date    WBC 14.3 07/02/2020    RBC 2.95 07/02/2020    HGB 8.3 07/02/2020    HCT 26.0 07/02/2020    MCV 88.0 07/02/2020    MCH 28.0 07/02/2020    MCHC 31.8 07/02/2020    RDW 15.3 07/02/2020     07/02/2020    MPV 8.4 07/02/2020     BMP:    Lab Results   Component Value Date     07/02/2020    K 3.9 07/02/2020     07/02/2020    CO2 29 07/02/2020    BUN 4 07/02/2020    LABALBU 3.1 06/17/2020    CREATININE 0.61 07/02/2020    CALCIUM 8.2 07/02/2020    GFRAA >60 07/02/2020    LABGLOM >60 07/02/2020    GLUCOSE 114 07/02/2020       Radiology Review:  No new images to review      ASSESSMENT     Principal Problem:    Rectal fistula  Active Problems:    Severe malnutrition (HCC)    Tachycardia    Diarrhea    Anemia    History of rectal cancer    Dehydration  Resolved Problems:    * No resolved hospital problems. *      Plan  1.  Change gluteal dressing as

## 2020-07-02 NOTE — FLOWSHEET NOTE
07/02/20 1123   Encounter Summary   Services provided to: Patient   Routine   Assessment Sleeping   Intervention Prayer

## 2020-07-03 ENCOUNTER — ANESTHESIA (OUTPATIENT)
Dept: OPERATING ROOM | Age: 45
DRG: 710 | End: 2020-07-03
Payer: MEDICARE

## 2020-07-03 VITALS — OXYGEN SATURATION: 100 % | DIASTOLIC BLOOD PRESSURE: 70 MMHG | SYSTOLIC BLOOD PRESSURE: 108 MMHG | TEMPERATURE: 96.6 F

## 2020-07-03 LAB
ABO/RH: NORMAL
ABSOLUTE EOS #: 0.1 K/UL (ref 0–0.4)
ABSOLUTE IMMATURE GRANULOCYTE: ABNORMAL K/UL (ref 0–0.3)
ABSOLUTE LYMPH #: 2.4 K/UL (ref 1–4.8)
ABSOLUTE MONO #: 1.2 K/UL (ref 0.1–1.3)
ANION GAP SERPL CALCULATED.3IONS-SCNC: 7 MMOL/L (ref 9–17)
ANTIBODY SCREEN: NEGATIVE
ARM BAND NUMBER: NORMAL
BASOPHILS # BLD: 0 % (ref 0–2)
BASOPHILS ABSOLUTE: 0 K/UL (ref 0–0.2)
BLOOD BANK COMMENT: NORMAL
BUN BLDV-MCNC: 4 MG/DL (ref 6–20)
BUN/CREAT BLD: ABNORMAL (ref 9–20)
CALCIUM SERPL-MCNC: 7.9 MG/DL (ref 8.6–10.4)
CHLORIDE BLD-SCNC: 108 MMOL/L (ref 98–107)
CO2: 28 MMOL/L (ref 20–31)
CREAT SERPL-MCNC: 0.59 MG/DL (ref 0.5–0.9)
DIFFERENTIAL TYPE: ABNORMAL
EOSINOPHILS RELATIVE PERCENT: 1 % (ref 0–4)
EXPIRATION DATE: NORMAL
GFR AFRICAN AMERICAN: >60 ML/MIN
GFR NON-AFRICAN AMERICAN: >60 ML/MIN
GFR SERPL CREATININE-BSD FRML MDRD: ABNORMAL ML/MIN/{1.73_M2}
GFR SERPL CREATININE-BSD FRML MDRD: ABNORMAL ML/MIN/{1.73_M2}
GLUCOSE BLD-MCNC: 111 MG/DL (ref 70–99)
HCT VFR BLD CALC: 23.7 % (ref 36–46)
HEMOGLOBIN: 7.6 G/DL (ref 12–16)
IMMATURE GRANULOCYTES: ABNORMAL %
LV EF: 55 %
LVEF MODALITY: NORMAL
LYMPHOCYTES # BLD: 21 % (ref 24–44)
MAGNESIUM: 2 MG/DL (ref 1.6–2.6)
MCH RBC QN AUTO: 28 PG (ref 26–34)
MCHC RBC AUTO-ENTMCNC: 32 G/DL (ref 31–37)
MCV RBC AUTO: 87.7 FL (ref 80–100)
MONOCYTES # BLD: 11 % (ref 1–7)
NRBC AUTOMATED: ABNORMAL PER 100 WBC
PDW BLD-RTO: 15.9 % (ref 11.5–14.9)
PLATELET # BLD: 387 K/UL (ref 150–450)
PLATELET ESTIMATE: ABNORMAL
PMV BLD AUTO: 8.3 FL (ref 6–12)
POTASSIUM SERPL-SCNC: 3 MMOL/L (ref 3.7–5.3)
POTASSIUM SERPL-SCNC: 3.7 MMOL/L (ref 3.7–5.3)
RBC # BLD: 2.7 M/UL (ref 4–5.2)
RBC # BLD: ABNORMAL 10*6/UL
SEG NEUTROPHILS: 67 % (ref 36–66)
SEGMENTED NEUTROPHILS ABSOLUTE COUNT: 8 K/UL (ref 1.3–9.1)
SODIUM BLD-SCNC: 143 MMOL/L (ref 135–144)
WBC # BLD: 11.7 K/UL (ref 3.5–11)
WBC # BLD: ABNORMAL 10*3/UL

## 2020-07-03 PROCEDURE — 80048 BASIC METABOLIC PNL TOTAL CA: CPT

## 2020-07-03 PROCEDURE — 83735 ASSAY OF MAGNESIUM: CPT

## 2020-07-03 PROCEDURE — 2060000000 HC ICU INTERMEDIATE R&B

## 2020-07-03 PROCEDURE — 2500000003 HC RX 250 WO HCPCS: Performed by: INTERNAL MEDICINE

## 2020-07-03 PROCEDURE — 85025 COMPLETE CBC W/AUTO DIFF WBC: CPT

## 2020-07-03 PROCEDURE — 2709999900 HC NON-CHARGEABLE SUPPLY: Performed by: SURGERY

## 2020-07-03 PROCEDURE — 6370000000 HC RX 637 (ALT 250 FOR IP): Performed by: SURGERY

## 2020-07-03 PROCEDURE — 99232 SBSQ HOSP IP/OBS MODERATE 35: CPT | Performed by: INTERNAL MEDICINE

## 2020-07-03 PROCEDURE — 2580000003 HC RX 258: Performed by: SURGERY

## 2020-07-03 PROCEDURE — 3700000000 HC ANESTHESIA ATTENDED CARE: Performed by: SURGERY

## 2020-07-03 PROCEDURE — 3600000012 HC SURGERY LEVEL 2 ADDTL 15MIN: Performed by: SURGERY

## 2020-07-03 PROCEDURE — 3600000002 HC SURGERY LEVEL 2 BASE: Performed by: SURGERY

## 2020-07-03 PROCEDURE — 6370000000 HC RX 637 (ALT 250 FOR IP): Performed by: PHYSICIAN ASSISTANT

## 2020-07-03 PROCEDURE — 86850 RBC ANTIBODY SCREEN: CPT

## 2020-07-03 PROCEDURE — 2500000003 HC RX 250 WO HCPCS: Performed by: NURSE ANESTHETIST, CERTIFIED REGISTERED

## 2020-07-03 PROCEDURE — 7100000001 HC PACU RECOVERY - ADDTL 15 MIN: Performed by: SURGERY

## 2020-07-03 PROCEDURE — 6360000002 HC RX W HCPCS: Performed by: INTERNAL MEDICINE

## 2020-07-03 PROCEDURE — 6360000002 HC RX W HCPCS: Performed by: SURGERY

## 2020-07-03 PROCEDURE — 2500000003 HC RX 250 WO HCPCS: Performed by: SURGERY

## 2020-07-03 PROCEDURE — 93306 TTE W/DOPPLER COMPLETE: CPT

## 2020-07-03 PROCEDURE — 0D1L0Z4 BYPASS TRANSVERSE COLON TO CUTANEOUS, OPEN APPROACH: ICD-10-PCS | Performed by: SURGERY

## 2020-07-03 PROCEDURE — 6360000002 HC RX W HCPCS: Performed by: NURSE ANESTHETIST, CERTIFIED REGISTERED

## 2020-07-03 PROCEDURE — 86900 BLOOD TYPING SEROLOGIC ABO: CPT

## 2020-07-03 PROCEDURE — 36415 COLL VENOUS BLD VENIPUNCTURE: CPT

## 2020-07-03 PROCEDURE — 84132 ASSAY OF SERUM POTASSIUM: CPT

## 2020-07-03 PROCEDURE — 86901 BLOOD TYPING SEROLOGIC RH(D): CPT

## 2020-07-03 PROCEDURE — 2720000010 HC SURG SUPPLY STERILE: Performed by: SURGERY

## 2020-07-03 PROCEDURE — 2580000003 HC RX 258: Performed by: INTERNAL MEDICINE

## 2020-07-03 PROCEDURE — 3700000001 HC ADD 15 MINUTES (ANESTHESIA): Performed by: SURGERY

## 2020-07-03 PROCEDURE — 7100000000 HC PACU RECOVERY - FIRST 15 MIN: Performed by: SURGERY

## 2020-07-03 PROCEDURE — 2580000003 HC RX 258: Performed by: PHYSICIAN ASSISTANT

## 2020-07-03 RX ORDER — ROCURONIUM BROMIDE 10 MG/ML
INJECTION, SOLUTION INTRAVENOUS PRN
Status: DISCONTINUED | OUTPATIENT
Start: 2020-07-03 | End: 2020-07-03 | Stop reason: SDUPTHER

## 2020-07-03 RX ORDER — NEOSTIGMINE METHYLSULFATE 5 MG/5 ML
SYRINGE (ML) INTRAVENOUS PRN
Status: DISCONTINUED | OUTPATIENT
Start: 2020-07-03 | End: 2020-07-03 | Stop reason: SDUPTHER

## 2020-07-03 RX ORDER — ESMOLOL HYDROCHLORIDE 10 MG/ML
INJECTION INTRAVENOUS PRN
Status: DISCONTINUED | OUTPATIENT
Start: 2020-07-03 | End: 2020-07-03 | Stop reason: SDUPTHER

## 2020-07-03 RX ORDER — LABETALOL 20 MG/4 ML (5 MG/ML) INTRAVENOUS SYRINGE
5 EVERY 10 MIN PRN
Status: DISCONTINUED | OUTPATIENT
Start: 2020-07-03 | End: 2020-07-03

## 2020-07-03 RX ORDER — OXYCODONE HYDROCHLORIDE AND ACETAMINOPHEN 5; 325 MG/1; MG/1
1 TABLET ORAL EVERY 4 HOURS PRN
Status: DISCONTINUED | OUTPATIENT
Start: 2020-07-03 | End: 2020-07-05 | Stop reason: HOSPADM

## 2020-07-03 RX ORDER — PROPOFOL 10 MG/ML
INJECTION, EMULSION INTRAVENOUS PRN
Status: DISCONTINUED | OUTPATIENT
Start: 2020-07-03 | End: 2020-07-03 | Stop reason: SDUPTHER

## 2020-07-03 RX ORDER — GLYCOPYRROLATE 1 MG/5 ML
SYRINGE (ML) INTRAVENOUS PRN
Status: DISCONTINUED | OUTPATIENT
Start: 2020-07-03 | End: 2020-07-03 | Stop reason: SDUPTHER

## 2020-07-03 RX ORDER — MEPERIDINE HYDROCHLORIDE 25 MG/ML
12.5 INJECTION INTRAMUSCULAR; INTRAVENOUS; SUBCUTANEOUS EVERY 5 MIN PRN
Status: DISCONTINUED | OUTPATIENT
Start: 2020-07-03 | End: 2020-07-03

## 2020-07-03 RX ORDER — DIPHENHYDRAMINE HYDROCHLORIDE 50 MG/ML
12.5 INJECTION INTRAMUSCULAR; INTRAVENOUS
Status: DISCONTINUED | OUTPATIENT
Start: 2020-07-03 | End: 2020-07-03

## 2020-07-03 RX ORDER — DEXAMETHASONE SODIUM PHOSPHATE 4 MG/ML
INJECTION, SOLUTION INTRA-ARTICULAR; INTRALESIONAL; INTRAMUSCULAR; INTRAVENOUS; SOFT TISSUE PRN
Status: DISCONTINUED | OUTPATIENT
Start: 2020-07-03 | End: 2020-07-03 | Stop reason: SDUPTHER

## 2020-07-03 RX ORDER — LIDOCAINE HYDROCHLORIDE 10 MG/ML
INJECTION, SOLUTION EPIDURAL; INFILTRATION; INTRACAUDAL; PERINEURAL PRN
Status: DISCONTINUED | OUTPATIENT
Start: 2020-07-03 | End: 2020-07-03 | Stop reason: SDUPTHER

## 2020-07-03 RX ORDER — ONDANSETRON 2 MG/ML
INJECTION INTRAMUSCULAR; INTRAVENOUS PRN
Status: DISCONTINUED | OUTPATIENT
Start: 2020-07-03 | End: 2020-07-03 | Stop reason: SDUPTHER

## 2020-07-03 RX ORDER — ONDANSETRON 2 MG/ML
4 INJECTION INTRAMUSCULAR; INTRAVENOUS EVERY 6 HOURS PRN
Status: DISCONTINUED | OUTPATIENT
Start: 2020-07-03 | End: 2020-07-05 | Stop reason: HOSPADM

## 2020-07-03 RX ORDER — MIDAZOLAM HYDROCHLORIDE 1 MG/ML
INJECTION INTRAMUSCULAR; INTRAVENOUS PRN
Status: DISCONTINUED | OUTPATIENT
Start: 2020-07-03 | End: 2020-07-03 | Stop reason: SDUPTHER

## 2020-07-03 RX ORDER — FENTANYL CITRATE 50 UG/ML
INJECTION, SOLUTION INTRAMUSCULAR; INTRAVENOUS PRN
Status: DISCONTINUED | OUTPATIENT
Start: 2020-07-03 | End: 2020-07-03 | Stop reason: SDUPTHER

## 2020-07-03 RX ORDER — MORPHINE SULFATE 2 MG/ML
2 INJECTION, SOLUTION INTRAMUSCULAR; INTRAVENOUS EVERY 5 MIN PRN
Status: DISCONTINUED | OUTPATIENT
Start: 2020-07-03 | End: 2020-07-03

## 2020-07-03 RX ORDER — ONDANSETRON 2 MG/ML
4 INJECTION INTRAMUSCULAR; INTRAVENOUS
Status: DISCONTINUED | OUTPATIENT
Start: 2020-07-03 | End: 2020-07-03

## 2020-07-03 RX ADMIN — FENTANYL CITRATE 100 MCG: 50 INJECTION, SOLUTION INTRAMUSCULAR; INTRAVENOUS at 11:35

## 2020-07-03 RX ADMIN — ESMOLOL HYDROCHLORIDE 10 MG: 10 INJECTION, SOLUTION INTRAVENOUS at 12:05

## 2020-07-03 RX ADMIN — CEFAZOLIN 2 G: 10 INJECTION, POWDER, FOR SOLUTION INTRAVENOUS at 11:52

## 2020-07-03 RX ADMIN — FENTANYL CITRATE 50 MCG: 50 INJECTION, SOLUTION INTRAMUSCULAR; INTRAVENOUS at 12:01

## 2020-07-03 RX ADMIN — MIDAZOLAM 2 MG: 1 INJECTION INTRAMUSCULAR; INTRAVENOUS at 11:26

## 2020-07-03 RX ADMIN — MIRTAZAPINE 15 MG: 15 TABLET, FILM COATED ORAL at 19:59

## 2020-07-03 RX ADMIN — METRONIDAZOLE 500 MG: 500 INJECTION, SOLUTION INTRAVENOUS at 14:45

## 2020-07-03 RX ADMIN — ESMOLOL HYDROCHLORIDE 5 MG: 10 INJECTION, SOLUTION INTRAVENOUS at 11:53

## 2020-07-03 RX ADMIN — FENTANYL CITRATE 50 MCG: 50 INJECTION, SOLUTION INTRAMUSCULAR; INTRAVENOUS at 12:02

## 2020-07-03 RX ADMIN — PROPOFOL 80 MG: 10 INJECTION, EMULSION INTRAVENOUS at 11:35

## 2020-07-03 RX ADMIN — BUSPIRONE HYDROCHLORIDE 15 MG: 15 TABLET ORAL at 19:59

## 2020-07-03 RX ADMIN — METRONIDAZOLE 500 MG: 500 INJECTION, SOLUTION INTRAVENOUS at 00:16

## 2020-07-03 RX ADMIN — Medication 0.4 MG: at 12:23

## 2020-07-03 RX ADMIN — Medication 2 MG: at 12:23

## 2020-07-03 RX ADMIN — ESMOLOL HYDROCHLORIDE 5 MG: 10 INJECTION, SOLUTION INTRAVENOUS at 11:49

## 2020-07-03 RX ADMIN — CEFTRIAXONE SODIUM 1 G: 1 INJECTION, POWDER, FOR SOLUTION INTRAMUSCULAR; INTRAVENOUS at 18:11

## 2020-07-03 RX ADMIN — DEXAMETHASONE SODIUM PHOSPHATE 4 MG: 4 INJECTION, SOLUTION INTRA-ARTICULAR; INTRALESIONAL; INTRAMUSCULAR; INTRAVENOUS; SOFT TISSUE at 12:09

## 2020-07-03 RX ADMIN — ROCURONIUM BROMIDE 30 MG: 10 INJECTION, SOLUTION INTRAVENOUS at 11:35

## 2020-07-03 RX ADMIN — BUSPIRONE HYDROCHLORIDE 15 MG: 15 TABLET ORAL at 14:45

## 2020-07-03 RX ADMIN — ACETAMINOPHEN 650 MG: 325 TABLET, FILM COATED ORAL at 00:15

## 2020-07-03 RX ADMIN — ONDANSETRON 4 MG: 2 INJECTION INTRAMUSCULAR; INTRAVENOUS at 12:41

## 2020-07-03 RX ADMIN — METRONIDAZOLE 500 MG: 500 INJECTION, SOLUTION INTRAVENOUS at 18:12

## 2020-07-03 RX ADMIN — SODIUM CHLORIDE: 9 INJECTION, SOLUTION INTRAVENOUS at 10:49

## 2020-07-03 RX ADMIN — POTASSIUM CHLORIDE: 2 INJECTION, SOLUTION, CONCENTRATE INTRAVENOUS at 08:17

## 2020-07-03 RX ADMIN — OLANZAPINE 5 MG: 10 TABLET, FILM COATED ORAL at 19:59

## 2020-07-03 RX ADMIN — LIDOCAINE HYDROCHLORIDE 50 MG: 10 INJECTION, SOLUTION EPIDURAL; INFILTRATION; INTRACAUDAL; PERINEURAL at 11:35

## 2020-07-03 RX ADMIN — CEFAZOLIN 1 G: 1 INJECTION, POWDER, FOR SOLUTION INTRAMUSCULAR; INTRAVENOUS at 19:59

## 2020-07-03 ASSESSMENT — PULMONARY FUNCTION TESTS
PIF_VALUE: 14
PIF_VALUE: 13
PIF_VALUE: 1
PIF_VALUE: 13
PIF_VALUE: 2
PIF_VALUE: 14
PIF_VALUE: 0
PIF_VALUE: 14
PIF_VALUE: 14
PIF_VALUE: 16
PIF_VALUE: 12
PIF_VALUE: 14
PIF_VALUE: 13
PIF_VALUE: 1
PIF_VALUE: 14
PIF_VALUE: 13
PIF_VALUE: 14
PIF_VALUE: 13
PIF_VALUE: 13
PIF_VALUE: 11
PIF_VALUE: 16
PIF_VALUE: 13
PIF_VALUE: 14
PIF_VALUE: 1
PIF_VALUE: 2
PIF_VALUE: 14
PIF_VALUE: 14
PIF_VALUE: 2
PIF_VALUE: 1
PIF_VALUE: 13
PIF_VALUE: 14
PIF_VALUE: 14
PIF_VALUE: 2
PIF_VALUE: 2
PIF_VALUE: 13
PIF_VALUE: 2
PIF_VALUE: 20
PIF_VALUE: 13
PIF_VALUE: 14
PIF_VALUE: 13
PIF_VALUE: 1
PIF_VALUE: 2
PIF_VALUE: 14
PIF_VALUE: 13
PIF_VALUE: 14
PIF_VALUE: 1
PIF_VALUE: 13
PIF_VALUE: 13
PIF_VALUE: 14
PIF_VALUE: 17
PIF_VALUE: 13
PIF_VALUE: 2
PIF_VALUE: 13
PIF_VALUE: 13
PIF_VALUE: 14
PIF_VALUE: 5
PIF_VALUE: 13
PIF_VALUE: 3
PIF_VALUE: 14
PIF_VALUE: 3
PIF_VALUE: 1
PIF_VALUE: 13
PIF_VALUE: 14
PIF_VALUE: 14
PIF_VALUE: 3
PIF_VALUE: 1
PIF_VALUE: 14
PIF_VALUE: 1
PIF_VALUE: 14
PIF_VALUE: 21

## 2020-07-03 ASSESSMENT — PAIN DESCRIPTION - ORIENTATION: ORIENTATION: LEFT

## 2020-07-03 ASSESSMENT — PAIN DESCRIPTION - DESCRIPTORS: DESCRIPTORS: DULL

## 2020-07-03 ASSESSMENT — PAIN SCALES - GENERAL
PAINLEVEL_OUTOF10: 0
PAINLEVEL_OUTOF10: 0
PAINLEVEL_OUTOF10: 3
PAINLEVEL_OUTOF10: 0

## 2020-07-03 ASSESSMENT — PAIN DESCRIPTION - LOCATION: LOCATION: ABDOMEN

## 2020-07-03 ASSESSMENT — PAIN DESCRIPTION - PAIN TYPE: TYPE: ACUTE PAIN

## 2020-07-03 NOTE — ANESTHESIA POST-OP
Charts and labs reviewed. Patient seen/exam/consent. Benefits and risks of anesthesia discussed.  She agreed to proceed with GETA

## 2020-07-03 NOTE — PLAN OF CARE
Problem: Falls - Risk of:  Goal: Will remain free from falls  Description: Will remain free from falls  7/3/2020 0303 by Loly Stern RN  Outcome: Ongoing  Note: Pt remains free from falls at this time. 1:1 at bedside, bed in lowest position, nonskid footwear     Problem: Falls - Risk of:  Goal: Absence of physical injury  Description: Absence of physical injury  7/3/2020 0303 by Loly Stern RN  Outcome: Ongoing     Problem: Cardiac Output - Decreased:  Goal: Hemodynamic stability will improve  Description: Hemodynamic stability will improve  7/3/2020 0303 by Loly Stern RN  Outcome: Ongoing  Note: Pt presents ST when up ambulating. Pt to decrease ambulation until after surgical intervention. Problem: Mental Status - Impaired:  Goal: Mental status will improve  Description: Mental status will improve  7/3/2020 0303 by Loly Stern RN  Outcome: Ongoing  Note: Pt pleasant and cooperative at this time. Auditory hallucinations still present at times. Problem: Nutrition  Goal: Optimal nutrition therapy  7/3/2020 0303 by Loly Stern RN  Outcome: Ongoing     Problem: Skin Integrity:  Goal: Will show no infection signs and symptoms  Description: Will show no infection signs and symptoms  7/3/2020 0303 by Loly Stern RN  Outcome: Ongoing  Note: No new skin issues at this time. Dressing changed when saturated.       Problem: Skin Integrity:  Goal: Absence of new skin breakdown  Description: Absence of new skin breakdown  7/3/2020 0303 by Loly Stern RN  Outcome: Ongoing     Problem: Pain:  Goal: Pain level will decrease  Description: Pain level will decrease  7/3/2020 0303 by Loly Stern RN  Outcome: Ongoing     Problem: Pain:  Goal: Control of acute pain  Description: Control of acute pain  7/3/2020 0303 by Loly Stern RN  Outcome: Ongoing     Problem: Pain:  Goal: Control of chronic pain  Description: Control of chronic pain  7/3/2020 0303 by Loly Stern RN  Outcome: Ongoing

## 2020-07-03 NOTE — PROGRESS NOTES
Nutrition Assessment    Type and Reason for Visit: Reassess    Nutrition Recommendations: Continue full liquid diet as ordered. Start Ensure [employer] each meal. Suggest TPN be initiated. Nutrition Assessment: Patient went to surgery today for transverse loop colostomy due to rectal fistula. Oral intake has been very poor, prealbumin only 5.1 feel pt could benefit by starting TPN. Malnutrition Assessment:  · Malnutrition Status: Meets the criteria for severe malnutrition  · Context: Acute illness or injury  · Findings of the 6 clinical characteristics of malnutrition (Minimum of 2 out of 6 clinical characteristics is required to make the diagnosis of moderate or severe Protein Calorie Malnutrition based on AND/ASPEN Guidelines):  1. Energy Intake-Less than or equal to 75% of estimated energy requirement, Greater than or equal to 7 days    2. Weight Loss-Unable to assess,    3. Fat Loss-Unable to assess,    4. Muscle Loss-Severe muscle mass loss, Clavicles (pectoralis and deltoids)  5. Fluid Accumulation-No significant fluid accumulation, Extremities  6.  Strength-Not measured    Nutrition Risk Level: High    Nutrient Needs:  · Estimated Daily Total Kcal: 3898-6616 based on Baxter-Wt. Osie Adas with 1.2-1.3 factor for maintenance using wt of 43.5kg. 2753-1449 for wt gain (additional 250-500 kcal)  · Estimated Daily Protein (g): 65 based on 1.5 gm per kg usual wt    Nutrition Diagnosis:   · Problem: Severe malnutrition  · Etiology: related to Insufficient energy/nutrient consumption, Psychological cause/life stress, Alteration in GI function     Signs and symptoms:  as evidenced by Severe muscle loss, Diet history of poor intake, Intake 0-25%, Intake 25-50%    Objective Information:  · Nutrition-Focused Physical Findings: No edema. Very thin. Diarrhea. N+V. Pelvic abscess drain placed 6/19. Past Hx of rectal cancer.  Na: 135 (6/26), PAB: 10.8 (6/17)  · Current Nutrition Therapies:  · Oral Diet Orders: Full Liquid   · Oral Diet intake: Unable to assess(just ordered-was at surgery)  · Anthropometric Measures:  · Ht: 5' (152.4 cm)   · Current Body Wt: 80 lb (36.3 kg)  · Usual Body Wt: 96 lb (43.5 kg)(43.5 kg approximately 1 month ago per pt; 52.3 kg approximately 1 year ago per pt)  · Ideal Body Wt: 100 lb (45.4 kg), % Ideal Body 78%  · BMI Classification: BMI <18.5 Underweight    Nutrition Interventions:   Start oral diet, Start ONS  Continued Inpatient Monitoring, Education not appropriate at this time    Nutrition Evaluation:   · Evaluation: No progress toward goals   · Goals: PO intake % of meals and supplements    · Monitoring: Nutrition Progression, Meal Intake, Supplement Intake, Diet Tolerance, Skin Integrity, Mental Status/Confusion, Weight, Pertinent Labs, Nausea or Vomiting, Diarrhea      Some areas of assessment may be incomplete due to COVID-19 precautions. Jessica Way R.D., L.D.   Clinical Dietitian  Office: [phone]

## 2020-07-03 NOTE — PLAN OF CARE
Nutrition Problem: Severe malnutrition  Intervention: Food and/or Nutrient Delivery: Start oral diet, Start ONS  Nutritional Goals: PO intake % of meals and supplements

## 2020-07-03 NOTE — PLAN OF CARE
Problem: Falls - Risk of:  Goal: Will remain free from falls  Description: Will remain free from falls  Outcome: Met This Shift     Problem: Cardiac Output - Decreased:  Goal: Hemodynamic stability will improve  Description: Hemodynamic stability will improve  Outcome: Ongoing     Problem: Mental Status - Impaired:  Goal: Mental status will improve  Description: Mental status will improve  Outcome: Ongoing     Problem: Nutrition  Goal: Optimal nutrition therapy  7/3/2020 1831 by Grant Dickerson RN  Outcome: Ongoing     Problem: Skin Integrity:  Goal: Will show no infection signs and symptoms  Description: Will show no infection signs and symptoms  Outcome: Ongoing     Problem: Skin Integrity:  Goal: Absence of new skin breakdown  Description: Absence of new skin breakdown  Outcome: Ongoing     Problem: Pain:  Goal: Pain level will decrease  Description: Pain level will decrease  Outcome: Ongoing     Problem: Pain:  Goal: Control of acute pain  Description: Control of acute pain  Outcome: Ongoing     Problem: Pain:  Goal: Control of chronic pain  Description: Control of chronic pain  Outcome: Ongoing

## 2020-07-03 NOTE — PROGRESS NOTES
history, and family history, and I haveupdated the database accordingly.   Past Medical History:     Past Medical History:   Diagnosis Date    Cancer [facility])     Rectal    Pelvis, female abscess     pre sacral drain put in 6/19/2020    UTI (urinary tract infection)     stent place june 2020       Past Surgical  History:     Past Surgical History:   Procedure Laterality Date    ABSCESS DRAINAGE      pre sacral pelvic abscess with drain placement to right buttock    SMALL INTESTINE SURGERY N/A 7/3/2020    TRANSVERSE COLOSTOMY LOOP performed by Fletcher Delgado MD at [facility]       Medications:      ceFAZolin  1 g Intravenous Q8H    ferrous sulfate  325 mg Oral BID WC    metroNIDAZOLE  500 mg Intravenous Once    fluconazole  200 mg Intravenous Q24H    metroNIDAZOLE  500 mg Intravenous Q8H    mirtazapine  15 mg Oral Nightly    OLANZapine  5 mg Oral Nightly    VORTIoxetine  10 mg Oral Daily    pantoprazole  40 mg Oral Daily    busPIRone  15 mg Oral TID    tamsulosin  0.4 mg Oral Daily    cariprazine hcl  3 mg Oral Daily       Social History:     Social History     Socioeconomic History    Marital status: Unknown     Spouse name: Not on file    Number of children: Not on file    Years of education: Not on file    Highest education level: Not on file   Occupational History    Not on file   Social Needs    Financial resource strain: Not on file    Food insecurity     Worry: Not on file     Inability: Not on file   [employer] needs     Medical: Not on file     Non-medical: Not on file   Tobacco Use    Smoking status: Never Smoker    Smokeless tobacco: Never Used    Tobacco comment: pt is a non smoker   Substance and Sexual Activity    Alcohol use: Not on file    Drug use: Not on file    Sexual activity: Not on file   Lifestyle    Physical activity     Days per week: Not on file     Minutes per session: Not on file    Stress: Not on file   Relationships    Social connections     Talks on phone: Not on file     Gets together: Not on file     Attends [employer] service: Not on file     Active member of club or organization: Not on file     Attends meetings of clubs or organizations: Not on file     Relationship status: Not on file    Intimate partner violence     Fear of current or ex partner: Not on file     Emotionally abused: Not on file     Physically abused: Not on file     Forced sexual activity: Not on file   Other Topics Concern    Not on file   Social History Narrative    Not on file       Family History:   History reviewed. No pertinent family history. Allergies:   Patient has no known allergies. Review of Systems:     Review of Systems   Constitutional: Negative. HENT: Negative. Eyes: Negative. Respiratory: Negative. Cardiovascular: Negative. Gastrointestinal: Negative. Endocrine: Negative. Genitourinary: Negative. Musculoskeletal: Negative. Skin: Negative. Allergic/Immunologic: Negative. Neurological: Negative. Hematological: Negative. Psychiatric/Behavioral: Negative for agitation and confusion. As per history present illness other than above 14 systems reviewed were negative  Physical Examination :     Patient Vitals for the past 8 hrs:   BP Temp Temp src Pulse Resp SpO2 Weight   07/04/20 0915 112/73 97.8 °F (36.6 °C) Oral 98 17 98 % --   07/04/20 0715 (!) 99/55 97.5 °F (36.4 °C) Oral 105 16 100 % --   07/04/20 0527 -- -- -- -- -- -- 82 lb 10.8 oz (37.5 kg)       Physical Exam  Vitals signs and nursing note reviewed. Constitutional:       General: She is not in acute distress. Appearance: Normal appearance. She is not ill-appearing. HENT:      Head: Normocephalic and atraumatic. Nose: Nose normal.      Mouth/Throat:      Mouth: Mucous membranes are moist.      Pharynx: Oropharynx is clear. Eyes:      Extraocular Movements: Extraocular movements intact.       Conjunctiva/sclera: Conjunctivae normal.      Pupils: Pupils are equal, round, and reactive to light. Neck:      Musculoskeletal: Normal range of motion. No neck rigidity. Cardiovascular:      Rate and Rhythm: Normal rate and regular rhythm. Heart sounds: Normal heart sounds. No murmur. Pulmonary:      Effort: Pulmonary effort is normal. No respiratory distress. Breath sounds: Normal breath sounds. Abdominal:      General: Abdomen is flat. There is no distension. Palpations: Abdomen is soft. Tenderness: There is no abdominal tenderness. Comments: Colostomy stoma beefy red. Liquid brown stool. Genitourinary:     Comments: No hollins. Musculoskeletal: Normal range of motion. General: No swelling or tenderness. Right lower leg: No edema. Left lower leg: No edema. Skin:     General: Skin is warm and dry. Capillary Refill: Capillary refill takes less than 2 seconds. Findings: No erythema. Neurological:      General: No focal deficit present. Mental Status: She is alert and oriented to person, place, and time. Psychiatric:         Mood and Affect: Mood normal.         Behavior: Behavior normal.       Right upper chest port in place    Medical Decision Making:   I have independently reviewed/ordered the following labs:    CBC with Differential:   Recent Labs     07/03/20  0431 07/04/20  0439   WBC 11.7* 8.0   HGB 7.6* 7.4*   HCT 23.7* 23.8*    388   LYMPHOPCT 21* 16*   MONOPCT 11* 4     BMP:  Recent Labs     07/03/20  0431 07/03/20  1859 07/04/20  0439     --  144   K 3.0* 3.7 3.5*   *  --  111*   CO2 28  --  24   BUN 4*  --  7   CREATININE 0.59  --  0.54   MG 2.0  --  2.0       Lab Results   Component Value Date    CREATININE 0.54 07/04/2020    GLUCOSE 134 07/04/2020       Detailed results: Thank you for allowing us to participate in the care of this patient. Please call with questions.     This note is created with the assistance of a speech recognition program.  While intending to generate adocument that actually reflects the content of the visit, the document can still have some errors including those of syntax and sound a like substitutions which may escape proof reading. It such instances, actual meaningcan be extrapolated by contextual diversion.     [employer], APRN - CNP  Office: [phone]  Perfect serve / office [phone]

## 2020-07-03 NOTE — ANESTHESIA POSTPROCEDURE EVALUATION
POST- ANESTHESIA EVALUATION       Pt Name: Danni Root  MRN: [id number]  YOB: 1975  Date of evaluation: 7/3/2020  Time:  3:51 PM      /71   Pulse 87   Temp 97.7 °F (36.5 °C)   Resp 16   Ht 5' (1.524 m)   Wt 80 lb 14.5 oz (36.7 kg)   LMP 06/25/2017   SpO2 94%   BMI 15.80 kg/m²      Consciousness Level  Awake  Cardiopulmonary Status  Stable  Pain Adequately Treated YES  Nausea / Vomiting  NO  Adequate Hydration  YES  Anesthesia Related Complications NONE      Electronically signed by Blue Callejas MD on 7/3/2020 at 3:51 PM       Department of Anesthesiology  Postprocedure Note    Patient: Danni Root  MRN: [id number]  YOB: 1975  Date of evaluation: 7/3/2020  Time:  3:51 PM     Procedure Summary     Date:  07/03/20 Room / Location:  [address] [place] [address]    Anesthesia Start:  0430 Anesthesia Stop:  7553    Procedure:  TRANSVERSE COLOSTOMY LOOP (N/A Abdomen) Diagnosis:  (RECTAL FISTULA)    Surgeon:  Raeann Brunson MD Responsible Provider:  Blue Callejas MD    Anesthesia Type:  general ASA Status:  3          Anesthesia Type: general    Kinjal Phase I: Kinjal Score: 9    Kinjal Phase II:      Last vitals: Reviewed and per EMR flowsheets.        Anesthesia Post Evaluation

## 2020-07-03 NOTE — OP NOTE
Operative Note      Patient: Raina Granda  YOB: 1975  MRN: [id number]    Date of Procedure: 7/3/2020    Pre-Op Diagnosis: RECTAL FISTULA    Post-Op Diagnosis: Same       Procedure(s):  TRANSVERSE  LOOP COLOSTOMY    Surgeon(s):  Rosaura Oliveira MD    Assistant:   First Assistant: TOÑITO Monsalve    Anesthesia: General    Estimated Blood Loss (mL): Minimal    Complications: None    Specimens:   * No specimens in log *    Implants:  * No implants in log *      Drains:   Closed/Suction Drain Right Buttock  8 Setswana (Active)   Site Description Reddened 7/3/2020  4:28 AM   Dressing Status Clean;Dry; Intact 7/3/2020  4:28 AM   Drainage Appearance Brown;Bloody 7/3/2020  4:28 AM   Status To bulb suction 7/3/2020  4:28 AM   Output (ml) 10 ml 6/30/2020  6:16 PM       Colostomy RUQ Loop (Active)       [REMOVED] Urethral Catheter Non-latex 16 fr (Removed)       Findings: Dictated    Detailed Description of Procedure:   Informed consent was obtained. Preoperative antibiotics were given. Patient was taken to the operating room. General anesthesia was given. Koch catheter was placed. Abdomen was prepped and draped in usual sterile fashion. Timeout was done. Right upper quadrant incision was made. Incision was deepened with help of Bovie cautery. Abdominal cavity was entered. Loop of transverse colon was right underneath. This was brought out. A small window was created in the mesentery of the transverse colon. Plastic bridge was inserted and was secured to the skin. Colostomy was matured in the usual fashion using 3-0 Vicryl suture. Loop colostomy was accomplished. Ostomy appliance was applied. Hemostasis was confirmed. Sponge needle instrument count was found to be correct. Patient tolerated procedure well and was transferred to the recovery room in a stable condition. Recommendations: Resume full liquid diet. Resume preop orders. Ostomy nurse to see.   Monitor drain output.     Electronically signed by Vickey Ryan MD on 7/3/2020 at 12:22 PM

## 2020-07-04 LAB
ABSOLUTE EOS #: 0 K/UL (ref 0–0.4)
ABSOLUTE IMMATURE GRANULOCYTE: ABNORMAL K/UL (ref 0–0.3)
ABSOLUTE LYMPH #: 1.3 K/UL (ref 1–4.8)
ABSOLUTE MONO #: 0.3 K/UL (ref 0.1–1.3)
ANION GAP SERPL CALCULATED.3IONS-SCNC: 9 MMOL/L (ref 9–17)
BASOPHILS # BLD: 0 % (ref 0–2)
BASOPHILS ABSOLUTE: 0 K/UL (ref 0–0.2)
BUN BLDV-MCNC: 7 MG/DL (ref 6–20)
BUN/CREAT BLD: ABNORMAL (ref 9–20)
CALCIUM SERPL-MCNC: 7.8 MG/DL (ref 8.6–10.4)
CHLORIDE BLD-SCNC: 111 MMOL/L (ref 98–107)
CO2: 24 MMOL/L (ref 20–31)
CREAT SERPL-MCNC: 0.54 MG/DL (ref 0.5–0.9)
DIFFERENTIAL TYPE: ABNORMAL
EOSINOPHILS RELATIVE PERCENT: 0 % (ref 0–4)
GFR AFRICAN AMERICAN: >60 ML/MIN
GFR NON-AFRICAN AMERICAN: >60 ML/MIN
GFR SERPL CREATININE-BSD FRML MDRD: ABNORMAL ML/MIN/{1.73_M2}
GFR SERPL CREATININE-BSD FRML MDRD: ABNORMAL ML/MIN/{1.73_M2}
GLUCOSE BLD-MCNC: 134 MG/DL (ref 70–99)
HCT VFR BLD CALC: 23.8 % (ref 36–46)
HEMOGLOBIN: 7.4 G/DL (ref 12–16)
IMMATURE GRANULOCYTES: ABNORMAL %
LYMPHOCYTES # BLD: 16 % (ref 24–44)
MAGNESIUM: 2 MG/DL (ref 1.6–2.6)
MCH RBC QN AUTO: 27.8 PG (ref 26–34)
MCHC RBC AUTO-ENTMCNC: 30.9 G/DL (ref 31–37)
MCV RBC AUTO: 89.7 FL (ref 80–100)
MONOCYTES # BLD: 4 % (ref 1–7)
NRBC AUTOMATED: ABNORMAL PER 100 WBC
PDW BLD-RTO: 16.2 % (ref 11.5–14.9)
PLATELET # BLD: 388 K/UL (ref 150–450)
PLATELET ESTIMATE: ABNORMAL
PMV BLD AUTO: 8.8 FL (ref 6–12)
POTASSIUM SERPL-SCNC: 3.5 MMOL/L (ref 3.7–5.3)
RBC # BLD: 2.65 M/UL (ref 4–5.2)
RBC # BLD: ABNORMAL 10*6/UL
SEG NEUTROPHILS: 80 % (ref 36–66)
SEGMENTED NEUTROPHILS ABSOLUTE COUNT: 6.4 K/UL (ref 1.3–9.1)
SODIUM BLD-SCNC: 144 MMOL/L (ref 135–144)
WBC # BLD: 8 K/UL (ref 3.5–11)
WBC # BLD: ABNORMAL 10*3/UL

## 2020-07-04 PROCEDURE — 6370000000 HC RX 637 (ALT 250 FOR IP): Performed by: SURGERY

## 2020-07-04 PROCEDURE — 99232 SBSQ HOSP IP/OBS MODERATE 35: CPT | Performed by: NURSE PRACTITIONER

## 2020-07-04 PROCEDURE — 80048 BASIC METABOLIC PNL TOTAL CA: CPT

## 2020-07-04 PROCEDURE — 2580000003 HC RX 258: Performed by: INTERNAL MEDICINE

## 2020-07-04 PROCEDURE — 36415 COLL VENOUS BLD VENIPUNCTURE: CPT

## 2020-07-04 PROCEDURE — 85025 COMPLETE CBC W/AUTO DIFF WBC: CPT

## 2020-07-04 PROCEDURE — 6360000002 HC RX W HCPCS: Performed by: SURGERY

## 2020-07-04 PROCEDURE — 2500000003 HC RX 250 WO HCPCS: Performed by: SURGERY

## 2020-07-04 PROCEDURE — 2580000003 HC RX 258: Performed by: SURGERY

## 2020-07-04 PROCEDURE — 6370000000 HC RX 637 (ALT 250 FOR IP): Performed by: INTERNAL MEDICINE

## 2020-07-04 PROCEDURE — 99232 SBSQ HOSP IP/OBS MODERATE 35: CPT | Performed by: INTERNAL MEDICINE

## 2020-07-04 PROCEDURE — 6370000000 HC RX 637 (ALT 250 FOR IP): Performed by: PHYSICIAN ASSISTANT

## 2020-07-04 PROCEDURE — 83735 ASSAY OF MAGNESIUM: CPT

## 2020-07-04 PROCEDURE — 6360000002 HC RX W HCPCS: Performed by: INTERNAL MEDICINE

## 2020-07-04 PROCEDURE — 2060000000 HC ICU INTERMEDIATE R&B

## 2020-07-04 RX ORDER — CIPROFLOXACIN 2 MG/ML
400 INJECTION, SOLUTION INTRAVENOUS EVERY 12 HOURS
Status: DISCONTINUED | OUTPATIENT
Start: 2020-07-04 | End: 2020-07-04

## 2020-07-04 RX ORDER — FERROUS SULFATE 325(65) MG
325 TABLET ORAL 2 TIMES DAILY WITH MEALS
Status: DISCONTINUED | OUTPATIENT
Start: 2020-07-04 | End: 2020-07-05 | Stop reason: HOSPADM

## 2020-07-04 RX ADMIN — OXYCODONE HYDROCHLORIDE AND ACETAMINOPHEN 1 TABLET: 5; 325 TABLET ORAL at 02:03

## 2020-07-04 RX ADMIN — SODIUM CHLORIDE: 9 INJECTION, SOLUTION INTRAVENOUS at 03:37

## 2020-07-04 RX ADMIN — TAMSULOSIN HYDROCHLORIDE 0.4 MG: 0.4 CAPSULE ORAL at 08:41

## 2020-07-04 RX ADMIN — BUSPIRONE HYDROCHLORIDE 15 MG: 15 TABLET ORAL at 15:04

## 2020-07-04 RX ADMIN — FERROUS SULFATE TAB 325 MG (65 MG ELEMENTAL FE) 325 MG: 325 (65 FE) TAB at 17:02

## 2020-07-04 RX ADMIN — MIRTAZAPINE 15 MG: 15 TABLET, FILM COATED ORAL at 20:40

## 2020-07-04 RX ADMIN — Medication 1 LOZENGE: at 17:09

## 2020-07-04 RX ADMIN — PANTOPRAZOLE SODIUM 40 MG: 40 TABLET, DELAYED RELEASE ORAL at 08:41

## 2020-07-04 RX ADMIN — METRONIDAZOLE 500 MG: 500 INJECTION, SOLUTION INTRAVENOUS at 02:03

## 2020-07-04 RX ADMIN — OLANZAPINE 5 MG: 10 TABLET, FILM COATED ORAL at 20:40

## 2020-07-04 RX ADMIN — POTASSIUM CHLORIDE 40 MEQ: 1500 TABLET, EXTENDED RELEASE ORAL at 08:41

## 2020-07-04 RX ADMIN — VORTIOXETINE 10 MG: 10 TABLET, FILM COATED ORAL at 08:44

## 2020-07-04 RX ADMIN — CEFAZOLIN 1 G: 1 INJECTION, POWDER, FOR SOLUTION INTRAMUSCULAR; INTRAVENOUS at 13:20

## 2020-07-04 RX ADMIN — CEFAZOLIN 1 G: 1 INJECTION, POWDER, FOR SOLUTION INTRAMUSCULAR; INTRAVENOUS at 04:59

## 2020-07-04 RX ADMIN — BUSPIRONE HYDROCHLORIDE 15 MG: 15 TABLET ORAL at 20:40

## 2020-07-04 RX ADMIN — CEFAZOLIN 1 G: 1 INJECTION, POWDER, FOR SOLUTION INTRAMUSCULAR; INTRAVENOUS at 20:41

## 2020-07-04 RX ADMIN — BUSPIRONE HYDROCHLORIDE 15 MG: 15 TABLET ORAL at 08:41

## 2020-07-04 RX ADMIN — SODIUM CHLORIDE: 9 INJECTION, SOLUTION INTRAVENOUS at 12:25

## 2020-07-04 RX ADMIN — CARIPRAZINE 3 MG: 3 CAPSULE, GELATIN COATED ORAL at 08:44

## 2020-07-04 RX ADMIN — METRONIDAZOLE 500 MG: 500 INJECTION, SOLUTION INTRAVENOUS at 08:44

## 2020-07-04 RX ADMIN — METRONIDAZOLE 500 MG: 500 INJECTION, SOLUTION INTRAVENOUS at 17:02

## 2020-07-04 RX ADMIN — FLUCONAZOLE 200 MG: 200 INJECTION, SOLUTION INTRAVENOUS at 15:04

## 2020-07-04 RX ADMIN — OXYCODONE HYDROCHLORIDE AND ACETAMINOPHEN 1 TABLET: 5; 325 TABLET ORAL at 15:04

## 2020-07-04 ASSESSMENT — PAIN SCALES - GENERAL
PAINLEVEL_OUTOF10: 0
PAINLEVEL_OUTOF10: 4
PAINLEVEL_OUTOF10: 0
PAINLEVEL_OUTOF10: 0
PAINLEVEL_OUTOF10: 4

## 2020-07-04 ASSESSMENT — PAIN DESCRIPTION - LOCATION
LOCATION: ABDOMEN

## 2020-07-04 ASSESSMENT — ENCOUNTER SYMPTOMS
RESPIRATORY NEGATIVE: 1
EYES NEGATIVE: 1
ALLERGIC/IMMUNOLOGIC NEGATIVE: 1
GASTROINTESTINAL NEGATIVE: 1

## 2020-07-04 ASSESSMENT — PAIN DESCRIPTION - DESCRIPTORS
DESCRIPTORS: ACHING

## 2020-07-04 ASSESSMENT — PAIN DESCRIPTION - ORIENTATION
ORIENTATION: RIGHT
ORIENTATION: RIGHT
ORIENTATION: LEFT

## 2020-07-04 ASSESSMENT — PAIN DESCRIPTION - PAIN TYPE
TYPE: ACUTE PAIN

## 2020-07-04 NOTE — ANESTHESIA POST-OP
Pain is adequately controlled   No N/V   tolerating oral liquid diet   no apparent post op anesthesia problems

## 2020-07-04 NOTE — PROGRESS NOTES
Nutrition Note    Type and Reason for Visit: Reassess    Nutrition Recommendations: Continue Low-Fiber diet and Ensure Enlive 3x/day. Nutrition Assessment:  Patient tolerated a Full liquid diet. Encouraged patient to drink Ensure Enlive supplements. If p.o intakes continue to be down reconsider nutrition support. Diet advanced to Low-fiber diet. Continue Ensure Enlive 3x/day. Monitor p.o intakes and labs. Refer to nutrition assessment for more detail. Malnutrition Assessment:  · Malnutrition Status: Meets the criteria for severe malnutrition    Nutrition Risk Level   Risk Level: High    Nutrition Diagnosis:   · Problem: Severe malnutrition  · Etiology: Insufficient energy/nutrient consumption, Psychological cause/life stress, Alteration in GI function    Signs and symptoms: Severe muscle loss, Diet history of poor intake, Intake 0-25%, Intake 25-50%    Nutrition Intervention:  Food and/or Delivery: Continue current diet, Continue current ONS  Nutrition Education/Counseling/Coordination of Care:  Continued Inpatient Monitoring      Some areas of assessment maybe incomplete due to COVID-19 precautions.     Rodolfo ASTUDILLO, R.D, L.D,  Clinical Dietitian  Office # [phone]

## 2020-07-04 NOTE — PROGRESS NOTES
[facility]                 PATIENT NAME: Sharri Brunner     TODAY'S DATE: 7/4/2020, 9:46 AM    SUBJECTIVE:  POD#1  Pt seen and examined. Afebrile, VSS. S/p diverting transverse loop colostomy. Patient is doing well today. Mild postoperative abdominal soreness. Colostomy functioning; brown liquid stool in ostomy bag. Ostomy pink and moist. Tolerating full liquid diet. No N/V. No stool leakage around gluteal drain currently; dressing clean and dry. OBJECTIVE:   VITALS:  /73   Pulse 98   Temp 97.8 °F (36.6 °C) (Oral)   Resp 17   Ht 5' (1.524 m)   Wt 82 lb 10.8 oz (37.5 kg)   LMP 06/25/2017   SpO2 98%   BMI 16.15 kg/m²      INTAKE/OUTPUT:      Intake/Output Summary (Last 24 hours) at 7/4/2020 0946  Last data filed at 7/4/2020 0856  Gross per 24 hour   Intake 4788.83 ml   Output 2050 ml   Net 2738.83 ml                 CONSTITUTIONAL:  awake and alert.   No acute distress  HEART:   RRR  LUNGS:   CTA, no wheezing  ABDOMEN:   Abdomen soft, mildly tender surrounding ostomy site, non-distended  EXTREMITIES:   No pedal edema    Data:  CBC:   Lab Results   Component Value Date    WBC 8.0 07/04/2020    RBC 2.65 07/04/2020    HGB 7.4 07/04/2020    HCT 23.8 07/04/2020    MCV 89.7 07/04/2020    MCH 27.8 07/04/2020    MCHC 30.9 07/04/2020    RDW 16.2 07/04/2020     07/04/2020    MPV 8.8 07/04/2020     BMP:    Lab Results   Component Value Date     07/04/2020    K 3.5 07/04/2020     07/04/2020    CO2 24 07/04/2020    BUN 7 07/04/2020    LABALBU 3.1 06/17/2020    CREATININE 0.54 07/04/2020    CALCIUM 7.8 07/04/2020    GFRAA >60 07/04/2020    LABGLOM >60 07/04/2020    GLUCOSE 134 07/04/2020       Radiology Review:  No new images to review      ASSESSMENT     Principal Problem:    Rectal fistula  Active Problems:    Severe malnutrition (HCC)    Tachycardia    Diarrhea    Anemia    History of rectal cancer    Dehydration  Resolved Problems:    * No resolved hospital problems. *      Plan  1. Full liquid diet as tolerated  2. Colostomy functioning well   3. Antibiotics per ID  4. Ostomy education per [id number]  RN  5. Patient is surgically stable  6. Continue medical management   7. Patient was seen and examined. Colostomy is functioning. Tolerating liquids. No significant drainage around the drain site anymore. Advance diet. Continue medical management.       Electronically signed by Yvette Cline PA-C  [address] [address]

## 2020-07-04 NOTE — PLAN OF CARE
Problem: Falls - Risk of:  Goal: Will remain free from falls  Description: Will remain free from falls  7/4/2020 0414 by Winston Ruiz RN  Outcome: Ongoing   The patient remained free from falls this shift, call light within reach, bed in locked and lowest position. Side rails up x2. Continue to monitor closely.    Problem: Mental Status - Impaired:  Goal: Mental status will improve  Description: Mental status will improve  7/4/2020 0414 by Winston Ruiz RN  Outcome: Ongoing   Pts remains alert and oriented x4 this shift    Problem: Cardiac Output - Decreased:  Goal: Hemodynamic stability will improve  Description: Hemodynamic stability will improve  7/4/2020 0414 by Winston Ruiz RN  Outcome: Ongoing     Problem: Nutrition  Goal: Optimal nutrition therapy  7/4/2020 0414 by Winston Ruiz RN  Outcome: Ongoing   Pt remains on a full liquid diet    Problem: Pain:  Goal: Pain level will decrease  Description: Pain level will decrease  7/4/2020 0414 by Winston Ruiz RN  Outcome: Ongoing   Pt has had some complaints of pain throughout this shift but states the current pain meds are helping to control the pain    Problem: Skin Integrity:  Goal: Absence of new skin breakdown  Description: Absence of new skin breakdown  7/4/2020 0414 by Winston Ruiz RN  Outcome: Ongoing

## 2020-07-04 NOTE — PROGRESS NOTES
[place] [address] Internal Medicine    Progress Note     7/4/2020    10:55 AM    Name:   Megan Garcia  MRN:     [id number]     Acct:      [de-identified]   Room:   [address] [place] [address] Day:  7  Admit Date:  6/25/2020  7:58 PM    PCP:   No primary care provider on file. Code Status:  Full Code    Subjective:     C/C: No chief complaint on file. Principal Problem:    Rectal fistula  Active Problems:    Severe malnutrition (HCC)    Tachycardia    Diarrhea    Anemia    History of rectal cancer    Dehydration  Resolved Problems:    * No resolved hospital problems. *      Interval History Status: worsened. Significant last 24 hr data reviewed ;   Vitals:    07/04/20 0008 07/04/20 0527 07/04/20 0715 07/04/20 0915   BP: 103/67  (!) 99/55 112/73   Pulse: 68  105 98   Resp: 18  16 17   Temp: 98.4 °F (36.9 °C)  97.5 °F (36.4 °C) 97.8 °F (36.6 °C)   TempSrc: Oral  Oral Oral   SpO2: 100%  100% 98%   Weight:  82 lb 10.8 oz (37.5 kg)     Height:          Recent Results (from the past 24 hour(s))   TYPE AND SCREEN    Collection Time: 07/03/20 11:21 AM   Result Value Ref Range    Expiration Date 07/06/2020,2199     Arm Band Number [id number]     ABO/Rh A POSITIVE     Antibody Screen NEGATIVE     Blood Bank Comment Patient ABO/Rh confirmed as:  A Positive [403].     K (Potassium)    Collection Time: 07/03/20  6:59 PM   Result Value Ref Range    Potassium 3.7 3.7 - 5.3 mmol/L   Basic Metabolic Panel w/ Reflex to MG    Collection Time: 07/04/20  4:39 AM   Result Value Ref Range    Glucose 134 (H) 70 - 99 mg/dL    BUN 7 6 - 20 mg/dL    CREATININE 0.54 0.50 - 0.90 mg/dL    Bun/Cre Ratio NOT REPORTED 9 - 20    Calcium 7.8 (L) 8.6 - 10.4 mg/dL    Sodium 144 135 - 144 mmol/L    Potassium 3.5 (L) 3.7 - 5.3 mmol/L    Chloride 111 (H) 98 - 107 mmol/L    CO2 24 20 - 31 mmol/L    Anion Gap 9 9 - 17 mmol/L    GFR Non-African American >60 >60 mL/min    GFR African American >60 >60 mL/min    GFR Comment GFR Staging NOT REPORTED    CBC auto differential    Collection Time: 07/04/20  4:39 AM   Result Value Ref Range    WBC 8.0 3.5 - 11.0 k/uL    RBC 2.65 (L) 4.0 - 5.2 m/uL    Hemoglobin 7.4 (L) 12.0 - 16.0 g/dL    Hematocrit 23.8 (L) 36 - 46 %    MCV 89.7 80 - 100 fL    MCH 27.8 26 - 34 pg    MCHC 30.9 (L) 31 - 37 g/dL    RDW 16.2 (H) 11.5 - 14.9 %    Platelets 375 520 - 901 k/uL    MPV 8.8 6.0 - 12.0 fL    NRBC Automated NOT REPORTED per 100 WBC    Differential Type NOT REPORTED     Immature Granulocytes NOT REPORTED 0 %    Absolute Immature Granulocyte NOT REPORTED 0.00 - 0.30 k/uL    WBC Morphology NOT REPORTED     RBC Morphology NOT REPORTED     Platelet Estimate NOT REPORTED     Seg Neutrophils 80 (H) 36 - 66 %    Lymphocytes 16 (L) 24 - 44 %    Monocytes 4 1 - 7 %    Eosinophils % 0 0 - 4 %    Basophils 0 0 - 2 %    Segs Absolute 6.40 1.3 - 9.1 k/uL    Absolute Lymph # 1.30 1.0 - 4.8 k/uL    Absolute Mono # 0.30 0.1 - 1.3 k/uL    Absolute Eos # 0.00 0.0 - 0.4 k/uL    Basophils Absolute 0.00 0.0 - 0.2 k/uL   Magnesium    Collection Time: 07/04/20  4:39 AM   Result Value Ref Range    Magnesium 2.0 1.6 - 2.6 mg/dL     No results for input(s): POCGLU in the last 72 hours. Ct Abdomen Pelvis W Iv Contrast Additional Contrast? Radiologist Recommendation    Result Date: 6/28/2020  EXAMINATION: CT OF THE ABDOMEN AND PELVIS WITH CONTRAST 6/28/2020 3:43 pm TECHNIQUE: CT of the abdomen and pelvis was performed with the administration of intravenous contrast. Multiplanar reformatted images are provided for review. Dose modulation, iterative reconstruction, and/or weight based adjustment of the mA/kV was utilized to reduce the radiation dose to as low as reasonably achievable.  COMPARISON: 06/17/2020 HISTORY: ORDERING SYSTEM PROVIDED HISTORY: fever tachycardia recent pelvic abscess TECHNOLOGIST PROVIDED HISTORY: fever tachycardia recent pelvic abscess Reason for Exam: fever tachycardia recent pelvic abscess Acuity: Unknown Type of Exam: Unknown FINDINGS: Lower Chest: No focal consolidation is seen at the lung bases. Organs: No splenomegaly. Adrenal glands appear normal. Hypodense nodule seen in the left kidney measuring 4 mm. This most likely represents cyst. There is mild right-sided hydronephrosis and hydroureter. Right ureteral stent is seen in this expected location. Gallstones are seen. No significant inflammatory change surrounding the gallbladder. Liver appears unchanged Pancreas appears unchanged GI/Bowel: Stomach is unremarkable. No significant small or large bowel distention noted Large stool load seen in the colon Pelvis: Drainage catheter has been is dysuria to in the posterior pelvis on the right since prior. The amount of fluid in the collection is slightly smaller. However, there is increasing gas seen in the collection, extending anteriorly into the pelvis on the right, abutting the bladder, and also into the gluteal region on the right. Anastomotic staple line seen in the rectosigmoid colon Peritoneum/Retroperitoneum: Small retroperitoneal nodes are seen. No aneurysm. Bones/Soft Tissues: Bones are unchanged. Persistent collection of fluid and gas in the pelvis, centered in the presacral space on the right. The amount of fluid has decreased, however the amount of gas has increased status post drainage catheter insertion             HPI:   See history in H and P      Review of Systems:     Status post a transverse loop colostomy pain is controlled denies any nausea vomiting stoma is working  Data:     Past Medical History:  no change     Social History:  no change    Family History: @no change    Vitals:      I/O (24Hr):     Intake/Output Summary (Last 24 hours) at 7/4/2020 1055  Last data filed at 7/4/2020 0856  Gross per 24 hour   Intake 4788.83 ml   Output 2050 ml   Net 2738.83 ml       Labs:    URINE ANALYSIS: No results found for: LABURIN     CBC:  Lab Results   Component Value Date    WBC 8.0 07/04/2020    HGB 7.4 07/04/2020     07/04/2020        BMP:    Lab Results   Component Value Date     07/04/2020    K 3.5 07/04/2020     07/04/2020    CO2 24 07/04/2020    BUN 7 07/04/2020    CREATININE 0.54 07/04/2020    GLUCOSE 134 07/04/2020      LIVER PROFILE:  Lab Results   Component Value Date    ALT 14 06/17/2020    AST 10 06/17/2020    PROT 6.6 06/17/2020    BILITOT 0.20 06/17/2020    LABALBU 3.1 06/17/2020               Radiology:      Physical Examination:        General appearance:  alert, cooperative and no distress  Mental Status: Alert lungs:  clear to auscultation bilaterally, normal effort  Heart:  regular rate and rhythm, no murmur  Abdomen: Post transverse colostomy extremities:  no edema, redness, tenderness in the calves  Skin:  no gross lesions, rashes, induration  Right gluteal drain noted brown collection looks like a loose stool  Assessment:        Primary Problem  Rectal fistula    Active Hospital Problems    Diagnosis Date Noted    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 History of for rectal cancer with pelvic abscesses and air in the pelvic area status post drain placed on last admission patient running persistent low-grade fever high as 101 today tachycardia pulse up to 122 drain noted coming from the right gluteus brown discharge and suspicious of fecal matter  2. CT scan done yesterday shows increasing gas but less of fluid collection  3. I discussed case on phone with surgeon dr Miles Mejía  4. On IV antibiotics ID and GI input noted plan is for repeat CT abdomen pelvis with rectal contrast and IV contrast  5. Patient may need be surgery possibly have perforated large bowel  6. At risk of getting septic  7. June 30  8. Rectal fistula noted  9.  IR DRAIN is draining fecal matter  10. Patient is still running fever needs surgical opinion  11. Heart rate goes in the 120s to 180s at risk of sepsis broad-spectrum antibiotic for gram-negative coverage  12. Sinus tachy on ekg  13.   14. July 2  15. surg plan noted  16. Family is available to give the consent for the above procedure diverting loop colostomy planned  17. Tachycardia is improving with IV antibiotics definitive treatment is surgical planned Friday am  18. Npo mid night tonight  19. cta rule out pe  20. Still sinus tach with minimal exertion  21. July 3  22. Late note entry  21. Post transverse colostomy  24. Pain control IV antibiotics  July 4  Post transverse colostomy  r reconsult psych for placement in [facility]  Pain controlled    Medications:      Allergies:  No Known Allergies    Current Meds:   Scheduled Meds:    metroNIDAZOLE  500 mg Intravenous Once    ceFAZolin  1 g Intravenous Q8H    fluconazole  200 mg Intravenous Q24H    cefTRIAXone (ROCEPHIN) IV  1 g Intravenous Q24H    metroNIDAZOLE  500 mg Intravenous Q8H    mirtazapine  15 mg Oral Nightly    OLANZapine  5 mg Oral Nightly    VORTIoxetine  10 mg Oral Daily    pantoprazole  40 mg Oral Daily    busPIRone  15 mg Oral TID    tamsulosin  0.4 mg Oral Daily    cariprazine hcl  3 mg Oral Daily     Continuous Infusions:    sodium chloride 100 mL/hr at 07/04/20 0337     PRN Meds: benzocaine-menthol, perflutren lipid microspheres, oxyCODONE-acetaminophen, ondansetron, potassium chloride **OR** potassium alternative oral replacement **OR** potassium chloride, acetaminophen, ondansetron, loperamide, hydrOXYzine, sodium chloride flush       Javier Cortez MD  7/4/2020  10:55 AM

## 2020-07-04 NOTE — PROGRESS NOTES
RN got pt up to bedside commode and noticed MEL drain tubing seemed farther out than this AM. Stool leaking around site more than this morning also. RN called Otoniel Claudio and notified her and said this has been happening. RN will change and reinforce dressing.

## 2020-07-04 NOTE — PROGRESS NOTES
[place] [address] Internal Medicine    Progress Note     7/4/2020    10:54 AM    Name:   Loki Florian  MRN:     [id number]     Acct:      [de-identified]   Room:   [address] [place] [address] Day:  7  Admit Date:  6/25/2020  7:58 PM    PCP:   No primary care provider on file. Code Status:  Full Code    Subjective:     C/C: No chief complaint on file. Principal Problem:    Rectal fistula  Active Problems:    Severe malnutrition (HCC)    Tachycardia    Diarrhea    Anemia    History of rectal cancer    Dehydration  Resolved Problems:    * No resolved hospital problems. *      Interval History Status: worsened. Significant last 24 hr data reviewed ;   Vitals:    07/04/20 0008 07/04/20 0527 07/04/20 0715 07/04/20 0915   BP: 103/67  (!) 99/55 112/73   Pulse: 68  105 98   Resp: 18  16 17   Temp: 98.4 °F (36.9 °C)  97.5 °F (36.4 °C) 97.8 °F (36.6 °C)   TempSrc: Oral  Oral Oral   SpO2: 100%  100% 98%   Weight:  82 lb 10.8 oz (37.5 kg)     Height:          Recent Results (from the past 24 hour(s))   TYPE AND SCREEN    Collection Time: 07/03/20 11:21 AM   Result Value Ref Range    Expiration Date 07/06/2020,4949     Arm Band Number [id number]     ABO/Rh A POSITIVE     Antibody Screen NEGATIVE     Blood Bank Comment Patient ABO/Rh confirmed as:  A Positive [[id number]].     K (Potassium)    Collection Time: 07/03/20  6:59 PM   Result Value Ref Range    Potassium 3.7 3.7 - 5.3 mmol/L   Basic Metabolic Panel w/ Reflex to MG    Collection Time: 07/04/20  4:39 AM   Result Value Ref Range    Glucose 134 (H) 70 - 99 mg/dL    BUN 7 6 - 20 mg/dL    CREATININE 0.54 0.50 - 0.90 mg/dL    Bun/Cre Ratio NOT REPORTED 9 - 20    Calcium 7.8 (L) 8.6 - 10.4 mg/dL    Sodium 144 135 - 144 mmol/L    Potassium 3.5 (L) 3.7 - 5.3 mmol/L    Chloride 111 (H) 98 - 107 mmol/L    CO2 24 20 - 31 mmol/L    Anion Gap 9 9 - 17 mmol/L    GFR Non-African American >60 >60 mL/min    GFR African American >60 >60 mL/min    GFR Comment Unknown Type of Exam: Unknown FINDINGS: Lower Chest: No focal consolidation is seen at the lung bases. Organs: No splenomegaly. Adrenal glands appear normal. Hypodense nodule seen in the left kidney measuring 4 mm. This most likely represents cyst. There is mild right-sided hydronephrosis and hydroureter. Right ureteral stent is seen in this expected location. Gallstones are seen. No significant inflammatory change surrounding the gallbladder. Liver appears unchanged Pancreas appears unchanged GI/Bowel: Stomach is unremarkable. No significant small or large bowel distention noted Large stool load seen in the colon Pelvis: Drainage catheter has been is dysuria to in the posterior pelvis on the right since prior. The amount of fluid in the collection is slightly smaller. However, there is increasing gas seen in the collection, extending anteriorly into the pelvis on the right, abutting the bladder, and also into the gluteal region on the right. Anastomotic staple line seen in the rectosigmoid colon Peritoneum/Retroperitoneum: Small retroperitoneal nodes are seen. No aneurysm. Bones/Soft Tissues: Bones are unchanged. Persistent collection of fluid and gas in the pelvis, centered in the presacral space on the right. The amount of fluid has decreased, however the amount of gas has increased status post drainage catheter insertion             HPI:   See history in H and P      Review of Systems:     Patient has borderline intelligence unable to get a review of system  Data:     Past Medical History:  no change     Social History:  no change    Family History: @no change    Vitals:      I/O (24Hr):     Intake/Output Summary (Last 24 hours) at 7/4/2020 1054  Last data filed at 7/4/2020 0856  Gross per 24 hour   Intake 4788.83 ml   Output 2050 ml   Net 2738.83 ml       Labs:    URINE ANALYSIS: No results found for: LABURIN     CBC:  Lab Results   Component Value Date    WBC 8.0 07/04/2020    HGB 7.4 07/04/2020  07/04/2020        BMP:    Lab Results   Component Value Date     07/04/2020    K 3.5 07/04/2020     07/04/2020    CO2 24 07/04/2020    BUN 7 07/04/2020    CREATININE 0.54 07/04/2020    GLUCOSE 134 07/04/2020      LIVER PROFILE:  Lab Results   Component Value Date    ALT 14 06/17/2020    AST 10 06/17/2020    PROT 6.6 06/17/2020    BILITOT 0.20 06/17/2020    LABALBU 3.1 06/17/2020               Radiology:      Physical Examination:        General appearance:  alert, cooperative and no distress  Mental Status: Alert lungs:  clear to auscultation bilaterally, normal effort  Heart:  regular rate and rhythm, no murmur  Abdomen: Post transverse colostomy extremities:  no edema, redness, tenderness in the calves  Skin:  no gross lesions, rashes, induration  Right gluteal drain noted brown collection looks like a loose stool  Assessment:        Primary Problem  Rectal fistula    Active Hospital Problems    Diagnosis Date Noted    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 History of for rectal cancer with pelvic abscesses and air in the pelvic area status post drain placed on last admission patient running persistent low-grade fever high as 101 today tachycardia pulse up to 122 drain noted coming from the right gluteus brown discharge and suspicious of fecal matter  2. CT scan done yesterday shows increasing gas but less of fluid collection  3. I discussed case on phone with surgeon dr Bridget Orantes  4. On IV antibiotics ID and GI input noted plan is for repeat CT abdomen pelvis with rectal contrast and IV contrast  5. Patient may need be surgery possibly have perforated large bowel  6. At risk of getting septic  7. June 30  8. Rectal fistula noted  9. IR DRAIN is draining fecal matter  10.  Patient is still running fever needs surgical opinion  11. Heart rate goes in the 120s to 180s at risk of sepsis broad-spectrum antibiotic for gram-negative coverage  12. Sinus tachy on ekg  13.   14. July 2  15. surg plan noted  16. Family is available to give the consent for the above procedure diverting loop colostomy planned  17. Tachycardia is improving with IV antibiotics definitive treatment is surgical planned Friday am  18. Npo mid night tonight  19. cta rule out pe  20. Still sinus tach with minimal exertion  21. July 3  22. Late note entry  21. Post transverse colostomy  24. Pain control IV antibiotics  25. Medications:      Allergies:  No Known Allergies    Current Meds:   Scheduled Meds:    metroNIDAZOLE  500 mg Intravenous Once    ceFAZolin  1 g Intravenous Q8H    fluconazole  200 mg Intravenous Q24H    cefTRIAXone (ROCEPHIN) IV  1 g Intravenous Q24H    metroNIDAZOLE  500 mg Intravenous Q8H    mirtazapine  15 mg Oral Nightly    OLANZapine  5 mg Oral Nightly    VORTIoxetine  10 mg Oral Daily    pantoprazole  40 mg Oral Daily    busPIRone  15 mg Oral TID    tamsulosin  0.4 mg Oral Daily    cariprazine hcl  3 mg Oral Daily     Continuous Infusions:    sodium chloride 100 mL/hr at 07/04/20 0337     PRN Meds: benzocaine-menthol, perflutren lipid microspheres, oxyCODONE-acetaminophen, ondansetron, potassium chloride **OR** potassium alternative oral replacement **OR** potassium chloride, acetaminophen, ondansetron, loperamide, hydrOXYzine, sodium chloride flush       Christian Phillips MD  7/4/2020  10:54 AM

## 2020-07-04 NOTE — PLAN OF CARE
Problem: Falls - Risk of:  Goal: Will remain free from falls  Description: Will remain free from falls  7/4/2020 1622 by Jonel Beverly RN  Outcome: Ongoing  Note: Pt remained free from falls this shift. Call light within reach. Bed in lowest position. Bed alarm on.   7/4/2020 0414 by Sapphire Snyder RN  Outcome: Ongoing  Goal: Absence of physical injury  Description: Absence of physical injury  7/4/2020 1622 by Jonel Beverly RN  Outcome: Ongoing  7/4/2020 0414 by Sapphire Snyder RN  Outcome: Ongoing     Problem: Cardiac Output - Decreased:  Goal: Hemodynamic stability will improve  Description: Hemodynamic stability will improve  7/4/2020 1622 by Jonel Beverly RN  Outcome: Ongoing  7/4/2020 0414 by Sapphire Snyder RN  Outcome: Ongoing     Problem: Mental Status - Impaired:  Goal: Mental status will improve  Description: Mental status will improve  7/4/2020 1622 by Jonel Beverly RN  Outcome: Ongoing  7/4/2020 0414 by Sapphire Snyder RN  Outcome: Ongoing     Problem: Nutrition  Goal: Optimal nutrition therapy  7/4/2020 1622 by Jonel Beverly RN  Outcome: Ongoing  Note: Pt tolerating full liquid diet. Advancing to low fiber diet for dinner. 7/4/2020 0414 by Sapphire Snyder RN  Outcome: Ongoing     Problem: Skin Integrity:  Goal: Will show no infection signs and symptoms  Description: Will show no infection signs and symptoms  7/4/2020 1622 by Jonel Beverly RN  Outcome: Ongoing  Note: Pt had no new signs of skin breakdown this shift. Turns self in bed. 7/4/2020 0414 by Sapphire Snyder RN  Outcome: Ongoing  Goal: Absence of new skin breakdown  Description: Absence of new skin breakdown  7/4/2020 1622 by Jonel Beverly RN  Outcome: Ongoing  7/4/2020 0414 by Sapphire Snyder RN  Outcome: Ongoing     Problem: Pain:  Goal: Pain level will decrease  Description: Pain level will decrease  7/4/2020 1622 by Jonel Beverly RN  Outcome: Ongoing  Note: Pt complaining of abdomen pain this shift.  Given PRN percocet. Patient satisfied.    7/4/2020 0414 by Keith Vincent RN  Outcome: Ongoing  Goal: Control of acute pain  Description: Control of acute pain  7/4/2020 1622 by Alicia Mcgovern RN  Outcome: Ongoing  7/4/2020 0414 by Keith Vincent RN  Outcome: Ongoing  Goal: Control of chronic pain  Description: Control of chronic pain  7/4/2020 1622 by Alicia Mcgovern RN  Outcome: Ongoing  7/4/2020 0414 by Keith Vincent RN  Outcome: Ongoing

## 2020-07-05 ENCOUNTER — HOSPITAL ENCOUNTER (INPATIENT)
Age: 45
LOS: 5 days | Discharge: HOME | DRG: 750 | End: 2020-07-10
Attending: PSYCHIATRY & NEUROLOGY | Admitting: PSYCHIATRY & NEUROLOGY
Payer: MEDICARE

## 2020-07-05 VITALS
TEMPERATURE: 98.1 F | HEART RATE: 91 BPM | OXYGEN SATURATION: 99 % | SYSTOLIC BLOOD PRESSURE: 145 MMHG | HEIGHT: 60 IN | WEIGHT: 82.67 LBS | RESPIRATION RATE: 18 BRPM | DIASTOLIC BLOOD PRESSURE: 83 MMHG | BODY MASS INDEX: 16.23 KG/M2

## 2020-07-05 PROBLEM — F25.9 SCHIZOAFFECTIVE DISORDER (HCC): Status: ACTIVE | Noted: 2020-07-05

## 2020-07-05 LAB
ABSOLUTE EOS #: 0.09 K/UL (ref 0–0.4)
ABSOLUTE IMMATURE GRANULOCYTE: ABNORMAL K/UL (ref 0–0.3)
ABSOLUTE LYMPH #: 2.55 K/UL (ref 1–4.8)
ABSOLUTE MONO #: 0.36 K/UL (ref 0.1–1.3)
ANION GAP SERPL CALCULATED.3IONS-SCNC: 7 MMOL/L (ref 9–17)
BASOPHILS # BLD: 0 % (ref 0–2)
BASOPHILS ABSOLUTE: 0 K/UL (ref 0–0.2)
BUN BLDV-MCNC: 13 MG/DL (ref 6–20)
BUN/CREAT BLD: ABNORMAL (ref 9–20)
CALCIUM SERPL-MCNC: 7.9 MG/DL (ref 8.6–10.4)
CHLORIDE BLD-SCNC: 109 MMOL/L (ref 98–107)
CO2: 25 MMOL/L (ref 20–31)
CREAT SERPL-MCNC: 0.66 MG/DL (ref 0.5–0.9)
DIFFERENTIAL TYPE: ABNORMAL
EOSINOPHILS RELATIVE PERCENT: 1 % (ref 0–4)
GFR AFRICAN AMERICAN: >60 ML/MIN
GFR NON-AFRICAN AMERICAN: >60 ML/MIN
GFR SERPL CREATININE-BSD FRML MDRD: ABNORMAL ML/MIN/{1.73_M2}
GFR SERPL CREATININE-BSD FRML MDRD: ABNORMAL ML/MIN/{1.73_M2}
GLUCOSE BLD-MCNC: 113 MG/DL (ref 70–99)
HCT VFR BLD CALC: 26.1 % (ref 36–46)
HEMOGLOBIN: 8.2 G/DL (ref 12–16)
IMMATURE GRANULOCYTES: ABNORMAL %
LYMPHOCYTES # BLD: 28 % (ref 24–44)
MCH RBC QN AUTO: 28.2 PG (ref 26–34)
MCHC RBC AUTO-ENTMCNC: 31.2 G/DL (ref 31–37)
MCV RBC AUTO: 90.2 FL (ref 80–100)
MONOCYTES # BLD: 4 % (ref 1–7)
MORPHOLOGY: ABNORMAL
MYELOCYTES ABSOLUTE COUNT: 0.18 K/UL
MYELOCYTES: 2 %
NRBC AUTOMATED: ABNORMAL PER 100 WBC
PDW BLD-RTO: 16.4 % (ref 11.5–14.9)
PLATELET # BLD: 472 K/UL (ref 150–450)
PLATELET ESTIMATE: ABNORMAL
PMV BLD AUTO: 8.9 FL (ref 6–12)
POTASSIUM SERPL-SCNC: 3.7 MMOL/L (ref 3.7–5.3)
PROMYELOCYTES ABSOLUTE COUNT: 0.09 K/UL
PROMYELOCYTES: 1 %
RBC # BLD: 2.9 M/UL (ref 4–5.2)
RBC # BLD: ABNORMAL 10*6/UL
SEG NEUTROPHILS: 64 % (ref 36–66)
SEGMENTED NEUTROPHILS ABSOLUTE COUNT: 5.83 K/UL (ref 1.3–9.1)
SODIUM BLD-SCNC: 141 MMOL/L (ref 135–144)
WBC # BLD: 9.1 K/UL (ref 3.5–11)
WBC # BLD: ABNORMAL 10*3/UL

## 2020-07-05 PROCEDURE — 6370000000 HC RX 637 (ALT 250 FOR IP): Performed by: SURGERY

## 2020-07-05 PROCEDURE — 36415 COLL VENOUS BLD VENIPUNCTURE: CPT

## 2020-07-05 PROCEDURE — 99231 SBSQ HOSP IP/OBS SF/LOW 25: CPT | Performed by: INTERNAL MEDICINE

## 2020-07-05 PROCEDURE — 2580000003 HC RX 258: Performed by: INTERNAL MEDICINE

## 2020-07-05 PROCEDURE — 6360000002 HC RX W HCPCS: Performed by: INTERNAL MEDICINE

## 2020-07-05 PROCEDURE — 2580000003 HC RX 258: Performed by: SURGERY

## 2020-07-05 PROCEDURE — 6370000000 HC RX 637 (ALT 250 FOR IP): Performed by: INTERNAL MEDICINE

## 2020-07-05 PROCEDURE — 6360000002 HC RX W HCPCS: Performed by: SURGERY

## 2020-07-05 PROCEDURE — 85025 COMPLETE CBC W/AUTO DIFF WBC: CPT

## 2020-07-05 PROCEDURE — 80048 BASIC METABOLIC PNL TOTAL CA: CPT

## 2020-07-05 PROCEDURE — 90792 PSYCH DIAG EVAL W/MED SRVCS: CPT | Performed by: NURSE PRACTITIONER

## 2020-07-05 PROCEDURE — 1240000000 HC EMOTIONAL WELLNESS R&B

## 2020-07-05 PROCEDURE — 2500000003 HC RX 250 WO HCPCS: Performed by: SURGERY

## 2020-07-05 PROCEDURE — 99999 PR OFFICE/OUTPT VISIT,PROCEDURE ONLY: CPT | Performed by: PHYSICIAN ASSISTANT

## 2020-07-05 RX ORDER — FERROUS SULFATE 325(65) MG
325 TABLET ORAL 2 TIMES DAILY WITH MEALS
Status: DISCONTINUED | OUTPATIENT
Start: 2020-07-06 | End: 2020-07-10 | Stop reason: HOSPADM

## 2020-07-05 RX ORDER — NICOTINE 21 MG/24HR
1 PATCH, TRANSDERMAL 24 HOURS TRANSDERMAL DAILY
Status: DISCONTINUED | OUTPATIENT
Start: 2020-07-06 | End: 2020-07-07

## 2020-07-05 RX ORDER — BUSPIRONE HYDROCHLORIDE 15 MG/1
15 TABLET ORAL 3 TIMES DAILY
Status: DISCONTINUED | OUTPATIENT
Start: 2020-07-06 | End: 2020-07-10 | Stop reason: HOSPADM

## 2020-07-05 RX ORDER — BENZTROPINE MESYLATE 1 MG/ML
2 INJECTION INTRAMUSCULAR; INTRAVENOUS 2 TIMES DAILY PRN
Status: DISCONTINUED | OUTPATIENT
Start: 2020-07-05 | End: 2020-07-10 | Stop reason: HOSPADM

## 2020-07-05 RX ORDER — ACETAMINOPHEN 325 MG/1
650 TABLET ORAL EVERY 4 HOURS PRN
Status: DISCONTINUED | OUTPATIENT
Start: 2020-07-05 | End: 2020-07-10 | Stop reason: HOSPADM

## 2020-07-05 RX ORDER — OLANZAPINE 5 MG/1
5 TABLET ORAL NIGHTLY
Status: DISCONTINUED | OUTPATIENT
Start: 2020-07-06 | End: 2020-07-06

## 2020-07-05 RX ORDER — CEFUROXIME AXETIL 500 MG/1
500 TABLET ORAL 2 TIMES DAILY
Qty: 20 TABLET | Refills: 0 | Status: ON HOLD
Start: 2020-07-05 | End: 2020-07-09 | Stop reason: HOSPADM

## 2020-07-05 RX ORDER — OXYCODONE HYDROCHLORIDE AND ACETAMINOPHEN 5; 325 MG/1; MG/1
1 TABLET ORAL EVERY 4 HOURS PRN
Status: DISCONTINUED | OUTPATIENT
Start: 2020-07-05 | End: 2020-07-10 | Stop reason: HOSPADM

## 2020-07-05 RX ORDER — MIRTAZAPINE 15 MG/1
15 TABLET, FILM COATED ORAL NIGHTLY
Status: CANCELLED | OUTPATIENT
Start: 2020-07-05

## 2020-07-05 RX ORDER — OLANZAPINE 10 MG/1
5 TABLET ORAL NIGHTLY
Status: CANCELLED | OUTPATIENT
Start: 2020-07-05

## 2020-07-05 RX ORDER — BUSPIRONE HYDROCHLORIDE 15 MG/1
15 TABLET ORAL 3 TIMES DAILY
Status: CANCELLED | OUTPATIENT
Start: 2020-07-05

## 2020-07-05 RX ORDER — MIRTAZAPINE 15 MG/1
15 TABLET, FILM COATED ORAL NIGHTLY
Status: DISCONTINUED | OUTPATIENT
Start: 2020-07-06 | End: 2020-07-10 | Stop reason: HOSPADM

## 2020-07-05 RX ORDER — FERROUS SULFATE 325(65) MG
325 TABLET ORAL 2 TIMES DAILY WITH MEALS
Status: CANCELLED | OUTPATIENT
Start: 2020-07-05

## 2020-07-05 RX ORDER — METRONIDAZOLE 500 MG/1
500 TABLET ORAL EVERY 8 HOURS SCHEDULED
Status: DISCONTINUED | OUTPATIENT
Start: 2020-07-05 | End: 2020-07-10 | Stop reason: HOSPADM

## 2020-07-05 RX ORDER — OXYCODONE HYDROCHLORIDE AND ACETAMINOPHEN 5; 325 MG/1; MG/1
1 TABLET ORAL EVERY 4 HOURS PRN
Status: CANCELLED | OUTPATIENT
Start: 2020-07-05 | End: 2020-07-10

## 2020-07-05 RX ORDER — MAGNESIUM HYDROXIDE/ALUMINUM HYDROXICE/SIMETHICONE 120; 1200; 1200 MG/30ML; MG/30ML; MG/30ML
30 SUSPENSION ORAL EVERY 6 HOURS PRN
Status: DISCONTINUED | OUTPATIENT
Start: 2020-07-05 | End: 2020-07-10 | Stop reason: HOSPADM

## 2020-07-05 RX ORDER — HYDROXYZINE HYDROCHLORIDE 25 MG/1
25 TABLET, FILM COATED ORAL 3 TIMES DAILY PRN
Status: DISCONTINUED | OUTPATIENT
Start: 2020-07-05 | End: 2020-07-10 | Stop reason: HOSPADM

## 2020-07-05 RX ORDER — METRONIDAZOLE 500 MG/1
500 TABLET ORAL EVERY 8 HOURS SCHEDULED
Status: CANCELLED | OUTPATIENT
Start: 2020-07-05

## 2020-07-05 RX ORDER — HYDROXYZINE HYDROCHLORIDE 25 MG/1
25 TABLET, FILM COATED ORAL 3 TIMES DAILY PRN
Status: CANCELLED | OUTPATIENT
Start: 2020-07-05

## 2020-07-05 RX ORDER — TRAZODONE HYDROCHLORIDE 50 MG/1
50 TABLET ORAL NIGHTLY PRN
Status: DISCONTINUED | OUTPATIENT
Start: 2020-07-06 | End: 2020-07-10 | Stop reason: HOSPADM

## 2020-07-05 RX ADMIN — CEFAZOLIN 1 G: 1 INJECTION, POWDER, FOR SOLUTION INTRAMUSCULAR; INTRAVENOUS at 04:51

## 2020-07-05 RX ADMIN — CARIPRAZINE 3 MG: 3 CAPSULE, GELATIN COATED ORAL at 08:07

## 2020-07-05 RX ADMIN — SODIUM CHLORIDE: 9 INJECTION, SOLUTION INTRAVENOUS at 14:39

## 2020-07-05 RX ADMIN — PANTOPRAZOLE SODIUM 40 MG: 40 TABLET, DELAYED RELEASE ORAL at 08:07

## 2020-07-05 RX ADMIN — OXYCODONE HYDROCHLORIDE AND ACETAMINOPHEN 1 TABLET: 5; 325 TABLET ORAL at 18:33

## 2020-07-05 RX ADMIN — METRONIDAZOLE 500 MG: 500 INJECTION, SOLUTION INTRAVENOUS at 02:13

## 2020-07-05 RX ADMIN — VORTIOXETINE 10 MG: 10 TABLET, FILM COATED ORAL at 08:06

## 2020-07-05 RX ADMIN — METRONIDAZOLE 500 MG: 500 TABLET ORAL at 22:53

## 2020-07-05 RX ADMIN — METRONIDAZOLE 500 MG: 500 INJECTION, SOLUTION INTRAVENOUS at 08:06

## 2020-07-05 RX ADMIN — BUSPIRONE HYDROCHLORIDE 15 MG: 15 TABLET ORAL at 08:07

## 2020-07-05 RX ADMIN — FLUCONAZOLE 200 MG: 200 INJECTION, SOLUTION INTRAVENOUS at 14:39

## 2020-07-05 RX ADMIN — FERROUS SULFATE TAB 325 MG (65 MG ELEMENTAL FE) 325 MG: 325 (65 FE) TAB at 16:33

## 2020-07-05 RX ADMIN — SODIUM CHLORIDE: 9 INJECTION, SOLUTION INTRAVENOUS at 02:00

## 2020-07-05 RX ADMIN — TAMSULOSIN HYDROCHLORIDE 0.4 MG: 0.4 CAPSULE ORAL at 08:06

## 2020-07-05 RX ADMIN — ONDANSETRON 4 MG: 2 INJECTION INTRAMUSCULAR; INTRAVENOUS at 08:58

## 2020-07-05 RX ADMIN — BUSPIRONE HYDROCHLORIDE 15 MG: 15 TABLET ORAL at 14:34

## 2020-07-05 RX ADMIN — CEFAZOLIN 1 G: 1 INJECTION, POWDER, FOR SOLUTION INTRAMUSCULAR; INTRAVENOUS at 12:17

## 2020-07-05 RX ADMIN — FERROUS SULFATE TAB 325 MG (65 MG ELEMENTAL FE) 325 MG: 325 (65 FE) TAB at 08:06

## 2020-07-05 RX ADMIN — Medication 1 LOZENGE: at 01:15

## 2020-07-05 RX ADMIN — OXYCODONE HYDROCHLORIDE AND ACETAMINOPHEN 1 TABLET: 5; 325 TABLET ORAL at 09:22

## 2020-07-05 RX ADMIN — METRONIDAZOLE 500 MG: 500 INJECTION, SOLUTION INTRAVENOUS at 16:33

## 2020-07-05 ASSESSMENT — SLEEP AND FATIGUE QUESTIONNAIRES
DIFFICULTY FALLING ASLEEP: YES
RESTFUL SLEEP: NO
DIFFICULTY STAYING ASLEEP: YES
DO YOU HAVE DIFFICULTY SLEEPING: YES
DIFFICULTY ARISING: NO
SLEEP PATTERN: DIFFICULTY FALLING ASLEEP;RESTLESSNESS
AVERAGE NUMBER OF SLEEP HOURS: 4
DO YOU USE A SLEEP AID: NO

## 2020-07-05 ASSESSMENT — PAIN SCALES - WONG BAKER: WONGBAKER_NUMERICALRESPONSE: 0

## 2020-07-05 ASSESSMENT — PATIENT HEALTH QUESTIONNAIRE - PHQ9: SUM OF ALL RESPONSES TO PHQ QUESTIONS 1-9: 3

## 2020-07-05 ASSESSMENT — PAIN SCALES - GENERAL
PAINLEVEL_OUTOF10: 0
PAINLEVEL_OUTOF10: 0
PAINLEVEL_OUTOF10: 4
PAINLEVEL_OUTOF10: 6
PAINLEVEL_OUTOF10: 0
PAINLEVEL_OUTOF10: 0

## 2020-07-05 ASSESSMENT — LIFESTYLE VARIABLES: HISTORY_ALCOHOL_USE: NO

## 2020-07-05 NOTE — CARE COORDINATION
ONGOING DISCHARGE PLAN:    Patient from [facility] has been consulted to determine discharge planning. If patient discharged to home she is agreeable to [facility]. POD #2 Transverse Loop Colostomy. Active order for IV ancef, diflucan and flagyl- ID is on. Low fiber diet. Will continue to follow for additional discharge needs.     Electronically signed by Adriane Gr RN on 7/5/2020 at 8:26 AM

## 2020-07-05 NOTE — PLAN OF CARE
is remaining stable. Patients current vital signs   Vitals:    07/05/20 0015   BP: (!) 144/90   Pulse: 65   Resp: 16   Temp: 98 °F (36.7 °C)   SpO2: 97%       7/4/2020 1622 by Hernandez Henry RN  Outcome: Ongoing     Problem: Mental Status - Impaired:  Goal: Mental status will improve  Description: Mental status will improve  7/5/2020 0337 by Vicky Raymond RN  Outcome: Ongoing  Note: Will continue to assess and monitor patients neurological and mental status to ensure there are no signs of deterioration. Patient has remained alert and oriented x 4 the entire shift, with no thoughts of suicidal ideation. 7/4/2020 1622 by Hernandez Henry RN  Outcome: Ongoing     Problem: Skin Integrity:  Goal: Absence of new skin breakdown  Description: Absence of new skin breakdown  7/5/2020 0337 by Vicky Raymond RN  Outcome: Ongoing  Note: Will continue to assess and monitor patient for any signs of skin integrity issues. Patient will be rotated hourly to ensure pressure points are being rotated since patient is unable to move freely. 7/4/2020 1622 by Hernandez Henry RN  Outcome: Ongoing     Problem: Pain:  Description: Pain management should include both nonpharmacologic and pharmacologic interventions. Goal: Pain level will decrease  Description: Pain level will decrease  7/4/2020 1622 by Hernandez Henry RN  Outcome: Ongoing  Note: Pt complaining of abdomen pain this shift. Given PRN percocet. Patient satisfied.

## 2020-07-05 NOTE — VIRTUAL HEALTH
Consults  Patient Location:  [facility]    Provider Location ([facility]):   [place], [place]      Department of Psychiatry  Consult Service  Attending Physician Psychiatric Assessment      Thank you very much for allowing us to participate in the care of this patient. Reason for Consult:  Evaluate for need to readmission to [facility]      History obtained from:  patient, electronic medical record    HISTORY OF PRESENT ILLNESS:          The patient is a 40 y.o. female with significant past medical history of rectal fistula, tachycardia, anemia who is admitted medically for treatment of tachycardia. Patient was admitted to the [facility] on 6/15 from [facility] on a pink slip.  The patient was admitted after increased agitation and combative behaviors.  The patient became agitated in the ED and required restraints.  The patient has been religiously preoccupied, having conversations with God and statements that she is possessed by the devil, intermittent confusion with thought blocking. On 6/17 she was transferred to medical d/t a pelvic abscess. The abscess was drained and she returned back to the [facility] on  6/20. She was then readmitted to medical on 6/25 d/t tachycardia, fever, and diarrhea. Today she is interviewed via telehealth with nurse present. Patient denies s/i and reports \"I want to live. I have a family I need to get home to\". She remained religiously preoccupied. She is speaking to God and has been talking the angels. She reports she only hears the voices of God/angels and she feels their spirit around.         Current Outpatient Psychiatric Medications:  Buspar,vraylar,  remeron, zyprexa, trintellix    Medications:    Current Facility-Administered Medications: benzocaine-menthol (CEPACOL SORE THROAT) lozenge 1 lozenge, 1 lozenge, Oral, Q2H PRN  ceFAZolin (ANCEF) 1 g in dextrose 5 % 50 mL IVPB (mini-bag), 1 g, Intravenous, Q8H  ferrous sulfate (IRON 325) tablet 325 mg, 325 mg, Oral, BID WC  perflutren lipid microspheres (DEFINITY) injection 2.2 mg, 2 mL, Intravenous, ONCE PRN  metronidazole (FLAGYL) 500 mg in NaCl 100 mL IVPB premix, 500 mg, Intravenous, Once  oxyCODONE-acetaminophen (PERCOCET) 5-325 MG per tablet 1 tablet, 1 tablet, Oral, Q4H PRN  ondansetron (ZOFRAN) injection 4 mg, 4 mg, Intravenous, Q6H PRN  fluconazole (DIFLUCAN) in 0.9 % sodium chloride IVPB 200 mg, 200 mg, Intravenous, Q24H  potassium chloride (KLOR-CON M) extended release tablet 40 mEq, 40 mEq, Oral, PRN **OR** potassium bicarb-citric acid (EFFER-K) effervescent tablet 40 mEq, 40 mEq, Oral, PRN **OR** potassium chloride 10 mEq/100 mL IVPB (Peripheral Line), 10 mEq, Intravenous, PRN  metronidazole (FLAGYL) 500 mg in NaCl 100 mL IVPB premix, 500 mg, Intravenous, Q8H  acetaminophen (TYLENOL) tablet 650 mg, 650 mg, Oral, Q4H PRN  0.9 % sodium chloride infusion, , Intravenous, Continuous  ondansetron (ZOFRAN) injection 4 mg, 4 mg, Intravenous, Q6H PRN  loperamide (IMODIUM) capsule 2 mg, 2 mg, Oral, 4x Daily PRN  mirtazapine (REMERON) tablet 15 mg, 15 mg, Oral, Nightly  OLANZapine (ZYPREXA) tablet 5 mg, 5 mg, Oral, Nightly  hydrOXYzine (ATARAX) tablet 25 mg, 25 mg, Oral, TID PRN  VORTIoxetine (TRINTELLIX) tablet 10 mg, 10 mg, Oral, Daily  pantoprazole (PROTONIX) tablet 40 mg, 40 mg, Oral, Daily  busPIRone (BUSPAR) tablet 15 mg, 15 mg, Oral, TID  tamsulosin (FLOMAX) capsule 0.4 mg, 0.4 mg, Oral, Daily  cariprazine hcl (VRAYLAR) capsule 3 mg, 3 mg, Oral, Daily  sodium chloride flush 0.9 % injection 10 mL, 10 mL, Intravenous, PRN       PAST PSYCHIATRIC HISTORY:  Patient does not know the answer    Past psychiatric medications include:   Patient does not know the answer    Adverse reactions from psychotropic medications:  denies    Lifetime Psychiatric Review of Systems:     Elizabeth: denies  Panic: denies  Phobia: denies  Hallucinations: denies  Delusions: religiously preoccupied     Past Medical History: Diagnosis Date    Cancer [facility])     Rectal    Pelvis, female abscess     pre sacral drain put in 6/19/2020    UTI (urinary tract infection)     stent place june 2020       Past Surgical History:        Procedure Laterality Date    ABSCESS DRAINAGE      pre sacral pelvic abscess with drain placement to right buttock    SMALL INTESTINE SURGERY N/A 7/3/2020    TRANSVERSE COLOSTOMY LOOP performed by Allegra Naik MD at [address]       Allergies: Patient has no known allergies. Social History:     with two children ages 25 and 12. Social History     Socioeconomic History    Marital status: Unknown     Spouse name: None    Number of children: None    Years of education: None    Highest education level: None   Occupational History    None   Social Needs    Financial resource strain: None    Food insecurity     Worry: None     Inability: None    Transportation needs     Medical: None     Non-medical: None   Tobacco Use    Smoking status: Never Smoker    Smokeless tobacco: Never Used    Tobacco comment: pt is a non smoker   Substance and Sexual Activity    Alcohol use: None    Drug use: None    Sexual activity: None   Lifestyle    Physical activity     Days per week: None     Minutes per session: None    Stress: None   Relationships    Social connections     Talks on phone: None     Gets together: None     Attends Hinduism service: None     Active member of club or organization: None     Attends meetings of clubs or organizations: None     Relationship status: None    Intimate partner violence     Fear of current or ex partner: None     Emotionally abused: None     Physically abused: None     Forced sexual activity: None   Other Topics Concern    None   Social History Narrative    None       Family Psychiatric History:  denies    Family History:   History reviewed. No pertinent family history.       Physical  /74   Pulse 76   Temp 98.4 °F (36.9 °C) (Oral)   Resp 16   Ht 5' (1.524 m)   Wt 82 lb 10.8 oz (37.5 kg)   LMP 06/25/2017   SpO2 100%   BMI 16.15 kg/m²     MENTAL STATUS EXAM:  Level of consciousness:  within normal limits  Appearance:  hospital attire and seated in bed  Behavior/Motor:  no abnormalities noted  Attitude toward examiner:  cooperative and attentive  Speech:  slow and whispered  Mood:  depressed  Affect:  flat  Thought processes:  thought blocking  Thought content:  Homocidal ideation denies  Suicidal Ideation:  denies suicidal ideation  Delusions:  Religiously preoccupied  Perceptual Disturbance:  auditory  Cognition:  oriented to person, place, and time  Memory: intact  Insight & Judgement: limited   Medication side effects: n/a    DSM-V DIAGNOSIS:  Schizoaffective d/o    Impression    Patient Active Problem List   Diagnosis Code    Psychotic episode ([facility] F23    H/O malignant neoplasm of colon Z85.038    Acute cystitis without hematuria N30.00    Pelvic abscess in female N73.9    Calculus of gallbladder without cholecystitis without obstruction K80.20    Leukocytosis D72.829    Elevated C-reactive protein (CRP) R79.82    Severe malnutrition ([facility] E43    Bipolar I disorder, most recent episode mixed ([facility] F31.60    Major depression with psychotic features ([facility] F32.3    Tachycardia R00.0    Diarrhea R19.7    Anemia D64.9    History of rectal cancer Z85.048    Dehydration E86.0    Rectal fistula K60.4               PLAN:    1. Continue medical intervention  2. When patient is medically cleared patient would benefit from readmission to the [facility]. Please contact stat on call psychiatry to hear report for consideration for admission to [facility]      Thank you very much for allowing us to participate in the care of this patient. Time spent > 60 min. Electronically signed by GRAY Mcrae CNP on 7/5/2020 at 8:18 AM.    Dragon voice recognition software used in portions of this document.     This virtual visit was conducted via interactive/real-time audio/video.

## 2020-07-05 NOTE — PLAN OF CARE
Problem: Falls - Risk of:  Goal: Will remain free from falls  Description: Will remain free from falls  7/5/2020 1648 by Vaishali Lombardo RN  Outcome: Ongoing     Problem: Falls - Risk of:  Goal: Absence of physical injury  Description: Absence of physical injury  Outcome: Ongoing     Problem: Cardiac Output - Decreased:  Goal: Hemodynamic stability will improve  Description: Hemodynamic stability will improve  7/5/2020 1648 by Vaishali Lombardo RN  Outcome: Ongoing     Problem: Mental Status - Impaired:  Goal: Mental status will improve  Description: Mental status will improve  7/5/2020 1648 by Vaishali Lombardo RN  Outcome: Ongoing     Problem: Nutrition  Goal: Optimal nutrition therapy  7/5/2020 1648 by Vaishali Lombardo RN  Outcome: Ongoing     Problem: Skin Integrity:  Goal: Will show no infection signs and symptoms  Description: Will show no infection signs and symptoms  Outcome: Ongoing     Problem: Skin Integrity:  Goal: Absence of new skin breakdown  Description: Absence of new skin breakdown  7/5/2020 1648 by Vaishali Lombardo RN  Outcome: Ongoing     Problem: Pain:  Goal: Pain level will decrease  Description: Pain level will decrease  Outcome: Ongoing     Problem: Pain:  Goal: Control of acute pain  Description: Control of acute pain  7/5/2020 1648 by Vaishali Lombardo RN  Outcome: Ongoing     Problem: Pain:  Goal: Control of chronic pain  Description: Control of chronic pain  Outcome: Ongoing        Patient remains free of new sings of infection. Patient reports no pain after receiving PRN pain medication. Tolerates current diet. Per patient, gradually able to increase oral intake. Alert and oriented. Occasional auditory hallucinations. Able to turn self.

## 2020-07-05 NOTE — DISCHARGE SUMMARY
[facility]    Discharge Summary     Patient ID: Raina Granda  :  1975   MRN: [id number]     ACCOUNT:  [de-identified]   Patient's PCP: No primary care provider on file. Admit Date: 2020   Discharge Date: 2020    Length of Stay: 8  Code Status:  Full Code  Admitting Physician: Dang Manjarrez MD  Discharge Physician: Nathalie Millard MD     Active Discharge Diagnoses:     Primary Problem  Rectal fistula      [place] Problems    Diagnosis Date Noted    Rectal fistula [K60.4]     Dehydration [E86.0] 2020    Diarrhea [R19.7]     Anemia [D64.9]     History of rectal cancer [Z85.048]     Severe malnutrition ([address] [E43] 2020    Tachycardia [R00.0] 2020       Admission Condition:  fair     Discharged Condition: fair    Hospital Stay:     Hospital Course:  Raina Granda is a 40 y.o. female who was admitted for the management of Rectal fistula , presented to ER with No chief complaint on file.   Patient is a 51-year-old lady with borderline intelligence history of: Rectal cancer status post surgery admitted from [facility] for tachycardia and pelvic abscess which was drained found to be fecal matter has a fistulogram which shows she got: Fistula leaking fecal matter into the pelvic area patient had a drain placed IV antibiotics plan for his outpatient surgery due to lack of insight and hallucination patient is being sent back to [facility] patient does get tachycardic with exertion advised for hydration activity as tolerated we will continue to follow her in [facility]  Medically cleared for [facility]        Significant therapeutic interventions:     Significant Diagnostic Studies:   Labs / Micro:        ,     Radiology:    Ct Abdomen Pelvis W Iv Contrast Additional Contrast? Radiologist Recommendation    Result Date: 2020  EXAMINATION: CT OF THE ABDOMEN AND PELVIS WITH CONTRAST 2020 3:43 pm TECHNIQUE: CT of the abdomen Contrast? None    Result Date: 6/17/2020  EXAMINATION: CT OF THE ABDOMEN AND PELVIS WITH CONTRAST 6/17/2020 8:30 am TECHNIQUE: CT of the abdomen and pelvis was performed with the administration of intravenous contrast. Multiplanar reformatted images are provided for review. Dose modulation, iterative reconstruction, and/or weight based adjustment of the mA/kV was utilized to reduce the radiation dose to as low as reasonably achievable. COMPARISON: None. HISTORY: ORDERING SYSTEM PROVIDED HISTORY: Pelvic abcess / Hydronephrosis TECHNOLOGIST PROVIDED HISTORY: Pelvic abcess / Hydronephrosis Reason for Exam: Pelvic abcess / Hydronephrosis, has stent in place Acuity: Acute Type of Exam: Initial FINDINGS: Lower Chest: No acute findings. Organs: Cholelithiasis in a contracted gallbladder. No biliary dilatation. The liver, pancreas, spleen and adrenals appear unremarkable. Right double-J ureteral stent in place. Mild residual hydronephrosis and right renal edema. Mild urothelial enhancement on the right side. No renal calculi. The left kidney is remarkable for a subcentimeter intraparenchymal cyst. GI/Bowel: Status post distal colonic resection. No evidence for bowel obstruction. Moderate stool burden. Pelvis: Right pelvic abscess in the presacral space measures up to 5.7 cm in greatest transverse dimension, approximately 7 cm in craniocaudal dimension and 2.8 cm in AP dimension. The bladder is decompressed. Peritoneum/Retroperitoneum: No free air. No ascites. No lymphadenopathy. The aorta is normal in caliber. Bones/Soft Tissues: No acute osseous abnormality identified. 1.  Pelvic abscess predominates in the presacral space extending to the right side. 2.  Right double-J ureteral stent in place. Mild residual hydronephrosis, urothelial enhancement and right renal edema. 3.  Cholelithiasis in a contracted gallbladder.      Ct Chest Pulmonary Embolism W Contrast    Result Date: 6/25/2020  EXAMINATION: CTA OF THE radiation dose to as low as reasonably achievable. COMPARISON: None. HISTORY: ORDERING SYSTEM PROVIDED HISTORY: r/o PE TECHNOLOGIST PROVIDED HISTORY: r/o PE Reason for Exam: patient would not give me any information Acuity: Unknown Type of Exam: Unknown FINDINGS: Pulmonary Arteries: Pulmonary arteries are adequately opacified for evaluation. No evidence of intraluminal filling defect to suggest pulmonary embolism. Main pulmonary artery is normal in caliber. Mediastinum: Thoracic aorta is normal caliber. Right-sided port is in place. Heart appears normal in size. No pericardial effusion. No lymphadenopathy. The esophagus is grossly unremarkable. Lungs/pleura: Biapical scarring. Lungs are clear. Trachea and distal airways appear patent. Upper Abdomen: No acute findings. Partially visualized cholelithiasis. Soft Tissues/Bones: No acute bone or soft tissue abnormality. No evidence of pulmonary embolism or acute pulmonary abnormality. Partially visualized cholelithiasis. Ct Abscess Drainage Retroperitoneal    Result Date: 6/20/2020  PROCEDURE: CT GUIDEDTRANSGLUTEAL PELVICABSCESS DRAINAGE CATHETER PLACEMENT 06/19/2020 HISTORY: ORDERING SYSTEM PROVIDED HISTORY: pelvis abcess drainage TECHNOLOGIST PROVIDED HISTORY: pelvis abcess drainage Is the patient pregnant?->No History of previous colon surgery, ureteral stent, CT shows a gas and fluid collection in the deep pelvis/presacral region SEDATION: None TECHNIQUE AND FINDINGS: This procedure was performed by Dr. Benjamin Sampson. Informed consent was obtained after a detailed explanation of the procedure including risks, benefits, and alternatives. Universal protocol was followed. A suitable skin site was prepped and draped in sterile fashion following CT localization. 1% lidocaine was used for local anesthesia. From a right transgluteal approach a 5-Croatian centesis catheter/needle was advanced into the collection.   CT images show safe tract and access into the space.  A small amount of air and fluid was aspirated confirming appropriate positioning. A stiff Amplatz guidewire was advanced through the catheter. After tract dilatation over the guidewire an 8 [place] pigtail catheter was placed into the collection with the pigtail formed in the presacral region. The majority of the air was aspirated and approximately 20 mL of bloody fluid/old blood was obtained. This may all be due to hematoma. A sample obtained was sent for cultures. Postprocedure images show overall decrease in size of the collection. The catheter was sutured to the skin and secured in place with an adhesive dressing. Sterile dressing was applied. The catheter was attached to MEL suction drainage. There are no immediate complications. The patient left the department in stable condition. EBL: Less than 5 mL. Dose modulation, iterative reconstruction, and/or weight based adjustment of the mA/kV was utilized to reduce the radiation dose to as low as reasonably achievable. Successful CT guided placement of a transgluteal deep pelvic abscess drainage catheter. If culture results are negative, this catheter should probably be removed. Findings were discussed with Ani Taylor at 1 pm on 6/19/2020. Ir Abscess Eval Thru Existing Cath    Result Date: 6/30/2020  PROCEDURE: IR ABSCESS EVALUATION 6/29/2020 HISTORY: ORDERING SYSTEM PROVIDED HISTORY: possible fistula TECHNOLOGIST PROVIDED HISTORY: possible fistula Is the patient pregnant?->No Abscessogram requested. CONTRAST: Omnipaque 240-40 mL half-strength SEDATION: None FLUOROSCOPY DOSE AND TYPE OR TIME AND EXPOSURES: Fluoroscopy time-46 seconds D AP-94 cGy cm squared DESCRIPTION OF PROCEDURE: Informed consent was obtained after a detailed explanation of the procedure including risks, benefits, and alternatives. Universal protocol was observed.   With the patient prone, contrast was injected via the existing right-sided pelvic drain and last fluoro examination, evaluation, counseling as well as medication reconciliation, prescriptions for required medications, discharge plan and follow up. Electronically signed by   Keyona Hannah MD  7/5/2020  2:33 PM      Thank you Dr. Simon Fong primary care provider on file. for the opportunity to be involved in this patient's care.

## 2020-07-05 NOTE — PROGRESS NOTES
in this expected location. Gallstones are seen. No significant inflammatory change surrounding the gallbladder. Liver appears unchanged Pancreas appears unchanged GI/Bowel: Stomach is unremarkable. No significant small or large bowel distention noted Large stool load seen in the colon Pelvis: Drainage catheter has been is dysuria to in the posterior pelvis on the right since prior. The amount of fluid in the collection is slightly smaller. However, there is increasing gas seen in the collection, extending anteriorly into the pelvis on the right, abutting the bladder, and also into the gluteal region on the right. Anastomotic staple line seen in the rectosigmoid colon Peritoneum/Retroperitoneum: Small retroperitoneal nodes are seen. No aneurysm. Bones/Soft Tissues: Bones are unchanged. Persistent collection of fluid and gas in the pelvis, centered in the presacral space on the right. The amount of fluid has decreased, however the amount of gas has increased status post drainage catheter insertion             HPI:   See history in H and P      Review of Systems:     Status post a transverse loop colostomy pain is controlled denies any nausea vomiting stoma is working  Data:     Past Medical History:  no change     Social History:  no change    Family History: @no change    Vitals:      I/O (24Hr):     Intake/Output Summary (Last 24 hours) at 7/5/2020 1225  Last data filed at 7/5/2020 0622  Gross per 24 hour   Intake 2069.33 ml   Output 1275 ml   Net 794.33 ml       Labs:    URINE ANALYSIS: No results found for: LABURIN     CBC:  Lab Results   Component Value Date    WBC 9.1 07/05/2020    HGB 8.2 07/05/2020     07/05/2020        BMP:    Lab Results   Component Value Date     07/05/2020    K 3.7 07/05/2020     07/05/2020    CO2 25 07/05/2020    BUN 13 07/05/2020    CREATININE 0.66 07/05/2020    GLUCOSE 113 07/05/2020      LIVER PROFILE:  Lab Results   Component Value Date    ALT 14 colostomy planned  17. Tachycardia is improving with IV antibiotics definitive treatment is surgical planned Friday am  18. Npo mid night tonight  19. cta rule out pe  20. Still sinus tach with minimal exertion  21. July 3  22. Late note entry  21. Post transverse colostomy  24. Pain control IV antibiotics  July 4  Post transverse colostomy  r reconsult psych for placement in [facility]  Pain controlled  July 5  medicallyl clear for bhi      Medications:      Allergies:  No Known Allergies    Current Meds:   Scheduled Meds:    ceFAZolin  1 g Intravenous Q8H    ferrous sulfate  325 mg Oral BID WC    metroNIDAZOLE  500 mg Intravenous Once    fluconazole  200 mg Intravenous Q24H    metroNIDAZOLE  500 mg Intravenous Q8H    mirtazapine  15 mg Oral Nightly    OLANZapine  5 mg Oral Nightly    VORTIoxetine  10 mg Oral Daily    pantoprazole  40 mg Oral Daily    busPIRone  15 mg Oral TID    tamsulosin  0.4 mg Oral Daily    cariprazine hcl  3 mg Oral Daily     Continuous Infusions:    sodium chloride 100 mL/hr at 07/05/20 0200     PRN Meds: benzocaine-menthol, perflutren lipid microspheres, oxyCODONE-acetaminophen, ondansetron, potassium chloride **OR** potassium alternative oral replacement **OR** potassium chloride, acetaminophen, ondansetron, loperamide, hydrOXYzine, sodium chloride jamey Waterman MD  7/5/2020  12:25 PM

## 2020-07-06 PROBLEM — F29 PSYCHOSIS (HCC): Status: ACTIVE | Noted: 2020-06-15

## 2020-07-06 PROCEDURE — 6370000000 HC RX 637 (ALT 250 FOR IP): Performed by: INTERNAL MEDICINE

## 2020-07-06 PROCEDURE — 99223 1ST HOSP IP/OBS HIGH 75: CPT | Performed by: PSYCHIATRY & NEUROLOGY

## 2020-07-06 PROCEDURE — 97162 PT EVAL MOD COMPLEX 30 MIN: CPT

## 2020-07-06 PROCEDURE — 6370000000 HC RX 637 (ALT 250 FOR IP): Performed by: PSYCHIATRY & NEUROLOGY

## 2020-07-06 PROCEDURE — 1240000000 HC EMOTIONAL WELLNESS R&B

## 2020-07-06 PROCEDURE — 6370000000 HC RX 637 (ALT 250 FOR IP): Performed by: NURSE PRACTITIONER

## 2020-07-06 RX ORDER — OLANZAPINE 7.5 MG/1
7.5 TABLET ORAL NIGHTLY
Status: DISCONTINUED | OUTPATIENT
Start: 2020-07-06 | End: 2020-07-07

## 2020-07-06 RX ADMIN — FERROUS SULFATE TAB 325 MG (65 MG ELEMENTAL FE) 325 MG: 325 (65 FE) TAB at 08:24

## 2020-07-06 RX ADMIN — FERROUS SULFATE TAB 325 MG (65 MG ELEMENTAL FE) 325 MG: 325 (65 FE) TAB at 17:24

## 2020-07-06 RX ADMIN — TRAZODONE HYDROCHLORIDE 50 MG: 50 TABLET ORAL at 21:23

## 2020-07-06 RX ADMIN — BUSPIRONE HYDROCHLORIDE 15 MG: 15 TABLET ORAL at 08:23

## 2020-07-06 RX ADMIN — CARIPRAZINE 3 MG: 3 CAPSULE, GELATIN COATED ORAL at 08:24

## 2020-07-06 RX ADMIN — BUSPIRONE HYDROCHLORIDE 15 MG: 15 TABLET ORAL at 14:39

## 2020-07-06 RX ADMIN — MIRTAZAPINE 15 MG: 15 TABLET, FILM COATED ORAL at 21:24

## 2020-07-06 RX ADMIN — METRONIDAZOLE 500 MG: 500 TABLET ORAL at 06:21

## 2020-07-06 RX ADMIN — HYDROXYZINE HYDROCHLORIDE 25 MG: 25 TABLET, FILM COATED ORAL at 21:23

## 2020-07-06 RX ADMIN — OLANZAPINE 7.5 MG: 7.5 TABLET, FILM COATED ORAL at 21:24

## 2020-07-06 RX ADMIN — OXYCODONE HYDROCHLORIDE AND ACETAMINOPHEN 1 TABLET: 5; 325 TABLET ORAL at 18:00

## 2020-07-06 RX ADMIN — METRONIDAZOLE 500 MG: 500 TABLET ORAL at 21:24

## 2020-07-06 RX ADMIN — OXYCODONE HYDROCHLORIDE AND ACETAMINOPHEN 1 TABLET: 5; 325 TABLET ORAL at 11:00

## 2020-07-06 RX ADMIN — BUSPIRONE HYDROCHLORIDE 15 MG: 15 TABLET ORAL at 21:23

## 2020-07-06 RX ADMIN — METRONIDAZOLE 500 MG: 500 TABLET ORAL at 14:39

## 2020-07-06 ASSESSMENT — PAIN SCALES - GENERAL
PAINLEVEL_OUTOF10: 5
PAINLEVEL_OUTOF10: 5
PAINLEVEL_OUTOF10: 3

## 2020-07-06 ASSESSMENT — PAIN DESCRIPTION - DESCRIPTORS: DESCRIPTORS: ACHING

## 2020-07-06 ASSESSMENT — PAIN DESCRIPTION - ONSET: ONSET: ON-GOING

## 2020-07-06 ASSESSMENT — PAIN DESCRIPTION - PAIN TYPE: TYPE: ACUTE PAIN

## 2020-07-06 ASSESSMENT — PAIN - FUNCTIONAL ASSESSMENT: PAIN_FUNCTIONAL_ASSESSMENT: ACTIVITIES ARE NOT PREVENTED

## 2020-07-06 ASSESSMENT — PAIN DESCRIPTION - LOCATION: LOCATION: LEG

## 2020-07-06 ASSESSMENT — PAIN DESCRIPTION - FREQUENCY: FREQUENCY: INTERMITTENT

## 2020-07-06 ASSESSMENT — PAIN DESCRIPTION - ORIENTATION: ORIENTATION: POSTERIOR

## 2020-07-06 ASSESSMENT — LIFESTYLE VARIABLES: HISTORY_ALCOHOL_USE: NO

## 2020-07-06 ASSESSMENT — PAIN DESCRIPTION - PROGRESSION: CLINICAL_PROGRESSION: NOT CHANGED

## 2020-07-06 NOTE — PLAN OF CARE
Problem: Altered Mood, Depressive Behavior:  Goal: Absence of self-harm  Description: Absence of self-harm  7/6/2020 1820 by Opal Fernandez RN  Outcome: Ongoing  Note: Ms. Davey Moctezuma denies thoughts of harm to self or others. She denies having any hallucinations, but states she talks to God. Safety checks completed every 15 minutes at minimum. Medication compliant.

## 2020-07-06 NOTE — BH NOTE
On call provider, Janet Chan NP, notified of best practice advisory suggesting to place patient on suicide precautions. Provider to discontinue the order as patient does not meet criteria for suicide precautions at this time. Continue to observe patient on every 15 minute checks.

## 2020-07-06 NOTE — GROUP NOTE
Group Therapy Note    Date: 7/6/2020    Group Start Time: 1430  Group End Time: 4169  Group Topic: Psychoeducation    ROMEO Gibbs        Group Therapy Note    Pt did not attend positive affirmations activity group d/t resting in room despite staff invitation to attend. 1:1 talk time offered as alternative to group session, pt declined.

## 2020-07-06 NOTE — GROUP NOTE
Group Therapy Note    Date: 7/6/2020    Group Start Time: 1000  Group End Time: 1100  Group Topic: Psychotherapy    ROMEO ROSENTHALI JADA Collins, LSW        Group Therapy Note    Patient declined to attend psychotherapy group at 10 am despite encouragement by staff. 1:1 was offered as am alternative.              Signature:  JADA Mackenzie, [employer]

## 2020-07-06 NOTE — PROGRESS NOTES
RN called report over to PEDRO Castañeda from [facility] unit at [facility]. RN notified that patient was alert and oriented x 4, vitals have been stable. RN stated that patient has a newly placed RUQ colostomy and MEL drain in the right buttocks. All questions were answered.     Electronically signed by Matthew Gillespie RN on 7/5/2020 at 9:18 PM
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
## 2020-07-06 NOTE — GROUP NOTE
Group Therapy Note    Date: 7/6/2020    Group Start Time: 1100  Group End Time: 5016  Group Topic: Cognitive Skills    ROMEO Stewart, CTRS      Pt did not attend 1100 cognitive skills group d/t resting in room despite staff invitation to attend. 1:1 talk time offered as alternative to group session, pt declined.               Signature:  Soco Restrepo
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
## 2020-07-06 NOTE — PROGRESS NOTES
Physical Therapy    Facility/Department: [facility]  Initial Assessment    NAME: Zora Melo  : 1975  MRN: [id number]    Date of Service: 2020    Discharge Recommendations: The patient's needs are being met with no further therapy recommended at discharge. PT Equipment Recommendations  Equipment Needed: No    Assessment   Body structures, Functions, Activity limitations: Decreased functional mobility ; Decreased ADL status; Decreased strength; Increased pain;Decreased balance;Decreased endurance  Assessment: Pt SBA level for functional mobility. Pt will not need further PT at d/c. Pt will be educated on HEP to maintain/improve strength. Treatment Diagnosis: Impaired functional mobility  Specific instructions for Next Treatment: progress gait/stairs, instruct HEP, monitor HR prn  Prognosis: Good  Decision Making: Medium Complexity  History: 80-year-old lady with borderline intelligence history of: Rectal cancer status post surgery admitted from [facility] for tachycardia and pelvic abscess which was drained found to be fecal matter has a fistulogram which shows she got: Fistula leaking fecal matter into the pelvic area patient had a drain placed IV antibiotics plan for his outpatient surgery due to lack of insight and hallucination patient is being sent back to [facility] patient does get tachycardic with exertion   Exam: ROM, MMT, Bed mobility, transfers, amb, activity tolerance, balance  Clinical Presentation: Pt alert, agreeable to PT. Cooperative and pleasant. Barriers to Learning: none  REQUIRES PT FOLLOW UP: Yes  Activity Tolerance  Activity Tolerance: Patient Tolerated treatment well       Patient Diagnosis(es): There were no encounter diagnoses. has a past medical history of Cancer ([address] Pelvis, female abscess, and UTI (urinary tract infection). has a past surgical history that includes Ureter stent placement ();  Abscess Drainage; and Small intestine surgery (N/A, 7/3/2020). Restrictions  Restrictions/Precautions  Restrictions/Precautions: General Precautions, Fall Risk(line of sight; MEL x1 R buttock)  Required Braces or Orthoses?: No  Position Activity Restriction  Other position/activity restrictions: up as tolerated  Vision/Hearing  Vision: Impaired  Vision Exceptions: Wears glasses at all times  Hearing: Within functional limits     Subjective  General  Chart Reviewed: Yes  Patient assessed for rehabilitation services?: Yes  Additional Pertinent Hx: rectal CA, Transverse colostomy loop 7/3/20  Family / Caregiver Present: No  Referring Practitioner: GRAY Rubio CNP  Referral Date : 07/05/20  Diagnosis: schizoaffective disorder  Follows Commands: Within Functional Limits  Subjective  Subjective: Pt very friendly, agreeable and cooperative for PT assessment. Nursing staff OKs assessment at this time  Pain Screening  Patient Currently in Pain: Yes  Pain Assessment  Pain Assessment: 0-10  Pain Level: 3  Pain Type: Acute pain  Pain Location: Leg(thigh region)  Pain Orientation: Posterior  Pain Descriptors: Aching  Pain Frequency: Intermittent  Pain Onset: On-going  Clinical Progression: Not changed  Functional Pain Assessment: Activities are not prevented  Non-Pharmaceutical Pain Intervention(s): Ambulation/Increased Activity; Distraction;Rest;Repositioned  Response to Pain Intervention: Patient Satisfied  Vital Signs  Patient Currently in Pain: Yes       Orientation  Orientation  Overall Orientation Status: Within Functional Limits  Social/Functional History  Social/Functional History  Lives With: Family(spouse and 2 kids - 12and 25years of age)  Type of Home: Trailer  Home Layout: One level  Home Access: Stairs to enter with rails  Entrance Stairs - Number of Steps: 3-4  Entrance Stairs - Rails: Right  Bathroom Shower/Tub: Tub/Shower unit, Doors  Bathroom Toilet: Standard  Bathroom Equipment: Hand-held shower  Bathroom Accessibility: Accessible  Home Equipment: Quad cane  ADL Assistance: Independent  Homemaking Assistance: Independent  Homemaking Responsibilities: Yes  Ambulation Assistance: Independent  Transfer Assistance: Independent  Active : No  Patient's  Info:   Mode of Transportation: [facility]  Occupation: On disability  IADL Comments: sleeps in flat bed  Additional Comments: Pt states  does not work - available to [employer] if needed at d/c. Pt reports kids are very helpful also. Pt states that they plan to move to [place] with family soon. Cognition        Objective          AROM RLE (degrees)  RLE AROM: WNL  AROM LLE (degrees)  LLE AROM : WNL  AROM RUE (degrees)  RUE AROM : WNL  AROM LUE (degrees)  LUE AROM : WNL  Strength RLE  Strength RLE: WFL  Comment: Grossly 4-/5  Strength LLE  Strength LLE: WFL  Comment: Grossly 4-/5  Strength RUE  Strength RUE: WFL  Comment: Grossly 4-/5  Strength LUE  Strength LUE: WFL  Comment: Grossly 4-/5     Sensation  Overall Sensation Status: Impaired(Numbness in B hands)  Bed mobility  Rolling to Left: Modified independent  Supine to Sit: Modified independent  Scooting: Independent  Comment: HOB slightly elevated, no cues needed. Pt sitting EOB for at least 12 minutes. Transfers  Sit to Stand: Stand by assistance  Stand to sit: Stand by assistance  Comment: no cues needed, SBA no device  Ambulation  Ambulation?: Yes  Ambulation 1  Surface: level tile  Device: No Device  Assistance: Stand by assistance  Quality of Gait: B UE slightly increased with mild increase in DINESH, lateral sway, no LOB, pt steady  Gait Deviations: Increased DINESH  Distance: [de-identified]'  Comments: Pt amb wtih SBA. Pt reaching for door frame/wall if close by. If not restricted, pt able to amb without UE support or device. Stairs/Curb  Stairs?: No     Balance  Posture: Good  Sitting - Static: Good  Sitting - Dynamic: Good  Standing - Static: Good;-  Standing - Dynamic: Fair;+  Comments: moderate fall risk, 23/28 Tinetti.  no device        Plan Plan  Times per week: 1-2 more treatments  Specific instructions for Next Treatment: progress gait/stairs, instruct HEP, monitor HR prn  Current Treatment Recommendations: Strengthening, Balance Training, Functional Mobility Training, Transfer Training, Endurance Training, Gait Training, Stair training, Equipment Evaluation, Education, & procurement, Patient/Caregiver Education & Training, Safety Education & Training, Home Exercise Program  Safety Devices  Type of devices: All fall risk precautions in place, Patient at risk for falls, Nurse notified(Pt in common area at nursing station)  Restraints  Initially in place: No    G-Code       OutComes Score  Balance Score: 13 (07/06/20 1028)  Gait Score: 10 (07/06/20 1028)        Tinetti Total Score: 23 (07/06/20 1028)                                   AM-PAC Score  AM-PAC Inpatient Mobility Raw Score : 16 (07/06/20 1028)  AM-PAC Inpatient T-Scale Score : 40.78 (07/06/20 1028)  Mobility Inpatient CMS 0-100% Score: 54.16 (07/06/20 1028)  Mobility Inpatient CMS G-Code Modifier : CK (07/06/20 1028)          Goals  Short term goals  Time Frame for Short term goals: 2 treatments  Short term goal 1: Pt to demonstrate IND bed mobility. Short term goal 2: Pt to demonstrate IND transfers. Short term goal 3: Pt to amb 150' IND. Short term goal 4: Pt to ascend/descend 4 stairs per home set up, SBA. Short term goal 5: Pt to demonstrate good technique for HEP. Short term goal 6: Pt to improve standing balance to GOOD to improve stability and reduce fall risk for safe d/c. Short term goal 7: Pt to reduce pain to 0-1/10 to increase independence and ease of mobility. Patient Goals   Patient goals :  To go home       Therapy Time   Individual Concurrent Group Co-treatment   Time In 1028         Time Out 1046         Minutes [address], [address] [facility]

## 2020-07-06 NOTE — GROUP NOTE
Group Therapy Note    Date: 7/6/2020    Group Start Time: 0900  Group End Time: 8303  Group Topic: Community Meeting    ALPHONSE Flores        Group Therapy Note    Attendees 10         Patient's Goal:  To improve decision making skills     Notes:   Pt was pleasant and cooperative     Status After Intervention:  Improved    Participation Level:  Active Listener and Interactive    Participation Quality: Appropriate, Attentive and Sharing      Speech:  normal      Thought Process/Content: Logical      Affective Functioning: flat      Mood: depressed       Level of consciousness:  Alert       Response to Learning: Able to verbalize current knowledge/experience and Progressing to goal      Endings: None Reported    Modes of Intervention: Education and Support      Discipline Responsible: Psychoeducational Specialist      Signature:  Erin Coronado

## 2020-07-06 NOTE — PROGRESS NOTES
Patient was taken to [facility] traveling by wheelchair with RN Lino Davis, PEDRO Castañeda and [facility] tech. Telemetry was removed from patient as well as IV access. Patients belongings were all given to her. Colostomy and MEL drain emptied prior to departure from PCU and all questions were answered for RN staff.      Electronically signed by Kathi Galeazzi, RN on 7/5/2020 at 9:20 PM

## 2020-07-06 NOTE — PLAN OF CARE
[facility]  Initial Interdisciplinary Treatment Plan NO      Original treatment plan Date & Time: 7/6/2020  0728    Admission Type:  Admission Type: Voluntary    Reason for admission:   Reason for Admission: Patient denies suicidal ideation on admit, states she was suidical a few days ago. Estimated Length of Stay:  5-7days  Estimated Discharge Date: to be determined by physician    PATIENT STRENGTHS:  Patient Strengths:Strengths: Positive Support  Patient Strengths and Limitations:Limitations: Multiple barriers to leisure interests  Addictive Behavior: Addictive Behavior  In the past 3 months, have you felt or has someone told you that you have a problem with:  : None  Do you have a history of Chemical Use?: No  Do you have a history of Alcohol Use?: No  Do you have a history of Street Drug Abuse?: No  Histroy of Prescripton Drug Abuse?: No  Medical Problems:  Past Medical History:   Diagnosis Date    Cancer ([facility] [address]     Rectal    Pelvis, female abscess     pre sacral drain put in 6/19/2020    UTI (urinary tract infection)     stent place june 2020     Status EXAM:Status and Exam  Normal: No  Facial Expression: Avoids Gaze  Affect: Blunt  Level of Consciousness: Alert  Mood:Normal: Yes  Motor Activity:Normal: No  Motor Activity: Decreased  Interview Behavior: Cooperative  Preception: [place] to Person, [place] to Time, [place] to Place, [place] to Situation  Attention:Normal: Yes  Thought Processes: Circumstantial  Thought Content:Normal: Yes  Hallucinations:  Auditory (Comment)(states communication with dead relatives)  Delusions: No  Memory:Normal: Yes  Insight and Judgment: Yes  Present Suicidal Ideation: No  Present Homicidal Ideation: No    EDUCATION:   Learner Progress Toward Treatment Goals: reviewed group plans and strategies for care    Method:group therapy, medication compliance, individualized assessments and care planning    Outcome: needs reinforcement    PATIENT GOALS: to be discussed with patient within 72 hours    PLAN/TREATMENT RECOMMENDATIONS:     continue group therapy , medications compliance, goal setting, individualized assessments and care, continue to monitor pt on unit      SHORT-TERM GOALS:   Time frame for Short-Term Goals: 5-7 days    LONG-TERM GOALS:  Time frame for Long-Term Goals: 6 months  Members Present in Team Meeting: See Signature Sheet    JORDON Ryan

## 2020-07-06 NOTE — BH NOTE
`[facility]  Admission Note     Admission Type:   Admission Type: Voluntary    Reason for admission:  Reason for Admission: Patient denies suicidal ideation on admit, states she was suidical a few days ago. PATIENT STRENGTHS:  Strengths: Positive Support    Patient Strengths and Limitations:  Limitations: Multiple barriers to leisure interests    Addictive Behavior:   Addictive Behavior  In the past 3 months, have you felt or has someone told you that you have a problem with:  : None  Do you have a history of Chemical Use?: No  Do you have a history of Alcohol Use?: No  Do you have a history of Street Drug Abuse?: No  Histroy of Prescripton Drug Abuse?: No    Medical Problems:   Past Medical History:   Diagnosis Date    Cancer ([facility] [address]     Rectal    Pelvis, female abscess     pre sacral drain put in 6/19/2020    UTI (urinary tract infection)     stent place june 2020       Status EXAM:  Status and Exam  Normal: No  Facial Expression: Avoids Gaze  Affect: Blunt  Level of Consciousness: Alert  Mood:Normal: Yes  Motor Activity:Normal: No  Motor Activity: Decreased  Interview Behavior: Cooperative  Preception: [place] to Person, [place] to Time, [place] to Place, [place] to Situation  Attention:Normal: Yes  Thought Processes: Circumstantial  Thought Content:Normal: Yes  Hallucinations:  Auditory (Comment)(states communication with dead relatives)  Delusions: No  Memory:Normal: Yes  Insight and Judgment: Yes  Present Suicidal Ideation: No  Present Homicidal Ideation: No    Tobacco Screening:  Practical Counseling, on admission, ana X, if applicable and completed (first 3 are required if patient doesn't refuse):            ( )  Recognizing danger situations (included triggers and roadblocks)                    ( )  Coping skills (new ways to manage stress, exercise, relaxation techniques, changing routine, distraction)                                                           ( )  Basic information about

## 2020-07-07 PROCEDURE — 1240000000 HC EMOTIONAL WELLNESS R&B

## 2020-07-07 PROCEDURE — 99222 1ST HOSP IP/OBS MODERATE 55: CPT | Performed by: INTERNAL MEDICINE

## 2020-07-07 PROCEDURE — 6370000000 HC RX 637 (ALT 250 FOR IP): Performed by: NURSE PRACTITIONER

## 2020-07-07 PROCEDURE — 6370000000 HC RX 637 (ALT 250 FOR IP): Performed by: INTERNAL MEDICINE

## 2020-07-07 PROCEDURE — 99232 SBSQ HOSP IP/OBS MODERATE 35: CPT | Performed by: PSYCHIATRY & NEUROLOGY

## 2020-07-07 PROCEDURE — 6370000000 HC RX 637 (ALT 250 FOR IP): Performed by: PSYCHIATRY & NEUROLOGY

## 2020-07-07 RX ORDER — SULFAMETHOXAZOLE AND TRIMETHOPRIM 800; 160 MG/1; MG/1
1 TABLET ORAL EVERY 12 HOURS SCHEDULED
Status: DISCONTINUED | OUTPATIENT
Start: 2020-07-08 | End: 2020-07-10 | Stop reason: HOSPADM

## 2020-07-07 RX ORDER — CEFUROXIME AXETIL 250 MG/1
500 TABLET ORAL EVERY 12 HOURS SCHEDULED
Status: DISCONTINUED | OUTPATIENT
Start: 2020-07-08 | End: 2020-07-10 | Stop reason: HOSPADM

## 2020-07-07 RX ORDER — OLANZAPINE 10 MG/1
10 TABLET ORAL NIGHTLY
Status: DISCONTINUED | OUTPATIENT
Start: 2020-07-07 | End: 2020-07-10 | Stop reason: HOSPADM

## 2020-07-07 RX ADMIN — MIRTAZAPINE 15 MG: 15 TABLET, FILM COATED ORAL at 20:31

## 2020-07-07 RX ADMIN — BUSPIRONE HYDROCHLORIDE 15 MG: 15 TABLET ORAL at 08:36

## 2020-07-07 RX ADMIN — OLANZAPINE 10 MG: 10 TABLET, FILM COATED ORAL at 20:30

## 2020-07-07 RX ADMIN — BUSPIRONE HYDROCHLORIDE 15 MG: 15 TABLET ORAL at 14:20

## 2020-07-07 RX ADMIN — OXYCODONE HYDROCHLORIDE AND ACETAMINOPHEN 1 TABLET: 5; 325 TABLET ORAL at 20:30

## 2020-07-07 RX ADMIN — METRONIDAZOLE 500 MG: 500 TABLET ORAL at 06:10

## 2020-07-07 RX ADMIN — HYDROXYZINE HYDROCHLORIDE 25 MG: 25 TABLET, FILM COATED ORAL at 20:31

## 2020-07-07 RX ADMIN — METRONIDAZOLE 500 MG: 500 TABLET ORAL at 14:20

## 2020-07-07 RX ADMIN — FERROUS SULFATE TAB 325 MG (65 MG ELEMENTAL FE) 325 MG: 325 (65 FE) TAB at 08:36

## 2020-07-07 RX ADMIN — OXYCODONE HYDROCHLORIDE AND ACETAMINOPHEN 1 TABLET: 5; 325 TABLET ORAL at 02:34

## 2020-07-07 RX ADMIN — FERROUS SULFATE TAB 325 MG (65 MG ELEMENTAL FE) 325 MG: 325 (65 FE) TAB at 17:33

## 2020-07-07 RX ADMIN — BUSPIRONE HYDROCHLORIDE 15 MG: 15 TABLET ORAL at 20:31

## 2020-07-07 RX ADMIN — TRAZODONE HYDROCHLORIDE 50 MG: 50 TABLET ORAL at 20:31

## 2020-07-07 RX ADMIN — METRONIDAZOLE 500 MG: 500 TABLET ORAL at 20:31

## 2020-07-07 ASSESSMENT — PAIN - FUNCTIONAL ASSESSMENT
PAIN_FUNCTIONAL_ASSESSMENT: 0-10
PAIN_FUNCTIONAL_ASSESSMENT: 0-10

## 2020-07-07 ASSESSMENT — PAIN SCALES - GENERAL
PAINLEVEL_OUTOF10: 0
PAINLEVEL_OUTOF10: 3
PAINLEVEL_OUTOF10: 5
PAINLEVEL_OUTOF10: 5

## 2020-07-07 NOTE — PROGRESS NOTES
BEHAVIORAL HEALTH FOLLOW-UP NOTE     7/7/2020     Patient was seen and examined in person, Chart reviewed   Patient's case discussed with staff/team    Chief Complaint: Psychosis    Interim History:   -The patient was seen at bedside. She reports that she is having conversations with God. She is unable to describe the content of those conversations. She does not seem to be distressed by these conversations and states she likes it. She states she has become a different person in the last few weeks. She prefers to be the person she was before. Her mood appears to be slightly elated. She was able to have a more coherent conversation compared to yesterday    -Appetite:   [x] Normal/Unchanged  [] Increased  [] Decreased      Sleep:       [] Normal/Unchanged  [x] Fair       [] Poor              Energy:    [] Normal/Unchanged  [x] Increased  [] Decreased        Aggression:  [] yes  [x] no    Patient is [] able  [] unable to CONTRACT FOR SAFETY ON THE UNIT    PAST MEDICAL/PSYCHIATRIC HISTORY:   Past Medical History:   Diagnosis Date    Cancer [facility])     Rectal    Pelvis, female abscess     pre sacral drain put in 6/19/2020    UTI (urinary tract infection)     stent place june 2020       FAMILY/SOCIAL HISTORY:  No family history on file.   Social History     Socioeconomic History    Marital status: Unknown     Spouse name: Not on file    Number of children: Not on file    Years of education: Not on file    Highest education level: Not on file   Occupational History    Not on file   Social Needs    Financial resource strain: Not on file    Food insecurity     Worry: Not on file     Inability: Not on file    Transportation needs     Medical: Not on file     Non-medical: Not on file   Tobacco Use    Smoking status: Never Smoker    Smokeless tobacco: Never Used    Tobacco comment: pt is a non smoker   Substance and Sexual Activity    Alcohol use: Not on file    Drug use: Not on file    Sexual activity: Not on file   Lifestyle    Physical activity     Days per week: Not on file     Minutes per session: Not on file    Stress: Not on file   Relationships    Social connections     Talks on phone: Not on file     Gets together: Not on file     Attends Jainism service: Not on file     Active member of club or organization: Not on file     Attends meetings of clubs or organizations: Not on file     Relationship status: Not on file    Intimate partner violence     Fear of current or ex partner: Not on file     Emotionally abused: Not on file     Physically abused: Not on file     Forced sexual activity: Not on file   Other Topics Concern    Not on file   Social History Narrative    Not on file           ROS:  [x] All negative/unchanged except if checked.  Explain positive(checked items) below:  [] Constitutional  [] Eyes  [] Ear/Nose/Mouth/Throat  [] Respiratory  [] CV  [] GI  []   [] Musculoskeletal  [] Skin/Breast  [] Neurological  [] Endocrine  [] Heme/Lymph  [] Allergic/Immunologic    Explanation:     MEDICATIONS:    Current Facility-Administered Medications:     OLANZapine (ZYPREXA) tablet 7.5 mg, 7.5 mg, Oral, Nightly, Sam Amaral MD, 7.5 mg at 07/06/20 2124    busPIRone (BUSPAR) tablet 15 mg, 15 mg, Oral, TID, Piper Edwards MD, 15 mg at 07/07/20 0836    ferrous sulfate (IRON 325) tablet 325 mg, 325 mg, Oral, BID WC, Steve Suhas Stephenson MD, 325 mg at 07/07/20 0836    hydrOXYzine (ATARAX) tablet 25 mg, 25 mg, Oral, TID PRN, Piper Edwards MD, 25 mg at 07/06/20 2123    metroNIDAZOLE (FLAGYL) tablet 500 mg, 500 mg, Oral, 3 times per day, Piper Edwards MD, 500 mg at 07/07/20 0610    mirtazapine (REMERON) tablet 15 mg, 15 mg, Oral, Nightly, Piper Edwards MD, 15 mg at 07/06/20 2124    oxyCODONE-acetaminophen (PERCOCET) 5-325 MG per tablet 1 tablet, 1 tablet, Oral, Q4H PRN, Piper Edwards MD, 1 tablet at 07/07/20 0234    acetaminophen (TYLENOL) tablet 650 mg, 650 mg, Oral, Q4H PRN, Kaylan DUNLAP

## 2020-07-07 NOTE — BH NOTE
Internal med consult ordered per Dr. Murali Mariscal to address care for chest port/MEL drain/colostomy/and labs. Dr. Patti Westfall contacted and stated Dr. German Mulligan will be in to round today.

## 2020-07-07 NOTE — H&P
HISTORY and [address]       NAME:  Christoph Hawkins  MRN: [id number]   YOB: 1975   Date: 7/7/2020   Age: 40 y.o. Gender: female     COMPLAINT AND PRESENT HISTORY:    Christoph Hawkins is a 40 y.o.,  female, admitted because of increasing psychosis. According to ED notes, patient had complaints of fleeting suicidal ideations for a few days and auditory hallucinations. Patient states that she stopped her medications a week and 1/2 ago, because it caused her to have suicidal ideations. Patient denies any current alcohol or substance abuse . Patient admits to sleep disturbances . Patient admits to her appetite slowly improving. Patient states that living arrangements after discharge will be to return back home with . Patient denies any somatic complaints. Patient has a hx of Rectal Cancer  Labs are revealing anemia with low hbg and mildly elevated platelets. No  chest pain or  shortness of breath. No fever/chills. Please see patient's psychiatric hx for more information.     DIAGNOSTIC RESULTS   Labs:  CBC:   Recent Labs     07/05/20  0429   WBC 9.1   HGB 8.2*   *     BMP:    Recent Labs     07/05/20  0429      K 3.7   *   CO2 25   BUN 13   CREATININE 0.66   GLUCOSE 113*     U/A:  Lab Results   Component Value Date    COLORU YELLOW 06/16/2020    WBCUA 20 TO 50 06/16/2020    RBCUA 2 TO 5 06/16/2020    MUCUS NOT REPORTED 06/16/2020    BACTERIA FEW 06/16/2020    SPECGRAV 1.004 06/16/2020    LEUKOCYTESUR LARGE 06/16/2020    GLUCOSEU NEGATIVE 06/16/2020    AMORPHOUS NOT REPORTED 06/16/2020     PAST MEDICAL HISTORY     Past Medical History:   Diagnosis Date    Cancer [facility])     Rectal    Pelvis, female abscess     pre sacral drain put in 6/19/2020    UTI (urinary tract infection)     stent place june 2020     Pt denies any history of Diabetes mellitus type 2, hypertension, stroke, heart disease, COPD, Asthma, GERD, HLD, 15 MG tablet Take 15 mg by mouth 3 times daily      PARoxetine (PAXIL) 20 MG tablet Take 20 mg by mouth every morning      mirtazapine (REMERON) 15 MG tablet Take 15 mg by mouth nightly      cefUROXime (CEFTIN) 500 MG tablet Take 1 tablet by mouth 2 times daily for 10 days 20 tablet 0    VORTIoxetine (TRINTELLIX) 10 MG TABS tablet Take 10 mg by mouth daily      tamsulosin (FLOMAX) 0.4 MG capsule Take 0.4 mg by mouth daily      hydrOXYzine (VISTARIL) 50 MG capsule Take 50 mg by mouth 3 times daily as needed      pantoprazole (PROTONIX) 40 MG tablet Take 40 mg by mouth daily                        General health:  Fairly good. No fever or chills. Skin:  No itching, redness or rash. Head, eyes, ears, nose, throat:  No headache, epistaxis, rhinorrhea hearing loss or sore throat. Neck:  No pain, stiffness or masses. Cardiovascular/Respiratory system:  No chest pain, palpitation, shortness of breath, coughing or expectoration. Gastrointestinal tract: No abdominal pain, nausea, vomiting, dysphagia, diarrhea or constipation. Genitourinary:  No burning on micturition. No hesitancy, urgency, frequency or discoloration of urine. Locomotor:  No bone or joint pains. No swelling or deformities. Neuropsychiatric:  See HPI. GENERAL PHYSICAL EXAM:     Vitals: /80   Pulse 120   Temp 98 °F (36.7 °C) (Oral)   Resp 14   Ht 5' (1.524 m)   Wt 82 lb (37.2 kg)   LMP 06/25/2017   BMI 16.01 kg/m²  Body mass index is 16.01 kg/m². Pt was examined with a nurse present in the room. GENERAL APPEARANCE:  Vidhi Mullen is 40 y.o.,  female, under nourished, conscious, alert. Does not appear to be distress or pain at this time. SKIN:  Warm, dry, no cyanosis or jaundice. HEAD:  Normocephalic, atraumatic, no swelling or tenderness. EYES:  Pupils equal, reactive to light, Conjunctiva is clear, EOMs intact porsha. eyelids WNL.     EARS:  No discharge, no marked hearing loss. NOSE:  No rhinorrhea, epistaxis or septal deformity. THROAT:  Not congested. No ulceration bleeding or discharge. NECK:  No stiffness, trachea central.  No palpable masses or L.N.      CHEST:  Symmetrical and equal on expansion. HEART:  Regular rate and rhythm. S1 > S2, No audible murmurs or gallops. LUNGS:  Equal on expansion, normal breath sounds. No adventitious sounds. ABDOMEN:  Obese. Soft on palpation. No localized tenderness. No guarding or rigidity. No palpable organomegaly. Colostomy in place with bile colored loose stool, stoma fleshy color. MEL present duc right buttock. no drainage noted. LYMPHATICS:  No palpable cervical Lymphadenopathy. LOCOMOTOR, BACK AND SPINE:  No tenderness or deformities. EXTREMITIES:  Symmetrical, no pretibial edema. Shiras sign negative. No discoloration or ulcerations. NEUROLOGIC:  The patient is conscious, alert, oriented,Cranial nerve II-XII intact, taste and smell were not examined. No apparent focal sensory or motor deficits. Muscle strength equal Rich. No facial droop, tongue protrudes centrally, no slurring of the speech. PROVISIONAL DIAGNOSES:      Active Problems:    Psychosis ([facility] [address]    Intellectual disability    Anorexia nervosa  Resolved Problems:    * No resolved hospital problems.  *      GENE VELEZ, GRAY - CNP on 7/7/2020 at 4:57 PM

## 2020-07-07 NOTE — BH NOTE
LOS    Patient is resting in bed with eyes closed. No distress observed. LOS continued per doctor order.

## 2020-07-07 NOTE — BH NOTE
Called [facility] to page Dr. Yris Anna to address chest port dressing change needs. Waiting for call back.

## 2020-07-07 NOTE — PLAN OF CARE
Problem: Altered Mood, Depressive Behavior:  Goal: Absence of self-harm  Description: Absence of self-harm  7/6/2020 2157 by Myrtie Lombard, RN  Outcome: Ongoing  Note: Patient denies suicidal ideation and verbally contracts for safety. Patient remains safe during Q15 min and random safety checks. Patient remains in hospital appropriate clothing at all times and free from harmful objects. Patient is accepting of talk time with writer. Denies any thoughts to harm others, denies any hallucinations. States she spoke to the doctor today and discussed discharge. States she feels ready, is ready to be home with her  and dogs. Problem: Altered Mood, Deterioration in Function:  Goal: Maintenance of adequate nutrition will improve  Description: Maintenance of adequate nutrition will improve  7/6/2020 2157 by Myrtie Lombard, RN  Outcome: Ongoing  Note: Ate pm snack and had extras. Problem: Falls - Risk of:  Goal: Will remain free from falls  Description: Will remain free from falls  7/6/2020 2157 by Myrtie Lombard, RN  Outcome: Ongoing  Note: Pt remains free of falls and verbalizes understanding of individual fall risks. Pt wearing non skid footwear and encouraged to seek out staff for any assistance needed.

## 2020-07-07 NOTE — BH NOTE
LOS    Patient resting in bed with eyes closed. No signs of distress. LOS continued per doctor order.

## 2020-07-07 NOTE — BH NOTE
LOS   Patient ambulates without difficulty to nurses station, is accepting of meal and medication. Patient remains in behavior control at this time.

## 2020-07-07 NOTE — BH NOTE
LOS note  Patient resting comfortable in room with eyes closed. Unlabored breathing pattern and no distress noted. LOS continued per doctor order and safety.

## 2020-07-07 NOTE — PLAN OF CARE
[facility]  Day 3 Interdisciplinary Treatment Plan NOTE    Review Date & Time: 7/7/2020                   1338    Admission Type:   Admission Type: Involuntary    Reason for admission:  Reason for Admission: SI no plan  Estimated Length of Stay: 5-7 days  Estimated Discharge Date Update: to be determined by physician    PATIENT STRENGTHS:  Patient Strengths Strengths: Positive Support, No significant Physical Illness  Patient Strengths and Limitations:Limitations: Tendency to isolate self, Lacks leisure interests, Difficulty problem solving/relies on others to help solve problems, Hopeless about future, Multiple barriers to leisure interests, Inappropriate/potentially harmful leisure interests (depression substance abuse anxiety poor coping skills)  Addictive Behavior:Addictive Behavior  In the past 3 months, have you felt or has someone told you that you have a problem with:  : None  Do you have a history of Chemical Use?: No  Do you have a history of Alcohol Use?: No  Do you have a history of Street Drug Abuse?: Yes  Histroy of Prescripton Drug Abuse?: No  Medical Problems:No past medical history on file.     Risk:  Fall RiskTotal: 53  Yoni Scale Yoni Scale Score: 22  BVC Total: 0  Change in scores no Changes to plan of Care no    Status EXAM:   Status and Exam  Normal: No  Facial Expression: Elevated  Affect: Inappropriate  Level of Consciousness: Alert  Mood:Normal: No  Mood: Depressed, Anxious  Motor Activity:Normal: No  Motor Activity: Decreased  Interview Behavior: Cooperative  Preception: Griffin to Person, Terrye Christians to Time, Griffin to Place, Griffin to Situation  Attention:Normal: Yes  Attention: Distractible  Thought Processes: Circumstantial  Thought Content:Normal: Yes  Thought Content: Preoccupations  Hallucinations: None  Delusions: No  Memory:Normal: Yes  Memory: Poor Recent, Confabulation  Insight and Judgment: No  Insight and Judgment: Unmotivated  Present Suicidal Ideation:

## 2020-07-07 NOTE — PLAN OF CARE
Problem: Altered Mood, Depressive Behavior:  Goal: Absence of self-harm  Description: Absence of self-harm  7/7/2020 1018 by Nani Chao RN  Outcome: Ongoing  Patient remains free from self harm. Goal: Able to verbalize acceptance of life and situations over which he or she has no control  Description: Able to verbalize acceptance of life and situations over which he or she has no control  Outcome: Ongoing  Patient is brightened upon approach and expresses readiness for discharge. Patient denies suicidal ideations. She states she does hear the voice of God and is a spiritual person. She is taking medications and attending groups. Patient is alert and oriented. She is ambulating without difficulty. Safety checks every 15 min; patient safety maintained. Problem: Altered Mood, Deterioration in Function:  Goal: Maintenance of adequate nutrition will improve  Description: Maintenance of adequate nutrition will improve  7/7/2020 1018 by Nani Chao RN  Outcome: Ongoing  Patient is observed eating and drinking during meals. Problem: Falls - Risk of:  Goal: Will remain free from falls  Description: Will remain free from falls  7/7/2020 1018 by Nani Chao RN  Outcome: Ongoing  Patient remains free from falls. Problem: Pain:  Goal: Pain level will decrease  Description: Pain level will decrease  Outcome: Ongoing  Patient denies pain at this time.

## 2020-07-07 NOTE — GROUP NOTE
Group Therapy Note    Date: 7/7/2020    Group Start Time: 1000  Group End Time: 4338  Group Topic: Cognitive Skills    [facility]    ALPHONSE Live        Group Therapy Note    Attendees: 5/12       Patient's Goal:  To increase social interaction and to practice decision making and concentration skills. Notes: Pt participated fully in group task with some prompts/explanations. Pt was able to practice decision making and concentration skills. Pt was pleasant and supportive of peers. Status After Intervention:  Improved    Participation Level: Active Listener and Interactive    Participation Quality: Appropriate, Attentive, Sharing and Supportive      Speech:  Slow to process et needs some assistance with decision making in task but does demonstrate improvement as group progresses.  Pt was able to assist a peer by end of group      Thought Process/Content: Logical  Linear      Affective Functioning: Blunted      Mood: euthymic      Level of consciousness:  Alert, Oriented x4 and Attentive      Response to Learning: Able to verbalize current knowledge/experience, Able to verbalize/acknowledge new learning, Able to retain information and Progressing to goal      Endings: None Reported    Modes of Intervention: Education, Support, Socialization, Exploration, Clarifying and Problem-solving      Discipline Responsible: Psychoeducational Specialist      Signature:  ALPHONES Meraz

## 2020-07-07 NOTE — GROUP NOTE
Group Therapy Note    Date: 7/7/2020    Group Start Time: 1430  Group End Time: 3395  Group Topic: Cognitive Skills    ROMEO Barton, CTRS        Group Therapy Note    Attendees: 11/22       Pt did not participate in Cognitive Skills Group at 1430 when encouraged by RT due to resting in room. Pt was offered talk time as an alternative to group but declined.        Discipline Responsible: Psychoeducational Specialist      Signature:  Gumaro Dasilva

## 2020-07-07 NOTE — GROUP NOTE
Group Therapy Note    Date: 7/7/2020    Group Start Time: 1330  Group End Time: 5457  Group Topic: Psychoeducation    [facility]    Raymond Jordan        Group Therapy Note    Attendees: 10/23          Patient's Goal:  To increase interpersonal interaction, and engage in discussion about social skills    Notes:  Pt attended and participated in group. Status After Intervention:  Improved    Participation Level:  Active Listener and Interactive    Participation Quality: Appropriate, Attentive and Sharing      Speech:  normal      Thought Process/Content: Logical  Linear      Affective Functioning: Congruent      Mood: euthymic      Level of consciousness:  Alert and Attentive      Response to Learning: Able to verbalize current knowledge/experience and Progressing to goal      Endings: None Reported    Modes of Intervention: Support, Socialization, Exploration, Activity and Reality-testing      Discipline Responsible: Psychoeducational Specialist      Signature:  Raymond Jordan

## 2020-07-07 NOTE — BH NOTE
Medical brought over patients belongs that had been left. Patient has five rings and five bracelets that went into security - [phone]. Charge nurse notified.

## 2020-07-07 NOTE — GROUP NOTE
Group Therapy Note    Date: 7/7/2020    Group Start Time: 1100  Group End Time: 36  Group Topic: Psychotherapy    ROMEO Palmer        Group Therapy Note         Patient refused to attend psychotherapy group after encouragement from staff. 1:1 talk time offered but refused. Signature:   Jazmín Palmer

## 2020-07-08 PROCEDURE — 6370000000 HC RX 637 (ALT 250 FOR IP): Performed by: INTERNAL MEDICINE

## 2020-07-08 PROCEDURE — 99232 SBSQ HOSP IP/OBS MODERATE 35: CPT | Performed by: PSYCHIATRY & NEUROLOGY

## 2020-07-08 PROCEDURE — 6370000000 HC RX 637 (ALT 250 FOR IP): Performed by: PSYCHIATRY & NEUROLOGY

## 2020-07-08 PROCEDURE — 99231 SBSQ HOSP IP/OBS SF/LOW 25: CPT | Performed by: INTERNAL MEDICINE

## 2020-07-08 PROCEDURE — 97110 THERAPEUTIC EXERCISES: CPT

## 2020-07-08 PROCEDURE — 1240000000 HC EMOTIONAL WELLNESS R&B

## 2020-07-08 PROCEDURE — 97116 GAIT TRAINING THERAPY: CPT

## 2020-07-08 PROCEDURE — 6370000000 HC RX 637 (ALT 250 FOR IP): Performed by: NURSE PRACTITIONER

## 2020-07-08 RX ADMIN — TRAZODONE HYDROCHLORIDE 50 MG: 50 TABLET ORAL at 21:34

## 2020-07-08 RX ADMIN — FERROUS SULFATE TAB 325 MG (65 MG ELEMENTAL FE) 325 MG: 325 (65 FE) TAB at 08:37

## 2020-07-08 RX ADMIN — BUSPIRONE HYDROCHLORIDE 15 MG: 15 TABLET ORAL at 08:37

## 2020-07-08 RX ADMIN — CEFUROXIME AXETIL 500 MG: 250 TABLET ORAL at 08:38

## 2020-07-08 RX ADMIN — OLANZAPINE 10 MG: 10 TABLET, FILM COATED ORAL at 21:34

## 2020-07-08 RX ADMIN — BUSPIRONE HYDROCHLORIDE 15 MG: 15 TABLET ORAL at 21:34

## 2020-07-08 RX ADMIN — MIRTAZAPINE 15 MG: 15 TABLET, FILM COATED ORAL at 21:34

## 2020-07-08 RX ADMIN — OXYCODONE HYDROCHLORIDE AND ACETAMINOPHEN 1 TABLET: 5; 325 TABLET ORAL at 19:22

## 2020-07-08 RX ADMIN — HYDROXYZINE HYDROCHLORIDE 25 MG: 25 TABLET, FILM COATED ORAL at 19:22

## 2020-07-08 RX ADMIN — CEFUROXIME AXETIL 500 MG: 250 TABLET ORAL at 21:34

## 2020-07-08 RX ADMIN — METRONIDAZOLE 500 MG: 500 TABLET ORAL at 15:38

## 2020-07-08 RX ADMIN — SULFAMETHOXAZOLE AND TRIMETHOPRIM 1 TABLET: 800; 160 TABLET ORAL at 21:34

## 2020-07-08 RX ADMIN — METRONIDAZOLE 500 MG: 500 TABLET ORAL at 21:34

## 2020-07-08 RX ADMIN — FERROUS SULFATE TAB 325 MG (65 MG ELEMENTAL FE) 325 MG: 325 (65 FE) TAB at 17:52

## 2020-07-08 RX ADMIN — SULFAMETHOXAZOLE AND TRIMETHOPRIM 1 TABLET: 800; 160 TABLET ORAL at 08:38

## 2020-07-08 RX ADMIN — BUSPIRONE HYDROCHLORIDE 15 MG: 15 TABLET ORAL at 15:39

## 2020-07-08 ASSESSMENT — PAIN SCALES - GENERAL
PAINLEVEL_OUTOF10: 1
PAINLEVEL_OUTOF10: 3

## 2020-07-08 NOTE — CONSULTS
[facility] Internal Medicine    CONSULTATION / HISTORY AND PHYSICAL EXAMINATION            Date:   7/8/2020  Patient name:  Martha Beavers  Date of admission:  7/5/2020  9:25 PM  MRN:   [id number]  Account:  [de-identified]  YOB: 1975  PCP:    No primary care provider on file. Room:   [address] [place] [address]  Code Status:    Full Code    Physician Requesting Consult: Te Albarran MD    Reason for Consult: Medical management    Chief Complaint:     No chief complaint on file. Abdominal abscess    History Obtained From:     Patient medical record nursing staff    History of Present Illness:     Patient is a 77-year-old lady with borderline intelligence history of: Rectal cancer status post surgery admitted from [facility] for tachycardia and pelvic abscess which was drained found to be fecal matter has a fistulogram which shows she got: Fistula leaking fecal matter into the pelvic area patient had a drain placed IV antibiotics plan for his outpatient surgery due to lack of insight and hallucination patient is being sent back to [facility]      Past Medical History:     Past Medical History:   Diagnosis Date    Cancer [facility])     Rectal    Pelvis, female abscess     pre sacral drain put in 6/19/2020    UTI (urinary tract infection)     stent place june 2020        Past Surgical History:     Past Surgical History:   Procedure Laterality Date    ABSCESS DRAINAGE      pre sacral pelvic abscess with drain placement to right buttock    SMALL INTESTINE SURGERY N/A 7/3/2020    TRANSVERSE COLOSTOMY LOOP performed by Carmen Monson MD at [facility]        Medications Prior to Admission:     Prior to Admission medications    Medication Sig Start Date End Date Taking?  Authorizing Provider   OLANZapine (ZYPREXA) 5 MG tablet Take 5 mg by mouth nightly   Yes Historical Provider, MD   busPIRone (BUSPAR) 15 MG tablet Take 15 mg by mouth 3 times daily   Yes Historical Provider, MD   PARoxetine (PAXIL) 20 MG tablet Take 20 mg by mouth every morning   Yes Historical Provider, MD   mirtazapine (REMERON) 15 MG tablet Take 15 mg by mouth nightly   Yes Historical Provider, MD   cefUROXime (CEFTIN) 500 MG tablet Take 1 tablet by mouth 2 times daily for 10 days 7/5/20 7/15/20  Milla Mireles MD   VORTIoxetine (TRINTELLIX) 10 MG TABS tablet Take 10 mg by mouth daily    Historical Provider, MD   tamsulosin (FLOMAX) 0.4 MG capsule Take 0.4 mg by mouth daily    Historical Provider, MD   hydrOXYzine (VISTARIL) 50 MG capsule Take 50 mg by mouth 3 times daily as needed    Historical Provider, MD   pantoprazole (PROTONIX) 40 MG tablet Take 40 mg by mouth daily    Historical Provider, MD        Allergies:     Patient has no known allergies. Social History:     Tobacco:    reports that she has never smoked. She has never used smokeless tobacco.  Alcohol:      has no history on file for alcohol. Drug Use:  has no history on file for drug. Family History:     No family history on file. Review of Systems:     Positive and Negative as described in HPI. CONSTITUTIONAL:  negative for fevers, chills, sweats, fatigue, weight loss  HEENT:  negative for vision, hearing changes, runny nose, throat pain  RESPIRATORY:  negative for shortness of breath, cough, congestion, wheezing. CARDIOVASCULAR:  negative for chest pain, palpitations.   GASTROINTESTINAL:  negative for nausea, vomiting, diarrhea, constipation, change in bowel habits, abdominal pain   GENITOURINARY: Loss of appetite no nausea vomiting  INTEGUMENT:  negative for rash, skin lesions, easy bruising   HEMATOLOGIC/LYMPHATIC:  negative for swelling/edema   ALLERGIC/IMMUNOLOGIC:  negative for urticaria , itching  ENDOCRINE:  negative increase in drinking, increase in urination, hot or cold intolerance  MUSCULOSKELETAL:  negative joint pains, muscle aches, swelling of joints  NEUROLOGICAL:  negative for headaches, dizziness,

## 2020-07-08 NOTE — PLAN OF CARE
Problem: Altered Mood, Depressive Behavior:  Goal: Absence of self-harm  Description: Absence of self-harm  7/8/2020 1001 by Mehdi Amaya  Outcome: Ongoing  No self harming behaviors observed or noted. Problem: Altered Mood, Depressive Behavior:  Goal: Able to verbalize acceptance of life and situations over which he or she has no control  Description: Able to verbalize acceptance of life and situations over which he or she has no control  Outcome: Ongoing  Patient was accepting of 1:1 talk time. Relates to feelings of depression and anxiety. Denies any auditory or visual hallucinations. Mainly isolative to room, out for meals and needs, cooperative. Appetite is good and states she is sleeping well. Q15 min safety checks maintained.

## 2020-07-08 NOTE — BH NOTE
LOS continues per doctor order. Patient in bed resting with eyes closed. Face/body relaxed. Respirations unlabored. No sign of distress observed. Safety maintained as ordered. Will continue to monitor.

## 2020-07-08 NOTE — PROGRESS NOTES
BEHAVIORAL HEALTH FOLLOW-UP NOTE     7/8/2020     Patient was seen and examined in person, Chart reviewed   Patient's case discussed with staff/team    Chief Complaint: Psychosis    Interim History:   -Says that she can hear her  through her \"inner spirit\". Says he just told her he is in the hospital but doesn't know how to find her. Can hear God. God has been telling her that her reports are all good. -Appetite:   [x] Normal/Unchanged  [] Increased  [] Decreased      Sleep:       [] Normal/Unchanged  [x] Fair       [] Poor              Energy:    [] Normal/Unchanged  [x] Increased  [] Decreased        Aggression:  [] yes  [x] no    Patient is [] able  [] unable to CONTRACT FOR SAFETY ON THE UNIT    PAST MEDICAL/PSYCHIATRIC HISTORY:   Past Medical History:   Diagnosis Date    Cancer [facility])     Rectal    Pelvis, female abscess     pre sacral drain put in 6/19/2020    UTI (urinary tract infection)     stent place june 2020       FAMILY/SOCIAL HISTORY:  No family history on file.   Social History     Socioeconomic History    Marital status: Unknown     Spouse name: Not on file    Number of children: Not on file    Years of education: Not on file    Highest education level: Not on file   Occupational History    Not on file   Social Needs    Financial resource strain: Not on file    Food insecurity     Worry: Not on file     Inability: Not on file    Transportation needs     Medical: Not on file     Non-medical: Not on file   Tobacco Use    Smoking status: Never Smoker    Smokeless tobacco: Never Used    Tobacco comment: pt is a non smoker   Substance and Sexual Activity    Alcohol use: Not on file    Drug use: Not on file    Sexual activity: Not on file   Lifestyle    Physical activity     Days per week: Not on file     Minutes per session: Not on file    Stress: Not on file   Relationships    Social connections     Talks on phone: Not on file     Gets together: Not on file     Attends mg, 650 mg, Oral, Q4H PRN, Percilla Settler, APRN - CNP    traZODone (DESYREL) tablet 50 mg, 50 mg, Oral, Nightly PRN, Percilla Settler, APRN - CNP, 50 mg at 07/07/20 2031    benztropine mesylate (COGENTIN) injection 2 mg, 2 mg, Intramuscular, BID PRN, Percilla Settler, APRN - CNP    magnesium hydroxide (MILK OF MAGNESIA) 400 MG/5ML suspension 30 mL, 30 mL, Oral, Daily PRN, Percilla Settler, APRN - CNP    aluminum & magnesium hydroxide-simethicone (MAALOX) 200-200-20 MG/5ML suspension 30 mL, 30 mL, Oral, Q6H PRN, Percilla Settler, APRN - CNP      Examination:  /82   Pulse 104   Temp 98.3 °F (36.8 °C) (Oral)   Resp 14   Ht 5' (1.524 m)   Wt 82 lb (37.2 kg)   LMP 06/25/2017   BMI 16.01 kg/m²   Gait - steady  Medication side effects(SE): none    Mental Status Examination:    Level of consciousness:  within normal limits   Appearance:  fair grooming and fair hygiene  Behavior/Motor:  no abnormalities noted  Attitude toward examiner:  cooperative  Speech:  slow   Mood: elated  Affect:  mood congruent  Thought processes:  loose associations   Thought content:  Homicidal ideation - none  Suicidal Ideation:  denies suicidal ideation  Delusions:  grandiose  Perceptual Disturbance:  auditory  Cognition:  oriented to person, place, and time   Concentration distractible  Insight poor   Judgement poor     ASSESSMENT:   Patient symptoms are:  [] Well controlled  [x] Improving  [] Worsening  [] No change      Diagnosis:   Active Problems:    Psychosis ([facility] [address]    Intellectual disability    Anorexia nervosa  Resolved Problems:    * No resolved hospital problems. *      LABS:    No results for input(s): WBC, HGB, PLT in the last 72 hours. No results for input(s): NA, K, CL, CO2, BUN, CREATININE, GLUCOSE in the last 72 hours. No results for input(s): BILITOT, ALKPHOS, AST, ALT in the last 72 hours.   Lab Results   Component Value Date    BARBSCNU NEGATIVE 06/16/2020    LABBENZ NEGATIVE 06/16/2020    LABMETH NEGATIVE 06/16/2020 errors which are inherent in voice recognition technology. **    An electronic signature was used to authenticate this note. **This report has been created using voice recognition software. It may contain minor errors which are inherent in voice recognition technology. **

## 2020-07-08 NOTE — BH NOTE
LOS note  Patient resting comfortable in room. Unlabored breathing pattern and no distress noted. LOS continued per doctor order and safety.

## 2020-07-08 NOTE — PLAN OF CARE
Problem: Altered Mood, Depressive Behavior:  Goal: Absence of self-harm  Description: Absence of self-harm  7/7/2020 2325 by Tasha Rodríguez RN  Outcome: Ongoing  Note: Patient denies suicidal ideation and verbally contracts for safety. Patient remains safe during Q15 min and random safety checks. Patient remains in hospital appropriate clothing at all times and free from harmful objects. Patient is accepting of talk time with writer. Denies any thoughts to harm others, was very tired and lethargic tonight. Problem: Falls - Risk of:  Goal: Will remain free from falls  Description: Will remain free from falls  7/7/2020 2325 by Tasha Rodríguez RN  Outcome: Ongoing  Note: Pt remains free of falls and verbalizes understanding of individual fall risks. Pt wearing non skid footwear and encouraged to seek out staff for any assistance needed.

## 2020-07-08 NOTE — BH NOTE
LOS continues as ordered. Patient in day room at this time. No sign of distress observed. Safety maintained as ordered. Will continue to monitor.

## 2020-07-08 NOTE — BH NOTE
LOS continues per doctor order. Patient out in day room. No signs of distress observed. Safety maintained with every 15 minute checks. Will continue to monitor.

## 2020-07-08 NOTE — BH NOTE
LOS continues per doctor order. Patient alert in bathroom. No signs of distress observed. Safety maintained as ordered. Will continue to monitor.

## 2020-07-08 NOTE — PROGRESS NOTES
[facility]: [facility] GALARZA   Physical Therapy Progress Note    Date: 20  Patient Name: Karla Varela       Room: 0219/0219-01  MRN: [id number]   Account: [de-identified]   : 1975  (40 y.o.)   Gender: female     Discharge Recommendations      Equipment Needed: No          Restrictions/Precautions: General Precautions, Fall Risk(MEL x1 R buttock)  Other position/activity restrictions: up as tolerated   Past Medical History:  has a past medical history of Cancer ([address] Pelvis, female abscess, and UTI (urinary tract infection). Past Surgical History:   has a past surgical history that includes Ureter stent placement (); Abscess Drainage; and Small intestine surgery (N/A, 7/3/2020). Additional Pertinent Hx: rectal CA, Transverse colostomy loop 7/3/20    Overall Orientation Status: Within Functional Limits  Restrictions/Precautions  Restrictions/Precautions: General Precautions  Required Braces or Orthoses?: No  Position Activity Restriction  Other position/activity restrictions: up as tolerated    Subjective: Pt reports \"nothing to do around here so I stay in my bed\"  Comments: Pt is very friendly and cooperative with PT tx. Vital Signs  Patient Currently in Pain: Denies                   Bed Mobility:   Rolling: Independent  Supine to Sit: Independent  Sit to Supine: Independent  Scooting: Independent  Comment: pt witnessed Independent bed mob from nurses station, pt Independent with all bed mobility      Transfers:  Sit to Stand: Independent  Stand to sit:  Independent  Bed to Chair: Independent              Ambulation 1  Surface: level tile  Device: No Device  Assistance: Independent  Distance: 200ft  Comments: pt amb around common area/hallways without difficulty, witnessed pt from a distance        Stairs/Curb  Stairs?: Yes  Stairs  # Steps : 12  Stairs Height: 6\"(step box)  Rails: None  Device: No Device  Assistance: Independent  Comment: pt demo safe use of stairs without need of

## 2020-07-08 NOTE — GROUP NOTE
Group Therapy Note    Date: 7/8/2020    Group Start Time: 1000  Group End Time: 1633  Group Topic: Cognitive Skills    ROMEO Shah, CTRS    Pt did not attend 1000 skills group d/t resting in room despite staff invitation to attend. 1:1 talk time offered as alternative to group session, pt declined.               Signature:  Alan Carey

## 2020-07-08 NOTE — CONSULTS
[facility]    CONSULTATION / HISTORY AND PHYSICAL EXAMINATION            Date:   7/7/2020  Patient name:  Allyson Castro  Date of admission:  7/5/2020  9:25 PM  MRN:   [id number]  Account:  [de-identified]  YOB: 1975  PCP:    No primary care provider on file. Room:   [address] [place] [address]  Code Status:    Full Code    Physician Requesting Consult: Wai Arreguin MD    Reason for Consult: Medical management    Chief Complaint:     No chief complaint on file. Abdominal abscess    History Obtained From:     Patient medical record nursing staff    History of Present Illness:     Patient is a 20-year-old lady with borderline intelligence history of: Rectal cancer status post surgery admitted from [facility] for tachycardia and pelvic abscess which was drained found to be fecal matter has a fistulogram which shows she got: Fistula leaking fecal matter into the pelvic area patient had a drain placed IV antibiotics plan for his outpatient surgery due to lack of insight and hallucination patient is being sent back to [facility]      Past Medical History:     Past Medical History:   Diagnosis Date    Cancer [facility])     Rectal    Pelvis, female abscess     pre sacral drain put in 6/19/2020    UTI (urinary tract infection)     stent place june 2020        Past Surgical History:     Past Surgical History:   Procedure Laterality Date    ABSCESS DRAINAGE      pre sacral pelvic abscess with drain placement to right buttock    SMALL INTESTINE SURGERY N/A 7/3/2020    TRANSVERSE COLOSTOMY LOOP performed by Nickie Ni MD at [facility]        Medications Prior to Admission:     Prior to Admission medications    Medication Sig Start Date End Date Taking?  Authorizing Provider   OLANZapine (ZYPREXA) 5 MG tablet Take 5 mg by mouth nightly   Yes Historical Provider, MD   busPIRone (BUSPAR) 15 MG tablet Take 15 mg by mouth 3 times daily   Yes Historical lightheadedness, numbness, pain, tingling extremities      Physical Exam:     /65   Pulse 101   Temp 99 °F (37.2 °C)   Resp 14   Ht 5' (1.524 m)   Wt 82 lb (37.2 kg)   LMP 2017   BMI 16.01 kg/m²   Temp (24hrs), Av.5 °F (36.9 °C), Min:98 °F (36.7 °C), Max:99 °F (37.2 °C)    No results for input(s): POCGLU in the last 72 hours. Intake/Output Summary (Last 24 hours) at 2020 2336  Last data filed at 2020 1009  Gross per 24 hour   Intake --   Output 220 ml   Net -220 ml      severe protein malnutrition  General Appearance:  alert, well appearing, and in no acute distress  Mental status: oriented to person, place, and time with normal affect  Head:  normocephalic, atraumatic. Eye: no icterus, redness, pupils equal and reactive, extraocular eye movements intact, conjunctiva clear  Ear: normal external ear, no discharge, hearing intact  Nose:  no drainage noted  Mouth: mucous membranes moist  Neck: supple, no carotid bruits, thyroid not palpable  Lungs: Bilateral equal air entry, clear to ausculation, no wheezing, rales or rhonchi, normal effort  Cardiovascular: normal rate, regular rhythm, no murmur, gallop, rub. Abdomen: Soft, nontender, nondistended, normal bowel sounds, no hepatomegaly or splenomegaly  Drain pelvic draining through right gluteus  Neurologic: There are no new focal motor or sensory deficits, normal muscle tone and bulk, no abnormal sensation, normal speech, cranial nerves II through XII grossly intact  Skin: No gross lesions, rashes, bruising or bleeding on exposed skin area  Extremities:  peripheral pulses palpable, no pedal edema or calf pain with palpation      Investigations:      Laboratory Testing:  No results found for this or any previous visit (from the past 24 hour(s)).     Imaging/Diagonstics:      Assessment :      Primary Problem  <principal problem not specified>    Active Hospital Problems    Diagnosis Date Noted    Intellectual disability [F79]     Anorexia nervosa [F50.00]     Psychosis ([facility] [address] Elise Sultana 06/15/2020       Plan:   Patient is a 22-year-old lady with borderline intelligence history of: Rectal cancer status post surgery admitted from [facility] for tachycardia and pelvic abscess which was drained found to be fecal matter has a fistulogram which shows she got: Fistula leaking fecal matter into the pelvic area patient had a drain placed IV antibiotics plan for his outpatient surgery due to lack of insight and hallucination patient is being sent back to [facility]  1. Pelvic abscess oral Ceftin Bactrim and metronidazole outpatient surgery consultation withdr srivastava  2. Severe protein calorie malnutrition encourage better nutrition recommend dietary input    Consultations:   IP CONSULT TO HOSPITALIST  IP CONSULT TO INTERNAL MEDICINE      Ty Valladares MD  7/7/2020  11:36 PM    Copy sent to Dr. Johanna Up primary care provider on file. Please note that this chart was generated using voice recognition Dragon dictation software. Although every effort was made to ensure the accuracy of this automated transcription, some errors in transcription may have occurred.

## 2020-07-08 NOTE — BH NOTE
LOS continues per doctor order. Patient in room awake. No sign of distress noted. Safety maintained as ordered. Will continue to monitor.

## 2020-07-08 NOTE — GROUP NOTE
Group Therapy Note    Date: 7/8/2020    Group Start Time: 0900  Group End Time: 0920  Group Topic: Sheila [place]        Group Therapy Note    Attendees: 9    Pt did not attend [employer] d/t resting in room despite staff invitation to attend. 1:1 talk time offered as alternative to group session, pt declined.

## 2020-07-09 PROCEDURE — 99232 SBSQ HOSP IP/OBS MODERATE 35: CPT | Performed by: INTERNAL MEDICINE

## 2020-07-09 PROCEDURE — 99213 OFFICE O/P EST LOW 20 MIN: CPT

## 2020-07-09 PROCEDURE — 1240000000 HC EMOTIONAL WELLNESS R&B

## 2020-07-09 PROCEDURE — 6370000000 HC RX 637 (ALT 250 FOR IP): Performed by: NURSE PRACTITIONER

## 2020-07-09 PROCEDURE — 99232 SBSQ HOSP IP/OBS MODERATE 35: CPT | Performed by: PSYCHIATRY & NEUROLOGY

## 2020-07-09 PROCEDURE — 6370000000 HC RX 637 (ALT 250 FOR IP): Performed by: PSYCHIATRY & NEUROLOGY

## 2020-07-09 PROCEDURE — 6370000000 HC RX 637 (ALT 250 FOR IP): Performed by: INTERNAL MEDICINE

## 2020-07-09 RX ORDER — SULFAMETHOXAZOLE AND TRIMETHOPRIM 800; 160 MG/1; MG/1
1 TABLET ORAL EVERY 12 HOURS SCHEDULED
Qty: 20 TABLET | Refills: 0 | Status: SHIPPED | OUTPATIENT
Start: 2020-07-09 | End: 2020-07-19

## 2020-07-09 RX ORDER — BUSPIRONE HYDROCHLORIDE 15 MG/1
15 TABLET ORAL 3 TIMES DAILY
Qty: 90 TABLET | Refills: 0 | Status: SHIPPED | OUTPATIENT
Start: 2020-07-09

## 2020-07-09 RX ORDER — CEFUROXIME AXETIL 500 MG/1
500 TABLET ORAL EVERY 12 HOURS SCHEDULED
Qty: 20 TABLET | Refills: 0 | Status: SHIPPED | OUTPATIENT
Start: 2020-07-09 | End: 2020-07-19

## 2020-07-09 RX ORDER — METRONIDAZOLE 500 MG/1
500 TABLET ORAL EVERY 8 HOURS SCHEDULED
Qty: 30 TABLET | Refills: 0 | Status: SHIPPED | OUTPATIENT
Start: 2020-07-09 | End: 2020-07-19

## 2020-07-09 RX ORDER — FERROUS SULFATE 325(65) MG
325 TABLET ORAL 2 TIMES DAILY WITH MEALS
Qty: 30 TABLET | Refills: 3 | Status: SHIPPED | OUTPATIENT
Start: 2020-07-09

## 2020-07-09 RX ORDER — OLANZAPINE 10 MG/1
10 TABLET ORAL NIGHTLY
Qty: 30 TABLET | Refills: 3 | Status: SHIPPED | OUTPATIENT
Start: 2020-07-09

## 2020-07-09 RX ADMIN — TRAZODONE HYDROCHLORIDE 50 MG: 50 TABLET ORAL at 21:01

## 2020-07-09 RX ADMIN — SULFAMETHOXAZOLE AND TRIMETHOPRIM 1 TABLET: 800; 160 TABLET ORAL at 21:01

## 2020-07-09 RX ADMIN — HYDROXYZINE HYDROCHLORIDE 25 MG: 25 TABLET, FILM COATED ORAL at 19:03

## 2020-07-09 RX ADMIN — OXYCODONE HYDROCHLORIDE AND ACETAMINOPHEN 1 TABLET: 5; 325 TABLET ORAL at 21:01

## 2020-07-09 RX ADMIN — BUSPIRONE HYDROCHLORIDE 15 MG: 15 TABLET ORAL at 09:51

## 2020-07-09 RX ADMIN — FERROUS SULFATE TAB 325 MG (65 MG ELEMENTAL FE) 325 MG: 325 (65 FE) TAB at 17:25

## 2020-07-09 RX ADMIN — BUSPIRONE HYDROCHLORIDE 15 MG: 15 TABLET ORAL at 21:02

## 2020-07-09 RX ADMIN — METRONIDAZOLE 500 MG: 500 TABLET ORAL at 21:01

## 2020-07-09 RX ADMIN — BUSPIRONE HYDROCHLORIDE 15 MG: 15 TABLET ORAL at 14:39

## 2020-07-09 RX ADMIN — SULFAMETHOXAZOLE AND TRIMETHOPRIM 1 TABLET: 800; 160 TABLET ORAL at 09:51

## 2020-07-09 RX ADMIN — METRONIDAZOLE 500 MG: 500 TABLET ORAL at 14:39

## 2020-07-09 RX ADMIN — FERROUS SULFATE TAB 325 MG (65 MG ELEMENTAL FE) 325 MG: 325 (65 FE) TAB at 09:51

## 2020-07-09 RX ADMIN — MIRTAZAPINE 15 MG: 15 TABLET, FILM COATED ORAL at 21:02

## 2020-07-09 RX ADMIN — OLANZAPINE 10 MG: 10 TABLET, FILM COATED ORAL at 21:02

## 2020-07-09 RX ADMIN — CEFUROXIME AXETIL 500 MG: 250 TABLET ORAL at 21:02

## 2020-07-09 RX ADMIN — CEFUROXIME AXETIL 500 MG: 250 TABLET ORAL at 09:54

## 2020-07-09 RX ADMIN — METRONIDAZOLE 500 MG: 500 TABLET ORAL at 06:05

## 2020-07-09 ASSESSMENT — PAIN SCALES - GENERAL
PAINLEVEL_OUTOF10: 1
PAINLEVEL_OUTOF10: 8

## 2020-07-09 NOTE — PROGRESS NOTES
Ostomy Referral Progress Note      NAME:  [address] RECORD NUMBER:  [id number]  AGE: 40 y.o. GENDER:  female  :  1975  TODAY'S DATE:  2020    Arun Ray is a 40 y.o. female referred by:   [x] Physician  [] Nursing  [] Other:     New    Surgeon Dr Nabila Hannah:        Diagnosis Date    Cancer [facility])     Rectal    Pelvis, female abscess     pre sacral drain put in 2020    UTI (urinary tract infection)     stent place 2020       MEDICATIONS:    No current facility-administered medications on file prior to encounter. Current Outpatient Medications on File Prior to Encounter   Medication Sig Dispense Refill    mirtazapine (REMERON) 15 MG tablet Take 15 mg by mouth nightly      hydrOXYzine (VISTARIL) 50 MG capsule Take 50 mg by mouth 3 times daily as needed         ALLERGIES:    No Known Allergies    PAST SURGICAL HISTORY:    Past Surgical History:   Procedure Laterality Date    ABSCESS DRAINAGE      pre sacral pelvic abscess with drain placement to right buttock    SMALL INTESTINE SURGERY N/A 7/3/2020    TRANSVERSE COLOSTOMY LOOP performed by Meche Hoyos MD at [address] [place] [address]       FAMILY HISTORY:    family history is not on file.     SOCIAL HISTORY:    Social History     Tobacco Use    Smoking status: Never Smoker    Smokeless tobacco: Never Used    Tobacco comment: pt is a non smoker   Substance Use Topics    Alcohol use: Not on file    Drug use: Not on file       LABS:  WBC:    Lab Results   Component Value Date    WBC 9.1 2020     H/H:    Lab Results   Component Value Date    HGB 8.2 2020    HCT 26.1 2020     BMP:    Lab Results   Component Value Date     2020    K 3.7 2020     2020    CO2 25 2020    BUN 13 2020    LABALBU 3.1 2020    CREATININE 0.66 2020    CALCIUM 7.9 2020    GFRAA >60 2020    LABGLOM >60 07/05/2020    GLUCOSE 113 07/05/2020     PTT:    Lab Results   Component Value Date    APTT 27.2 06/18/2020   [APTT}  PT/INR:    Lab Results   Component Value Date    PROTIME 14.5 06/18/2020    INR 1.1 06/18/2020       Objective    /63   Pulse 92   Temp 98.4 °F (36.9 °C) (Oral)   Resp 14   Ht 5' (1.524 m)   Wt 82 lb (37.2 kg)   LMP 06/25/2017   BMI 16.01 kg/m²     Yoni Risk Score Yoni Scale Score: 21    Patient Active Problem List   Diagnosis Code    Psychosis ([facility] [address] F29    H/O malignant neoplasm of colon Z85.038    Acute cystitis without hematuria N30.00    Pelvic abscess in female N73.9    Calculus of gallbladder without cholecystitis without obstruction K80.20    Leukocytosis D72.829    Elevated C-reactive protein (CRP) R79.82    Severe malnutrition (HCC) E43    Bipolar I disorder, most recent episode mixed ([facility]) F31.60    Major depression with psychotic features (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) F25.9    Intellectual disability F79    Anorexia nervosa F50.00       Assessment              Colostomy RUQ Loop (Active)   Stomal Appliance 1 piece 7/6/2020  8:30 AM   Stoma  Assessment [place] 7/8/2020 10:34 PM   Peristomal Assessment Intact 7/8/2020 10:34 PM   Stool Appearance Soft 7/8/2020 10:34 PM   Stool Color Brown 7/8/2020 10:34 PM   Stool Amount Small 7/8/2020 10:34 PM   Output (mL) 220 ml 7/7/2020 10:09 AM   Number of days: 6                  No intake or output data in the 24 hours ending 07/09/20 1326      RUQ loop colostomy, pouch intact at this time. Appears pink, moist, edematous. Pt states there have been no problems with pouching. Per nursing, the patient is to be discharged today per psych. The patient will require home care nursing for the mgmt of the buttock abscess drainage device and new colostomy mgmt and education. Initial ostomy education provided today including GI anatomy, healthy ostomy appearance, manipulation/closure of pouch, when/how to empty, when to change pouch. A pouch change education will need to be provided to the patient either prior to discharge or by home care nursing at initial visit. The patient will require discharge supplies including one piece pouches (at least 3) and stoma paste. Plan   Plan for Ostomy Care:    Further ostomy education including pouch change to be provided either prior to discharge or by home care nursing. Will require supplies to be sent home with her (3 one piece flat pouches (ref#[id number]) and stoma paste    [facility] information provided for ostomy care    Will discuss drain tube care needs with general surgery ADDENDUM: Gen surgery did not place the drain. This will need to be addressed with IR/IM prior to discharge.  Discussed with primary nurse, Florina Bronson 1:44pm      Discharge Plan:  Placement for patient upon discharge: home with support    Outpatient visit plan N/A      Referrals:  [x]   [] [facility]  [] Supplies  [] Other      Patient/Caregiver Teaching:  Written Instructions given to patient/family [employer] folder    Teaching provided:  [x] Reviewed GI and A&P        [] Supplies  [] Pouch emptying      [x] Manipulate closure  [x] Routine Care         [] Comment  [x] Pouch maintenance           Level of patient/caregiver understanding able to:  [x] Indicates understanding       [x] Needs reinforcement  [] Unsuccessful      [x] Verbal Understanding  [] Demonstrated understanding       [] No evidence of learning  [] Refused teaching         [] N/A    Electronically signed by Keith Cortez RN, on 7/9/2020 at 1:26 PM

## 2020-07-09 NOTE — BH NOTE
Line of Sight   Patient is in bed asleep, eye closed no labored breathing or distress noted, will continue to monitor for patient safety

## 2020-07-09 NOTE — PLAN OF CARE
Problem: Altered Mood, Depressive Behavior:  Goal: Absence of self-harm  Description: Absence of self-harm  7/9/2020 0949 by Navid Fernandes  Outcome: Ongoing   Safe environment maintained and patient remains free from self-harm. Agreeable to seeking staff should thoughts to harm self or others arise. Q15 minute checks for safety. Problem: Falls - Risk of:  Goal: Will remain free from falls  Description: Will remain free from falls  7/9/2020 0949 by Navid Fernandes  Outcome: Ongoing   Patient remains free of falls and verbalizes understanding of individual fall risks. Wearing nonskid footwear and encouraged to seek out staff if assistance needed.

## 2020-07-09 NOTE — CONSULTS
Rachelle Hess MD   sulfamethoxazole-trimethoprim (BACTRIM DS;SEPTRA DS) 800-160 MG per tablet Take 1 tablet by mouth every 12 hours for 10 days 7/9/20 7/19/20 Yes Mirela Cook MD   metroNIDAZOLE (FLAGYL) 500 MG tablet Take 1 tablet by mouth every 8 hours for 10 days 7/9/20 7/19/20 Yes Mirela Cook MD   ferrous sulfate (IRON 325) 325 (65 Fe) MG tablet Take 1 tablet by mouth 2 times daily (with meals) 7/9/20  Yes Mirela Cook MD   cefUROXime (CEFTIN) 500 MG tablet Take 1 tablet by mouth every 12 hours for 10 days 7/9/20 7/19/20 Yes Mirela Cook MD   mirtazapine (REMERON) 15 MG tablet Take 15 mg by mouth nightly   Yes Historical Provider, MD   hydrOXYzine (VISTARIL) 50 MG capsule Take 50 mg by mouth 3 times daily as needed    Historical Provider, MD        Allergies:     Patient has no known allergies. Social History:     Tobacco:    reports that she has never smoked. She has never used smokeless tobacco.  Alcohol:      has no history on file for alcohol. Drug Use:  has no history on file for drug. Family History:     No family history on file. Review of Systems:     Positive and Negative as described in HPI. CONSTITUTIONAL:  negative for fevers, chills, sweats, fatigue, weight loss  HEENT:  negative for vision, hearing changes, runny nose, throat pain  RESPIRATORY:  negative for shortness of breath, cough, congestion, wheezing. CARDIOVASCULAR:  negative for chest pain, palpitations.   GASTROINTESTINAL:  negative for nausea, vomiting, diarrhea, constipation, change in bowel habits, abdominal pain   GENITOURINARY: Loss of appetite no nausea vomiting  INTEGUMENT:  negative for rash, skin lesions, easy bruising   HEMATOLOGIC/LYMPHATIC:  negative for swelling/edema   ALLERGIC/IMMUNOLOGIC:  negative for urticaria , itching  ENDOCRINE:  negative increase in drinking, increase in urination, hot or cold intolerance  MUSCULOSKELETAL:  negative joint pains, muscle aches, swelling of joints  NEUROLOGICAL:

## 2020-07-09 NOTE — PROGRESS NOTES
CLINICAL PHARMACY NOTE: MEDS TO [address] [place] [address] Drive Select Patient?: No  Total # of Prescriptions Filled: 5   The following medications were delivered to the patient:  · FERROUS SULFATE  · OLANZAPINE 10MG  · METRONIDAZOLE-TRIME 800-160  · METRONIDAZOLE 500MG  · CEFUROXIME AXETIL 500MG  Total # of Interventions Completed: 0  Time Spent (min): 15    Additional Documentation:

## 2020-07-09 NOTE — GROUP NOTE
Group Therapy Note    Date: 7/9/2020    Group Start Time: 1100  Group End Time: 9261  Group Topic: Cognitive Skills    [facility] Carolee Hodgkin, CTRS        Group Therapy Note    Attendees:8       Patient's Goal:  To improve coping skills       Status After Intervention:  Improved    Participation Level:  Active Listener     Participation Quality: Appropriate       Speech:  Minimal       Thought Process/Content: slow to process     Affective Functioning: flat      Mood:depressed       Level of consciousness:  Alert       Response to Learning:  Progressing to goal      Endings: None Reported    Modes of Intervention: Education, Support and Socialization      Discipline Responsible: Psychoeducational Specialist      Signature:  Carol Hernández

## 2020-07-09 NOTE — BH NOTE
Line of Sight  Patient is currently asleep in room, in bed no distress or labored breathing noted, will continue to monitor  For line of sight for patient safety.

## 2020-07-09 NOTE — BH NOTE
Line of Sight  Patient is currently awake in room, in bed no distress or labored breathing noted, will continue to monitor  For line of sight for patient safety.

## 2020-07-09 NOTE — CARE COORDINATION
: SW received call from PT son Kelly Landin at [phone] who reports that he just had a 17 minute phone call with his mother and he had some questions and concerns. PT son reports that he is concerned about his mom. He reports that she told him that her colon was removed and that she has a Colostomy and he has some questions regarding that. PT son also reported that PT was responding to herself while talking to him and say that she was \"talking to God in tongues. \" and that God is telling her to talk to her  , \"in tongues. \" He also reports that PT told him that she has been talking to \"sky. \" Pt son also reported that PT asked how her kids are doing and if they were helping him with the house. Pt son reports that PT's kids are grown and that her two other children live in [place] and have lived there for years. PT reports that his mother lives with him in [place], [place]. SW reports that she will coordinate with PT's nurse regarding medical questions and with Psychiatrist regarding mental health concerns. SW will continue to monitor the situation.

## 2020-07-09 NOTE — GROUP NOTE
Group Therapy Note    Date: 7/9/2020    Group Start Time: 1330  Group End Time: 1563  Group Topic: Relaxation    ALPHONSE [facility]        Group Therapy Note    Attendees: 12         Patient's Goal:  To improve positive coping skills / improve relaxation    Notes:  Pt was pleasant and cooperative     Status After Intervention:  Improved    Participation Level:  Active Listener and Interactive    Participation Quality: Appropriate and Attentive      Speech:  normal      Thought Process/Content: slow to process       Affective Functioning: flat      Mood: depressed       Level of consciousness:  Alert       Response to Learning: Able to verbalize current knowledge/experience and Progressing to goal      Endings: None Reported    Modes of Intervention: Education, Support and Socialization      Discipline Responsible: Psychoeducational Specialist      Signature:  Jose A Vaughn

## 2020-07-09 NOTE — GROUP NOTE
Group Therapy Note  ? Date: 7/9/2020  Group Start Time: 1600  Group End Time: 1630  Group Topic: Healthy Living/Wellness  [facility]    Colon Bold  ? ? Group Therapy Note  Attendees: 12/21  ? Patient's Goal: To share positive social interactions with peers and to provide meaningful responses to the given prompts. ? Notes: Patient actively participated in group and was respectful of staff and peers. ?  Status After Intervention: Improved  ? Participation Level: Active Listener and Interactive  ? Participation Quality: Appropriate, Attentive and Sharing  ? ? Speech: normal  ?  ? Thought Process/Content: Logical  ?  ? Affective Functioning: Congruent  ? ? Mood: Appropriate  ? ? Level of consciousness: Alert and Oriented x4  ?  ? Response to Learning: Able to verbalize current knowledge/experience  ? ? Endings: None Reported  ? Modes of Intervention: Socialization  ? ? Discipline Responsible: [address] [place] [address]  ? ?   Signature: Colon Bold

## 2020-07-09 NOTE — PLAN OF CARE
Problem: Altered Mood, Depressive Behavior:  Goal: Absence of self-harm  Description: Absence of self-harm  7/8/2020 2310 by Aleta Burrell LPN  Outcome: Ongoing  Note: Patient is free of self harm at this time. Patient safety check every 15 minute     Problem: Altered Mood, Depressive Behavior:  Goal: Able to verbalize acceptance of life and situations over which he or she has no control  Description: Able to verbalize acceptance of life and situations over which he or she has no control  7/8/2020 2310 by Aleta Burrell LPN  Outcome: Ongoing  Note: Patient states that she understands that there are things she can not change. Admits to feeling better and clearer since admission. Patient safety check every 15 minute     Problem: Altered Mood, Deterioration in Function:  Goal: Maintenance of adequate nutrition will improve  Description: Maintenance of adequate nutrition will improve  Outcome: Ongoing  Note: Patient has drank 300 cc's of fluids and ate snack during shift, will continue to encourage fluids. Patient safety check every 15 minute     Problem: Falls - Risk of:  Goal: Will remain free from falls  Description: Will remain free from falls  Outcome: Ongoing  Note: Patient is free of fall and maintains a steady gait, states she feels as if she's getting her strength back. Patient safety check every 15 minute     Problem: Musculor/Skeletal Functional Status  Goal: Highest potential functional level  Outcome: Ongoing  Note: Patient maintains steady gait and states that she feel as if she has gotten some strength back. Patient safety check every 15 min     Problem: Musculor/Skeletal Functional Status  Goal: Absence of falls  Outcome: Ongoing  Note: Patient is free of falls at this times, maintains steady gait.  Patient safety check every 15 minute     Problem: Pain:  Goal: Pain level will decrease  Description: Pain level will decrease  Outcome: Ongoing  Note: Patient denies pain at this time, will continue to monitor. Patient safety check every 15 minute     Problem: Pain:  Goal: Control of acute pain  Description: Control of acute pain  Outcome: Ongoing  Note: Patient denies pain at this time, will continue to monitor. Patient safety check every 15 minute     Problem: Pain:  Goal: Control of chronic pain  Description: Control of chronic pain  Outcome: Ongoing  Note: Patient denies pain at this time, will continue to monitor.  Patient safety check every 15 minute

## 2020-07-09 NOTE — GROUP NOTE
Group Therapy Note    Date: 7/9/2020    Group Start Time: 0900  Group End Time: 0930  Group Topic: Community Meeting    [facility] [place], [place]        Group Therapy Note    Attendees: 11/22         Pt did not participate in Community Meeting/Goals Group at 900am when encouraged by RT due to resting in room. Pt was offered talk time as an alternative to group but declined.         Discipline Responsible: Psychoeducational Specialist        Signature:  Ross Cousins

## 2020-07-09 NOTE — PROGRESS NOTES
BEHAVIORAL HEALTH FOLLOW-UP NOTE     7/9/2020     Patient was seen and examined in person, Chart reviewed   Patient's case discussed with staff/team    Chief Complaint: Psychosis    Interim History:   -The patient was seen at bedside. She is elated in mood. She is denying auditory hallucinations this morning. However she appears to have little understanding of her colostomy bag. Her family have no knowledge about her surgical procedure and the care she will require. They seem unable to care for her. She appears to be incapable of caring for herself .she has told her family that she has been hearing God's voice. -Appetite:   [x] Normal/Unchanged  [] Increased  [] Decreased      Sleep:       [] Normal/Unchanged  [x] Fair       [] Poor              Energy:    [] Normal/Unchanged  [x] Increased  [] Decreased        Aggression:  [] yes  [x] no    Patient is [] able  [] unable to CONTRACT FOR SAFETY ON THE UNIT    PAST MEDICAL/PSYCHIATRIC HISTORY:   Past Medical History:   Diagnosis Date    Cancer [facility])     Rectal    Pelvis, female abscess     pre sacral drain put in 6/19/2020    UTI (urinary tract infection)     stent place june 2020       FAMILY/SOCIAL HISTORY:  No family history on file.   Social History     Socioeconomic History    Marital status: Unknown     Spouse name: Not on file    Number of children: Not on file    Years of education: Not on file    Highest education level: Not on file   Occupational History    Not on file   Social Needs    Financial resource strain: Not on file    Food insecurity     Worry: Not on file     Inability: Not on file    Transportation needs     Medical: Not on file     Non-medical: Not on file   Tobacco Use    Smoking status: Never Smoker    Smokeless tobacco: Never Used    Tobacco comment: pt is a non smoker   Substance and Sexual Activity    Alcohol use: Not on file    Drug use: Not on file    Sexual activity: Not on file   Lifestyle    Physical activity Days per week: Not on file     Minutes per session: Not on file    Stress: Not on file   Relationships    Social connections     Talks on phone: Not on file     Gets together: Not on file     Attends Spiritism service: Not on file     Active member of club or organization: Not on file     Attends meetings of clubs or organizations: Not on file     Relationship status: Not on file    Intimate partner violence     Fear of current or ex partner: Not on file     Emotionally abused: Not on file     Physically abused: Not on file     Forced sexual activity: Not on file   Other Topics Concern    Not on file   Social History Narrative    Not on file           ROS:  [x] All negative/unchanged except if checked.  Explain positive(checked items) below:  [] Constitutional  [] Eyes  [] Ear/Nose/Mouth/Throat  [] Respiratory  [] CV  [] GI  []   [] Musculoskeletal  [] Skin/Breast  [] Neurological  [] Endocrine  [] Heme/Lymph  [] Allergic/Immunologic    Explanation:     MEDICATIONS:    Current Facility-Administered Medications:     OLANZapine (ZYPREXA) tablet 10 mg, 10 mg, Oral, Nightly, Tim Engel MD, 10 mg at 07/08/20 2134    cefUROXime (CEFTIN) tablet 500 mg, 500 mg, Oral, 2 times per day, Anup Anderson MD, 500 mg at 07/09/20 0954    sulfamethoxazole-trimethoprim (BACTRIM DS;SEPTRA DS) 800-160 MG per tablet 1 tablet, 1 tablet, Oral, 2 times per day, Anup Anderson MD, 1 tablet at 07/09/20 0951    busPIRone (BUSPAR) tablet 15 mg, 15 mg, Oral, TID, Anup Anderson MD, 15 mg at 07/09/20 0951    ferrous sulfate (IRON 325) tablet 325 mg, 325 mg, Oral, BID WC, Steve Fritz Stevens MD, 325 mg at 07/09/20 0951    hydrOXYzine (ATARAX) tablet 25 mg, 25 mg, Oral, TID PRN, Anup Anderson MD, 25 mg at 07/08/20 1922    metroNIDAZOLE (FLAGYL) tablet 500 mg, 500 mg, Oral, 3 times per day, Anup Anderson MD, 500 mg at 07/09/20 0605    mirtazapine (REMERON) tablet 15 mg, 15 mg, Oral, Nightly, Anup Anderson MD, 15 mg at 07/08/20 2134    oxyCODONE-acetaminophen (PERCOCET) 5-325 MG per tablet 1 tablet, 1 tablet, Oral, Q4H PRN, Bebeto Pittman MD, 1 tablet at 07/08/20 1922    acetaminophen (TYLENOL) tablet 650 mg, 650 mg, Oral, Q4H PRN, [place], APRN - CNP    traZODone (DESYREL) tablet 50 mg, 50 mg, Oral, Nightly PRN, [place], APRN - CNP, 50 mg at 07/08/20 2134    benztropine mesylate (COGENTIN) injection 2 mg, 2 mg, Intramuscular, BID PRN, [facility], APRN - CNP    magnesium hydroxide (MILK OF MAGNESIA) 400 MG/5ML suspension 30 mL, 30 mL, Oral, Daily PRN, [place], APRN - CNP    aluminum & magnesium hydroxide-simethicone (MAALOX) 200-200-20 MG/5ML suspension 30 mL, 30 mL, Oral, Q6H PRN, [place], APRN - CNP      Examination:  /63   Pulse 92   Temp 98.4 °F (36.9 °C) (Oral)   Resp 14   Ht 5' (1.524 m)   Wt 82 lb (37.2 kg)   LMP 06/25/2017   BMI 16.01 kg/m²   Gait - steady  Medication side effects(SE): none    Mental Status Examination:    Level of consciousness:  within normal limits   Appearance:  fair grooming and fair hygiene  Behavior/Motor:  no abnormalities noted  Attitude toward examiner:  cooperative  Speech:  slow and low volume  Mood: elated  Affect:  mood congruent  Thought processes:  loose associations   Thought content:  Homicidal ideation - none  Suicidal Ideation:  denies suicidal ideation  Delusions:  grandiose  Perceptual Disturbance:  auditory  Cognition:  oriented to person, place, and time   Concentration FAIR  Insight poor   Judgement poor     ASSESSMENT:   Patient symptoms are:  [] Well controlled  [x] Improving  [] Worsening  [] No change      Diagnosis:   Active Problems:    Psychosis ([facility] [address]    Intellectual disability    Anorexia nervosa  Resolved Problems:    * No resolved hospital problems. *      LABS:    No results for input(s): WBC, HGB, PLT in the last 72 hours.   No results for input(s): NA, K, CL, CO2, BUN, CREATININE, GLUCOSE in the last 72 hours. No results for input(s): BILITOT, ALKPHOS, AST, ALT in the last 72 hours. Lab Results   Component Value Date    BARBSCNU NEGATIVE 06/16/2020    LABBENZ NEGATIVE 06/16/2020    LABMETH NEGATIVE 06/16/2020    PPXUR NOT REPORTED 06/16/2020     Lab Results   Component Value Date    TSH 1.86 06/25/2020     No results found for: LITHIUM  No results found for: VALPROATE, CBMZ    RISK ASSESSMENT: Moderate risk of suicide and moderate risk of aggression    Treatment Plan:  Reviewed current Medications with the patient. No changes to medications    Risks, benefits, side effects, drug-to-drug interactions and alternatives to treatment were discussed. The patient  consented to treatment. Encourage patient to attend group and other milieu activities. Discharge planning discussed with the patient and treatment team.    PSYCHOTHERAPY/COUNSELING:  [] Therapeutic interview  [x] Supportive  [] CBT  [] Ongoing  [] Other    [x] Patient continues to need, on a daily basis, active treatment furnished directly by or requiring the supervision of inpatient psychiatric personnel      Anticipated Length of [facility]:[id number]. Patel Garnica is a 40 y.o. female     --Javier Garg MD on 7/9/2020 at 2:29 PM    An electronic signature was used to authenticate this note. **This report has been created using voice recognition software. It may contain minor errors which are inherent in voice recognition technology. **

## 2020-07-09 NOTE — BH NOTE
Line of Sight   Patient is asleep in bed eyes closed, no distress or labored breathing noted.  Will continue to monitor for patient safety

## 2020-07-09 NOTE — BH NOTE
LOS:    Patient in day area eating lunch tray at this time, no complaints voiced, no distress noted.

## 2020-07-09 NOTE — GROUP NOTE
Group Therapy Note    Date: 7/9/2020    Group Start Time: 1430  Group End Time: 7321  Group Topic: Cognitive Skills    ALPHONSE Larson        Group Therapy Note    Attendees: 11/19       Patient's Goal:  To increase social interaction and to practice self expression, positive coping, and communication skills. Notes: Pt participated fully in group task . Pt was able to practice self expression using creative expression and individual and group discussion, positive coping, and communication skills. . Pt was pleasant and supportive of peers and able to relate to some of their ideas and feelings. .     Status After Intervention:  Improved     Participation Level:  Active Listener and Interactive     Participation Quality: Appropriate, Attentive, Sharing and Supportive        Speech:  Normal        Thought Process/Content: Logical  Linear        Affective Functioning: Congruent        Mood: euthymic        Level of consciousness:  Alert, Oriented x4 and Attentive        Response to Learning: Able to verbalize current knowledge/experience, Able to verbalize/acknowledge new learning, Able to retain information and Progressing to goal        Endings: None Reported     Modes of Intervention: Education, Support, Socialization, Exploration, Clarifying and Problem-solving        Discipline Responsible: Psychoeducational Specialist        Signature:  Haven Snellen

## 2020-07-09 NOTE — BH NOTE
Spoke with Dr Jackson King in regard to patient being discharged and following concerns of drain/rectal fistula, colostomy, and access site to chest that needs to be address, and followed up with home care. Dr Jackson King instructed for patient to follow up with Dr. Sonam Mulligan, and home care for maintenance of devices. SW informed. Charge nurse informed of difficulty of discharging patient to home without proper follow up and care/ management of these issues.

## 2020-07-10 VITALS
HEIGHT: 60 IN | RESPIRATION RATE: 14 BRPM | DIASTOLIC BLOOD PRESSURE: 66 MMHG | TEMPERATURE: 98.4 F | BODY MASS INDEX: 16.1 KG/M2 | HEART RATE: 93 BPM | SYSTOLIC BLOOD PRESSURE: 109 MMHG | WEIGHT: 82 LBS

## 2020-07-10 PROCEDURE — 99239 HOSP IP/OBS DSCHRG MGMT >30: CPT | Performed by: PSYCHIATRY & NEUROLOGY

## 2020-07-10 PROCEDURE — 6370000000 HC RX 637 (ALT 250 FOR IP): Performed by: INTERNAL MEDICINE

## 2020-07-10 PROCEDURE — 99213 OFFICE O/P EST LOW 20 MIN: CPT

## 2020-07-10 PROCEDURE — 99232 SBSQ HOSP IP/OBS MODERATE 35: CPT | Performed by: INTERNAL MEDICINE

## 2020-07-10 RX ADMIN — BUSPIRONE HYDROCHLORIDE 15 MG: 15 TABLET ORAL at 14:00

## 2020-07-10 RX ADMIN — METRONIDAZOLE 500 MG: 500 TABLET ORAL at 14:00

## 2020-07-10 RX ADMIN — FERROUS SULFATE TAB 325 MG (65 MG ELEMENTAL FE) 325 MG: 325 (65 FE) TAB at 09:08

## 2020-07-10 RX ADMIN — BUSPIRONE HYDROCHLORIDE 15 MG: 15 TABLET ORAL at 09:08

## 2020-07-10 RX ADMIN — CEFUROXIME AXETIL 500 MG: 250 TABLET ORAL at 09:07

## 2020-07-10 RX ADMIN — METRONIDAZOLE 500 MG: 500 TABLET ORAL at 06:47

## 2020-07-10 RX ADMIN — FERROUS SULFATE TAB 325 MG (65 MG ELEMENTAL FE) 325 MG: 325 (65 FE) TAB at 16:18

## 2020-07-10 RX ADMIN — SULFAMETHOXAZOLE AND TRIMETHOPRIM 1 TABLET: 800; 160 TABLET ORAL at 09:07

## 2020-07-10 ASSESSMENT — PAIN SCALES - GENERAL: PAINLEVEL_OUTOF10: 0

## 2020-07-10 NOTE — PLAN OF CARE
Problem: Altered Mood, Depressive Behavior:  Goal: Absence of self-harm  Description: Absence of self-harm  7/9/2020 2049 by Cesar Alegria LPN  Outcome: Ongoing  Note: Patient is free of self harm at this time. Patient safety check every 15 minute, line of sight order continues. Problem: Altered Mood, Depressive Behavior:  Goal: Able to verbalize acceptance of life and situations over which he or she has no control  Description: Able to verbalize acceptance of life and situations over which he or she has no control  Outcome: Ongoing  Note: Patient states she accepts situation in life, states that she is better than before admission. Patient safety check every 15 minute, line of sight continues. Problem: Altered Mood, Deterioration in Function:  Goal: Maintenance of adequate nutrition will improve  Description: Maintenance of adequate nutrition will improve  Outcome: Ongoing  Note: Patient has drank 300 cc's of fluid today, refused snack. Patient safety check every 15 minute, line of sight order continues. Problem: Falls - Risk of:  Goal: Will remain free from falls  Description: Will remain free from falls  7/9/2020 2049 by Cesar Alegria LPN  Outcome: Ongoing  Note: Patient is free of falls and has a steady gait. Patient safety check every 15 minute, line of sight order continues. Problem: Musculor/Skeletal Functional Status  Goal: Highest potential functional level  Outcome: Ongoing     Problem: Musculor/Skeletal Functional Status  Goal: Absence of falls  Outcome: Ongoing  Note: Patient is free of falls and has a steady gait. Patient safety check every 15 minute, line of sight order continues. Problem: Pain:  Goal: Pain level will decrease  Description: Pain level will decrease  Outcome: Ongoing  Note: Patient admits to having some pain to legs, patient given percocet per prn order. Patient safety check every 15 minute, line of sight order continue.      Problem: Pain:  Goal: Control of acute pain  Description: Control of acute pain  Outcome: Ongoing  Note: Patient admits to having some pain to legs, patient given percocet per prn order. Patient safety check every 15 minute, line of sight order continue. Problem: Pain:  Goal: Control of chronic pain  Description: Control of chronic pain  Outcome: Ongoing  Note: Patient admits to having some pain to legs, patient given percocet per prn order. Patient safety check every 15 minute, line of sight order continue.

## 2020-07-10 NOTE — BH NOTE
[facility]  Discharge Note    Pt discharged with followings belongings:   Dentures: None  Vision - Corrective Lenses: Glasses  Hearing Aid: None  Jewelry: Bracelet  Body Piercings Removed: N/A  Clothing: Shirt, Socks  Were All Patient Medications Collected?: Not Applicable  Other Valuables: None   Valuables sent home with patient . Valuables retrieved from safe, Security envelope number:  [id number] and returned to patient. Patient education on aftercare instructions: yes  Information faxed to follow up  by staff Patient verbalize understanding of AVS:  yes. Status EXAM upon discharge:  Status and Exam  Normal: Yes  Facial Expression: Brightened, Flat  Affect: Appropriate  Level of Consciousness: Alert  Mood:Normal: No  Mood: Depressed, Anxious  Motor Activity:Normal: Yes  Motor Activity: Decreased  Interview Behavior: Cooperative  Preception: [place] to Person, Mardee Mage to Time, [place]rick to Situation, He[place]rick to Place  Attention:Normal: Yes  Attention: Distractible  Thought Processes: Blocking  Thought Content:Normal: Yes  Thought Content: Other(See Comment)(normal)  Hallucinations: None  Delusions: No  Delusions: Grandeur  Memory:Normal: Yes  Memory: Poor Recent  Insight and Judgment: Yes  Insight and Judgment: Poor Judgment, Poor Insight  Present Suicidal Ideation: No  Present Homicidal Ideation: No      Metabolic Screening:    Lab Results   Component Value Date    LABA1C 5.6 06/17/2020       Lab Results   Component Value Date    CHOL 121 06/17/2020     Lab Results   Component Value Date    TRIG 72 06/17/2020     Lab Results   Component Value Date    HDL 34 (L) 06/17/2020     No components found for: LDLCAL  No results found for: Violetta Juarez RN    Patient was discharge with a ride home to [facility].

## 2020-07-10 NOTE — DISCHARGE SUMMARY
DISCHARGE SUMMARY      Patient ID:  Sherrill Agustin  [id number]  70 y.o.  1975    Admit date: 7/5/2020    Discharge date and time: 7/10/2020    Disposition: Home     Admitting Physician: Pasquale eLroy MD     Discharge Physician: Dr Alec Bhandari MD    Ad[facility] Diagnoses: [facility] [F25.9]  Schizoaffective disorder ([facility] [address] [F25.9]  Schizoaffective disorder ([facility] [address] [F25.9]    Admission Condition: poor    Discharged Condition: stable    Admission Circumstance: The patient was transferred from the medical floor where she was treated for rectal cancer status post surgery and pelvic abscess. She appeared to be floridly psychotic. She was internally stimulated. PAST MEDICAL/PSYCHIATRIC HISTORY:   Past Medical History:   Diagnosis Date    Cancer [facility])     Rectal    Pelvis, female abscess     pre sacral drain put in 6/19/2020    UTI (urinary tract infection)     stent place june 2020       FAMILY/SOCIAL HISTORY:  No family history on file.   Social History     Socioeconomic History    Marital status: Unknown     Spouse name: Not on file    Number of children: Not on file    Years of education: Not on file    Highest education level: Not on file   Occupational History    Not on file   Social Needs    Financial resource strain: Not on file    Food insecurity     Worry: Not on file     Inability: Not on file    Transportation needs     Medical: Not on file     Non-medical: Not on file   Tobacco Use    Smoking status: Never Smoker    Smokeless tobacco: Never Used    Tobacco comment: pt is a non smoker   Substance and Sexual Activity    Alcohol use: Not on file    Drug use: Not on file    Sexual activity: Not on file   Lifestyle    Physical activity     Days per week: Not on file     Minutes per session: Not on file    Stress: Not on file   Relationships    Social connections     Talks on phone: Not on file     Gets together: Not on file     Attends Holiness service: Not on file Active member of club or organization: Not on file     Attends meetings of clubs or organizations: Not on file     Relationship status: Not on file    Intimate partner violence     Fear of current or ex partner: Not on file     Emotionally abused: Not on file     Physically abused: Not on file     Forced sexual activity: Not on file   Other Topics Concern    Not on file   Social History Narrative    Not on file       MEDICATIONS:    Current Facility-Administered Medications:     OLANZapine (ZYPREXA) tablet 10 mg, 10 mg, Oral, Nightly, Lisa Daniels MD, 10 mg at 07/09/20 2102    cefUROXime (CEFTIN) tablet 500 mg, 500 mg, Oral, 2 times per day, Lilliana Garg MD, 500 mg at 07/10/20 0907    sulfamethoxazole-trimethoprim (BACTRIM DS;SEPTRA DS) 800-160 MG per tablet 1 tablet, 1 tablet, Oral, 2 times per day, Lilliana Garg MD, 1 tablet at 07/10/20 0907    busPIRone (BUSPAR) tablet 15 mg, 15 mg, Oral, TID, Lilliana Garg MD, 15 mg at 07/10/20 0908    ferrous sulfate (IRON 325) tablet 325 mg, 325 mg, Oral, BID WC, Lilliana Garg MD, 325 mg at 07/10/20 0908    hydrOXYzine (ATARAX) tablet 25 mg, 25 mg, Oral, TID PRN, Lilliana Garg MD, 25 mg at 07/09/20 1903    metroNIDAZOLE (FLAGYL) tablet 500 mg, 500 mg, Oral, 3 times per day, Lilliana Garg MD, 500 mg at 07/10/20 0647    mirtazapine (REMERON) tablet 15 mg, 15 mg, Oral, Nightly, Lilliana Garg MD, 15 mg at 07/09/20 2102    oxyCODONE-acetaminophen (PERCOCET) 5-325 MG per tablet 1 tablet, 1 tablet, Oral, Q4H PRN, Lilliana Garg MD, 1 tablet at 07/09/20 2101    acetaminophen (TYLENOL) tablet 650 mg, 650 mg, Oral, Q4H PRN, Cynthia Seen, APRN - CNP    traZODone (DESYREL) tablet 50 mg, 50 mg, Oral, Nightly PRN, Cynthia Seen, APRN - CNP, 50 mg at 07/09/20 2101    benztropine mesylate (COGENTIN) injection 2 mg, 2 mg, Intramuscular, BID PRN, Cynthia Seen, APRN - CNP    magnesium hydroxide (MILK OF MAGNESIA) 400 MG/5ML suspension 30 mL, 30 mL, Oral, Daily PRN, Matt Tejeda APRN - CNP    aluminum & magnesium hydroxide-simethicone (MAALOX) 200-200-20 MG/5ML suspension 30 mL, 30 mL, Oral, Q6H PRN, GRAY Shelley - CNP    Examination:  /66   Pulse 93   Temp 98.4 °F (36.9 °C) (Oral)   Resp 14   Ht 5' (1.524 m)   Wt 82 lb (37.2 kg)   LMP 06/25/2017   BMI 16.01 kg/m²   Gait - steady    HOSPITAL COURSE[de-identified]  Following admission to the hospital, patient had a complete physical exam and blood work up. He was followed by internal medicine who treated her with antibiotics for her pelvic abscess  Patient was monitored closely with suicide precaution  Patient was started on Zyprexa to which she responded well  Was encouraged to participate in group and other milieu activity  Patient started to feel better with this combination of treatment. Significant progress in the symptoms since admission. Mood is improved  The patient denies AVH or paranoid thoughts  The patient denies any hopelessness or worthlessness  No active SI/HI  Appetite:  [x] Normal  [] Increased  [] Decreased    Sleep:       [x] Normal  [] Fair       [] Poor            Energy:    [x] Normal  [] Increased  [] Decreased     SI [] Present  [x] Absent  HI  []Present  [x] Absent   Aggression:  [] yes  [] no  Patient is [x] able  [] unable to CONTRACT FOR SAFETY   Medication side effects(SE):  [x] None(Psych.  Meds.) [] Other      Mental Status Examination on discharge:    Level of consciousness:  within normal limits   Appearance:  well-appearing  Behavior/Motor:  no abnormalities noted  Attitude toward examiner:  attentive and good eye contact  Speech:  spontaneous, normal rate and normal volume   Mood: euthymic  Affect:  mood congruent  Thought processes:  linear, goal directed and coherent   Thought content:  Suicidal Ideation:  denies suicidal ideation  Delusions:  no evidence of delusions  Perceptual Disturbance:  denies any perceptual disturbance  Cognition:  oriented to person, place, and time   Concentration intact  Memory intact  Insight good   Judgement fair   Fund of Knowledge adequate      ASSESSMENT:  Patient symptoms are:  [x] Well controlled  [x] Improving  [] Worsening  [] No change      Diagnosis:  Active Problems:    Psychosis ([facility] [address]    Intellectual disability    Anorexia nervosa  Resolved Problems:    * No resolved hospital problems. *      LABS:    No results for input(s): WBC, HGB, PLT in the last 72 hours. No results for input(s): NA, K, CL, CO2, BUN, CREATININE, GLUCOSE in the last 72 hours. No results for input(s): BILITOT, ALKPHOS, AST, ALT in the last 72 hours. Lab Results   Component Value Date    BARBSCNU NEGATIVE 06/16/2020    LABBENZ NEGATIVE 06/16/2020    LABMETH NEGATIVE 06/16/2020    PPXUR NOT REPORTED 06/16/2020     Lab Results   Component Value Date    TSH 1.86 06/25/2020     No results found for: LITHIUM  No results found for: VALPROATE, CBMZ    RISK ASSESSMENT AT DISCHARGE: Low risk for suicide and homicide. Treatment Plan:  Reviewed current Medications with the patient. Education provided on the complaince with treatment. Risks, benefits, side effects, drug-to-drug interactions and alternatives to treatment were discussed. Encourage patient to attend outpatient follow up appointment and therapy. Patient was advised to call the outpatient provider, visit the nearest ED or call 911 if symptoms are not manageable. Patient's family member was contacted prior to the discharge.          Medication List      START taking these medications    ferrous sulfate 325 (65 Fe) MG tablet  Commonly known as:  IRON 325  Take 1 tablet by mouth 2 times daily (with meals)  Notes to patient:  Supplement     metroNIDAZOLE 500 MG tablet  Commonly known as:  FLAGYL  Take 1 tablet by mouth every 8 hours for 10 days  Notes to patient:  Antibiotic     sulfamethoxazole-trimethoprim 800-160 MG per tablet  Commonly known as:  BACTRIM DS;SEPTRA DS  Take 1 inherent in voice recognition technology. **

## 2020-07-10 NOTE — PLAN OF CARE
Problem: Altered Mood, Depressive Behavior:  Goal: Absence of self-harm  Description: Absence of self-harm  7/10/2020 1006 by Brianna Enriquez  Outcome: Ongoing   Patient is absent of self-harm at this time. Patient is line of sight as ordered by doctor, for patient safety. Problem: Altered Mood, Deterioration in Function:  Goal: Maintenance of adequate nutrition will improve  Description: Maintenance of adequate nutrition will improve  7/10/2020 1006 by Brianna Enriquez  Outcome: Ongoing   Patient is out for meals, and eat a majority of meals. Patient is able to take care of themselves, and needs little encouragement to come out for meals. Problem: Falls - Risk of:  Goal: Will remain free from falls  Description: Will remain free from falls  7/10/2020 1006 by Brianna Enriquez  Outcome: Ongoing   Patient is free from falls at this time. Patient is line of sight for patient safety.

## 2020-07-10 NOTE — GROUP NOTE
Group Therapy Note    Date: 7/10/2020    Group Start Time: 1100  Group End Time: 5818  Group Topic: Cognitive Skills    [facility] EARLE Lloyd, BECKIES        Group Therapy Note    Attendees: 9/20         Patient's Goal:  To increase social interaction and to practice problem solving and communication skills. Notes: Pt participated fully in group task . Pt was able to practice problem solving and communication skills. Pt was pleasant and supportive of peers. Status After Intervention:  Improved     Participation Level:  Active Listener and Interactive     Participation Quality: Appropriate, Attentive, Sharing and Supportive        Speech:  normal        Thought Process/Content: Logical  Linear        Affective Functioning: Congruent        Mood: euthymic        Level of consciousness:  Alert, Oriented x4 and Attentive        Response to Learning: Able to verbalize current knowledge/experience, Able to verbalize/acknowledge new learning, Able to retain information and Progressing to goal        Endings: None Reported     Modes of Intervention: Education, Support, Socialization, Exploration, Clarifying and Problem-solving        Discipline Responsible: Psychoeducational Specialist        Signature:  Rohith Polk

## 2020-07-10 NOTE — BH NOTE
Line of Sight   Patient is awake in bed with eyes open, colostomy bag and diamond drainage intact. No labored breathing, or distress noted,will continue to monitor.

## 2020-07-10 NOTE — BH NOTE
LOS note  Patient is resting in room with eyes closed No signes of distress at this time. LOS continued for patient safety, per doctor order.

## 2020-07-10 NOTE — BH NOTE
LOS note  Patient is resting in bed with eyes open. No signs of labored breathing and no signs distress at this time. Line of sight continued per doctor's order for patient's safety.

## 2020-07-10 NOTE — GROUP NOTE
Group Therapy Note    Date: 7/10/2020    Group Start Time: 1430  Group End Time: 7440  Group Topic: Cognitive Skills    ROMEO Patel, CTRS        Group Therapy Note    Attendees:          Patient's Goal:  ***    Notes:  ***    Status After Intervention:  {Status After Intervention:121594050}    Participation Level: {Participation Level:771163291}    Participation Quality: {[facility] PARTICIPATION QUALITY:129041215}      Speech:  {[facility] CD_SPEECH:73498}      Thought Process/Content: {Thought Process/Content:991728167}      Affective Functioning: {Affective Functionin}      Mood: {Mood:974326516}      Level of consciousness:  {Level of consciousness:912646331}      Response to Learning: {[facility] Responses to Learnin}      Endings: {[facility] Endings:53043}    Modes of Intervention: {[facility] Modes of Intervention:637184132}      Discipline Responsible: {[facility] Multidisciplinary:097769323}      Signature:  Sathish Munoz

## 2020-07-10 NOTE — GROUP NOTE
Group Therapy Note    Date: 7/10/2020    Group Start Time: 1330  Group End Time: 8420  Group Topic: Cognitive Skills    ROMEO Rincon, CTRS      Pt did not attend 1330 cognitive skills group d/t resting in room despite staff invitation to attend. 1:1 talk time offered as alternative to group session, pt declined.             Signature:  Dominique Koehler

## 2020-07-10 NOTE — BH NOTE
Line of Sight   Patient is asleep in bed with eyes closed, colostomy bag and diamond drainage intact. No labored breathing, or distress noted,will continue to monitor.

## 2020-07-10 NOTE — CONSULTS
formerly [facility] Internal Medicine    CONSULTATION / HISTORY AND PHYSICAL EXAMINATION            Date:   7/10/2020  Patient name:  Sahara Valle  Date of admission:  7/5/2020  9:25 PM  MRN:   [id number]  Account:  [de-identified]  YOB: 1975  PCP:    No primary care provider on file. Room:   [address] [place] [address]  Code Status:    Full Code    Physician Requesting Consult: Zee Hurtado MD    Reason for Consult: Medical management    Chief Complaint:     No chief complaint on file. Abdominal abscess    History Obtained From:     Patient medical record nursing staff    History of Present Illness:     Patient is a 80-year-old lady with borderline intelligence history of: Rectal cancer status post surgery admitted from [facility] for tachycardia and pelvic abscess which was drained found to be fecal matter has a fistulogram which shows she got: Fistula leaking fecal matter into the pelvic area patient had a drain placed IV antibiotics plan for his outpatient surgery due to lack of insight and hallucination patient is being sent back to [facility]      Past Medical History:     Past Medical History:   Diagnosis Date    Cancer [facility])     Rectal    Pelvis, female abscess     pre sacral drain put in 6/19/2020    UTI (urinary tract infection)     stent place june 2020        Past Surgical History:     Past Surgical History:   Procedure Laterality Date    ABSCESS DRAINAGE      pre sacral pelvic abscess with drain placement to right buttock    SMALL INTESTINE SURGERY N/A 7/3/2020    TRANSVERSE COLOSTOMY LOOP performed by Gerardo Tanner MD at [facility]        Medications Prior to Admission:     Prior to Admission medications    Medication Sig Start Date End Date Taking?  Authorizing Provider   busPIRone (BUSPAR) 15 MG tablet Take 15 mg by mouth 3 times daily 7/9/20  Yes Zee Hurtado MD   OLANZapine (ZYPREXA) 10 MG tablet Take 1 tablet by mouth nightly 7/9/20  Yes Imer Choi negative for headaches, dizziness, lightheadedness, numbness, pain, tingling extremities      Physical Exam:     /66   Pulse 93   Temp 98.4 °F (36.9 °C) (Oral)   Resp 14   Ht 5' (1.524 m)   Wt 82 lb (37.2 kg)   LMP 2017   BMI 16.01 kg/m²   Temp (24hrs), Av.6 °F (37 °C), Min:98.4 °F (36.9 °C), Max:98.8 °F (37.1 °C)    No results for input(s): POCGLU in the last 72 hours. No intake or output data in the 24 hours ending 07/10/20 1516   severe protein malnutrition  General Appearance:  alert, well appearing, and in no acute distress  Mental status: oriented to person, place, and time with normal affect  Head:  normocephalic, atraumatic. Eye: no icterus, redness, pupils equal and reactive, extraocular eye movements intact, conjunctiva clear  Ear: normal external ear, no discharge, hearing intact  Nose:  no drainage noted  Mouth: mucous membranes moist  Neck: supple, no carotid bruits, thyroid not palpable  Lungs: Bilateral equal air entry, clear to ausculation, no wheezing, rales or rhonchi, normal effort  Cardiovascular: normal rate, regular rhythm, no murmur, gallop, rub. Abdomen: Soft, nontender, nondistended, normal bowel sounds, no hepatomegaly or splenomegaly  Drain pelvic draining through right gluteus  Neurologic: There are no new focal motor or sensory deficits, normal muscle tone and bulk, no abnormal sensation, normal speech, cranial nerves II through XII grossly intact  Skin: No gross lesions, rashes, bruising or bleeding on exposed skin area  Extremities:  peripheral pulses palpable, no pedal edema or calf pain with palpation      Investigations:      Laboratory Testing:  No results found for this or any previous visit (from the past 24 hour(s)).     Imaging/Diagonstics:      Assessment :      Primary Problem  <principal problem not specified>    Active Hospital Problems    Diagnosis Date Noted    Intellectual disability [F79]     Anorexia nervosa [F50.00]     Psychosis ([facility] [address] Leah Katz 06/15/2020       Plan:   Patient is a 45-year-old lady with borderline intelligence history of: Rectal cancer status post surgery admitted from [facility] for tachycardia and pelvic abscess which was drained found to be fecal matter has a fistulogram which shows she got: Fistula leaking fecal matter into the pelvic area patient had a drain placed IV antibiotics plan for his outpatient surgery due to lack of insight and hallucination patient is being sent back to [facility]  1. Pelvic abscess oral Ceftin Bactrim and metronidazole outpatient surgery consultation withdr srivastava  2. Severe protein calorie malnutrition encourage better nutrition recommend dietary input  Doing well continue supportive care better nutrition antibiotics and follow-up with surgery outpatient  Okay to discharge today on oral antibiotic outpatient with surgery dr Jonathan Merlos    July 10  Patient seen and examined stoma is working fine MEL drain from the right gluteus draining pelvic abscess is empty okay to remove the drain continue oral antibiotics discharged home with home care and follow-up with surgery outpatient  I personally spoke with the [facility] team and explained everything all questions answered up to his satisfaction home care form signed    Consultations:   [address], [place]  7/10/2020  3:16 PM    Copy sent to Dr. Alexa Ovalle primary care provider on file. Please note that this chart was generated using voice recognition Dragon dictation software. Although every effort was made to ensure the accuracy of this automated transcription, some errors in transcription may have occurred.

## 2020-07-10 NOTE — PROGRESS NOTES
[facility]  Follow Up      NAME:  Martha Beavers  MEDICAL RECORD NUMBER:  [id number]  AGE: 40 y.o. GENDER: female  : 1975  TODAY'S DATE:  7/10/2020      Follow up visit today for continued ostomy care and education. The patient has a new loop colostomy created 7/3/2020 by Dr. Viola Carrasco due to rectal fistula/abscess. The buttock drain placed by IR on  remains in place and the bulb has had no drainage for at least several days per the patient. There is minimal drainage that resembles fecal matter coming from around the drain tube entrance site. The patient also has a medical port accessed at this time that is not being used. Discussed case with primary nurse as the drain and the medical port being de-accessed will have to be addressed prior to discharge today. Colostomy care: the patient states that she emptied the pouch herself for the first time this morning. The patient was given encouragement and some follow up education for emptying the pouch and being sure to cleanse around the pouch closure before folding back up to prevent odors. The colostomy pouch was then removed from the skin as it is due to be changed today. The bridge remains intact and the stoma appears to be healthy. The patient was taught step by step how to properly apply the new ostomy appliance to the skin including advanced technique with ostomy putty around the bridge. Explained that the bridge will be removed at her follow up appt with her surgeon. The patient states understanding of all education. She was encouraged to coordinate a visit with her home care nurse for the first time she places the pouch herself. She states understanding. Supplies to be used at home until home care can order were provided (3 flat one piece, 3 moldable rings, and moldable strip paste).      Ostomy supply needs:  Coloplast one piece Sensura sam ref# [id number]  Brava protective seal ref# [id number]  Brava moldable strip paste ref# [address] Ivanna Hogan RN, [address]  Wound, Ostomy, and Continence Care  [phone]

## 2020-07-14 NOTE — CARE COORDINATION
Name: Michael Juárez    : 1975    Discharge Date: 7/10/2020    Primary Auth/Cert #: [id number]    Discharge Medications:      Medication List      START taking these medications    ferrous sulfate 325 (65 Fe) MG tablet  Commonly known as:  IRON 325  Take 1 tablet by mouth 2 times daily (with meals)  Notes to patient:  Supplement     metroNIDAZOLE 500 MG tablet  Commonly known as:  FLAGYL  Take 1 tablet by mouth every 8 hours for 10 days  Notes to patient:  Antibiotic     sulfamethoxazole-trimethoprim 800-160 MG per tablet  Commonly known as:  BACTRIM DS;SEPTRA DS  Take 1 tablet by mouth every 12 hours for 10 days  Notes to patient:  Antibiotic        CHANGE how you take these medications    cefUROXime 500 MG tablet  Commonly known as:  CEFTIN  Take 1 tablet by mouth every 12 hours for 10 days  What changed:  when to take this  Notes to patient:  Antibiotic     OLANZapine 10 MG tablet  Commonly known as:  ZYPREXA  Take 1 tablet by mouth nightly  What changed:    · medication strength  · how much to take  Notes to patient:  Clear thoughts        CONTINUE taking these medications    busPIRone 15 MG tablet  Commonly known as:  BUSPAR  Take 15 mg by mouth 3 times daily  Notes to patient:  Anxiety     hydrOXYzine 50 MG capsule  Commonly known as:  VISTARIL  Notes to patient:  Anxiety     mirtazapine 15 MG tablet  Commonly known as:  REMERON  Notes to patient:  Improve mood        STOP taking these medications    pantoprazole 40 MG tablet  Commonly known as:  PROTONIX     PARoxetine 20 MG tablet  Commonly known as:  PAXIL     tamsulosin 0.4 MG capsule  Commonly known as:  FLOMAX     Trintellix 10 MG Tabs tablet  Generic drug:  VORTIoxetine           Where to Get Your Medications      These medications were sent to [address], [address] [facility] [address]    Phone:  [phone]   · busPIRone 15 MG tablet  · cefUROXime

## 2020-07-28 PROBLEM — E86.0 DEHYDRATION: Status: RESOLVED | Noted: 2020-06-28 | Resolved: 2020-07-28

## 2020-10-13 ENCOUNTER — HOSPITAL ENCOUNTER (OUTPATIENT)
Dept: CT IMAGING | Age: 45
Discharge: HOME | End: 2020-10-15
Payer: MEDICARE

## 2020-10-13 PROCEDURE — 74177 CT ABD & PELVIS W/CONTRAST: CPT

## 2020-10-13 PROCEDURE — 2580000003 HC RX 258: Performed by: SURGERY

## 2020-10-13 PROCEDURE — 6360000004 HC RX CONTRAST MEDICATION: Performed by: SURGERY

## 2020-10-13 RX ORDER — 0.9 % SODIUM CHLORIDE 0.9 %
80 INTRAVENOUS SOLUTION INTRAVENOUS ONCE
Status: COMPLETED | OUTPATIENT
Start: 2020-10-13 | End: 2020-10-13

## 2020-10-13 RX ORDER — SODIUM CHLORIDE 0.9 % (FLUSH) 0.9 %
10 SYRINGE (ML) INJECTION PRN
Status: DISCONTINUED | OUTPATIENT
Start: 2020-10-13 | End: 2020-10-16 | Stop reason: HOSPADM

## 2020-10-13 RX ADMIN — IOHEXOL 100 ML: 240 INJECTION, SOLUTION INTRATHECAL; INTRAVASCULAR; INTRAVENOUS; ORAL at 11:21

## 2020-10-13 RX ADMIN — IOVERSOL 75 ML: 741 INJECTION INTRA-ARTERIAL; INTRAVENOUS at 11:21

## 2020-10-13 RX ADMIN — Medication 10 ML: at 11:21

## 2020-10-13 RX ADMIN — SODIUM CHLORIDE 80 ML: 9 INJECTION, SOLUTION INTRAVENOUS at 11:21

## 2020-11-27 NOTE — BH NOTE
LOS note  Patient is resting in room, reading a book. No signs of distress at this time or labored breathing. Patient is line of sight, ordered by patient. normal...

## 2020-12-28 ENCOUNTER — HOSPITAL ENCOUNTER (OUTPATIENT)
Dept: CT IMAGING | Age: 45
Discharge: HOME | End: 2020-12-30
Payer: MEDICARE

## 2020-12-28 PROCEDURE — 2580000003 HC RX 258: Performed by: SURGERY

## 2020-12-28 PROCEDURE — 74177 CT ABD & PELVIS W/CONTRAST: CPT

## 2020-12-28 PROCEDURE — 6360000004 HC RX CONTRAST MEDICATION: Performed by: SURGERY

## 2020-12-28 RX ORDER — 0.9 % SODIUM CHLORIDE 0.9 %
80 INTRAVENOUS SOLUTION INTRAVENOUS ONCE
Status: COMPLETED | OUTPATIENT
Start: 2020-12-28 | End: 2020-12-28

## 2020-12-28 RX ORDER — SODIUM CHLORIDE 0.9 % (FLUSH) 0.9 %
10 SYRINGE (ML) INJECTION PRN
Status: DISCONTINUED | OUTPATIENT
Start: 2020-12-28 | End: 2020-12-31 | Stop reason: HOSPADM

## 2020-12-28 RX ADMIN — IOPAMIDOL 75 ML: 755 INJECTION, SOLUTION INTRAVENOUS at 14:10

## 2020-12-28 RX ADMIN — SODIUM CHLORIDE 80 ML: 9 INJECTION, SOLUTION INTRAVENOUS at 14:09

## 2020-12-28 RX ADMIN — Medication 10 ML: at 14:10

## 2020-12-28 RX ADMIN — IOHEXOL 100 ML: 240 INJECTION, SOLUTION INTRATHECAL; INTRAVASCULAR; INTRAVENOUS; ORAL at 14:10

## 2021-01-25 NOTE — CARE COORDINATION
Otolaryngology Operative Report    1/25/2021    Surgeon: Deon Gooden MD    Assistant: Shi Feliciano PA-C    Preoperative Diagnosis: Conductive hearing loss of left ear with unrestricted hearing of right ear [H90.12]    Postoperative Diagnosis: Same    Procedure:    left  osseointegrated abutment for Baha    lndication: Ana Samaniego is a 46 year old with the above mentioned diagnosis and presented to the listed procedure    Findings:    1. 4 mm abutment fixed with a torque of 50 NMC   2. No bone preventing placement of the screw     3. Serial number [id number]    4. The patient is very thin and had a very thin epidermis overlying the implant.  Even her temporalis muscle is thin so it was left under the implant    Issues making this more time consuming than a standard BAHA:  This procedure requires a 6 cm incision and dissection to get do the underlying bone.  Once this area is found I do the standard drilling and seeking like a regular BAHA.  However, again, after this is completed I then need to ensure that I have cleared enough soft tissue away that I have 360 degrees of clearance for the actuator.  If this is not present then I need to use a drill to contour bone so there is.  This makes this procedure at least 30% more time consuming and involved than a standard baha.          Summary:  The patient was placed in supine position and the consent was read aloud and confirmed. Antibiotic was given pre-operatively. Next  a shave was done and the area was marked and injected taking care to ensure that the acctuator was at the level of the ear canal and the magnet was just above the pinna. The patient was now prepped and draped in sterile fashion. A 15 blade was used to make a 6 cm incision in an S shape1 cm away from the marked location.  The implant was marked with clearance of the ear.     The incision was carried down to the periosteum which was cleared and a pocket for the magnet was made  ONGOING DISCHARGE PLAN:    Spoke with patient regarding discharge plan and patient confirms that plan is still home with no needs. Patient declines VNS. Writer to  encourage home care. Infectious disease consulted for drain in the pelvic gluteal abscess. Currently on PO augmentin. GI considering endoscopy to rule out colitis. Sitter at bedside. Will continue to follow for additional discharge needs.     Electronically signed by Janeth Pierce RN on 6/27/2020 at 4:05 PM aove the temporalis muscle.  The seeking drill was used to confirm a safe depth of 3 mm could be accomplished. The depth gauge confirmed bone was still present at the bottom of the well. Once this was confirmed I took the guide off and a  4 mm well was drilled. Next, a 4 mm counter sink was used with copious irrigation. The bone was good quality and so using a torque of 50Ncm the implant was placed while irrigating (the first turn and a half were done before irrigation). At this time I confirmed that there was clearance of bone in 360 degree from the actuator location.  Next the OSIA was opened and the fixation screw was placed into it.  This was then brought onto the field and fixed to the implant at 25 NCM.  The incision was closed with 3.0 vicryl and 5.0 fast absorbing gut.  I would point out that she is so thin that there was very little tissue over her abutment .  This was secondary to her thin habitus and I suspect she should still heal well, it is just simply a product of her low BMI

## 2021-07-19 ENCOUNTER — TELEPHONE (OUTPATIENT)
Dept: ONCOLOGY | Age: 46
End: 2021-07-19

## 2021-08-11 PROBLEM — K91.89 ANASTOMOTIC LEAK OF INTESTINE: Status: ACTIVE | Noted: 2021-08-11

## 2021-08-12 ENCOUNTER — HOSPITAL ENCOUNTER (INPATIENT)
Age: 46
LOS: 6 days | Discharge: HOME HEALTH | DRG: 231 | End: 2021-08-18
Attending: FAMILY MEDICINE | Admitting: FAMILY MEDICINE
Payer: MEDICARE

## 2021-08-12 ENCOUNTER — APPOINTMENT (OUTPATIENT)
Dept: GENERAL RADIOLOGY | Age: 46
DRG: 231 | End: 2021-08-12
Attending: FAMILY MEDICINE
Payer: MEDICARE

## 2021-08-12 DIAGNOSIS — N82.3 RECTOVAGINAL FISTULA: Primary | ICD-10-CM

## 2021-08-12 PROBLEM — G93.41 ACUTE METABOLIC ENCEPHALOPATHY: Status: ACTIVE | Noted: 2021-08-12

## 2021-08-12 LAB
-: ABNORMAL
ALBUMIN SERPL-MCNC: 1.7 G/DL (ref 3.5–5.2)
ALBUMIN/GLOBULIN RATIO: 0.6 (ref 1–2.5)
ALP BLD-CCNC: 103 U/L (ref 35–104)
ALT SERPL-CCNC: 12 U/L (ref 5–33)
AMORPHOUS: ABNORMAL
ANION GAP SERPL CALCULATED.3IONS-SCNC: 7 MMOL/L (ref 9–17)
AST SERPL-CCNC: 14 U/L
BACTERIA: ABNORMAL
BILIRUB SERPL-MCNC: 0.35 MG/DL (ref 0.3–1.2)
BILIRUBIN URINE: NEGATIVE
BUN BLDV-MCNC: 10 MG/DL (ref 6–20)
BUN/CREAT BLD: ABNORMAL (ref 9–20)
CALCIUM SERPL-MCNC: 7.8 MG/DL (ref 8.6–10.4)
CASTS UA: ABNORMAL /LPF (ref 0–2)
CHLORIDE BLD-SCNC: 112 MMOL/L (ref 98–107)
CO2: 18 MMOL/L (ref 20–31)
COLOR: ABNORMAL
COMMENT UA: ABNORMAL
CREAT SERPL-MCNC: 0.8 MG/DL (ref 0.5–0.9)
CRYSTALS, UA: ABNORMAL /HPF
DATE, STOOL #1: ABNORMAL
DATE, STOOL #2: ABNORMAL
DATE, STOOL #3: ABNORMAL
DIRECT EXAM: NORMAL
EPITHELIAL CELLS UA: ABNORMAL /HPF (ref 0–5)
GFR AFRICAN AMERICAN: >60 ML/MIN
GFR NON-AFRICAN AMERICAN: >60 ML/MIN
GFR SERPL CREATININE-BSD FRML MDRD: ABNORMAL ML/MIN/{1.73_M2}
GFR SERPL CREATININE-BSD FRML MDRD: ABNORMAL ML/MIN/{1.73_M2}
GLUCOSE BLD-MCNC: 114 MG/DL (ref 70–99)
GLUCOSE URINE: NEGATIVE
HCT VFR BLD CALC: 32.5 % (ref 36.3–47.1)
HEMOCCULT SP1 STL QL: POSITIVE
HEMOCCULT SP2 STL QL: ABNORMAL
HEMOCCULT SP3 STL QL: ABNORMAL
HEMOGLOBIN: 9.6 G/DL (ref 11.9–15.1)
KETONES, URINE: NEGATIVE
LEUKOCYTE ESTERASE, URINE: ABNORMAL
Lab: NORMAL
MCH RBC QN AUTO: 25.7 PG (ref 25.2–33.5)
MCHC RBC AUTO-ENTMCNC: 29.5 G/DL (ref 28.4–34.8)
MCV RBC AUTO: 86.9 FL (ref 82.6–102.9)
MUCUS: ABNORMAL
NITRITE, URINE: NEGATIVE
NRBC AUTOMATED: 0 PER 100 WBC
OTHER OBSERVATIONS UA: ABNORMAL
PDW BLD-RTO: 18.5 % (ref 11.8–14.4)
PH UA: 6 (ref 5–8)
PLATELET # BLD: 397 K/UL (ref 138–453)
PMV BLD AUTO: 10 FL (ref 8.1–13.5)
POTASSIUM SERPL-SCNC: 4.1 MMOL/L (ref 3.7–5.3)
PROTEIN UA: ABNORMAL
RBC # BLD: 3.74 M/UL (ref 3.95–5.11)
RBC UA: ABNORMAL /HPF (ref 0–2)
RENAL EPITHELIAL, UA: ABNORMAL /HPF
SODIUM BLD-SCNC: 137 MMOL/L (ref 135–144)
SPECIFIC GRAVITY UA: 1.02 (ref 1–1.03)
SPECIMEN DESCRIPTION: NORMAL
TIME, STOOL #1: ABNORMAL
TIME, STOOL #2: ABNORMAL
TIME, STOOL #3: ABNORMAL
TOTAL PROTEIN: 4.6 G/DL (ref 6.4–8.3)
TRICHOMONAS: ABNORMAL
TURBIDITY: ABNORMAL
URINE HGB: ABNORMAL
UROBILINOGEN, URINE: NORMAL
WBC # BLD: 12.6 K/UL (ref 3.5–11.3)
WBC UA: ABNORMAL /HPF (ref 0–5)
YEAST: ABNORMAL

## 2021-08-12 PROCEDURE — 87205 SMEAR GRAM STAIN: CPT

## 2021-08-12 PROCEDURE — 83630 LACTOFERRIN FECAL (QUAL): CPT

## 2021-08-12 PROCEDURE — 85027 COMPLETE CBC AUTOMATED: CPT

## 2021-08-12 PROCEDURE — 6360000002 HC RX W HCPCS: Performed by: FAMILY MEDICINE

## 2021-08-12 PROCEDURE — 87086 URINE CULTURE/COLONY COUNT: CPT

## 2021-08-12 PROCEDURE — 74019 RADEX ABDOMEN 2 VIEWS: CPT

## 2021-08-12 PROCEDURE — 6370000000 HC RX 637 (ALT 250 FOR IP): Performed by: FAMILY MEDICINE

## 2021-08-12 PROCEDURE — 1200000000 HC SEMI PRIVATE

## 2021-08-12 PROCEDURE — C9113 INJ PANTOPRAZOLE SODIUM, VIA: HCPCS | Performed by: FAMILY MEDICINE

## 2021-08-12 PROCEDURE — 80053 COMPREHEN METABOLIC PANEL: CPT

## 2021-08-12 PROCEDURE — 99222 1ST HOSP IP/OBS MODERATE 55: CPT | Performed by: FAMILY MEDICINE

## 2021-08-12 PROCEDURE — 81001 URINALYSIS AUTO W/SCOPE: CPT

## 2021-08-12 PROCEDURE — 87449 NOS EACH ORGANISM AG IA: CPT

## 2021-08-12 PROCEDURE — 87324 CLOSTRIDIUM AG IA: CPT

## 2021-08-12 PROCEDURE — 73502 X-RAY EXAM HIP UNI 2-3 VIEWS: CPT

## 2021-08-12 PROCEDURE — 2500000003 HC RX 250 WO HCPCS: Performed by: FAMILY MEDICINE

## 2021-08-12 PROCEDURE — 82272 OCCULT BLD FECES 1-3 TESTS: CPT

## 2021-08-12 PROCEDURE — 2580000003 HC RX 258: Performed by: FAMILY MEDICINE

## 2021-08-12 PROCEDURE — 36415 COLL VENOUS BLD VENIPUNCTURE: CPT

## 2021-08-12 RX ORDER — MAGNESIUM SULFATE 1 G/100ML
1000 INJECTION INTRAVENOUS PRN
Status: DISCONTINUED | OUTPATIENT
Start: 2021-08-12 | End: 2021-08-18 | Stop reason: HOSPADM

## 2021-08-12 RX ORDER — SODIUM CHLORIDE 0.9 % (FLUSH) 0.9 %
10 SYRINGE (ML) INJECTION PRN
Status: DISCONTINUED | OUTPATIENT
Start: 2021-08-12 | End: 2021-08-18 | Stop reason: HOSPADM

## 2021-08-12 RX ORDER — ACETAMINOPHEN 325 MG/1
650 TABLET ORAL EVERY 6 HOURS PRN
Status: DISCONTINUED | OUTPATIENT
Start: 2021-08-12 | End: 2021-08-14

## 2021-08-12 RX ORDER — SODIUM CHLORIDE 0.9 % (FLUSH) 0.9 %
5-40 SYRINGE (ML) INJECTION EVERY 12 HOURS SCHEDULED
Status: DISCONTINUED | OUTPATIENT
Start: 2021-08-12 | End: 2021-08-18 | Stop reason: HOSPADM

## 2021-08-12 RX ORDER — HYDROXYZINE HYDROCHLORIDE 10 MG/1
50 TABLET, FILM COATED ORAL 3 TIMES DAILY PRN
Status: DISCONTINUED | OUTPATIENT
Start: 2021-08-12 | End: 2021-08-18 | Stop reason: HOSPADM

## 2021-08-12 RX ORDER — ACETAMINOPHEN 650 MG/1
650 SUPPOSITORY RECTAL EVERY 6 HOURS PRN
Status: DISCONTINUED | OUTPATIENT
Start: 2021-08-12 | End: 2021-08-14

## 2021-08-12 RX ORDER — POTASSIUM CHLORIDE 7.45 MG/ML
10 INJECTION INTRAVENOUS PRN
Status: DISCONTINUED | OUTPATIENT
Start: 2021-08-12 | End: 2021-08-18 | Stop reason: HOSPADM

## 2021-08-12 RX ORDER — SODIUM CHLORIDE 9 MG/ML
25 INJECTION, SOLUTION INTRAVENOUS PRN
Status: DISCONTINUED | OUTPATIENT
Start: 2021-08-12 | End: 2021-08-18 | Stop reason: HOSPADM

## 2021-08-12 RX ORDER — SODIUM CHLORIDE 9 MG/ML
10 INJECTION INTRAVENOUS DAILY
Status: DISCONTINUED | OUTPATIENT
Start: 2021-08-12 | End: 2021-08-18 | Stop reason: HOSPADM

## 2021-08-12 RX ORDER — LANOLIN ALCOHOL/MO/W.PET/CERES
325 CREAM (GRAM) TOPICAL 2 TIMES DAILY WITH MEALS
Status: DISCONTINUED | OUTPATIENT
Start: 2021-08-12 | End: 2021-08-17

## 2021-08-12 RX ORDER — POLYETHYLENE GLYCOL 3350 17 G/17G
17 POWDER, FOR SOLUTION ORAL DAILY PRN
Status: DISCONTINUED | OUTPATIENT
Start: 2021-08-12 | End: 2021-08-18 | Stop reason: HOSPADM

## 2021-08-12 RX ORDER — DEXTROSE, SODIUM CHLORIDE, AND POTASSIUM CHLORIDE 5; .45; .15 G/100ML; G/100ML; G/100ML
INJECTION INTRAVENOUS CONTINUOUS
Status: DISCONTINUED | OUTPATIENT
Start: 2021-08-12 | End: 2021-08-13

## 2021-08-12 RX ORDER — POTASSIUM CHLORIDE 20 MEQ/1
40 TABLET, EXTENDED RELEASE ORAL PRN
Status: DISCONTINUED | OUTPATIENT
Start: 2021-08-12 | End: 2021-08-18 | Stop reason: HOSPADM

## 2021-08-12 RX ORDER — ONDANSETRON 2 MG/ML
4 INJECTION INTRAMUSCULAR; INTRAVENOUS EVERY 6 HOURS PRN
Status: DISCONTINUED | OUTPATIENT
Start: 2021-08-12 | End: 2021-08-18 | Stop reason: HOSPADM

## 2021-08-12 RX ORDER — MIRTAZAPINE 15 MG/1
15 TABLET, FILM COATED ORAL NIGHTLY
Status: DISCONTINUED | OUTPATIENT
Start: 2021-08-12 | End: 2021-08-18 | Stop reason: HOSPADM

## 2021-08-12 RX ORDER — BUSPIRONE HYDROCHLORIDE 15 MG/1
15 TABLET ORAL 3 TIMES DAILY
Status: DISCONTINUED | OUTPATIENT
Start: 2021-08-12 | End: 2021-08-18 | Stop reason: HOSPADM

## 2021-08-12 RX ORDER — PANTOPRAZOLE SODIUM 40 MG/10ML
40 INJECTION, POWDER, LYOPHILIZED, FOR SOLUTION INTRAVENOUS DAILY
Status: DISCONTINUED | OUTPATIENT
Start: 2021-08-12 | End: 2021-08-18 | Stop reason: HOSPADM

## 2021-08-12 RX ORDER — OXYCODONE HYDROCHLORIDE 5 MG/1
5 TABLET ORAL EVERY 4 HOURS PRN
Status: DISCONTINUED | OUTPATIENT
Start: 2021-08-12 | End: 2021-08-18 | Stop reason: HOSPADM

## 2021-08-12 RX ORDER — ONDANSETRON 4 MG/1
4 TABLET, ORALLY DISINTEGRATING ORAL EVERY 8 HOURS PRN
Status: DISCONTINUED | OUTPATIENT
Start: 2021-08-12 | End: 2021-08-18 | Stop reason: HOSPADM

## 2021-08-12 RX ORDER — OLANZAPINE 10 MG/1
10 TABLET ORAL NIGHTLY
Status: DISCONTINUED | OUTPATIENT
Start: 2021-08-12 | End: 2021-08-18 | Stop reason: HOSPADM

## 2021-08-12 RX ADMIN — POTASSIUM CHLORIDE, DEXTROSE MONOHYDRATE AND SODIUM CHLORIDE: 150; 5; 450 INJECTION, SOLUTION INTRAVENOUS at 09:03

## 2021-08-12 RX ADMIN — MIRTAZAPINE 15 MG: 15 TABLET, FILM COATED ORAL at 20:31

## 2021-08-12 RX ADMIN — HYDROXYZINE HYDROCHLORIDE 50 MG: 10 TABLET ORAL at 15:21

## 2021-08-12 RX ADMIN — OLANZAPINE 10 MG: 10 TABLET, FILM COATED ORAL at 20:31

## 2021-08-12 RX ADMIN — FERROUS SULFATE TAB EC 325 MG (65 MG FE EQUIVALENT) 325 MG: 325 (65 FE) TABLET DELAYED RESPONSE at 16:55

## 2021-08-12 RX ADMIN — BUSPIRONE HYDROCHLORIDE 15 MG: 15 TABLET ORAL at 15:21

## 2021-08-12 RX ADMIN — OXYCODONE HYDROCHLORIDE 5 MG: 5 TABLET ORAL at 17:08

## 2021-08-12 RX ADMIN — PANTOPRAZOLE SODIUM 40 MG: 40 INJECTION, POWDER, FOR SOLUTION INTRAVENOUS at 15:21

## 2021-08-12 RX ADMIN — PIPERACILLIN AND TAZOBACTAM 3375 MG: 3; .375 INJECTION, POWDER, LYOPHILIZED, FOR SOLUTION INTRAVENOUS at 15:20

## 2021-08-12 RX ADMIN — SODIUM CHLORIDE, PRESERVATIVE FREE 10 ML: 5 INJECTION INTRAVENOUS at 20:32

## 2021-08-12 RX ADMIN — SODIUM CHLORIDE, PRESERVATIVE FREE 10 ML: 5 INJECTION INTRAVENOUS at 15:21

## 2021-08-12 RX ADMIN — BUSPIRONE HYDROCHLORIDE 15 MG: 15 TABLET ORAL at 20:31

## 2021-08-12 RX ADMIN — SODIUM CHLORIDE, PRESERVATIVE FREE 10 ML: 5 INJECTION INTRAVENOUS at 12:14

## 2021-08-12 RX ADMIN — PIPERACILLIN AND TAZOBACTAM 3375 MG: 3; .375 INJECTION, POWDER, LYOPHILIZED, FOR SOLUTION INTRAVENOUS at 22:50

## 2021-08-12 RX ADMIN — ACETAMINOPHEN 650 MG: 325 TABLET ORAL at 12:14

## 2021-08-12 RX ADMIN — ENOXAPARIN SODIUM 40 MG: 40 INJECTION SUBCUTANEOUS at 12:14

## 2021-08-12 RX ADMIN — POTASSIUM CHLORIDE, DEXTROSE MONOHYDRATE AND SODIUM CHLORIDE: 150; 5; 450 INJECTION, SOLUTION INTRAVENOUS at 16:55

## 2021-08-12 ASSESSMENT — PAIN DESCRIPTION - DESCRIPTORS: DESCRIPTORS: CRAMPING;DISCOMFORT

## 2021-08-12 ASSESSMENT — PAIN SCALES - GENERAL
PAINLEVEL_OUTOF10: 5
PAINLEVEL_OUTOF10: 4
PAINLEVEL_OUTOF10: 5
PAINLEVEL_OUTOF10: 7
PAINLEVEL_OUTOF10: 0

## 2021-08-12 ASSESSMENT — ENCOUNTER SYMPTOMS
COUGH: 0
SHORTNESS OF BREATH: 0
NAUSEA: 0
BLOOD IN STOOL: 0
RHINORRHEA: 0
CHEST TIGHTNESS: 0
DIARRHEA: 0
ABDOMINAL PAIN: 0
CONSTIPATION: 0
WHEEZING: 0
VOMITING: 0

## 2021-08-12 ASSESSMENT — PAIN DESCRIPTION - PROGRESSION: CLINICAL_PROGRESSION: NOT CHANGED

## 2021-08-12 ASSESSMENT — PAIN DESCRIPTION - ORIENTATION: ORIENTATION: RIGHT

## 2021-08-12 ASSESSMENT — PAIN DESCRIPTION - ONSET: ONSET: ON-GOING

## 2021-08-12 ASSESSMENT — PAIN DESCRIPTION - FREQUENCY: FREQUENCY: CONTINUOUS

## 2021-08-12 ASSESSMENT — PAIN - FUNCTIONAL ASSESSMENT: PAIN_FUNCTIONAL_ASSESSMENT: PREVENTS OR INTERFERES SOME ACTIVE ACTIVITIES AND ADLS

## 2021-08-12 ASSESSMENT — PAIN DESCRIPTION - PAIN TYPE: TYPE: ACUTE PAIN

## 2021-08-12 ASSESSMENT — PAIN DESCRIPTION - LOCATION: LOCATION: HIP;OTHER (COMMENT)

## 2021-08-12 NOTE — CONSULTS
Jm Samayoa, Robert Craig, Rich, [facility] Shamar   Urology Consultation      Patient:  Cara Caballero   MRN: [id number]  YOB: 1975     CHIEF COMPLAINT:  Right hydronephrosis with stent in place    HISTORY OF PRESENT ILLNESS:   The patient is a 39 y.o. female who was admitted to [facility] after being transferred from [facility] for concern of anastomotic leak from reversal of colostomy in June 2021. The patient is a poor historian but believes she has rectal cancer in 2017 with resection and ostomy. Patient denies any abdominal pain, distention, nausea, vomiting, fever, chills. She has had known right hydronephrosis due to a \"pelvic abscess\" or \"ureteral narrowing\" that caused compression resulting in a stent back in 2015. She has since had her abscess drained. She has been following Dr. Ubaldo Archuleta from [facility] tarpipe yet he recently closed his practice. However, she recently had her stent exchanged on 6/4/21 by Dr. Kathya Rodriguez abdomen pelvis obtained at outside hospital showed presacral fluid and air collection of the posterior pelvis concerning for leak of anastomosis, moderate persistent right hydronephrosis which has been noted in previous CTs. Koch catheter was placed at [facility] for urinary retention. Bladder scans had showed residuals above 500cc, Koch was placed for 750 cc. UA is positive for small leuk esterase, few bacteria, WBC 20-50, creatinine 0.8, WBC 12.6    Patient's old records, notes and chart reviewed and summarized above.     Past Medical History:    Past Medical History:   Diagnosis Date    Cancer [facility])     Rectal    Pelvis, female abscess     pre sacral drain put in 6/19/2020    UTI (urinary tract infection)     stent place june 2020       Past Surgical History:    Past Surgical History:   Procedure Laterality Date    ABSCESS DRAINAGE      pre sacral pelvic abscess with drain placement to right buttock    SMALL INTESTINE SURGERY N/A 7/3/2020    TRANSVERSE COLOSTOMY LOOP performed by Juan Jose Laureano MD at [facility]       Medications:      Current Facility-Administered Medications:     dextrose 5 % and 0.45 % NaCl with KCl 20 mEq infusion, , Intravenous, Continuous, Effie Ware MD, Last Rate: 125 mL/hr at 08/12/21 0903, New Bag at 08/12/21 0903    sodium chloride flush 0.9 % injection 5-40 mL, 5-40 mL, Intravenous, 2 times per day, Effie Ware MD, 10 mL at 08/12/21 1214    sodium chloride flush 0.9 % injection 10 mL, 10 mL, Intravenous, PRN, Effie Ware MD    0.9 % sodium chloride infusion, 25 mL, Intravenous, PRN, Effie Ware MD    potassium chloride (KLOR-CON M) extended release tablet 40 mEq, 40 mEq, Oral, PRN **OR** potassium bicarb-citric acid (EFFER-K) effervescent tablet 40 mEq, 40 mEq, Oral, PRN **OR** potassium chloride 10 mEq/100 mL IVPB (Peripheral Line), 10 mEq, Intravenous, PRN, Effie Ware MD    magnesium sulfate 1000 mg in dextrose 5% 100 mL IVPB, 1,000 mg, Intravenous, PRN, Effie Ware MD    enoxaparin (LOVENOX) injection 40 mg, 40 mg, Subcutaneous, Daily, Effie Ware MD, 40 mg at 08/12/21 1214    ondansetron (ZOFRAN-ODT) disintegrating tablet 4 mg, 4 mg, Oral, Q8H PRN **OR** ondansetron (ZOFRAN) injection 4 mg, 4 mg, Intravenous, Q6H PRN, Effie Ware MD    acetaminophen (TYLENOL) tablet 650 mg, 650 mg, Oral, Q6H PRN, 650 mg at 08/12/21 1214 **OR** acetaminophen (TYLENOL) suppository 650 mg, 650 mg, Rectal, Q6H PRN, Effie Ware MD    polyethylene glycol (GLYCOLAX) packet 17 g, 17 g, Oral, Daily PRN, Effie Ware MD    busPIRone (BUSPAR) tablet 15 mg, 15 mg, Oral, TID, Effie Ware MD, 15 mg at 08/12/21 1521    ferrous sulfate (FE TABS 325) EC tablet 325 mg, 325 mg, Oral, BID WCEffie MD    hydrOXYzine (ATARAX) tablet 50 mg, 50 mg, Oral, TID PRN, Effie Ware MD, 50 mg at 08/12/21 1521    mirtazapine (REMERON) tablet 15 mg, 15 mg, Oral, Nightly, Nader Mendoza MD    OLANZapine THE PAVILIION) tablet 10 mg, 10 mg, Oral, Nightly, Nader Mendoza MD    piperacillin-tazobactam (ZOSYN) 3,375 mg in dextrose 5 % 50 mL IVPB extended infusion (mini-bag), 3,375 mg, Intravenous, Q8H, Nader Mendoza MD, Last Rate: 12.5 mL/hr at 08/12/21 1520, 3,375 mg at 08/12/21 1520    pantoprazole (PROTONIX) injection 40 mg, 40 mg, Intravenous, Daily, 40 mg at 08/12/21 1521 **AND** sodium chloride (PF) 0.9 % injection 10 mL, 10 mL, Intravenous, Daily, Nader Mendoza MD, 10 mL at 08/12/21 1521    Allergies:  No Known Allergies    Social History:   Social History     Socioeconomic History    Marital status: Single     Spouse name: Not on file    Number of children: Not on file    Years of education: Not on file    Highest education level: Not on file   Occupational History    Not on file   Tobacco Use    Smoking status: Never Smoker    Smokeless tobacco: Never Used    Tobacco comment: pt is a non smoker   Substance and Sexual Activity    Alcohol use: Not on file    Drug use: Not on file    Sexual activity: Not on file   Other Topics Concern    Not on file   Social History Narrative    Not on file     Social Determinants of Health     Financial Resource Strain:     Difficulty of Paying Living Expenses:    Food Insecurity:     Worried About Running Out of Food in the Last Year:     [place] of Food in the Last Year:    Transportation Needs:     Lack of Transportation (Medical):      Lack of Transportation (Non-Medical):    Physical Activity:     Days of Exercise per Week:     Minutes of Exercise per Session:    Stress:     Feeling of Stress :    Social Connections:     Frequency of Communication with Friends and Family:     Frequency of Social Gatherings with Friends and Family:     Attends Jew Services:     Active Member of Clubs or Organizations:     Attends Club or Organization Meetings:     Marital Status:    Intimate Partner Violence:     Fear of Current or Ex-Partner:     Emotionally Abused:     Physically Abused:     Sexually Abused:        Family History:  No family history on file. REVIEW OF SYSTEMS:  A comprehensive 14 point review of systems was obtained. Constitutional: No fatigue  Eyes: No blurry vision  Ears, nose, mouth, throat, face: No ringing in the ears; no facial droop. Respiratory: No cough or cold. Cardiovascular: No palpitations  Gastrointestinal: No diarrhea or constipation. Genitourinary: No burning with urination  Integument/Skin: No rashes  Hematologic/Lymphatic: No easy bruising  Musculoskeletal: No muscle pains  Neurologic: No weakness in the extremities. Psychiatric: No depression or suicidal thoughts. Endocrine: No heat or cold intolerances. Allergic/Immunologic: No current seasonal allergies; no skin hives. Physical Exam:      This a 39 y.o. female   Vitals:    08/12/21 1516   BP: (!) 102/59   Pulse: 92   Resp: 18   Temp: 98.2 °F (36.8 °C)   SpO2: 99%     Constitutional: Patient in no acute distress. Thin  Neuro: alert, awake. Head: Atraumatic and normocephalic. Neck: Trachea midline. Ext: 2+ radial pulses bilaterally. Psych: Mood and affect normal.  Skin: No rashes or bruising present. Lungs: Respiratory effort normal.  Cardiovascular:  Regular rhythm. Abdomen: Soft, non-tender, non-distended. Neither side has CVA tenderness on exam. Scare from prior ostomy. Bladder non-tender and not distended. Lymphatics: no palpable lymphadenopathy  Koch catheter in place with yellow place   Rectal exam not indicated.     Labs:  Recent Labs     08/12/21  1405   WBC 12.6*   HGB 9.6*   HCT 32.5*   MCV 86.9        Recent Labs     08/12/21  1405      K 4.1   *   CO2 18*   BUN 10   CREATININE 0.80       Recent Labs     08/12/21  1413   COLORU DARK YELLOW*   PHUR 6.0   WBCUA 20 TO 50   RBCUA TOO NUMEROUS TO COUNT   MUCUS NOT REPORTED   TRICHOMONAS NOT REPORTED   YEAST NOT REPORTED   BACTERIA FEW* SPECGRAV 1.019   LEUKOCYTESUR SMALL*   UROBILINOGEN Normal   BILIRUBINUR NEGATIVE           -----------------------------------------------------------------  Imaging Results:  XR ABDOMEN (2 VIEWS)    Result Date: 8/12/2021  EXAMINATION: TWO XRAY VIEWS OF THE ABDOMEN 8/12/2021 10:06 am COMPARISON: CT abdomen and pelvis performed 08/11/2021. HISTORY: ORDERING SYSTEM PROVIDED HISTORY: concern anastomotic leak TECHNOLOGIST PROVIDED HISTORY: concern anastomotic leak Acuity: Unknown FINDINGS: There is a nonobstructive bowel gas pattern. There is no intraperitoneal free air. There are no suspicious calcifications. There is a right ureteral stent. There is no acute osseous abnormality. The surrounding soft tissues are unremarkable. Unremarkable radiographs of the abdomen. Assessment and Plan   Impression:    24-year-old female  Active  problem list  Right hydronephrosis due to abscess status post right stent placement CT scan showed moderate hydronephrosis with right stent in place  Acute urinary retention    Plan:   No acute urological intervention at this time   Maintain Koch catheter, will plan for void trial prior to discharge  Continue stent. Patient recently had stent exchanged on 6/4/21 by Dr. Ahmet Muñoz Sr. Will need to continue to get her stents exchanged at scheduled intervals   Patient has had moderate hydronephrosis in the past with stent in place. Last CT scan in system on 12/28/2020 showed consistent moderate hydronephrosis with stent in place. UA shows small leuk esterase, WBC 20-50, pending urine culture, however with stent in place urine will appear dirty  Patient currently on Zosyn  Appreciate general surgery recommendations  Appreciate medical management per primary    Thank you for involving us in the care of Nasreen Morales. Should you have any questions, please do not hesitate to contact us at any time.     Ray Garcia MD

## 2021-08-12 NOTE — CONSULTS
History    Marital status: Single     Spouse name: Not on file    Number of children: Not on file    Years of education: Not on file    Highest education level: Not on file   Occupational History    Not on file   Tobacco Use    Smoking status: Never Smoker    Smokeless tobacco: Never Used    Tobacco comment: pt is a non smoker   Substance and Sexual Activity    Alcohol use: Not on file    Drug use: Not on file    Sexual activity: Not on file   Other Topics Concern    Not on file   Social History Narrative    Not on file     Social Determinants of Health     Financial Resource Strain:     Difficulty of Paying Living Expenses:    Food Insecurity:     Worried About [address] in the Last Year:     [address] in the Last Year:    Transportation Needs:     Lack of Transportation (Medical):     Lack of Transportation (Non-Medical):    Physical Activity:     Days of Exercise per Week:     Minutes of Exercise per Session:    Stress:     Feeling of Stress :    Social Connections:     Frequency of Communication with Friends and Family:     Frequency of Social Gatherings with Friends and Family:     Attends Sikh Services: Active Member of Clubs or Organizations:     Attends Club or Organization Meetings:     Marital Status:    Intimate Partner Violence:     Fear of Current or Ex-Partner:     Emotionally Abused:     Physically Abused:     Sexually Abused:        Family History:   No family history on file. REVIEW OF SYSTEMS:    CONSTITUTIONAL: Denies recent weight loss, fatigue, fevers, chills. HEENT: Denies rhinorrhea, dysphagia, odynphagia. CARDIOVASCULAR: Denies history of MI, recent chest pain. RESPIRATORY: Denies recent history of shortness of breath or history of PE. GASTROINTESTINAL: Melena x 2-3 weeks. GENITOURINARY: Denies increased frequency or dysuria. HEMATOLOGIC/LYMPHATIC: Denies history of anemia or DVTs. ENDOCRINE: Denies history of thyroid problems or diabetes.   NEURO: Denies history of CVA, TIA. Review of systems negative unless listed above. PHYSICAL EXAM:    VITALS:  BP (!) 109/53   Pulse 91   Temp 98.6 °F (37 °C) (Oral)   Resp 16   Ht 5' (1.524 m)   Wt 110 lb (49.9 kg)   LMP 06/25/2017   SpO2 93%   BMI 21.48 kg/m²   INTAKE/OUTPUT:   No intake or output data in the 24 hours ending 08/12/21 0948    CONSTITUTIONAL:  awake, alert, no apparent distress, not distressed and thin  HEENT: Normocephalic/atraumatic, without obvious abnormality. NECK:  Supple, symmetrical, trachea midline   CARDIOVASCULAR: Regular rate and rhythm without murmurs. LUNGS: Clear to auscultation bilaterally without evidence of wheezing or tachypnea. ABDOMEN: Soft, non tender, non distended, scar from prior ostomy. No guarding or rebound tenderness. MUSCULOSKELETAL: Muscle strength intact in all extremities bilaterally. NEUROLOGIC: CN II- XII intact. Gross motor intact without focal weakness. SKIN: No cyanosis, rashes, or edema noted.    Orientation:   oriented to person, place, and time    CBC:   Lab Results   Component Value Date    WBC 9.1 07/05/2020    RBC 2.90 07/05/2020    HGB 8.2 07/05/2020    HCT 26.1 07/05/2020    MCV 90.2 07/05/2020    MCH 28.2 07/05/2020    MCHC 31.2 07/05/2020    RDW 16.4 07/05/2020     07/05/2020    MPV 8.9 07/05/2020     CBC with Differential:    Lab Results   Component Value Date    WBC 9.1 07/05/2020    RBC 2.90 07/05/2020    HGB 8.2 07/05/2020    HCT 26.1 07/05/2020     07/05/2020    MCV 90.2 07/05/2020    MCH 28.2 07/05/2020    MCHC 31.2 07/05/2020    RDW 16.4 07/05/2020    LYMPHOPCT 28 07/05/2020    PROMYELOPCT 1 07/05/2020    MONOPCT 4 07/05/2020    MYELOPCT 2 07/05/2020    BASOPCT 0 07/05/2020    MONOSABS 0.36 07/05/2020    LYMPHSABS 2.55 07/05/2020    EOSABS 0.09 07/05/2020    BASOSABS 0.00 07/05/2020    DIFFTYPE NOT REPORTED 07/05/2020     WBC:    Lab Results   Component Value Date    WBC 9.1 07/05/2020     Platelets:    Lab Results   Component Value Date     07/05/2020     Hemoglobin/Hematocrit:    Lab Results   Component Value Date    HGB 8.2 07/05/2020    HCT 26.1 07/05/2020     CMP:    Lab Results   Component Value Date     07/05/2020    K 3.7 07/05/2020     07/05/2020    CO2 25 07/05/2020    BUN 13 07/05/2020    CREATININE 0.66 07/05/2020    GFRAA >60 07/05/2020    LABGLOM >60 07/05/2020    GLUCOSE 113 07/05/2020    PROT 6.6 06/17/2020    LABALBU 3.1 06/17/2020    CALCIUM 7.9 07/05/2020    BILITOT 0.20 06/17/2020    ALKPHOS 102 06/17/2020    AST 10 06/17/2020    ALT 14 06/17/2020     BMP:    Lab Results   Component Value Date     07/05/2020    K 3.7 07/05/2020     07/05/2020    CO2 25 07/05/2020    BUN 13 07/05/2020    LABALBU 3.1 06/17/2020    CREATININE 0.66 07/05/2020    CALCIUM 7.9 07/05/2020    GFRAA >60 07/05/2020    LABGLOM >60 07/05/2020    GLUCOSE 113 07/05/2020       Pertinent Radiology:   No results found. ASSESSMENT:  Active Hospital Problems    Diagnosis Date Noted    Anastomotic leak of intestine [K91.89] 08/11/2021    Schizoaffective disorder ([facility] [address] [F25.9] 07/05/2020    Major depression with psychotic features ([facility] [address] [F32.3]     Bipolar I disorder, most recent episode mixed [facility]) [F31.60] 06/20/2020       39 y.o female with concerns for leak at anastomosis site from prior reversal of ileostomy, as per recent CT. Patients physical exam is unremarkable. Plan:  Pt seen and examined. Will review labs and CT imaging from outlying facility. Diet as tolerated at this time, no acute surgical intervention. Abdomen soft, non tender. Currently having stools. Recommend consult gastroenterology for subjective melena, concerns for upper GI bleed. Electronically signed by Luke Daniels MD  on 8/12/2021 at 9:48 AM     General Surgery Resident Statement/Note:  I have discussed the case, including pertinent history and exam findings with the above resident, I have personally seen the patient.      Pt seen and examined at bedside. I performed both history and physical exam.     Note was edited and changes made by me. Assessment and plan reviewed and changes made by me. I agree with the assessment and plan as stated above. Will review films and labs. No acute surgical intervention at this time. Will discuss if needs drain after CT reviewed. Continue medical management per primary. Remain npo for possible intervention 8/13. Electronically signed by Ashely Flanagan DO on 8/12/2021 at 12:06 PM     I Dr. Rupal Krishna saw and examined the patient. I have edited the above and agree with the above. Aron Branham  Colorectal Surgery
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
## 2021-08-12 NOTE — CARE COORDINATION
Case Management Initial Discharge Plan  Yony Lopez,             Met with:patient to discuss discharge plans. Information verified: address, contacts, phone number, , insurance Yes  Insurance Provider: [place] [employer]    Emergency Contact/Next of Kin name & number:   medardo Cruz, [phone]    Who are involved in patient's support system? [email or web address]    PCP: GRAY Henderson CNP  Date of last visit: few years ago      Discharge Planning    Living Arrangements:  Spouse/Significant Other     Home has 1 stories  2 stairs to climb to get into front door, 0 stairs to climb to reach second floor  Location of bedroom/bathroom in home main    Patient able to perform ADL's:Assisted    Current Services (outpatient & in home) none  DME equipment: none (pt stated that she uses furniture)  DME provider:     Is patient receiving oral anticoagulation therapy? No    If indicated:   Physician managing anticoagulation treatment:   Where does patient obtain lab work for ATC treatment? Potential Assistance Needed:  [place], Durable Medical Equipment    Patient agreeable to home care: Yes- had [facility] in past  Freedom of choice provided:  yes    Prior SNF/Rehab Placement and Facility: none  Agreeable to SNF/Rehab: Yes  [place] of choice provided: yes     Evaluation: n/a    Expected Discharge date:  21    Patient expects to be discharged to: If home: is the family and/or caregiver wiling & able to provide support at home? yes  Who will be providing this support? [email or web address]    Follow Up Appointment: Best Day/ Time: Monday AM    Transportation provider: medardo  Transportation arrangements needed for discharge: No    Readmission Risk              Risk of Unplanned Readmission:  11             Does patient have a readmission risk score greater than 14?: No  If yes, follow-up appointment must be made within 7 days of discharge.      Goals of Care:       Educated patient on transitional options, provided freedom of choice and are agreeable with plan      Discharge Plan: goal is [facility] with bf and [facility]- referral sent to [facility], agreeable to SNF if needed pending PT/OT juanito- has list of choices, has ride          Electronically signed by Tony Raymond, RN on 8/12/21 at 175 [facility] PM EDT

## 2021-08-13 ENCOUNTER — ANESTHESIA (OUTPATIENT)
Dept: ENDOSCOPY | Age: 46
DRG: 231 | End: 2021-08-13
Payer: MEDICARE

## 2021-08-13 ENCOUNTER — ANESTHESIA EVENT (OUTPATIENT)
Dept: ENDOSCOPY | Age: 46
DRG: 231 | End: 2021-08-13
Payer: MEDICARE

## 2021-08-13 ENCOUNTER — APPOINTMENT (OUTPATIENT)
Dept: GENERAL RADIOLOGY | Age: 46
DRG: 231 | End: 2021-08-13
Attending: FAMILY MEDICINE
Payer: MEDICARE

## 2021-08-13 VITALS — DIASTOLIC BLOOD PRESSURE: 80 MMHG | SYSTOLIC BLOOD PRESSURE: 100 MMHG | OXYGEN SATURATION: 100 %

## 2021-08-13 LAB
ABO/RH: NORMAL
AMYLASE: 11 U/L (ref 28–100)
ANTIBODY SCREEN: NEGATIVE
ARM BAND NUMBER: NORMAL
BLOOD BANK SPECIMEN: NORMAL
C DIFF AG + TOXIN: NEGATIVE
CULTURE: NO GROWTH
CULTURE: NO GROWTH
EXPIRATION DATE: NORMAL
HCT VFR BLD CALC: 34.1 % (ref 36.3–47.1)
HEMOGLOBIN: 10.1 G/DL (ref 11.9–15.1)
LACTOFERRIN, QUAL: ABNORMAL
LIPASE: 13 U/L (ref 13–60)
Lab: NORMAL
Lab: NORMAL
MAGNESIUM: 2 MG/DL (ref 1.6–2.6)
PHOSPHORUS: 2.9 MG/DL (ref 2.6–4.5)
SARS-COV-2, RAPID: NOT DETECTED
SPECIMEN DESCRIPTION: NORMAL

## 2021-08-13 PROCEDURE — 6370000000 HC RX 637 (ALT 250 FOR IP): Performed by: INTERNAL MEDICINE

## 2021-08-13 PROCEDURE — 0UJH7ZZ INSPECTION OF VAGINA AND CUL-DE-SAC, VIA NATURAL OR ARTIFICIAL OPENING: ICD-10-PCS | Performed by: OBSTETRICS & GYNECOLOGY

## 2021-08-13 PROCEDURE — 6360000002 HC RX W HCPCS: Performed by: INTERNAL MEDICINE

## 2021-08-13 PROCEDURE — 6360000004 HC RX CONTRAST MEDICATION: Performed by: STUDENT IN AN ORGANIZED HEALTH CARE EDUCATION/TRAINING PROGRAM

## 2021-08-13 PROCEDURE — 2500000003 HC RX 250 WO HCPCS: Performed by: FAMILY MEDICINE

## 2021-08-13 PROCEDURE — 45330 DIAGNOSTIC SIGMOIDOSCOPY: CPT | Performed by: INTERNAL MEDICINE

## 2021-08-13 PROCEDURE — 83690 ASSAY OF LIPASE: CPT

## 2021-08-13 PROCEDURE — 2580000003 HC RX 258: Performed by: INTERNAL MEDICINE

## 2021-08-13 PROCEDURE — 7100000011 HC PHASE II RECOVERY - ADDTL 15 MIN: Performed by: INTERNAL MEDICINE

## 2021-08-13 PROCEDURE — 57410 PELVIC EXAMINATION: CPT | Performed by: OBSTETRICS & GYNECOLOGY

## 2021-08-13 PROCEDURE — C9113 INJ PANTOPRAZOLE SODIUM, VIA: HCPCS | Performed by: FAMILY MEDICINE

## 2021-08-13 PROCEDURE — 3609017100 HC EGD: Performed by: INTERNAL MEDICINE

## 2021-08-13 PROCEDURE — 86901 BLOOD TYPING SEROLOGIC RH(D): CPT

## 2021-08-13 PROCEDURE — 2500000003 HC RX 250 WO HCPCS: Performed by: INTERNAL MEDICINE

## 2021-08-13 PROCEDURE — 6360000002 HC RX W HCPCS: Performed by: NURSE ANESTHETIST, CERTIFIED REGISTERED

## 2021-08-13 PROCEDURE — 99222 1ST HOSP IP/OBS MODERATE 55: CPT | Performed by: INTERNAL MEDICINE

## 2021-08-13 PROCEDURE — 2580000003 HC RX 258: Performed by: FAMILY MEDICINE

## 2021-08-13 PROCEDURE — 7100000010 HC PHASE II RECOVERY - FIRST 15 MIN: Performed by: INTERNAL MEDICINE

## 2021-08-13 PROCEDURE — 93005 ELECTROCARDIOGRAM TRACING: CPT | Performed by: ANESTHESIOLOGY

## 2021-08-13 PROCEDURE — 3700000001 HC ADD 15 MINUTES (ANESTHESIA): Performed by: INTERNAL MEDICINE

## 2021-08-13 PROCEDURE — 6370000000 HC RX 637 (ALT 250 FOR IP): Performed by: FAMILY MEDICINE

## 2021-08-13 PROCEDURE — 84100 ASSAY OF PHOSPHORUS: CPT

## 2021-08-13 PROCEDURE — 74270 X-RAY XM COLON 1CNTRST STD: CPT

## 2021-08-13 PROCEDURE — 0DJ08ZZ INSPECTION OF UPPER INTESTINAL TRACT, VIA NATURAL OR ARTIFICIAL OPENING ENDOSCOPIC: ICD-10-PCS | Performed by: INTERNAL MEDICINE

## 2021-08-13 PROCEDURE — 6360000002 HC RX W HCPCS: Performed by: FAMILY MEDICINE

## 2021-08-13 PROCEDURE — 43235 EGD DIAGNOSTIC BRUSH WASH: CPT | Performed by: INTERNAL MEDICINE

## 2021-08-13 PROCEDURE — 83735 ASSAY OF MAGNESIUM: CPT

## 2021-08-13 PROCEDURE — 0DJD8ZZ INSPECTION OF LOWER INTESTINAL TRACT, VIA NATURAL OR ARTIFICIAL OPENING ENDOSCOPIC: ICD-10-PCS | Performed by: INTERNAL MEDICINE

## 2021-08-13 PROCEDURE — 86900 BLOOD TYPING SEROLOGIC ABO: CPT

## 2021-08-13 PROCEDURE — 2709999900 HC NON-CHARGEABLE SUPPLY: Performed by: INTERNAL MEDICINE

## 2021-08-13 PROCEDURE — 82150 ASSAY OF AMYLASE: CPT

## 2021-08-13 PROCEDURE — 2500000003 HC RX 250 WO HCPCS: Performed by: NURSE ANESTHETIST, CERTIFIED REGISTERED

## 2021-08-13 PROCEDURE — 85014 HEMATOCRIT: CPT

## 2021-08-13 PROCEDURE — 3609008400 HC SIGMOIDOSCOPY DIAGNOSTIC: Performed by: INTERNAL MEDICINE

## 2021-08-13 PROCEDURE — 85018 HEMOGLOBIN: CPT

## 2021-08-13 PROCEDURE — 36415 COLL VENOUS BLD VENIPUNCTURE: CPT

## 2021-08-13 PROCEDURE — 87635 SARS-COV-2 COVID-19 AMP PRB: CPT

## 2021-08-13 PROCEDURE — 3700000000 HC ANESTHESIA ATTENDED CARE: Performed by: INTERNAL MEDICINE

## 2021-08-13 PROCEDURE — 86850 RBC ANTIBODY SCREEN: CPT

## 2021-08-13 PROCEDURE — 1200000000 HC SEMI PRIVATE

## 2021-08-13 PROCEDURE — 2580000003 HC RX 258: Performed by: NURSE ANESTHETIST, CERTIFIED REGISTERED

## 2021-08-13 PROCEDURE — 99232 SBSQ HOSP IP/OBS MODERATE 35: CPT | Performed by: FAMILY MEDICINE

## 2021-08-13 RX ORDER — SODIUM CHLORIDE 9 MG/ML
INJECTION, SOLUTION INTRAVENOUS CONTINUOUS PRN
Status: DISCONTINUED | OUTPATIENT
Start: 2021-08-13 | End: 2021-08-13 | Stop reason: SDUPTHER

## 2021-08-13 RX ORDER — LEUCINE, PHENYLALANINE, LYSINE, METHIONINE, ISOLEUCINE, VALINE, HISTIDINE, THREONINE, TRYPTOPHAN, ALANINE, GLYCINE, ARGININE, PROLINE, SERINE, TYROSINE, DEXTROSE 311; 238; 247; 170; 255; 247; 204; 179; 77; 880; 438; 489; 289; 213; 17; 10 MG/100ML; MG/100ML; MG/100ML; MG/100ML; MG/100ML; MG/100ML; MG/100ML; MG/100ML; MG/100ML; MG/100ML; MG/100ML; MG/100ML; MG/100ML; MG/100ML; MG/100ML; G/100ML
1200 INJECTION INTRAVENOUS EVERY 24 HOURS
Status: DISCONTINUED | OUTPATIENT
Start: 2021-08-13 | End: 2021-08-13 | Stop reason: DRUGHIGH

## 2021-08-13 RX ORDER — LIDOCAINE HYDROCHLORIDE 10 MG/ML
INJECTION, SOLUTION EPIDURAL; INFILTRATION; INTRACAUDAL; PERINEURAL PRN
Status: DISCONTINUED | OUTPATIENT
Start: 2021-08-13 | End: 2021-08-13 | Stop reason: SDUPTHER

## 2021-08-13 RX ORDER — SODIUM CHLORIDE 9 MG/ML
INJECTION, SOLUTION INTRAVENOUS CONTINUOUS
Status: DISCONTINUED | OUTPATIENT
Start: 2021-08-13 | End: 2021-08-18 | Stop reason: HOSPADM

## 2021-08-13 RX ORDER — PROPOFOL 10 MG/ML
INJECTION, EMULSION INTRAVENOUS PRN
Status: DISCONTINUED | OUTPATIENT
Start: 2021-08-13 | End: 2021-08-13 | Stop reason: SDUPTHER

## 2021-08-13 RX ORDER — PROPOFOL 10 MG/ML
INJECTION, EMULSION INTRAVENOUS CONTINUOUS PRN
Status: DISCONTINUED | OUTPATIENT
Start: 2021-08-13 | End: 2021-08-13 | Stop reason: SDUPTHER

## 2021-08-13 RX ADMIN — DIATRIZOATE MEGLUMINE AND DIATRIZOATE SODIUM 240 ML: 660; 100 LIQUID ORAL; RECTAL at 11:18

## 2021-08-13 RX ADMIN — SODIUM CHLORIDE: 9 INJECTION, SOLUTION INTRAVENOUS at 14:28

## 2021-08-13 RX ADMIN — ASCORBIC ACID, VITAMIN A PALMITATE, CHOLECALCIFEROL, THIAMINE HYDROCHLORIDE, RIBOFLAVIN-5 PHOSPHATE SODIUM, PYRIDOXINE HYDROCHLORIDE, NIACINAMIDE, DEXPANTHENOL, ALPHA-TOCOPHEROL ACETATE, VITAMIN K1, FOLIC ACID, BIOTIN, CYANOCOBALAMIN: 200; 3300; 200; 6; 3.6; 6; 40; 15; 10; 150; 600; 60; 5 INJECTION, SOLUTION INTRAVENOUS at 18:37

## 2021-08-13 RX ADMIN — PROPOFOL 390 MG: 10 INJECTION, EMULSION INTRAVENOUS at 14:39

## 2021-08-13 RX ADMIN — OXYCODONE HYDROCHLORIDE 5 MG: 5 TABLET ORAL at 04:01

## 2021-08-13 RX ADMIN — BUSPIRONE HYDROCHLORIDE 15 MG: 15 TABLET ORAL at 17:26

## 2021-08-13 RX ADMIN — ENOXAPARIN SODIUM 40 MG: 40 INJECTION SUBCUTANEOUS at 08:46

## 2021-08-13 RX ADMIN — LIDOCAINE HYDROCHLORIDE 50 MG: 10 INJECTION, SOLUTION EPIDURAL; INFILTRATION; INTRACAUDAL; PERINEURAL at 14:39

## 2021-08-13 RX ADMIN — BUSPIRONE HYDROCHLORIDE 15 MG: 15 TABLET ORAL at 08:46

## 2021-08-13 RX ADMIN — PROPOFOL INJECTABLE EMULSION 75 MCG/KG/MIN: 10 INJECTION, EMULSION INTRAVENOUS at 15:04

## 2021-08-13 RX ADMIN — SODIUM CHLORIDE, PRESERVATIVE FREE 10 ML: 5 INJECTION INTRAVENOUS at 08:47

## 2021-08-13 RX ADMIN — I.V. FAT EMULSION 100 ML: 20 EMULSION INTRAVENOUS at 18:38

## 2021-08-13 RX ADMIN — OXYCODONE HYDROCHLORIDE 5 MG: 5 TABLET ORAL at 17:26

## 2021-08-13 RX ADMIN — PIPERACILLIN AND TAZOBACTAM 3375 MG: 3; .375 INJECTION, POWDER, LYOPHILIZED, FOR SOLUTION INTRAVENOUS at 08:52

## 2021-08-13 RX ADMIN — BUSPIRONE HYDROCHLORIDE 15 MG: 15 TABLET ORAL at 20:44

## 2021-08-13 RX ADMIN — MIRTAZAPINE 15 MG: 15 TABLET, FILM COATED ORAL at 20:44

## 2021-08-13 RX ADMIN — POTASSIUM CHLORIDE, DEXTROSE MONOHYDRATE AND SODIUM CHLORIDE: 150; 5; 450 INJECTION, SOLUTION INTRAVENOUS at 01:44

## 2021-08-13 RX ADMIN — OLANZAPINE 10 MG: 10 TABLET, FILM COATED ORAL at 20:44

## 2021-08-13 RX ADMIN — OXYCODONE HYDROCHLORIDE 5 MG: 5 TABLET ORAL at 08:46

## 2021-08-13 RX ADMIN — SODIUM CHLORIDE, PRESERVATIVE FREE 10 ML: 5 INJECTION INTRAVENOUS at 08:53

## 2021-08-13 RX ADMIN — PANTOPRAZOLE SODIUM 40 MG: 40 INJECTION, POWDER, FOR SOLUTION INTRAVENOUS at 08:46

## 2021-08-13 RX ADMIN — SODIUM CHLORIDE: 9 INJECTION, SOLUTION INTRAVENOUS at 14:25

## 2021-08-13 RX ADMIN — PIPERACILLIN AND TAZOBACTAM 3375 MG: 3; .375 INJECTION, POWDER, LYOPHILIZED, FOR SOLUTION INTRAVENOUS at 15:30

## 2021-08-13 RX ADMIN — FERROUS SULFATE TAB EC 325 MG (65 MG FE EQUIVALENT) 325 MG: 325 (65 FE) TABLET DELAYED RESPONSE at 08:46

## 2021-08-13 ASSESSMENT — PAIN DESCRIPTION - PAIN TYPE
TYPE: CHRONIC PAIN
TYPE: ACUTE PAIN

## 2021-08-13 ASSESSMENT — PAIN DESCRIPTION - LOCATION
LOCATION: HIP;LEG
LOCATION: LEG

## 2021-08-13 ASSESSMENT — PAIN DESCRIPTION - PROGRESSION
CLINICAL_PROGRESSION: GRADUALLY WORSENING
CLINICAL_PROGRESSION: NOT CHANGED
CLINICAL_PROGRESSION: GRADUALLY WORSENING

## 2021-08-13 ASSESSMENT — PAIN SCALES - GENERAL
PAINLEVEL_OUTOF10: 5
PAINLEVEL_OUTOF10: 0
PAINLEVEL_OUTOF10: 5
PAINLEVEL_OUTOF10: 0
PAINLEVEL_OUTOF10: 7
PAINLEVEL_OUTOF10: 8
PAINLEVEL_OUTOF10: 0
PAINLEVEL_OUTOF10: 0
PAINLEVEL_OUTOF10: 8
PAINLEVEL_OUTOF10: 7
PAINLEVEL_OUTOF10: 6
PAINLEVEL_OUTOF10: 3

## 2021-08-13 ASSESSMENT — PAIN DESCRIPTION - FREQUENCY
FREQUENCY: INTERMITTENT
FREQUENCY: CONTINUOUS

## 2021-08-13 ASSESSMENT — PAIN DESCRIPTION - ONSET
ONSET: ON-GOING
ONSET: SUDDEN

## 2021-08-13 ASSESSMENT — PAIN - FUNCTIONAL ASSESSMENT
PAIN_FUNCTIONAL_ASSESSMENT: PREVENTS OR INTERFERES WITH ALL ACTIVE AND SOME PASSIVE ACTIVITIES
PAIN_FUNCTIONAL_ASSESSMENT: PREVENTS OR INTERFERES SOME ACTIVE ACTIVITIES AND ADLS
PAIN_FUNCTIONAL_ASSESSMENT: 0-10

## 2021-08-13 ASSESSMENT — PAIN DESCRIPTION - DESCRIPTORS
DESCRIPTORS: CRAMPING
DESCRIPTORS: CRAMPING

## 2021-08-13 ASSESSMENT — PAIN DESCRIPTION - ORIENTATION
ORIENTATION: RIGHT;UPPER
ORIENTATION: RIGHT;UPPER;POSTERIOR

## 2021-08-13 NOTE — CARE COORDINATION
Transitional Planning    Janelle from [facility] called and said if patient does go home , they are able to accept.

## 2021-08-13 NOTE — PROGRESS NOTES
General Surgery:  Daily Progress Note                  PATIENT NAME: Naveed Fletcher     TODAY'S DATE: 8/13/2021, 7:05 AM  SUBJECTIVE:     Pt seen and examined at bedside this morning. Concerns for anastomotic lead and possible colovaginal fistula. Notes right lateral thigh pain and diarrhea. CT was obtained concerning for leak at anastomosis site from reversal of colostomy in June 2021. Hx of rectal cancer, resection, transverse loop colostomy, reversal, and chemotherapy. Notes she has had stool from her vagina for the past few weeks. Pt is a poor historian. OBJECTIVE:   VITALS:  /80   Pulse 90   Temp 98.5 °F (36.9 °C) (Oral)   Resp 18   Ht 5' (1.524 m)   Wt 110 lb (49.9 kg)   LMP 06/25/2017   SpO2 98%   BMI 21.48 kg/m²      INTAKE/OUTPUT:      Intake/Output Summary (Last 24 hours) at 8/13/2021 5557  Last data filed at 8/12/2021 1807  Gross per 24 hour   Intake 1153 ml   Output 600 ml   Net 553 ml       PHYSICAL EXAM:  General Appearance:  in no acute distress  HEENT:  Normocephalic, atraumatic, mucus membranes moist   Skin:  No cyanosis, rashes, or edema noted. Lungs:  No chest wall tenderness. Heart:  Heart regular rate and rhythm  Abdomen:  Soft, nontender, non distended, scar from prior ostomy. No guarding or rebound tenderness. Extremities: Extremities warm to touch, pink, with no edema.         Data:  CBC:   Lab Results   Component Value Date    WBC 12.6 08/12/2021    RBC 3.74 08/12/2021    HGB 9.6 08/12/2021    HCT 32.5 08/12/2021    MCV 86.9 08/12/2021    MCH 25.7 08/12/2021    MCHC 29.5 08/12/2021    RDW 18.5 08/12/2021     08/12/2021    MPV 10.0 08/12/2021     BMP:    Lab Results   Component Value Date     08/12/2021    K 4.1 08/12/2021     08/12/2021    CO2 18 08/12/2021    BUN 10 08/12/2021    LABALBU 1.7 08/12/2021    CREATININE 0.80 08/12/2021    CALCIUM 7.8 08/12/2021    GFRAA >60 08/12/2021    LABGLOM >60 08/12/2021    GLUCOSE 114 08/12/2021 Radiology Review:      XR Hip and Abdomen 8/13/2021  Impression   Unremarkable radiographs of the abdomen. Impression   No acute osseous or soft tissue abnormality. ASSESSMENT:  Active Hospital Problems    Diagnosis Date Noted    Acute metabolic encephalopathy [T69.34] 08/12/2021    Anastomotic leak of intestine [K91.89] 08/11/2021    Schizoaffective disorder ([facility] [address] [F25.9] 07/05/2020    Major depression with psychotic features ([facility] [address] [F32.3]     Bipolar I disorder, most recent episode mixed [facility]) [F31.60] 06/20/2020       39 y.o. female with concern for anastomotic leak and possible colovaginal fistula      Plan:  Diet: NPO  Plan for possible colonoscopy today       Electronically signed by Tonya Montano  on 8/13/2021 at 7:05 AM     General Surgery Resident Statement/Note:  I have discussed the case, including pertinent history and exam findings with the above medical student have personally seen the patient. Pt seen and examined at bedside. I performed both history and physical exam.     Note was edited and changes made by me. Assessment and plan reviewed and changes made by me. I agree with the assessment and plan as stated above. Patient seen examined at bedside this morning. Patient reports today that she has had stool from her vagina for the last 2 to 3 weeks. Per nursing she did have stool from her vagina yesterday during the day. Remain n.p.o. we will discuss possible scope later today. Concern for colovaginal fistula, will get gastrografin fistula. Electronically signed by Daina Krishnamurthy DO on 8/13/2021 at 7:12 AM  I Dr. Justus Sicard saw and examined the patient. I have edited the above and agree with the above. Aron Branham  Colorectal Surgery

## 2021-08-13 NOTE — PROGRESS NOTES
[facility]  Office: [address], DO, Minda Sal, DO, Elizabeth Cecilio, DO, Talon Diamond Blood, DO, Danyell Sotelo MD, Karla Perdomo MD, Kelly Musa MD, Kenji Garg MD, Sharona Benson MD, Trena Montes De Oca MD, Greta García MD, Tasha Dominguez, DO, Tiarra Joseph MD, Beatrice Dowd DO, Omi Hurtado MD,  Juan Carlos Carrasco DO, Kathya Long MD, Moni Kirk MD, Alyssa Katz MD, Sakina Serrano MD, Hermila Brody MD, Davon Guerrero MD, Stephen Hyatt MD, Tatum Pimentel [facility], [facility], CNP, Johanna Reyes, CNP, Chris Beth, CNS, Julio Gutierrez, CNP, Tito Swift, [facility], Melly Quintana, CNP, Karmen Whitaker, CNP, Janie Juarez, CNP, Nikita Fletcher PA-C, Margi Quintanilla, [facility], Moe Bray, CNP, Hema Godinez, CNP, Luisana Mccarty, CNP, Lilliam Jones, CNP, Jeromy Rabago, CNP, Mo Saxena, CNP, Vaughn Nissen, CNP, Gaby Majano, [address] [place] [address]    Progress Note    8/13/2021    12:52 PM    Name:   Gustavo Grimes  MRN:     [id number]     Acct:      [de-identified]   Room:   [address] [place] [address] Day:  1  Admit Date:  8/12/2021  8:35 AM    PCP:   GRAY Mohr CNP  Code Status:  Full Code    Subjective:     C/C: fistula complication. Interval History Status: not changed. Patient was seen and examined this morning  No acute events overnight   No new complaints          Review of Systems:     12 point ROS performed and negative for anything other than what was stated in subjective     Medications:      Allergies:  No Known Allergies    Current Meds:   Scheduled Meds:    sodium chloride flush  5-40 mL Intravenous 2 times per day    enoxaparin  40 mg Subcutaneous Daily    busPIRone  15 mg Oral TID    ferrous sulfate  325 mg Oral BID WC    mirtazapine  15 mg Oral Nightly    OLANZapine  10 mg Oral Nightly    piperacillin-tazobactam  3,375 mg Intravenous Q8H    pantoprazole  40 mg Intravenous Daily    And    sodium chloride (PF)  10 mL Intravenous Daily     Continuous Infusions:    dextrose 5% and 0.45% NaCl with KCl 20 mEq 125 mL/hr at 21 0144    sodium chloride       PRN Meds: diatrizoate meglumine-sodium, sodium chloride flush, sodium chloride, potassium chloride **OR** potassium alternative oral replacement **OR** potassium chloride, magnesium sulfate, ondansetron **OR** ondansetron, acetaminophen **OR** acetaminophen, polyethylene glycol, hydrOXYzine, oxyCODONE    Data:     Past Medical History:   has a past medical history of Cancer ([facility] [address] Pelvis, female abscess, and UTI (urinary tract infection). Social History:   reports that she has never smoked. She has never used smokeless tobacco.     Family History: No family history on file. Vitals:  BP 99/66   Pulse 84   Temp 98.6 °F (37 °C)   Resp 16   Ht 5' (1.524 m)   Wt 110 lb (49.9 kg)   LMP 2017   SpO2 96%   BMI 21.48 kg/m²   Temp (24hrs), Av.4 °F (36.9 °C), Min:98.2 °F (36.8 °C), Max:98.6 °F (37 °C)    No results for input(s): POCGLU in the last 72 hours. I/O (24Hr):     Intake/Output Summary (Last 24 hours) at 2021 1252  Last data filed at 2021 1008  Gross per 24 hour   Intake 1153 ml   Output 1575 ml   Net -422 ml       Labs:  Hematology:  Recent Labs     21  1405   WBC 12.6*   RBC 3.74*   HGB 9.6*   HCT 32.5*   MCV 86.9   MCH 25.7   MCHC 29.5   RDW 18.5*      MPV 10.0     Chemistry:  Recent Labs     21  1405 21  0709     --    K 4.1  --    *  --    CO2 18*  --    GLUCOSE 114*  --    BUN 10  --    CREATININE 0.80  --    MG  --  2.0   ANIONGAP 7*  --    LABGLOM >60  --    GFRAA >60  --    CALCIUM 7.8*  --    PHOS  --  2.9     Recent Labs     21  1405 21  0709   PROT 4.6*  --    LABALBU 1.7*  --    AST 14  --    ALT 12  --    ALKPHOS 103  --    BILITOT 0.35  --    AMYLASE  --  11*   LIPASE  --  13     ABG:No results found for: POCPH, PHART, PH, POCPCO2, QUS0CVU, PCO2, POCPO2, PO2ART, PO2, POCHCO3, VBQ7RGD, HCO3, NBEA, PBEA, BEART, BE, THGBART, THB, QLU2GBI, WSOW2RJC, D7WKAECI, O2SAT, FIO2  Lab Results   Component Value Date/Time    SPECIAL NOT REPORTED 08/12/2021 03:19 PM     Lab Results   Component Value Date/Time    CULTURE ESCHERICHIA COLI HEAVY GROWTH (A) 06/29/2020 01:01 PM    CULTURE (A) 06/29/2020 01:01 PM     ESCHERICHIA COLI (SECOND MORPHOLOGY) MODERATE GROWTH    CULTURE PRESUMPTIVE CANDIDA ALBICANS LIGHT GROWTH (A) 06/29/2020 01:01 PM       Radiology:  XR HIP 2-3 VW W PELVIS RIGHT    Result Date: 8/12/2021  No acute osseous or soft tissue abnormality. XR ABDOMEN (2 VIEWS)    Result Date: 8/12/2021  Unremarkable radiographs of the abdomen. Physical Examination:        General appearance:  alert, cooperative and no distress  Mental Status:  oriented to person, place and time  Lungs:  clear to auscultation bilaterally, normal effort  Heart:  regular rate and rhythm, no murmur  Abdomen:  soft, nontender, nondistended, normal bowel sounds  Extremities:  no edema, redness, tenderness in the calves  Skin:  no gross lesions, rashes, induration    Assessment:        Hospital Problems         Last Modified POA    * (Principal) Anastomotic leak of intestine 8/11/2021 Yes    Bipolar I disorder, most recent episode mixed ([address] (Chronic) 8/11/2021 Yes    Major depression with psychotic features ([address] 8/11/2021 Yes    Schizoaffective disorder ([address] 8/11/2021 Yes    Acute metabolic encephalopathy 3/43/1784 Yes          Plan:        - Colorectal surgery on board   - continues to have stool noted coming from vagina   - concern for colovaginal fistula, will plan for colonoscopy later today per colorectal surgery   - NPO   - urology on board given hydronephrosis.    - continue IV zosyn   - per urology will need to get her stents exchanged at scheduled intervals   - v/s per unit protocol   - continue IVF       Harper Sylvester MD  8/13/2021  12:52 PM

## 2021-08-13 NOTE — OP NOTE
Operative Note      Patient: Aníbal Rossi  YOB: 1975  MRN: [id number]    Date of Procedure: 8/13/2021    Pre-Op Diagnosis: MELENA    Post-Op Diagnosis: Same   · Normal esophagus stomach and duodenum. · Rectal anastomosis dehydration's with a walled off abscess cavity in the posterior rectal fossa. · Rectal vagina fistula. · Normal sigmoid colon. Procedure(s):  EGD DIAGNOSTIC ONLY  SIGMOIDOSCOPY DIAGNOSTIC FLEXIBLE    Surgeon(s):  Marine Brannon MD    Assistant:   First Assistant: Eusebio Weinstein RN    Anesthesia: Monitor Anesthesia Care    Estimated Blood Loss (mL): None    Complications: None    Specimens:   * No specimens in log *    Implants:  * No implants in log *      Drains:   Closed/Suction Drain Right Buttock  8 [place] (Active)       Colostomy RUQ Loop (Active)       Urethral Catheter (Active)   Catheter Indications Urinary retention (acute or chronic), continuous bladder irrigation or bladder outlet obstruction 08/13/21 0802   Site Assessment Red; Swelling 08/13/21 0802   Urine Color Monica 08/13/21 1008   Urine Appearance Clear 08/13/21 1008   Output (mL) 1200 mL 08/13/21 1008             Rectal anastomotic dehiscence with walled off abscess    Findings:   EGD  1. Normal esophagus  2. Normal stomach  3. Normal duodenum    Flexible sigmoidoscopy  1. A 2 cm opening was found at the rectal 3 to 4 cm from the anal verge likely rectal anastomotic dehiscence. This opened into an abscess cavity in the posterior rectal fossa which appeared to be walled off. Intraprocedure consultation with colorectal surgery was obtained and the case was discussed. At this time endoscopic closure was deferred to allow continued drainage of the abscess cavity while surgical management is planned in the near future. 2. A fistulous tract was found about 5 cm from the anorectal verge that communicated with the posterior aspect of the vagina.   This was confirmed by bimanual and speculum exam that was performed by GYN attending during the procedure. 3. The sigmoidoscope was advanced into the sigmoid. The mucosa in the sigmoid appeared normal.    Recommendations  1. Continue IV antibiotics. 2. Keep patient n.p.o.  3. Start parenteral nutrition. 4. Further management per colorectal surgery. 5. GI will sign off. Please call for any questions or concerns. Detailed Description of Procedure:   Informed consent was obtained from the patient after explanation of the procedure including indications, description of the procedure,  benefits and possible risks and complications of the procedure, and alternatives. Questions were answered. The patient's history was reviewed and a directed physical examination was performed prior to the procedure. Patient was monitored throughout the procedure with pulse oximetry and periodic assessment of vital signs. Patient was sedated as noted above. With the patient in the left lateral decubitus position, the Olympus videoendoscope was placed in the patient's mouth and under direct visualization passed into the esophagus. Visualization of the esophagus, stomach, and duodenum was performed during both introduction and withdrawal of the endoscope and retroflexed view of the proximal stomach was obtained. The scope was passed to the 3rd portion of the duodenum. With the patient in the left lateral decubitus position, a digital rectal examination was performed and revealed induration in the rectal vault. The Olympus video endoscope was placed in the patient's rectum and advanced without difficulty  to the sigmoid colon. The prep was poor. Examination of the mucosa was performed during both introduction and withdrawal of the colonoscope. Retroflexed view of the rectum was performed. The patient tolerated the procedure well and was taken to the recovery area in good condition. The patient  was taken to the recovery area in good condition.     Electronically signed by Anam Agustin MD on 8/13/2021 at 3:43 PM

## 2021-08-13 NOTE — PROGRESS NOTES
Dr. Camille Kapoor was phoned in labor and delivery and return call. Dr. Camille Kapoor and Dr. Childs Grooms patient. Dr. Camille Kapoor here and preforms a pelvic exam and consults with Dr. Óscar Roa.     Dr. Camille Kapoor leave room after pelvic exam.

## 2021-08-13 NOTE — ANESTHESIA PRE PROCEDURE
Department of Anesthesiology  Preprocedure Note       Name:  Griselda Officer   Age:  39 y.o.  :  1975                                          MRN:  [id number]         Date:  2021      Surgeon: Lauro Bueno):  Marice Leyden, MD    Procedure: Procedure(s):  TRANSVERSE COLOSTOMY LOOP    Medications prior to admission:   Prior to Admission medications    Medication Sig Start Date End Date Taking? Authorizing Provider   busPIRone (BUSPAR) 15 MG tablet Take 15 mg by mouth 3 times daily 20   Jazmin Tran MD   OLANZapine (ZYPREXA) 10 MG tablet Take 1 tablet by mouth nightly 20   Jazmin Tran MD   ferrous sulfate (IRON 325) 325 (65 Fe) MG tablet Take 1 tablet by mouth 2 times daily (with meals) 20   Jazmin Tran MD   hydrOXYzine (VISTARIL) 50 MG capsule Take 50 mg by mouth 3 times daily as needed    Historical Provider, MD   mirtazapine (REMERON) 15 MG tablet Take 15 mg by mouth nightly    Historical Provider, MD       Current medications:    No current facility-administered medications for this visit. No current outpatient medications on file.      Facility-Administered Medications Ordered in Other Visits   Medication Dose Route Frequency Provider Last Rate Last Admin    diatrizoate meglumine-sodium (GASTROGRAFIN) 66-10 % solution 240 mL  240 mL Oral ONCE PRN Josh Flood DO   240 mL at 21 1118    dextrose 5 % and 0.45 % NaCl with KCl 20 mEq infusion   Intravenous Continuous Fadumo Li  mL/hr at 21 0144 New Bag at 21 0144    sodium chloride flush 0.9 % injection 5-40 mL  5-40 mL Intravenous 2 times per day Fadumo Li MD   10 mL at 21 0847    sodium chloride flush 0.9 % injection 10 mL  10 mL Intravenous PRN Fadumo Li MD        0.9 % sodium chloride infusion  25 mL Intravenous PRN Fadumo Li MD        potassium chloride (KLOR-CON M) extended release tablet 40 mEq  40 mEq Oral PRN Fadumo Li MD        Or    potassium bicarb-citric acid (EFFER-K) effervescent tablet 40 mEq  40 mEq Oral PRN Adan Disla MD        Or   [facility] potassium chloride 10 mEq/100 mL IVPB (Peripheral Line)  10 mEq Intravenous PRN Adan Disla MD        magnesium sulfate 1000 mg in dextrose 5% 100 mL IVPB  1,000 mg Intravenous PRN Adan Disla MD        enoxaparin (LOVENOX) injection 40 mg  40 mg Subcutaneous Daily Adan Disla MD   40 mg at 08/13/21 0846    ondansetron (ZOFRAN-ODT) disintegrating tablet 4 mg  4 mg Oral Q8H PRN Adan Disla MD        Or    ondansetron [facility]) injection 4 mg  4 mg Intravenous Q6H PRN Adan Disla MD        acetaminophen (TYLENOL) tablet 650 mg  650 mg Oral Q6H PRN Adan Disla MD   650 mg at 08/12/21 1214    Or    acetaminophen (TYLENOL) suppository 650 mg  650 mg Rectal Q6H PRN Adan Disla MD        polyethylene glycol (GLYCOLAX) packet 17 g  17 g Oral Daily PRN Adan Disla MD        busPIRone (BUSPAR) tablet 15 mg  15 mg Oral TID Adan Disla MD   15 mg at 08/13/21 0846    ferrous sulfate (FE TABS 325) EC tablet 325 mg  325 mg Oral BID WC Adan Disla MD   325 mg at 08/13/21 0846    hydrOXYzine (ATARAX) tablet 50 mg  50 mg Oral TID PRN Adan Disla MD   50 mg at 08/12/21 1521    mirtazapine (REMERON) tablet 15 mg  15 mg Oral Nightly Adan Disla MD   15 mg at 08/12/21 2031    OLANZapine (ZYPREXA) tablet 10 mg  10 mg Oral Nightly Adan Disla MD   10 mg at 08/12/21 2031    piperacillin-tazobactam (ZOSYN) 3,375 mg in dextrose 5 % 50 mL IVPB extended infusion (mini-bag)  3,375 mg Intravenous Q8H Adan Disla MD   Stopped at 08/13/21 1252    pantoprazole (PROTONIX) injection 40 mg  40 mg Intravenous Daily Adan Disla MD   40 mg at 08/13/21 0846    And    sodium chloride (PF) 0.9 % injection 10 mL  10 mL Intravenous Daily Adan Disla MD   10 mL at 08/13/21 0853    oxyCODONE (ROXICODONE) immediate release tablet 5 mg  5 mg Oral Q4H PRN Adan Disla MD   5 mg at 08/13/21 0846       Allergies: No Known Allergies    Problem List:    Patient Active Problem List   Diagnosis Code    Psychosis ([facility] F29    H/O malignant neoplasm of colon Z85.038    Acute cystitis without hematuria N30.00    Pelvic abscess in female N73.9    Calculus of gallbladder without cholecystitis without obstruction K80.20    Leukocytosis D72.829    Elevated C-reactive protein (CRP) R79.82    Severe malnutrition (HCC) E43    Bipolar I disorder, most recent episode mixed ([facility] F31.60    Major depression with psychotic features ([facility] F32.3    Tachycardia R00.0    Diarrhea R19.7    Anemia D64.9    History of rectal cancer Z85.048    Rectal fistula K60.4    Schizoaffective disorder ([facility] F25.9    Intellectual disability F79    Anorexia nervosa F50.00    Anastomotic leak of intestine K91.89    Acute metabolic encephalopathy O97.23       Past Medical History:        Diagnosis Date    Cancer [facility])     Rectal    Pelvis, female abscess     pre sacral drain put in 6/19/2020    UTI (urinary tract infection)     stent place june 2020       Past Surgical History:        Procedure Laterality Date    ABSCESS DRAINAGE      pre sacral pelvic abscess with drain placement to right buttock    SMALL INTESTINE SURGERY N/A 7/3/2020    TRANSVERSE COLOSTOMY LOOP performed by Emma Thornton MD at [address] [place] [address] PLACEMENT  2020       Social History:    Social History     Tobacco Use    Smoking status: Never Smoker    Smokeless tobacco: Never Used    Tobacco comment: pt is a non smoker   Substance Use Topics    Alcohol use: Not on file                                Counseling given: Not Answered  Comment: pt is a non smoker      Vital Signs (Current): There were no vitals filed for this visit.                                            BP Readings from Last 3 Encounters:   08/13/21 99/66   07/05/20 (!) 145/83   07/03/20 108/70       NPO Status: BMI:   Wt Readings from Last 3 Encounters:   08/12/21 110 lb (49.9 kg)   07/05/20 82 lb 10.8 oz (37.5 kg)   06/20/20 100 lb 5 oz (45.5 kg)     There is no height or weight on file to calculate BMI.    CBC:   Lab Results   Component Value Date    WBC 12.6 08/12/2021    RBC 3.74 08/12/2021    HGB 9.6 08/12/2021    HCT 32.5 08/12/2021    MCV 86.9 08/12/2021    RDW 18.5 08/12/2021     08/12/2021       CMP:   Lab Results   Component Value Date     08/12/2021    K 4.1 08/12/2021     08/12/2021    CO2 18 08/12/2021    BUN 10 08/12/2021    CREATININE 0.80 08/12/2021    GFRAA >60 08/12/2021    LABGLOM >60 08/12/2021    GLUCOSE 114 08/12/2021    PROT 4.6 08/12/2021    CALCIUM 7.8 08/12/2021    BILITOT 0.35 08/12/2021    ALKPHOS 103 08/12/2021    AST 14 08/12/2021    ALT 12 08/12/2021       POC Tests: No results for input(s): POCGLU, POCNA, POCK, POCCL, POCBUN, POCHEMO, POCHCT in the last 72 hours. Coags:   Lab Results   Component Value Date    PROTIME 14.5 06/18/2020    INR 1.1 06/18/2020    APTT 27.2 06/18/2020       HCG (If Applicable):   Lab Results   Component Value Date    PREGTESTUR NEGATIVE 06/16/2020        ABGs: No results found for: PHART, PO2ART, MPM1NAZ, LTM4ONF, BEART, N7NONEGO     Type & Screen (If Applicable):  No results found for: LABABO, LABRH    Drug/Infectious Status (If Applicable):  No results found for: HIV, HEPCAB    COVID-19 Screening (If Applicable):   Lab Results   Component Value Date    COVID19 Not Detected 08/13/2021    COVID19 Not Detected 06/28/2020         Anesthesia Evaluation  Patient summary reviewed and Nursing notes reviewed   history of anesthetic complications: PONV.   Airway: Mallampati: II  TM distance: >3 FB   Neck ROM: full  Mouth opening: > = 3 FB Dental: normal exam         Pulmonary:Negative Pulmonary ROS and normal exam  breath sounds clear to auscultation                             Cardiovascular:    (+) dysrhythmias (sinus tach):,       ECG reviewed  Rhythm: regular  Rate: normal                    Neuro/Psych:   (+) psychiatric history:depression/anxiety              ROS comment: Bipolar I disorder  Recently admitted to [facility] due to suicidal ideation and disorientation  Acute metabolic encephalopathy GI/Hepatic/Renal:   (+) renal disease (states \"inflammed kidney\" s/p stent):,          ROS comment: H/O malignant neoplasm of colon  Rectal fistula and Pelvic abscess s/p drainage by IR. Endo/Other:    (+) blood dyscrasia (Hgb -8.3): anemia:., electrolyte abnormalities (Hypocalcemia), malignancy/cancer (H/O malignant neoplasm of colon). ROS comment: Severe malnutrition     She states she tries to walk but very unsteady Abdominal:             Vascular: negative vascular ROS. Other Findings:               Anesthesia Plan      general     ASA 3       Induction: intravenous. MIPS: Postoperative opioids intended and Prophylactic antiemetics administered. Anesthetic plan and risks discussed with patient. Plan discussed with CRNA. CONCLUSIONS     Summary  Small LV cavity. Normal left ventricular ejection fraction 55%. Normal wall thickness. Normal wall motions. Normal right ventricular size and function. Left atrium is normal in size. Right atrium is normal in size. Normal AV. Thickened anterior mitral valve leaflet. Mild mitral regurgitation. IVC normal diameter & inspiratory collapse indicating normal RA filling  pressure. Normal aortic root dimension.     7/2020  Normal sinus rhythm  Nonspecific T wave abnormality  Abnormal ECG  When compared with ECG of 30-JUN-2020 12:16,  Nonspecific T wave abnormality, worse in Anterior leads    8/13/2021  Normal sinus rhythm  Normal ECG  No previous ECGs available      Jose Weber MD   8/13/2021

## 2021-08-13 NOTE — CONSULTS
[facility]    GASTROENTEROLOGY CONSULT    Patient:   Ballard Phoenix   :    1975   Facility:   [facility]   Date:    2021  Admission Dx: Anastomotic leak of intestine [K91.89]  Requesting physician: Rachid Chase MD  Reason for consult:  Severe anemia*    CC : \"R lateral thigh pain and diarrhea\"    SUBJECTIVE     HISTORY OF PRESENT ILLNESS  This is a 39 y.o.   female who was admitted 2021 with Anastomotic leak of intestine [K91.89]. We have been asked to see the patient in consultation by Rachid Chase MD for  Severe anemia. Patient is a pleasant 78-year-old female with past medical history significant for rectal cancer dx 2014 with colon resection (R hemicolectomy) , chemo and radiation, Transverse loop colostomy 2020 - reversal of transverse loop colostomy , bipolar disorder, right hydronephrosis due to abscess status post right stent. Patient is transferred from [facility] and [address] [place] [address] to [facility] for R lateral thigh pain and diarrhea. As per RN she had black tarry stool since her admission and had been having constant diarrhea. She also has stool through vaginal area as per RN. Patient also complains of pain along the R lateral thigh. Patient states she is on iron tablets from past 1 week. Patient is a poor historian. Denies any nausea vomiting abdominal pain tenderness. Before transferring her hemoglobin was 5.4, she was given 2 units PRBC, currently hemoglobin is 9.6. Patient states she drinks occasionally, denies smoking or any illicit drugs. Summary of imaging completed at this time:    X RAY ABDOMEN - unremarkable   X RAY HIP - no acute osseous or soft tissue abnormality  CT abdomen at [facility] - 21 - slight increased size of presacral fluid and air collection of posterior pelvis now containing increased debris possibly stool.  A leak at anastomosis cannot be excluded. Persistent moderate R sided hydronephrosis despite presence of stent. 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 There was a rectal lesion. There was a polyp near ileocecal valve. There was a transverse loop colostomy at 40 cm. There were possible rectal fistulas right at the anastomosis. Probably there were pockets as a result of anastomosis. Looked like diverticula recess. BIOPSY - tubular adenoma at colon 4cm, tubular adenoma at ileocecal valve    Colonoscopy with rectal EUS - 08/31/20 - Examination of the colon up to the transverse colon was unremarkable aside from the previously performed diverting loop colostomy. An opening of what suspected to be a fistula tract was seen in the rectum approximately 2-3 cm   proximal to the anal verge. The trach was examined endosonographically and was seen ending within then perirectal space.      OBJECTIVE:     PAST MEDICAL/SURGICAL HISTORY  Past Medical History:   Diagnosis Date    Cancer [facility])     Rectal    Pelvis, female abscess     pre sacral drain put in 6/19/2020    UTI (urinary tract infection)     stent place june 2020     Past Surgical History:   Procedure Laterality Date    ABSCESS DRAINAGE      pre sacral pelvic abscess with drain placement to right buttock    SMALL INTESTINE SURGERY N/A 7/3/2020    TRANSVERSE COLOSTOMY LOOP performed by Harinder Gatica MD at [address]       ALLERGIES:  No Known Allergies    HOME MEDICATIONS:  Prior to Admission medications    Medication Sig Start Date End Date Taking? Authorizing Provider   busPIRone (BUSPAR) 15 MG tablet Take 15 mg by mouth 3 times daily 7/9/20  Yes Jose Reyes MD   OLANZapine (ZYPREXA) 10 MG tablet Take 1 tablet by mouth nightly 7/9/20  Yes Jose Reyes MD   ferrous sulfate (IRON 325) 325 (65 Fe) MG tablet Take 1 tablet by mouth 2 times daily (with meals) 7/9/20  Yes Jose Reyes MD   hydrOXYzine (VISTARIL) 50 MG capsule Take 50 mg by mouth 3 times daily as needed   Yes Historical Provider, MD   mirtazapine (REMERON) 15 MG tablet Take 15 mg by mouth nightly   Yes Historical Provider, MD       CURRENT MEDICATIONS:  Scheduled Meds:   sodium chloride flush  5-40 mL Intravenous 2 times per day    enoxaparin  40 mg Subcutaneous Daily    busPIRone  15 mg Oral TID    ferrous sulfate  325 mg Oral BID WC    mirtazapine  15 mg Oral Nightly    OLANZapine  10 mg Oral Nightly    piperacillin-tazobactam  3,375 mg Intravenous Q8H    pantoprazole  40 mg Intravenous Daily    And    sodium chloride (PF)  10 mL Intravenous Daily     Continuous Infusions:   dextrose 5% and 0.45% NaCl with KCl 20 mEq 125 mL/hr at 08/13/21 0144    sodium chloride       PRN Meds:sodium chloride flush, sodium chloride, potassium chloride **OR** potassium alternative oral replacement **OR** potassium chloride, magnesium sulfate, ondansetron **OR** ondansetron, acetaminophen **OR** acetaminophen, polyethylene glycol, hydrOXYzine, oxyCODONE    SOCIAL HISTORY:     Tobacco:   reports that she has never smoked. She has never used smokeless tobacco.  Alcohol:   has no history on file for alcohol use. Illicit drugs:  has no history on file for drug use. FAMILY HISTORY:     No family history on file.     REVIEW OF SYSTEMS:    Constitutional: No fever, no chills, no lethargy, no weakness. HEENT:  No headache, otalgia, itchy eyes, nasal discharge or sore throat. Cardiac:  No chest pain, dyspnea, orthopnea or PND. Chest:   No cough, phlegm or wheezing. Abdomen:  No abdominal pain, nausea or vomiting. Neuro:  No focal weakness, abnormal movements or seizure like activity. Skin:   No rashes, no itching. :   No hematuria, no pyuria, no dysuria, no flank pain. Extremities:  No swelling or joint pains. ROS was otherwise negative except as mentioned in the [address] PHYSICAL EXAM:    BP 99/66   Pulse 84   Temp 98.6 °F (37 °C)   Resp 16   Ht 5' (1.524 m)   Wt 110 lb (49.9 kg)   LMP 06/25/2017   SpO2 96%   BMI 21.48 kg/m²     GENERAL:   Well developed, Well nourished, No apparent distress  HEAD:   Normocephalic, Atraumatic  EENT:   EOMI, Sclera not icteric, Oropharynx moist   NECK:   Supple, Trachea midline  LUNGS:  CTA Bilaterally  HEART:  RRR, No murmur  ABDOMEN:   Soft, nontender, Nondistended, BS WNL  EXT:   No clubbing. No cyanosis. No edema. SKIN:   No rashes. No jaundice. No stigmata of liver disease. MUSC/SKEL:   Adequate muscle bulk for patient's age, No significant synovitis, No deformities  NEURO:  A&O x Three, CN II- XII grossly intact      LABS AND IMAGING:     CBC  Recent Labs     08/12/21  1405   WBC 12.6*   HGB 9.6*   HCT 32.5*   MCV 86.9   MCH 25.7   MCHC 29.5          IMMATURE PLTs  No results found for: PLTFLUORE    BMP  Recent Labs     08/12/21  1405      K 4.1   *   CO2 18*   BUN 10   CREATININE 0.80   GLUCOSE 114*   CALCIUM 7.8*       LFTS  Recent Labs     08/12/21  1405   ALKPHOS 103   ALT 12   AST 14   PROT 4.6*   BILITOT 0.35   LABALBU 1.7*       AMYLASE/LIPASE/AMMONIA  Recent Labs     08/13/21  0709   AMYLASE 11*   LIPASE 13       PT/INR  No results for input(s): PROTIME, INR in the last 72 hours.     ANEMIA STUDIES  No results for input(s): IRON, LABIRON, TIBC, UIBC, FERRITIN, RVKPYAQZ10, FOLATE, OCCULTBLD in the last 72 hours.    IMAGING  XR HIP 2-3 VW W PELVIS RIGHT    Result Date: 8/12/2021  EXAMINATION: ONE XRAY VIEW OF THE PELVIS AND TWO XRAY VIEWS RIGHT HIP 8/12/2021 3:43 pm COMPARISON: None. HISTORY: ORDERING SYSTEM PROVIDED HISTORY: R hip pain TECHNOLOGIST PROVIDED HISTORY: R hip pain Acuity: Unknown Type of Exam: Unknown FINDINGS: The bony pelvis is intact. There is no acute osseous abnormality. The SI joints are maintained with mild degenerative change. The hip joints are maintained. There is a partially visualized right ureteral stent. The surrounding soft tissues are otherwise unremarkable. No acute osseous or soft tissue abnormality. XR ABDOMEN (2 VIEWS)    Result Date: 8/12/2021  EXAMINATION: TWO XRAY VIEWS OF THE ABDOMEN 8/12/2021 10:06 am COMPARISON: CT abdomen and pelvis performed 08/11/2021. HISTORY: ORDERING SYSTEM PROVIDED HISTORY: concern anastomotic leak TECHNOLOGIST PROVIDED HISTORY: concern anastomotic leak Acuity: Unknown FINDINGS: There is a nonobstructive bowel gas pattern. There is no intraperitoneal free air. There are no suspicious calcifications. There is a right ureteral stent. There is no acute osseous abnormality. The surrounding soft tissues are unremarkable. Unremarkable radiographs of the abdomen. IMPRESSION:     1. Diarrhea with dark stools  2. Severe anemia with Hb 5.4, s/p 2 PRBC, Hb today 9.6  3. Leak at site of anastomosis? On CT scan  4. Rectal cancer dx 12/2014 s/p R hemicolectomy 2015,  Transverse loop colostomy 07/03/2020 - reversal of transverse loop colostomy 05/21.  5. 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 Acute encephalopathy        Old records, labs and imaging reviewed. PLAN   1. Scheduled for EGD and flexible sigmoidoscopy today. 2. Monitor H & H, replace as clinically indicated. 3. Further recommendations will follow. This plan was formulated in collaboration with Dr. Dayton Celaya .   Thank you for allowing us to participate in the care of your patient. Electronically signed by: Sulma Montano MD IM resident PGY - 1 on 8/13/2021 at 9:27 AM     Please note that this note was generated using a voice recognition dictation software. Although every effort was made to ensure the accuracy of this automated transcription, some errors in transcription may have occurred.

## 2021-08-13 NOTE — ANESTHESIA POSTPROCEDURE EVALUATION
Department of Anesthesiology  Postprocedure Note    Patient: Jolene Boothe  MRN: [id number]  YOB: 1975  Date of evaluation: 8/13/2021  Time:  3:34 PM     Procedure Summary     Date: 08/13/21 Room / Location: [address] [place] [address]    Anesthesia Start: 0416 Anesthesia Stop: 5092    Procedures:       EGD DIAGNOSTIC ONLY (N/A )      SIGMOIDOSCOPY DIAGNOSTIC FLEXIBLE Diagnosis: (MELENA)    Surgeons: Waldron Merlin, MD Responsible Provider: Linda Judge MD    Anesthesia Type: general ASA Status: 3          Anesthesia Type: general    Kinjal Phase I: Kinjal Score: 10    Kinjal Phase II:      Last vitals: Reviewed and per EMR flowsheets.        Anesthesia Post Evaluation    Patient location during evaluation: PACU  Patient participation: complete - patient participated  Level of consciousness: awake  Pain score: 1  Airway patency: patent  Nausea & Vomiting: no nausea and no vomiting  Complications: no  Cardiovascular status: blood pressure returned to baseline and hemodynamically stable  Respiratory status: acceptable  Hydration status: euvolemic

## 2021-08-13 NOTE — OP NOTE
Operative Note  Department of Obstetrics and Gynecology  [facility]     Patient: Saqib Nunez   : 1975  MRN: [id number]       Acct: [de-identified]   PCP: GRAY Dos Santos CNP  Date of Procedure: 21    Pre-operative Diagnosis: 39 y.o. female  with history of rectal cancer, concern for rectovaginal or rectouterine fistula    Post-operative Diagnosis: right rectovaginal fistula visualized on pelvic exam with leak of endoscopic fluid into the vagina. Procedure: Exam under anesthesia, speculum exam     Indications: suspected rectovaginal, rectouterine fistula     Surgeon:  Katie Lopez MD      Procedure Details   Called for intraoperative consult by Dr. Will Truong while patient undergoing colonoscopy due to suspected rectovaginal fistula, concern for uterine fistula/pelvic abscess. Consent was not obtained due to the patient being under anesthesia at the time of the intraop consult. The patient was already positioned on her left side. The right leg was elevated gently. A Hook speculum was utilized to complete a speculum exam. A 0.5 cm defect was visualized at the apex of the vagina along the posterior wall approximately 1 cm from the cervix on the right side. This opening was gently probed with a sterile swab while attempt was made for visualization and localization with the endoscope. The swab was not able to be visualized endoscopically and thus was removed. The endoscopic fluid was seen entering the vagina from the fistula when flow directed at the start of the defect from the rectum. Bimanual exam was completed with radiation changes and atrophic changes palpated. Cervix present and defect approximately 0.5 cm defect palpated on the right approximately 1 cm from the cervix at the apex. Findings:  0.5 cm rectovaginal fistula visualized. Atropic vaginal tissue. Grossly normal cervix.    Estimated Blood Loss: less than 5mL      Katie Lopez MD  Ob/Gyn Attending  8/13/2021, 3:29 PM

## 2021-08-13 NOTE — PROGRESS NOTES
Tremors noted to mouth and jaw; anesthesia notified. Vitals stable.   Answers yes and now questions appropriately

## 2021-08-14 ENCOUNTER — ANESTHESIA (OUTPATIENT)
Dept: OPERATING ROOM | Age: 46
DRG: 231 | End: 2021-08-14
Payer: MEDICARE

## 2021-08-14 ENCOUNTER — ANESTHESIA EVENT (OUTPATIENT)
Dept: OPERATING ROOM | Age: 46
DRG: 231 | End: 2021-08-14
Payer: MEDICARE

## 2021-08-14 VITALS
DIASTOLIC BLOOD PRESSURE: 90 MMHG | OXYGEN SATURATION: 97 % | SYSTOLIC BLOOD PRESSURE: 109 MMHG | RESPIRATION RATE: 16 BRPM | TEMPERATURE: 97.9 F

## 2021-08-14 LAB
EKG ATRIAL RATE: 86 BPM
EKG P AXIS: -2 DEGREES
EKG P-R INTERVAL: 122 MS
EKG Q-T INTERVAL: 364 MS
EKG QRS DURATION: 74 MS
EKG QTC CALCULATION (BAZETT): 435 MS
EKG R AXIS: 80 DEGREES
EKG T AXIS: 37 DEGREES
EKG VENTRICULAR RATE: 86 BPM
GLUCOSE BLD-MCNC: 119 MG/DL (ref 65–105)
GLUCOSE BLD-MCNC: 150 MG/DL (ref 65–105)
GLUCOSE BLD-MCNC: 168 MG/DL (ref 65–105)
HCT VFR BLD CALC: 32.5 % (ref 36.3–47.1)
HCT VFR BLD CALC: 39 % (ref 36.3–47.1)
HEMOGLOBIN: 10.4 G/DL (ref 11.9–15.1)
HEMOGLOBIN: 11.6 G/DL (ref 11.9–15.1)

## 2021-08-14 PROCEDURE — 80053 COMPREHEN METABOLIC PANEL: CPT

## 2021-08-14 PROCEDURE — 6360000002 HC RX W HCPCS: Performed by: NURSE ANESTHETIST, CERTIFIED REGISTERED

## 2021-08-14 PROCEDURE — 85014 HEMATOCRIT: CPT

## 2021-08-14 PROCEDURE — 6360000002 HC RX W HCPCS: Performed by: STUDENT IN AN ORGANIZED HEALTH CARE EDUCATION/TRAINING PROGRAM

## 2021-08-14 PROCEDURE — 6370000000 HC RX 637 (ALT 250 FOR IP): Performed by: STUDENT IN AN ORGANIZED HEALTH CARE EDUCATION/TRAINING PROGRAM

## 2021-08-14 PROCEDURE — 3700000001 HC ADD 15 MINUTES (ANESTHESIA): Performed by: COLON & RECTAL SURGERY

## 2021-08-14 PROCEDURE — 99232 SBSQ HOSP IP/OBS MODERATE 35: CPT | Performed by: FAMILY MEDICINE

## 2021-08-14 PROCEDURE — 3600000004 HC SURGERY LEVEL 4 BASE: Performed by: COLON & RECTAL SURGERY

## 2021-08-14 PROCEDURE — 2500000003 HC RX 250 WO HCPCS: Performed by: NURSE ANESTHETIST, CERTIFIED REGISTERED

## 2021-08-14 PROCEDURE — 7100000000 HC PACU RECOVERY - FIRST 15 MIN: Performed by: COLON & RECTAL SURGERY

## 2021-08-14 PROCEDURE — 7100000001 HC PACU RECOVERY - ADDTL 15 MIN: Performed by: COLON & RECTAL SURGERY

## 2021-08-14 PROCEDURE — 36415 COLL VENOUS BLD VENIPUNCTURE: CPT

## 2021-08-14 PROCEDURE — 3600000014 HC SURGERY LEVEL 4 ADDTL 15MIN: Performed by: COLON & RECTAL SURGERY

## 2021-08-14 PROCEDURE — 3700000000 HC ANESTHESIA ATTENDED CARE: Performed by: COLON & RECTAL SURGERY

## 2021-08-14 PROCEDURE — 83735 ASSAY OF MAGNESIUM: CPT

## 2021-08-14 PROCEDURE — 2580000003 HC RX 258: Performed by: COLON & RECTAL SURGERY

## 2021-08-14 PROCEDURE — 0D1N0Z4 BYPASS SIGMOID COLON TO CUTANEOUS, OPEN APPROACH: ICD-10-PCS | Performed by: COLON & RECTAL SURGERY

## 2021-08-14 PROCEDURE — 6370000000 HC RX 637 (ALT 250 FOR IP): Performed by: INTERNAL MEDICINE

## 2021-08-14 PROCEDURE — 2580000003 HC RX 258: Performed by: NURSE ANESTHETIST, CERTIFIED REGISTERED

## 2021-08-14 PROCEDURE — 84100 ASSAY OF PHOSPHORUS: CPT

## 2021-08-14 PROCEDURE — 1200000000 HC SEMI PRIVATE

## 2021-08-14 PROCEDURE — 2580000003 HC RX 258: Performed by: ANESTHESIOLOGY

## 2021-08-14 PROCEDURE — 82947 ASSAY GLUCOSE BLOOD QUANT: CPT

## 2021-08-14 PROCEDURE — 2709999900 HC NON-CHARGEABLE SUPPLY: Performed by: COLON & RECTAL SURGERY

## 2021-08-14 PROCEDURE — 2580000003 HC RX 258: Performed by: STUDENT IN AN ORGANIZED HEALTH CARE EDUCATION/TRAINING PROGRAM

## 2021-08-14 PROCEDURE — 2720000010 HC SURG SUPPLY STERILE: Performed by: COLON & RECTAL SURGERY

## 2021-08-14 PROCEDURE — 2500000003 HC RX 250 WO HCPCS: Performed by: FAMILY MEDICINE

## 2021-08-14 PROCEDURE — 85018 HEMOGLOBIN: CPT

## 2021-08-14 RX ORDER — SODIUM CHLORIDE 0.9 % (FLUSH) 0.9 %
5-40 SYRINGE (ML) INJECTION PRN
Status: DISCONTINUED | OUTPATIENT
Start: 2021-08-14 | End: 2021-08-18 | Stop reason: HOSPADM

## 2021-08-14 RX ORDER — SODIUM CHLORIDE 0.9 % (FLUSH) 0.9 %
5-40 SYRINGE (ML) INJECTION EVERY 12 HOURS SCHEDULED
Status: DISCONTINUED | OUTPATIENT
Start: 2021-08-14 | End: 2021-08-18 | Stop reason: HOSPADM

## 2021-08-14 RX ORDER — GLYCOPYRROLATE 1 MG/5 ML
SYRINGE (ML) INTRAVENOUS PRN
Status: DISCONTINUED | OUTPATIENT
Start: 2021-08-14 | End: 2021-08-14 | Stop reason: SDUPTHER

## 2021-08-14 RX ORDER — ROCURONIUM BROMIDE 10 MG/ML
INJECTION, SOLUTION INTRAVENOUS PRN
Status: DISCONTINUED | OUTPATIENT
Start: 2021-08-14 | End: 2021-08-14 | Stop reason: SDUPTHER

## 2021-08-14 RX ORDER — SODIUM CHLORIDE 9 MG/ML
25 INJECTION, SOLUTION INTRAVENOUS PRN
Status: DISCONTINUED | OUTPATIENT
Start: 2021-08-14 | End: 2021-08-18 | Stop reason: HOSPADM

## 2021-08-14 RX ORDER — SODIUM CHLORIDE, SODIUM LACTATE, POTASSIUM CHLORIDE, CALCIUM CHLORIDE 600; 310; 30; 20 MG/100ML; MG/100ML; MG/100ML; MG/100ML
INJECTION, SOLUTION INTRAVENOUS CONTINUOUS PRN
Status: DISCONTINUED | OUTPATIENT
Start: 2021-08-14 | End: 2021-08-14 | Stop reason: SDUPTHER

## 2021-08-14 RX ORDER — ACETAMINOPHEN 500 MG
1000 TABLET ORAL EVERY 8 HOURS SCHEDULED
Status: DISCONTINUED | OUTPATIENT
Start: 2021-08-14 | End: 2021-08-18 | Stop reason: HOSPADM

## 2021-08-14 RX ORDER — LIDOCAINE HYDROCHLORIDE 10 MG/ML
1 INJECTION, SOLUTION EPIDURAL; INFILTRATION; INTRACAUDAL; PERINEURAL
Status: ACTIVE | OUTPATIENT
Start: 2021-08-14 | End: 2021-08-14

## 2021-08-14 RX ORDER — PROPOFOL 10 MG/ML
INJECTION, EMULSION INTRAVENOUS PRN
Status: DISCONTINUED | OUTPATIENT
Start: 2021-08-14 | End: 2021-08-14 | Stop reason: SDUPTHER

## 2021-08-14 RX ORDER — ONDANSETRON 2 MG/ML
INJECTION INTRAMUSCULAR; INTRAVENOUS PRN
Status: DISCONTINUED | OUTPATIENT
Start: 2021-08-14 | End: 2021-08-14 | Stop reason: SDUPTHER

## 2021-08-14 RX ORDER — IBUPROFEN 400 MG/1
400 TABLET ORAL EVERY 6 HOURS PRN
Status: DISCONTINUED | OUTPATIENT
Start: 2021-08-14 | End: 2021-08-18 | Stop reason: HOSPADM

## 2021-08-14 RX ORDER — DEXAMETHASONE SODIUM PHOSPHATE 10 MG/ML
INJECTION INTRAMUSCULAR; INTRAVENOUS PRN
Status: DISCONTINUED | OUTPATIENT
Start: 2021-08-14 | End: 2021-08-14 | Stop reason: SDUPTHER

## 2021-08-14 RX ORDER — CYCLOBENZAPRINE HCL 10 MG
10 TABLET ORAL 3 TIMES DAILY
Status: DISCONTINUED | OUTPATIENT
Start: 2021-08-14 | End: 2021-08-18 | Stop reason: HOSPADM

## 2021-08-14 RX ORDER — NEOSTIGMINE METHYLSULFATE 5 MG/5 ML
SYRINGE (ML) INTRAVENOUS PRN
Status: DISCONTINUED | OUTPATIENT
Start: 2021-08-14 | End: 2021-08-14 | Stop reason: SDUPTHER

## 2021-08-14 RX ORDER — SODIUM CHLORIDE, SODIUM LACTATE, POTASSIUM CHLORIDE, CALCIUM CHLORIDE 600; 310; 30; 20 MG/100ML; MG/100ML; MG/100ML; MG/100ML
INJECTION, SOLUTION INTRAVENOUS CONTINUOUS
Status: DISCONTINUED | OUTPATIENT
Start: 2021-08-14 | End: 2021-08-18

## 2021-08-14 RX ORDER — MORPHINE SULFATE 2 MG/ML
2 INJECTION, SOLUTION INTRAMUSCULAR; INTRAVENOUS EVERY 4 HOURS PRN
Status: DISCONTINUED | OUTPATIENT
Start: 2021-08-14 | End: 2021-08-14

## 2021-08-14 RX ORDER — FENTANYL CITRATE 50 UG/ML
INJECTION, SOLUTION INTRAMUSCULAR; INTRAVENOUS PRN
Status: DISCONTINUED | OUTPATIENT
Start: 2021-08-14 | End: 2021-08-14 | Stop reason: SDUPTHER

## 2021-08-14 RX ORDER — LIDOCAINE HYDROCHLORIDE 10 MG/ML
INJECTION, SOLUTION EPIDURAL; INFILTRATION; INTRACAUDAL; PERINEURAL PRN
Status: DISCONTINUED | OUTPATIENT
Start: 2021-08-14 | End: 2021-08-14 | Stop reason: SDUPTHER

## 2021-08-14 RX ORDER — MORPHINE SULFATE 2 MG/ML
2 INJECTION, SOLUTION INTRAMUSCULAR; INTRAVENOUS
Status: DISCONTINUED | OUTPATIENT
Start: 2021-08-14 | End: 2021-08-18 | Stop reason: HOSPADM

## 2021-08-14 RX ORDER — MORPHINE SULFATE 4 MG/ML
4 INJECTION, SOLUTION INTRAMUSCULAR; INTRAVENOUS
Status: DISCONTINUED | OUTPATIENT
Start: 2021-08-14 | End: 2021-08-18 | Stop reason: HOSPADM

## 2021-08-14 RX ORDER — SODIUM CHLORIDE, SODIUM LACTATE, POTASSIUM CHLORIDE, CALCIUM CHLORIDE 600; 310; 30; 20 MG/100ML; MG/100ML; MG/100ML; MG/100ML
INJECTION, SOLUTION INTRAVENOUS CONTINUOUS PRN
Status: COMPLETED | OUTPATIENT
Start: 2021-08-14 | End: 2021-08-14

## 2021-08-14 RX ORDER — MIDAZOLAM HYDROCHLORIDE 2 MG/2ML
1 INJECTION, SOLUTION INTRAMUSCULAR; INTRAVENOUS EVERY 10 MIN PRN
Status: DISCONTINUED | OUTPATIENT
Start: 2021-08-14 | End: 2021-08-16

## 2021-08-14 RX ORDER — FENTANYL CITRATE 50 UG/ML
25 INJECTION, SOLUTION INTRAMUSCULAR; INTRAVENOUS EVERY 5 MIN PRN
Status: DISCONTINUED | OUTPATIENT
Start: 2021-08-14 | End: 2021-08-16

## 2021-08-14 RX ADMIN — I.V. FAT EMULSION 200 ML: 20 EMULSION INTRAVENOUS at 18:38

## 2021-08-14 RX ADMIN — DEXAMETHASONE SODIUM PHOSPHATE 10 MG: 10 INJECTION INTRAMUSCULAR; INTRAVENOUS at 10:23

## 2021-08-14 RX ADMIN — PIPERACILLIN AND TAZOBACTAM 3375 MG: 3; .375 INJECTION, POWDER, LYOPHILIZED, FOR SOLUTION INTRAVENOUS at 10:15

## 2021-08-14 RX ADMIN — MIRTAZAPINE 15 MG: 15 TABLET, FILM COATED ORAL at 21:04

## 2021-08-14 RX ADMIN — OLANZAPINE 10 MG: 10 TABLET, FILM COATED ORAL at 21:04

## 2021-08-14 RX ADMIN — Medication 4 MG: at 11:25

## 2021-08-14 RX ADMIN — ACETAMINOPHEN 1000 MG: 500 TABLET ORAL at 13:41

## 2021-08-14 RX ADMIN — FENTANYL CITRATE 100 MCG: 50 INJECTION, SOLUTION INTRAMUSCULAR; INTRAVENOUS at 10:26

## 2021-08-14 RX ADMIN — CYCLOBENZAPRINE 10 MG: 10 TABLET, FILM COATED ORAL at 13:41

## 2021-08-14 RX ADMIN — Medication 0.8 MG: at 11:25

## 2021-08-14 RX ADMIN — POTASSIUM CHLORIDE: 2 INJECTION, SOLUTION, CONCENTRATE INTRAVENOUS at 18:38

## 2021-08-14 RX ADMIN — LIDOCAINE HYDROCHLORIDE 50 MG: 10 INJECTION, SOLUTION EPIDURAL; INFILTRATION; INTRACAUDAL; PERINEURAL at 10:08

## 2021-08-14 RX ADMIN — BUSPIRONE HYDROCHLORIDE 15 MG: 15 TABLET ORAL at 13:41

## 2021-08-14 RX ADMIN — PROPOFOL 160 MG: 10 INJECTION, EMULSION INTRAVENOUS at 10:08

## 2021-08-14 RX ADMIN — PIPERACILLIN AND TAZOBACTAM 3375 MG: 3; .375 INJECTION, POWDER, LYOPHILIZED, FOR SOLUTION INTRAVENOUS at 18:22

## 2021-08-14 RX ADMIN — FENTANYL CITRATE 100 MCG: 50 INJECTION, SOLUTION INTRAMUSCULAR; INTRAVENOUS at 10:02

## 2021-08-14 RX ADMIN — MORPHINE SULFATE 2 MG: 2 INJECTION, SOLUTION INTRAMUSCULAR; INTRAVENOUS at 14:46

## 2021-08-14 RX ADMIN — SODIUM CHLORIDE, POTASSIUM CHLORIDE, SODIUM LACTATE AND CALCIUM CHLORIDE: 600; 310; 30; 20 INJECTION, SOLUTION INTRAVENOUS at 13:41

## 2021-08-14 RX ADMIN — OXYCODONE HYDROCHLORIDE 5 MG: 5 TABLET ORAL at 00:49

## 2021-08-14 RX ADMIN — SODIUM CHLORIDE, POTASSIUM CHLORIDE, SODIUM LACTATE AND CALCIUM CHLORIDE: 600; 310; 30; 20 INJECTION, SOLUTION INTRAVENOUS at 09:59

## 2021-08-14 RX ADMIN — ACETAMINOPHEN 1000 MG: 500 TABLET ORAL at 22:04

## 2021-08-14 RX ADMIN — ONDANSETRON 4 MG: 2 INJECTION, SOLUTION INTRAMUSCULAR; INTRAVENOUS at 10:23

## 2021-08-14 RX ADMIN — SUGAMMADEX 200 MG: 100 INJECTION, SOLUTION INTRAVENOUS at 11:38

## 2021-08-14 RX ADMIN — FERROUS SULFATE TAB EC 325 MG (65 MG FE EQUIVALENT) 325 MG: 325 (65 FE) TABLET DELAYED RESPONSE at 18:21

## 2021-08-14 RX ADMIN — BUSPIRONE HYDROCHLORIDE 15 MG: 15 TABLET ORAL at 21:04

## 2021-08-14 RX ADMIN — ROCURONIUM BROMIDE 40 MG: 10 INJECTION INTRAVENOUS at 10:08

## 2021-08-14 RX ADMIN — FENTANYL CITRATE 50 MCG: 50 INJECTION, SOLUTION INTRAMUSCULAR; INTRAVENOUS at 10:32

## 2021-08-14 RX ADMIN — OXYCODONE HYDROCHLORIDE 5 MG: 5 TABLET ORAL at 18:27

## 2021-08-14 RX ADMIN — CYCLOBENZAPRINE 10 MG: 10 TABLET, FILM COATED ORAL at 21:04

## 2021-08-14 ASSESSMENT — PULMONARY FUNCTION TESTS
PIF_VALUE: 22
PIF_VALUE: 18
PIF_VALUE: 18
PIF_VALUE: 19
PIF_VALUE: 18
PIF_VALUE: 18
PIF_VALUE: 0
PIF_VALUE: 18
PIF_VALUE: 17
PIF_VALUE: 17
PIF_VALUE: 18
PIF_VALUE: 18
PIF_VALUE: 17
PIF_VALUE: 19
PIF_VALUE: 17
PIF_VALUE: 20
PIF_VALUE: 17
PIF_VALUE: 2
PIF_VALUE: 19
PIF_VALUE: 17
PIF_VALUE: 19
PIF_VALUE: 17
PIF_VALUE: 3
PIF_VALUE: 2
PIF_VALUE: 17
PIF_VALUE: 17
PIF_VALUE: 2
PIF_VALUE: 18
PIF_VALUE: 17
PIF_VALUE: 19
PIF_VALUE: 6
PIF_VALUE: 19
PIF_VALUE: 17
PIF_VALUE: 18
PIF_VALUE: 18
PIF_VALUE: 1
PIF_VALUE: 18
PIF_VALUE: 16
PIF_VALUE: 17
PIF_VALUE: 17
PIF_VALUE: 20
PIF_VALUE: 17
PIF_VALUE: 18
PIF_VALUE: 16
PIF_VALUE: 1
PIF_VALUE: 0
PIF_VALUE: 18
PIF_VALUE: 16
PIF_VALUE: 18
PIF_VALUE: 16
PIF_VALUE: 18
PIF_VALUE: 19
PIF_VALUE: 18
PIF_VALUE: 17
PIF_VALUE: 17
PIF_VALUE: 0
PIF_VALUE: 17
PIF_VALUE: 19
PIF_VALUE: 18
PIF_VALUE: 19
PIF_VALUE: 18
PIF_VALUE: 18
PIF_VALUE: 17
PIF_VALUE: 2
PIF_VALUE: 17
PIF_VALUE: 18
PIF_VALUE: 18
PIF_VALUE: 19
PIF_VALUE: 16
PIF_VALUE: 18
PIF_VALUE: 16
PIF_VALUE: 19
PIF_VALUE: 17
PIF_VALUE: 18
PIF_VALUE: 1
PIF_VALUE: 17
PIF_VALUE: 19
PIF_VALUE: 18
PIF_VALUE: 2
PIF_VALUE: 18
PIF_VALUE: 17
PIF_VALUE: 1
PIF_VALUE: 18
PIF_VALUE: 17
PIF_VALUE: 18
PIF_VALUE: 17
PIF_VALUE: 17
PIF_VALUE: 18
PIF_VALUE: 1
PIF_VALUE: 19
PIF_VALUE: 2
PIF_VALUE: 18
PIF_VALUE: 18
PIF_VALUE: 0
PIF_VALUE: 32
PIF_VALUE: 18
PIF_VALUE: 18
PIF_VALUE: 17
PIF_VALUE: 19

## 2021-08-14 ASSESSMENT — PAIN - FUNCTIONAL ASSESSMENT
PAIN_FUNCTIONAL_ASSESSMENT: PREVENTS OR INTERFERES SOME ACTIVE ACTIVITIES AND ADLS
PAIN_FUNCTIONAL_ASSESSMENT: 0-10

## 2021-08-14 ASSESSMENT — PAIN DESCRIPTION - PAIN TYPE
TYPE: SURGICAL PAIN
TYPE: SURGICAL PAIN

## 2021-08-14 ASSESSMENT — PAIN SCALES - GENERAL
PAINLEVEL_OUTOF10: 6
PAINLEVEL_OUTOF10: 0
PAINLEVEL_OUTOF10: 3
PAINLEVEL_OUTOF10: 0
PAINLEVEL_OUTOF10: 8
PAINLEVEL_OUTOF10: 4
PAINLEVEL_OUTOF10: 7
PAINLEVEL_OUTOF10: 4
PAINLEVEL_OUTOF10: 3

## 2021-08-14 ASSESSMENT — PAIN DESCRIPTION - ORIENTATION
ORIENTATION: MID
ORIENTATION: MID

## 2021-08-14 ASSESSMENT — PAIN DESCRIPTION - DESCRIPTORS
DESCRIPTORS: ACHING;DISCOMFORT
DESCRIPTORS: ACHING

## 2021-08-14 ASSESSMENT — PAIN DESCRIPTION - PROGRESSION
CLINICAL_PROGRESSION: NOT CHANGED
CLINICAL_PROGRESSION: NOT CHANGED

## 2021-08-14 ASSESSMENT — PAIN DESCRIPTION - ONSET
ONSET: ON-GOING
ONSET: ON-GOING

## 2021-08-14 ASSESSMENT — PAIN DESCRIPTION - LOCATION
LOCATION: ABDOMEN
LOCATION: ABDOMEN

## 2021-08-14 ASSESSMENT — PAIN DESCRIPTION - FREQUENCY
FREQUENCY: INTERMITTENT
FREQUENCY: INTERMITTENT

## 2021-08-14 NOTE — ANESTHESIA POSTPROCEDURE EVALUATION
Department of Anesthesiology  Postprocedure Note    Patient: Promise Schaffer  MRN: [id number]  YOB: 1975  Date of evaluation: 8/14/2021  Time:  2:36 PM     Procedure Summary     Date: 08/14/21 Room / Location: [facility] / [facility]    Anesthesia Start: 7477 Anesthesia Stop: 8314    Procedure: EUA, CREATION END COLOSTOMY (N/A ) Diagnosis: (ANASTOMATIC LEAK POSSIBLE COLOVAGINAL FISTULA)    Surgeons: Mariza Sears MD Responsible Provider: Liv Partida MD    Anesthesia Type: general ASA Status: 2          Anesthesia Type: general    Kinjal Phase I: Kinjal Score: 8    Kinjal Phase II: Kinjal Score: 10    Last vitals: Reviewed and per EMR flowsheets.        Anesthesia Post Evaluation    Patient location during evaluation: PACU  Patient participation: complete - patient participated  Level of consciousness: awake and alert  Pain score: 3  Airway patency: patent  Nausea & Vomiting: no vomiting and no nausea  Complications: no  Cardiovascular status: hemodynamically stable  Respiratory status: acceptable  Hydration status: stable

## 2021-08-14 NOTE — PLAN OF CARE
Problem: Safety:  Goal: Free from accidental physical injury  Description: Free from accidental physical injury  Outcome: Ongoing  Goal: Free from intentional harm  Description: Free from intentional harm  Outcome: Ongoing     Problem: Pain:  Goal: Patient's pain/discomfort is manageable  Description: Patient's pain/discomfort is manageable  Outcome: Ongoing  Goal: Pain level will decrease  Description: Pain level will decrease  Outcome: Ongoing  Goal: Control of acute pain  Description: Control of acute pain  Outcome: Ongoing  Goal: Control of chronic pain  Description: Control of chronic pain  Outcome: Ongoing     Problem: Skin Integrity:  Goal: Skin integrity will stabilize  Description: Skin integrity will stabilize  Outcome: Ongoing     Problem: Skin Integrity:  Goal: Will show no infection signs and symptoms  Description: Will show no infection signs and symptoms  Outcome: Ongoing  Goal: Absence of new skin breakdown  Description: Absence of new skin breakdown  Outcome: Ongoing     Problem: Falls - Risk of:  Goal: Will remain free from falls  Description: Will remain free from falls  Outcome: Ongoing  Goal: Absence of physical injury  Description: Absence of physical injury  Outcome: Ongoing     Problem: Nutrition  Goal: Optimal nutrition therapy  8/14/2021 1849 by Shawn Rojas RN  Outcome: Ongoing  8/14/2021 1239 by Darby Ignacio RD, LD  Outcome: Ongoing  Note: Nutrition Problem #1: Inadequate protein intake  Intervention: Food and/or Nutrient Delivery: Continue NPO, Modify Parenteral Nutrition  Nutritional Goals: Pt to meet % of est'd needs via TPN daily

## 2021-08-14 NOTE — PROGRESS NOTES
PT. HAS FISTULA IN VAGINA WHICH ALLOWED BOWEL CONTENTS TO LEAK THROUGH VAGINA. NOTED UPON EXAM UNDER ANESTHESIA PER DR. Kassandra Gottron. PT. JAVAD AREA AND RECTUM WERE CLEANED BEFORE CONTINUING WITH POSITIONING.

## 2021-08-14 NOTE — PLAN OF CARE
Nutrition Problem #1: Inadequate protein intake  Intervention: Food and/or Nutrient Delivery: Continue NPO, Modify Parenteral Nutrition  Nutritional Goals: Pt to meet % of est'd needs via TPN daily

## 2021-08-14 NOTE — PROGRESS NOTES
Pt returned to room from recovery. Pt alert, oriented. Vitals stable. Denies need for pain medication at this time, states pain is \"just achy\". Koch intact. IV fluids infusing as ordered. EPC's in place. Dressing and new ostomy intact to abdomen. Will continue to monitor and implement comfort measures.

## 2021-08-14 NOTE — PROGRESS NOTES
General Surgery:  Daily Progress Note                  PATIENT NAME: Gaby Alba     TODAY'S DATE: 8/14/2021, 9:32 AM    SUBJECTIVE:     Pt seen and examined at bedside this morning. No acute concerns. Afebrile. Patient reports she does have some lower abdominal pain today. Patient with very flat affect. Discussed operative intervention at bedside last night and this morning. All questions and concerns answered. Currently n.p.o.    OBJECTIVE:   VITALS:  /83   Pulse 87   Temp 98 °F (36.7 °C) (Temporal)   Resp 18   Ht 5' (1.524 m)   Wt 108 lb 4.8 oz (49.1 kg)   LMP 06/25/2017   SpO2 95%   BMI 21.15 kg/m²      INTAKE/OUTPUT:      Intake/Output Summary (Last 24 hours) at 8/14/2021 0932  Last data filed at 8/14/2021 1472  Gross per 24 hour   Intake 3332 ml   Output 4250 ml   Net -918 ml       PHYSICAL EXAM:  General Appearance:  awake, alert, oriented, in no acute distress  HEENT:  Normocephalic, atraumatic, mucus membranes moist   Skin:  Skin color, texture, turgor normal. No rashes or lesions. Lungs:  No chest wall tenderness. Heart:  Heart regular rate and rhythm  Abdomen: Soft, minimal suprapubic tenderness, previous incisions well-healed, no peritoneal signs, no rebound tenderness, nondistended  Extremities: Extremities warm to touch, pink, with no edema.       Data:  CBC with Differential:    Lab Results   Component Value Date    WBC 12.6 08/12/2021    RBC 3.74 08/12/2021    HGB 10.1 08/13/2021    HCT 34.1 08/13/2021     08/12/2021    MCV 86.9 08/12/2021    MCH 25.7 08/12/2021    MCHC 29.5 08/12/2021    RDW 18.5 08/12/2021    LYMPHOPCT 28 07/05/2020    PROMYELOPCT 1 07/05/2020    MONOPCT 4 07/05/2020    MYELOPCT 2 07/05/2020    BASOPCT 0 07/05/2020    MONOSABS 0.36 07/05/2020    LYMPHSABS 2.55 07/05/2020    EOSABS 0.09 07/05/2020    BASOSABS 0.00 07/05/2020    DIFFTYPE NOT REPORTED 07/05/2020     BMP:    Lab Results   Component Value Date     08/12/2021    K 4.1 08/12/2021  08/12/2021    CO2 18 08/12/2021    BUN 10 08/12/2021    LABALBU 1.7 08/12/2021    CREATININE 0.80 08/12/2021    CALCIUM 7.8 08/12/2021    GFRAA >60 08/12/2021    LABGLOM >60 08/12/2021    GLUCOSE 114 08/12/2021       ASSESSMENT:  Active Hospital Problems    Diagnosis Date Noted    Rectovaginal fistula [N82.3]     Acute metabolic encephalopathy [K98.53] 08/12/2021    Anastomotic leak of intestine [K91.89] 08/11/2021    Schizoaffective disorder ([facility] [address] [F25.9] 07/05/2020    [facility] with [facility] features [facility] [F32.3]     Bipolar I disorder, most recent episode mixed [facility]) [F31.60] 06/20/2020       39 y.o. female status post rectal cancer with resection with reversal of colostomy recently, new finding of rectovaginal fistula    Plan:  Patient seen examined at bedside this morning. N.p.o. for OR today. Informed consent obtained, all risk and possible complications discussed, discussed over the phone with the patient's fiancé. We will plan for exploratory laparotomy with creation of end colostomy for diversion of rectovaginal fistula  Covid negative    Electronically signed by Carolyne Ace DO  on 8/14/2021 at 9:32 AM   I Dr. oJse Estevez saw and examined the patient. I have edited the above and agree with the above. Aron Branham  Colorectal Surgery

## 2021-08-14 NOTE — PLAN OF CARE
Problem: Safety:  Goal: Free from accidental physical injury  Description: Free from accidental physical injury  Outcome: Ongoing  Goal: Free from intentional harm  Description: Free from intentional harm  Outcome: Ongoing     Problem: Pain:  Goal: Patient's pain/discomfort is manageable  Description: Patient's pain/discomfort is manageable  Outcome: Ongoing  Goal: Pain level will decrease  Description: Pain level will decrease  Outcome: Ongoing  Goal: Control of acute pain  Description: Control of acute pain  Outcome: Ongoing  Goal: Control of chronic pain  Description: Control of chronic pain  Outcome: Ongoing     Problem: Skin Integrity:  Goal: Skin integrity will stabilize  Description: Skin integrity will stabilize  Outcome: Ongoing     Problem: Skin Integrity:  Goal: Will show no infection signs and symptoms  Description: Will show no infection signs and symptoms  Outcome: Ongoing  Goal: Absence of new skin breakdown  Description: Absence of new skin breakdown  Outcome: Ongoing

## 2021-08-14 NOTE — PROGRESS NOTES
will plan for void trial prior to discharge  Continue stent. Patient recently had stent exchanged on 6/4/21 by Dr. Lillian Whipple Sr. Will need to continue to get her stents exchanged at scheduled intervals   Patient has had moderate hydronephrosis in the past with stent in place.   CT scan from 8/2021 showed right hydronephrosis with right ureteral stent in place  UA shows small leuk esterase, WBC 20-50, pending urine culture, however with stent in place urine will appear dirty  Patient currently on [place] general surgery recommendations  Appreciate medical management per primary       Guille Russo MD  8:39 AM 8/14/2021

## 2021-08-14 NOTE — PROGRESS NOTES
[facility]  Office: [address], DO, Mandy Michael, DO, Paty Howe, DO, Randa Eid, DO, Thaddeus Pimentel MD, Padmini Macias MD, Beatrice Fernandez MD, Rody Sheridan MD, Fritz Harmon MD, Jose Patterson MD, Yamilka Navarro MD, Carlos Alberto Elizabeth DO, Ros Hicks MD, Aurea Chaudhary DO, Kee Barragan MD,  Rivas Levine DO, Brodie Arreola MD, Luke Brunson MD, Carl Norwood MD, Monserrat Garcia MD, Mena Pathak MD, Pancho Noe MD, Molly Montano MD, Emory Khan, [facility], [facility], CNP, Shawn Pozo, CNP, Danny Pablo, CNS, Stalin Bravo, CNP, Claude Leep, CNP, Windy Oscar, CNP, Josiane Dasilva, CNP, Saravanan Spears, CNP, Naun Zelaya PA-C, Ahsan Gaspar, [facility], Natty Ramesh, CNP, Amanda Pablo, CNP, Sanjeev Lopez, CNP, Leandro Westfall, CNP, Carli Morgan, CNP, Bakari Velez, CNP, Jaime Fair, [facility], Davin Lowe, [address] [place] [address]    Progress Note    8/14/2021    7:46 AM    Name:   Reagan Zee  MRN:     [id number]     Acct:      [de-identified]   Room:   [address] [place] [address] Day:  2  Admit Date:  8/12/2021  8:35 AM    PCP:   GRAY Moore CNP  Code Status:  Full Code    Subjective:     C/C: fistula complication     Interval History Status: not changed. Pt was seen and examined this morning  No new complaints at this time       Review of Systems:     12 point ROS performed and negative for anything other than what was stated in subjective    Medications:      Allergies:  No Known Allergies    Current Meds:   Scheduled Meds:    fat emulsion  100 mL Intravenous Daily    sodium chloride flush  5-40 mL Intravenous 2 times per day    enoxaparin  40 mg Subcutaneous Daily    busPIRone  15 mg Oral TID    ferrous sulfate  325 mg Oral BID WC    mirtazapine  15 mg Oral Nightly    OLANZapine  10 mg Oral Nightly    piperacillin-tazobactam  3,375 mg Intravenous Q8H    pantoprazole  40 mg Intravenous Daily And    sodium chloride (PF)  10 mL Intravenous Daily     Continuous Infusions:    sodium chloride Stopped (21 1727)    Adult Clinimix E TPN - Peripheral Line (Standard) 50 mL/hr at 21 1837    sodium chloride       PRN Meds: morphine, diatrizoate meglumine-sodium, sodium chloride flush, sodium chloride, potassium chloride **OR** potassium alternative oral replacement **OR** potassium chloride, magnesium sulfate, ondansetron **OR** ondansetron, acetaminophen **OR** acetaminophen, polyethylene glycol, hydrOXYzine, oxyCODONE    Data:     Past Medical History:   has a past medical history of Cancer ([address] Pelvis, female abscess, and UTI (urinary tract infection). Social History:   reports that she has never smoked. She has never used smokeless tobacco.     Family History: No family history on file. Vitals:  /69   Pulse 87   Temp 98.6 °F (37 °C) (Oral)   Resp 20   Ht 5' (1.524 m)   Wt 108 lb 4.8 oz (49.1 kg)   LMP 2017   SpO2 94%   BMI 21.15 kg/m²   Temp (24hrs), Av.6 °F (37 °C), Min:98.4 °F (36.9 °C), Max:98.6 °F (37 °C)    No results for input(s): POCGLU in the last 72 hours. I/O (24Hr):     Intake/Output Summary (Last 24 hours) at 2021 0746  Last data filed at 2021 0651  Gross per 24 hour   Intake 3332 ml   Output 3550 ml   Net -218 ml       Labs:  Hematology:  Recent Labs     21  1405 21  2209   WBC 12.6*  --    RBC 3.74*  --    HGB 9.6* 10.1*   HCT 32.5* 34.1*   MCV 86.9  --    MCH 25.7  --    MCHC 29.5  --    RDW 18.5*  --      --    MPV 10.0  --      Chemistry:  Recent Labs     21  1405 21  0709     --    K 4.1  --    *  --    CO2 18*  --    GLUCOSE 114*  --    BUN 10  --    CREATININE 0.80  --    MG  --  2.0   ANIONGAP 7*  --    LABGLOM >60  --    GFRAA >60  --    CALCIUM 7.8*  --    PHOS  --  2.9     Recent Labs     21  1405 21  0709   PROT 4.6*  --    LABALBU 1.7*  --    AST 14  --    ALT 12  -- ALKPHOS 103  --    BILITOT 0.35  --    AMYLASE  --  11*   LIPASE  --  13     ABG:No results found for: POCPH, PHART, PH, POCPCO2, DPE9OQS, PCO2, POCPO2, PO2ART, PO2, POCHCO3, RZP0WGK, HCO3, NBEA, PBEA, BEART, BE, THGBART, THB, CSR9FQS, PAJV1MQL, T6WIQPID, O2SAT, FIO2  Lab Results   Component Value Date/Time    SPECIAL NOT REPORTED 08/12/2021 08:57 PM     Lab Results   Component Value Date/Time    CULTURE NO GROWTH 08/12/2021 08:57 PM       Radiology:  [facility] BARIUM ENEMA    Result Date: 8/13/2021  1. Colovaginal fistulous communication is demonstrated. 2. Pooling of contrast posterior to the rectum in the presacral space consistent with extravasation of contrast most likely from low rectal anastomotic site. This correlates with presacral gas/fluid collection on the comparison CT scan. Findings were discussed with Dr. Liliana Cullen  (Gastroenterology) on 08/13/2020     XR HIP 2-3 VW W PELVIS RIGHT    Result Date: 8/12/2021  No acute osseous or soft tissue abnormality. XR ABDOMEN (2 VIEWS)    Result Date: 8/12/2021  Unremarkable radiographs of the abdomen. Physical Examination:        General appearance:  alert, cooperative and no distress  Mental Status:  oriented to person, place and time  Lungs:  clear to auscultation bilaterally, normal effort  Heart:  regular rate and rhythm, no murmur  Abdomen:  soft, nontender, nondistended, normal bowel sounds  Extremities:  no edema, redness, tenderness in the calves  Skin:  no gross lesions, rashes, induration    Assessment:        Hospital Problems         Last Modified POA    * (Principal) Anastomotic leak of intestine 8/11/2021 Yes    Bipolar I disorder, most recent episode mixed ([facility] [address] (Chronic) 8/11/2021 Yes    Major depression with psychotic features ([address] 8/11/2021 Yes    Schizoaffective disorder ([facility] [address] 8/11/2021 Yes    Acute metabolic encephalopathy 9/93/1275 Yes    Rectovaginal fistula 8/13/2021 Yes          Plan:        1.  Rectovaginal fistula  - Colorectal surgery on board   - GI performed EGD   - OBGYN on board  - patient underwent flexible sigmoidoscopy performed yday along with gastrografin enema completed, both consistent with a rectovaginal fistula   - concern for colovaginal fistula, will plan for colonoscopy later today per colorectal surgery   - NPO   - urology on board given hydronephrosis.    - continue IV zosyn   - per urology will need to get her stents exchanged at scheduled intervals   - v/s per unit protocol   - continue IVF   - plan for exploratory laparotomy with creation of colostomy     Kalani Bowman MD  8/14/2021  7:46 AM

## 2021-08-14 NOTE — PROGRESS NOTES
Comprehensive Nutrition Assessment    Type and Reason for Visit:  Initial    Nutrition Recommendations/Plan:   -Continue NPO status   -Recommend modifying to TPN central custom @ 60 mL/hr x 24 hrs (1440 mLs) + 20% of 200 mL IV lipids + 200 gms dextrose + 75 gms AA ~> 1380 kcals, 75 gms protein   -Recommend increasing Ca, NaAcetate and decreasing NaCl in next bag  -Will monitor TPN, labs, weights and wound healing     Nutrition Assessment:  Pt s/p rectal CA w/ resection w/ reversal of colostomy recently, new finding of rectovaginal fistula. Pt is currently NPO for OR today. PPN was started last night through port. Informed RN that Gustav Bumpers will order TPN central custom bag for tonight. No wt hx in EMR. Will monitor.      Malnutrition Assessment:  Malnutrition Status:  Insufficient data    Context:  Acute Illness     Findings of the 6 clinical characteristics of malnutrition:  Energy Intake:  Mild decrease in energy intake (Comment)  Weight Loss:  Unable to assess     Body Fat Loss:  Unable to assess     Muscle Mass Loss:  Unable to assess    Fluid Accumulation:  1 - Mild Extremities   Strength:  Not Performed    Estimated Daily Nutrient Needs:  Energy (kcal):  30-35 ~>4700-0872 kcals/d; Weight Used for Energy Requirements:  Current     Protein (g):  1.3-1.5 gm/kg ~> 64-74 gms/d; Weight Used for Protein Requirements:  Current        Fluid (ml/day):  30 ~> 1450 mLs OR per MD discretion; Method Used for Fluid Requirements:  ml/Kg      Nutrition Related Findings:  BM 8/13; labs reviewed; remeron      Wounds:  Open Wounds       Current Nutrition Therapies:    Adult Clinimix E TPN - Peripheral Line (Standard)  Diet NPO Exceptions are: Sips of Water with Meds, Sips of Clear Liquids, Popsicles, Ice Chips  Current Parenteral Nutrition Orders:  · Type and Formula: Premix Peripheral   · Lipids: 100ml, Daily  · Duration: Continuous  · Rate/Volume: 50 mL/hr; 1200 mLs  · Current PN Order Provides: 120 gms dextrose + 51 gms AA ~> 812 kcals, 51 gms protein  · Goal PN Orders Provides: 200 gms dextrose + 75 gms AA + 20% of 200 mL IV lipids ~> 1380 kcals, 75 gms protein      Anthropometric Measures:  · Height: 5' (152.4 cm)  · Current Body Weight: 108 lb (49 kg)   · Admission Body Weight: 108 lb (49 kg)    · Ideal Body Weight: 100 lbs; % Ideal Body Weight 108 %   · BMI: 21.1  · BMI Categories: Normal Weight (BMI 18.5-24. 9)       Nutrition Diagnosis:   · Inadequate protein intake related to altered GI function as evidenced by NPO or clear liquid status due to medical condition, nutrition support - parenteral nutrition      Nutrition Interventions:   Food and/or Nutrient Delivery:  Continue NPO, Modify Parenteral Nutrition  Nutrition Education/Counseling:  Education not indicated   Coordination of Nutrition Care:  Continue to monitor while inpatient    Goals: Set   Pt to meet % of est'd needs via TPN daily       Nutrition Monitoring and Evaluation:   Food/Nutrient Intake Outcomes:  Parenteral Nutrition Intake/Tolerance  Physical Signs/Symptoms Outcomes:  Biochemical Data, Nutrition Focused Physical Findings, Skin, Weight, GI Status, Fluid Status or Edema     Discharge Planning:     Too soon to determine     Electronically signed by Rui Ramirez RD, LD on 8/14/21 at 12:34 PM EDT    Contact: [phone]

## 2021-08-14 NOTE — ANESTHESIA PRE PROCEDURE
Department of Anesthesiology  Preprocedure Note       Name:  Gustavo Grimes   Age:  39 y.o.  :  1975                                          MRN:  [id number]         Date:  2021      Surgeon: Josh Alejandra):  Tiffanie Means MD    Procedure: Procedure(s):  CREATION END COLOSTOMY, POSSIBLE COLON RESECTION    Medications prior to admission:   Prior to Admission medications    Medication Sig Start Date End Date Taking?  Authorizing Provider   busPIRone (BUSPAR) 15 MG tablet Take 15 mg by mouth 3 times daily 20  Yes Kathie Kim MD   OLANZapine (ZYPREXA) 10 MG tablet Take 1 tablet by mouth nightly 20  Yes Kathie Kim MD   ferrous sulfate (IRON 325) 325 (65 Fe) MG tablet Take 1 tablet by mouth 2 times daily (with meals) 20  Yes Kathie Kim MD   hydrOXYzine (VISTARIL) 50 MG capsule Take 50 mg by mouth 3 times daily as needed   Yes Historical Provider, MD   mirtazapine (REMERON) 15 MG tablet Take 15 mg by mouth nightly   Yes Historical Provider, MD       Current medications:    Current Facility-Administered Medications   Medication Dose Route Frequency Provider Last Rate Last Admin    morphine (PF) injection 2 mg  2 mg Intravenous Q4H PRN Rody Borrero APRN - CNP        diatrizoate meglumine-sodium (GASTROGRAFIN) 66-10 % solution 240 mL  240 mL Oral ONCE PRN Thad Cullen MD   240 mL at 21 1118    0.9 % sodium chloride infusion   Intravenous Continuous Augustine Hadley MD   Stopped at 21 1727    Adult Clinimix E TPN - Peripheral Line (Standard)   Intravenous Continuous TPN Augustine Hadley MD 50 mL/hr at 21 1837 New Bag at 21 1837    fat emulsion 20 % infusion 100 mL  100 mL Intravenous Daily Augustine Hadley MD   Stopped at 21 0649    sodium chloride flush 0.9 % injection 5-40 mL  5-40 mL Intravenous 2 times per day Augustine Hadley MD   10 mL at 21 0847    sodium chloride flush 0.9 % injection 10 mL  10 mL Intravenous PRN Augustine Hadley MD  0.9 % sodium chloride infusion  25 mL Intravenous PRN Dhruv Shankar MD        potassium chloride (KLOR-CON M) extended release tablet 40 mEq  40 mEq Oral PRN Dhruv Shankar MD        Or    potassium bicarb-citric acid (EFFER-K) effervescent tablet 40 mEq  40 mEq Oral PRN Dhruv Shankar MD        Or    potassium chloride 10 mEq/100 mL IVPB (Peripheral Line)  10 mEq Intravenous PRN Dhruv Shankar MD        magnesium sulfate 1000 mg in dextrose 5% 100 mL IVPB  1,000 mg Intravenous PRN Dhruv Shankar MD        enoxaparin (LOVENOX) injection 40 mg  40 mg Subcutaneous Daily Dhruv Shankar MD   40 mg at 08/13/21 0846    ondansetron (ZOFRAN-ODT) disintegrating tablet 4 mg  4 mg Oral Q8H PRN Dhruv Shankar MD        Or    ondansetron (ZOFRAN) injection 4 mg  4 mg Intravenous Q6H PRN Dhruv Shankar MD        acetaminophen (TYLENOL) tablet 650 mg  650 mg Oral Q6H PRN Dhruv Shankar MD   650 mg at 08/12/21 1214    Or    acetaminophen (TYLENOL) suppository 650 mg  650 mg Rectal Q6H PRN Dhruv Shankar MD        polyethylene glycol (GLYCOLAX) packet 17 g  17 g Oral Daily PRN Dhruv Shankar MD        busPIRone (BUSPAR) tablet 15 mg  15 mg Oral TID Dhruv Shankar MD   15 mg at 08/13/21 2044    ferrous sulfate (FE TABS 325) EC tablet 325 mg  325 mg Oral BID WC Thad Cullen MD   325 mg at 08/13/21 0846    hydrOXYzine (ATARAX) tablet 50 mg  50 mg Oral TID PRN Dhruv Shankar MD   50 mg at 08/12/21 1521    mirtazapine (REMERON) tablet 15 mg  15 mg Oral Nightly Dhruv Shankar MD   15 mg at 08/13/21 2044    OLANZapine (ZYPREXA) tablet 10 mg  10 mg Oral Nightly Dhruv Shankar MD   10 mg at 08/13/21 2044    piperacillin-tazobactam (ZOSYN) 3,375 mg in dextrose 5 % 50 mL IVPB extended infusion (mini-bag)  3,375 mg Intravenous Jas Vasques MD 12.5 mL/hr at 08/13/21 1530 3,375 mg at 08/13/21 1530    pantoprazole (PROTONIX) injection 40 mg  40 mg Intravenous Daily Dhruv Shankar MD 40 mg at 08/13/21 0846    And    sodium chloride (PF) 0.9 % injection 10 mL  10 mL Intravenous Daily Merle Choi MD   10 mL at 08/13/21 0853    oxyCODONE (ROXICODONE) immediate release tablet 5 mg  5 mg Oral Q4H PRN Merle Choi MD   5 mg at 08/14/21 0049       Allergies:  No Known Allergies    Problem List:    Patient Active Problem List   Diagnosis Code    Psychosis ([facility] F29    H/O malignant neoplasm of colon Z85.038    Acute cystitis without hematuria N30.00    Pelvic abscess in female N73.9    Calculus of gallbladder without cholecystitis without obstruction K80.20    Leukocytosis D72.829    Elevated C-reactive protein (CRP) R79.82    Severe malnutrition ([facility] E43    Bipolar I disorder, most recent episode mixed ([facility] F31.60    Major depression with psychotic features ([facility] F32.3    Tachycardia R00.0    Diarrhea R19.7    Anemia D64.9    History of rectal cancer Z85.048    Rectal fistula K60.4    Schizoaffective disorder ([facility] F25.9    Intellectual disability F79    Anorexia nervosa F50.00    Anastomotic leak of intestine K91.89    Acute metabolic encephalopathy J35.59    Rectovaginal fistula N82.3       Past Medical History:        Diagnosis Date    Cancer [facility])     Rectal    Pelvis, female abscess     pre sacral drain put in 6/19/2020    UTI (urinary tract infection)     stent place june 2020       Past Surgical History:        Procedure Laterality Date    ABSCESS DRAINAGE      pre sacral pelvic abscess with drain placement to right buttock    SMALL INTESTINE SURGERY N/A 7/3/2020    TRANSVERSE COLOSTOMY LOOP performed by Emma Thornton MD at [facility]  2020       Social History:    Social History     Tobacco Use    Smoking status: Never Smoker    Smokeless tobacco: Never Used    Tobacco comment: pt is a non smoker   Substance Use Topics    Alcohol use: Not on file                                Counseling given: Not Answered  Comment: pt is a non smoker      Vital Signs (Current):   Vitals:    08/13/21 1600 08/13/21 1958 08/14/21 0600 08/14/21 0813   BP: 130/83 132/69  131/83   Pulse: 88 87  87   Resp: 19 20 18   Temp:  98.6 °F (37 °C)  98 °F (36.7 °C)   TempSrc:  Oral  Temporal   SpO2: 99% 94%  95%   Weight:   108 lb 4.8 oz (49.1 kg)    Height:                                                  BP Readings from Last 3 Encounters:   08/14/21 131/83   08/13/21 100/80   07/05/20 (!) 145/83       NPO Status: Time of last liquid consumption: 0600                        Time of last solid consumption: 1800                        Date of last liquid consumption: 08/13/21                        Date of last solid food consumption: 08/11/21    BMI:   Wt Readings from Last 3 Encounters:   08/14/21 108 lb 4.8 oz (49.1 kg)   07/05/20 82 lb 10.8 oz (37.5 kg)   06/20/20 100 lb 5 oz (45.5 kg)     Body mass index is 21.15 kg/m². CBC:   Lab Results   Component Value Date    WBC 12.6 08/12/2021    RBC 3.74 08/12/2021    HGB 10.1 08/13/2021    HCT 34.1 08/13/2021    MCV 86.9 08/12/2021    RDW 18.5 08/12/2021     08/12/2021       CMP:   Lab Results   Component Value Date     08/12/2021    K 4.1 08/12/2021     08/12/2021    CO2 18 08/12/2021    BUN 10 08/12/2021    CREATININE 0.80 08/12/2021    GFRAA >60 08/12/2021    LABGLOM >60 08/12/2021    GLUCOSE 114 08/12/2021    PROT 4.6 08/12/2021    CALCIUM 7.8 08/12/2021    BILITOT 0.35 08/12/2021    ALKPHOS 103 08/12/2021    AST 14 08/12/2021    ALT 12 08/12/2021       POC Tests: No results for input(s): POCGLU, POCNA, POCK, POCCL, POCBUN, POCHEMO, POCHCT in the last 72 hours.     Coags:   Lab Results   Component Value Date    PROTIME 14.5 06/18/2020    INR 1.1 06/18/2020    APTT 27.2 06/18/2020       HCG (If Applicable):   Lab Results   Component Value Date    PREGTESTUR NEGATIVE 06/16/2020        ABGs: No results found for: PHART, PO2ART, BTW5YCO, OHV3MWX, BEART, J6TZINJB     Type & Screen (If Applicable):  No results found for: LABABO, LABRH    Drug/Infectious Status (If Applicable):  No results found for: HIV, HEPCAB    COVID-19 Screening (If Applicable):   Lab Results   Component Value Date    COVID19 Not Detected 08/13/2021    COVID19 Not Detected 06/28/2020           Anesthesia Evaluation  Patient summary reviewed no history of anesthetic complications:   Airway: Mallampati: II  TM distance: >3 FB   Neck ROM: full  Mouth opening: > = 3 FB Dental:      Comment: Multiple missing poor condition    Pulmonary:Negative Pulmonary ROS and normal exam                               Cardiovascular:Negative CV ROS            Rhythm: regular  Rate: normal                    Neuro/Psych:   (+) neuromuscular disease:, psychiatric history:depression/anxiety             GI/Hepatic/Renal: Neg GI/Hepatic/Renal ROS            Endo/Other: Negative Endo/Other ROS                    Abdominal:             Vascular: negative vascular ROS. Other Findings:           Anesthesia Plan      general     ASA 2       Induction: intravenous. Anesthetic plan and risks discussed with patient. Use of blood products discussed with patient whom consented to blood products. Plan discussed with CRNA.                   Bethany Taylor MD   8/14/2021

## 2021-08-14 NOTE — OP NOTE
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 EUA  2. CREATION END COLOSTOMY    Surgeon(s):  Ja Street MD    Assistant:   Resident: Duane Chary, DO    Anesthesia: General    Estimated Blood Loss (mL): 5 mL    Complications: None    Specimens:   * No specimens in log *    Implants:  * No implants in log *      Drains:   Closed/Suction Drain Right Buttock  8 [place] (Active)       Colostomy LLQ  (Active)       Urethral Catheter (Active)   Catheter Indications Urinary retention (acute or chronic), continuous bladder irrigation or bladder outlet obstruction 08/13/21 0802   Site Assessment Red; Swelling 08/13/21 0802   Urine Color Monica 08/13/21 1008   Urine Appearance Clear 08/13/21 1008   Output (mL) 700 mL 08/14/21 0832       [REMOVED] NG/OG/NJ/NE Tube Nasogastric 18 fr Right nostril (Removed)       [REMOVED] Colostomy RUQ Loop (Removed)       Findings: Examination under anesthesia performed, rectal exam with finding of of anterior defect and anal canal consistent with rectovaginal fistula, vaginal exam performed with findings of rectovaginal fistula along the right posterior vaginal wall, creation of end colostomy for diversion of stool, wound class II    Detailed Description of Procedure:   Patient was seen examined at bedside. Informed consent was obtained at bedside. All risk and possible complications of the procedure were discussed with the patient. All questions and concerns answered. The patient was then transferred from the preop area to the operating room on the [facility].   On arrival to the operating room the patient was transferred to the operating room table in the supine position then underwent general anesthesia with endotracheal intubation via the anesthesia team.  A formal timeout was called verifying the correct patient, positioning, procedure to be performed, and preoperative antibiotics. The patient did receive IV Zosyn. The patient was marked preoperatively for placement of her end colostomy. Prior to starting the operative portion of the case we did perform a rectal and vaginal exam.  On rectal examination about 3 cm from the dentate line/anal opening we were able to palpate a defect consistent with a fistula in the anterior rectal wall. On vaginal examination we were able to identify a defect in the posterior right side of the vaginal wall. There was a large amount of stool noted to be leaking from the patient's vagina. The patient's abdomen was then prepped and draped in the usual sterile fashion. The patient did arrive to the operating room with a Koch in place. After the patient was properly prepped and draped we began the case by making a midline infraumbilical incision extending down to the suprapubic region. We then used Bovie cautery to further dissect through the subcutaneous tissue and midline fascia. Upon entrance into the abdomen we explored the pelvis. The overlying greater omentum and small bowel were easily mobile with no adhesions noted. We examined the pelvic floor with no signs of succus or concerns for perforation. On preop imaging she did have concern for possible rectal/anastomotic leak from her previous surgery. There were no findings intraperitoneal of any signs of abscess or infection. The uterus, ovaries, and bladder were found to be intact. We were then able to grasp the sigmoid colon with a Deny clamp and perform mobilization of the sigmoid colon to create the end colostomy. After grasping the sigmoid colon with the Deny clamp were able to use the Bovie cautery to mobilize the white line of Toldt. We then made a small mesenteric window noted at the peritoneal reflection of the sigmoid colon we then passed a RAUL 75 stapler and stapled this portion of the colon off. We then used the LigaSure device to mobilize the sigmoid colon mesentery for mobilization of the colon to the anterior abdominal wall. After mobilization of the sigmoid colon we turned our attention to the anterior abdominal wall. At the premarked position on the anterior abdominal wall we were able to grasp the skin using a Deny. We then used Bovie cautery to further make a circular incision and dissect down to the anterior abdominal wall. We were able to make a cruciate incision in the anterior abdominal wall fascia. We were then able to spread the rectus muscle and then opened the posterior sheath. We then passed 2 fingers through this opening. A [place] clamp was then passed from the anterior abdominal wall skin level through the newly created ostomy site and grasped the proximal portion of the sigmoid colon. Were able to bring the sigmoid colon out to the skin. No tension was noted on the sigmoid colon at this time. We then examined the abdomen for hemostasis. Hemostasis had been achieved. All laparotomy pads were removed. We then closed the midline fascia using running 0-looped PDS suture. The subcutaneous tissue was then irrigated with normal saline. The overlying skin was closed using skin staples. A sterile dressing was then applied. We then turned our attention to creation of the end colostomy. The [place] clamp was used to hold the sigmoid colon in place. We then used Bovie cautery to remove the staple line. The underlying colon was grasped with a Deny clamp. We then continued creation of the end colostomy. This was done by using 3-0 Vicryl suture to perform brooking of the end colostomy. After completion of creating the end colostomy an ostomy appliance was placed. This concluded the operative portion of the procedure. At the end of the case all sponge, needle, and instrument counts were correct. The patient tolerated procedure well.   Patient was in extubated and taken to the PACU in stable condition. Dr. Morteza Meek was present for the entire case. Electronically signed by Amy Westborok DO on 8/14/2021 at 11:47 AM           I Dr. Morteza Meek was present throughout and performed the entire procedure. Aron Branham  Colorectal Surgery

## 2021-08-15 LAB
ALBUMIN SERPL-MCNC: 1.8 G/DL (ref 3.5–5.2)
ALBUMIN SERPL-MCNC: 2 G/DL (ref 3.5–5.2)
ALBUMIN/GLOBULIN RATIO: 0.5 (ref 1–2.5)
ALBUMIN/GLOBULIN RATIO: 0.7 (ref 1–2.5)
ALP BLD-CCNC: 116 U/L (ref 35–104)
ALP BLD-CCNC: 94 U/L (ref 35–104)
ALT SERPL-CCNC: 12 U/L (ref 5–33)
ALT SERPL-CCNC: 13 U/L (ref 5–33)
ANION GAP SERPL CALCULATED.3IONS-SCNC: 10 MMOL/L (ref 9–17)
ANION GAP SERPL CALCULATED.3IONS-SCNC: 8 MMOL/L (ref 9–17)
AST SERPL-CCNC: 24 U/L
AST SERPL-CCNC: 9 U/L
BILIRUB SERPL-MCNC: 0.15 MG/DL (ref 0.3–1.2)
BILIRUB SERPL-MCNC: 0.39 MG/DL (ref 0.3–1.2)
BUN BLDV-MCNC: 12 MG/DL (ref 6–20)
BUN BLDV-MCNC: 7 MG/DL (ref 6–20)
BUN/CREAT BLD: ABNORMAL (ref 9–20)
BUN/CREAT BLD: ABNORMAL (ref 9–20)
CALCIUM SERPL-MCNC: 8 MG/DL (ref 8.6–10.4)
CALCIUM SERPL-MCNC: 8.3 MG/DL (ref 8.6–10.4)
CHLORIDE BLD-SCNC: 106 MMOL/L (ref 98–107)
CHLORIDE BLD-SCNC: 109 MMOL/L (ref 98–107)
CO2: 18 MMOL/L (ref 20–31)
CO2: 23 MMOL/L (ref 20–31)
CREAT SERPL-MCNC: 0.63 MG/DL (ref 0.5–0.9)
CREAT SERPL-MCNC: 0.7 MG/DL (ref 0.5–0.9)
GFR AFRICAN AMERICAN: >60 ML/MIN
GFR AFRICAN AMERICAN: >60 ML/MIN
GFR NON-AFRICAN AMERICAN: >60 ML/MIN
GFR NON-AFRICAN AMERICAN: >60 ML/MIN
GFR SERPL CREATININE-BSD FRML MDRD: ABNORMAL ML/MIN/{1.73_M2}
GLUCOSE BLD-MCNC: 108 MG/DL (ref 65–105)
GLUCOSE BLD-MCNC: 149 MG/DL (ref 65–105)
GLUCOSE BLD-MCNC: 173 MG/DL (ref 70–99)
GLUCOSE BLD-MCNC: 174 MG/DL (ref 65–105)
GLUCOSE BLD-MCNC: 184 MG/DL (ref 70–99)
GLUCOSE BLD-MCNC: 193 MG/DL (ref 65–105)
HCT VFR BLD CALC: 33.8 % (ref 36.3–47.1)
HCT VFR BLD CALC: 34.2 % (ref 36.3–47.1)
HEMOGLOBIN: 10 G/DL (ref 11.9–15.1)
HEMOGLOBIN: 9.8 G/DL (ref 11.9–15.1)
MAGNESIUM: 2.4 MG/DL (ref 1.6–2.6)
MAGNESIUM: 2.5 MG/DL (ref 1.6–2.6)
MCH RBC QN AUTO: 25.3 PG (ref 25.2–33.5)
MCHC RBC AUTO-ENTMCNC: 28.7 G/DL (ref 28.4–34.8)
MCV RBC AUTO: 88.4 FL (ref 82.6–102.9)
NRBC AUTOMATED: 0 PER 100 WBC
PDW BLD-RTO: 20.4 % (ref 11.8–14.4)
PHOSPHORUS: 2.9 MG/DL (ref 2.6–4.5)
PHOSPHORUS: 4.1 MG/DL (ref 2.6–4.5)
PLATELET # BLD: 402 K/UL (ref 138–453)
PMV BLD AUTO: 10.7 FL (ref 8.1–13.5)
POTASSIUM SERPL-SCNC: 4.6 MMOL/L (ref 3.7–5.3)
POTASSIUM SERPL-SCNC: 4.8 MMOL/L (ref 3.7–5.3)
RBC # BLD: 3.87 M/UL (ref 3.95–5.11)
SODIUM BLD-SCNC: 134 MMOL/L (ref 135–144)
SODIUM BLD-SCNC: 140 MMOL/L (ref 135–144)
TOTAL PROTEIN: 4.9 G/DL (ref 6.4–8.3)
TOTAL PROTEIN: 5.2 G/DL (ref 6.4–8.3)
TRIGL SERPL-MCNC: 76 MG/DL
WBC # BLD: 15.1 K/UL (ref 3.5–11.3)

## 2021-08-15 PROCEDURE — 85018 HEMOGLOBIN: CPT

## 2021-08-15 PROCEDURE — 6360000002 HC RX W HCPCS: Performed by: STUDENT IN AN ORGANIZED HEALTH CARE EDUCATION/TRAINING PROGRAM

## 2021-08-15 PROCEDURE — 2500000003 HC RX 250 WO HCPCS: Performed by: FAMILY MEDICINE

## 2021-08-15 PROCEDURE — 2500000003 HC RX 250 WO HCPCS: Performed by: COLON & RECTAL SURGERY

## 2021-08-15 PROCEDURE — 84478 ASSAY OF TRIGLYCERIDES: CPT

## 2021-08-15 PROCEDURE — 83735 ASSAY OF MAGNESIUM: CPT

## 2021-08-15 PROCEDURE — 6370000000 HC RX 637 (ALT 250 FOR IP): Performed by: STUDENT IN AN ORGANIZED HEALTH CARE EDUCATION/TRAINING PROGRAM

## 2021-08-15 PROCEDURE — 85027 COMPLETE CBC AUTOMATED: CPT

## 2021-08-15 PROCEDURE — 84100 ASSAY OF PHOSPHORUS: CPT

## 2021-08-15 PROCEDURE — C9113 INJ PANTOPRAZOLE SODIUM, VIA: HCPCS | Performed by: STUDENT IN AN ORGANIZED HEALTH CARE EDUCATION/TRAINING PROGRAM

## 2021-08-15 PROCEDURE — 99232 SBSQ HOSP IP/OBS MODERATE 35: CPT | Performed by: FAMILY MEDICINE

## 2021-08-15 PROCEDURE — 2580000003 HC RX 258: Performed by: ANESTHESIOLOGY

## 2021-08-15 PROCEDURE — 1200000000 HC SEMI PRIVATE

## 2021-08-15 PROCEDURE — 36415 COLL VENOUS BLD VENIPUNCTURE: CPT

## 2021-08-15 PROCEDURE — 85014 HEMATOCRIT: CPT

## 2021-08-15 PROCEDURE — 2580000003 HC RX 258: Performed by: STUDENT IN AN ORGANIZED HEALTH CARE EDUCATION/TRAINING PROGRAM

## 2021-08-15 PROCEDURE — 94761 N-INVAS EAR/PLS OXIMETRY MLT: CPT

## 2021-08-15 PROCEDURE — 80053 COMPREHEN METABOLIC PANEL: CPT

## 2021-08-15 RX ADMIN — PIPERACILLIN AND TAZOBACTAM 3375 MG: 3; .375 INJECTION, POWDER, LYOPHILIZED, FOR SOLUTION INTRAVENOUS at 02:03

## 2021-08-15 RX ADMIN — PIPERACILLIN AND TAZOBACTAM 3375 MG: 3; .375 INJECTION, POWDER, LYOPHILIZED, FOR SOLUTION INTRAVENOUS at 18:32

## 2021-08-15 RX ADMIN — I.V. FAT EMULSION 200 ML: 20 EMULSION INTRAVENOUS at 18:34

## 2021-08-15 RX ADMIN — FERROUS SULFATE TAB EC 325 MG (65 MG FE EQUIVALENT) 325 MG: 325 (65 FE) TABLET DELAYED RESPONSE at 08:56

## 2021-08-15 RX ADMIN — FERROUS SULFATE TAB EC 325 MG (65 MG FE EQUIVALENT) 325 MG: 325 (65 FE) TABLET DELAYED RESPONSE at 18:32

## 2021-08-15 RX ADMIN — BUSPIRONE HYDROCHLORIDE 15 MG: 15 TABLET ORAL at 22:43

## 2021-08-15 RX ADMIN — OXYCODONE HYDROCHLORIDE 5 MG: 5 TABLET ORAL at 18:32

## 2021-08-15 RX ADMIN — SODIUM CHLORIDE, PRESERVATIVE FREE 10 ML: 5 INJECTION INTRAVENOUS at 08:56

## 2021-08-15 RX ADMIN — BUSPIRONE HYDROCHLORIDE 15 MG: 15 TABLET ORAL at 14:45

## 2021-08-15 RX ADMIN — CYCLOBENZAPRINE 10 MG: 10 TABLET, FILM COATED ORAL at 14:45

## 2021-08-15 RX ADMIN — CYCLOBENZAPRINE 10 MG: 10 TABLET, FILM COATED ORAL at 08:56

## 2021-08-15 RX ADMIN — OXYCODONE HYDROCHLORIDE 5 MG: 5 TABLET ORAL at 04:32

## 2021-08-15 RX ADMIN — SODIUM CHLORIDE, POTASSIUM CHLORIDE, SODIUM LACTATE AND CALCIUM CHLORIDE: 600; 310; 30; 20 INJECTION, SOLUTION INTRAVENOUS at 18:34

## 2021-08-15 RX ADMIN — ACETAMINOPHEN 1000 MG: 500 TABLET ORAL at 06:11

## 2021-08-15 RX ADMIN — POTASSIUM CHLORIDE: 2 INJECTION, SOLUTION, CONCENTRATE INTRAVENOUS at 18:35

## 2021-08-15 RX ADMIN — BUSPIRONE HYDROCHLORIDE 15 MG: 15 TABLET ORAL at 08:56

## 2021-08-15 RX ADMIN — OXYCODONE HYDROCHLORIDE 5 MG: 5 TABLET ORAL at 09:01

## 2021-08-15 RX ADMIN — SODIUM CHLORIDE, POTASSIUM CHLORIDE, SODIUM LACTATE AND CALCIUM CHLORIDE: 600; 310; 30; 20 INJECTION, SOLUTION INTRAVENOUS at 07:30

## 2021-08-15 RX ADMIN — OXYCODONE HYDROCHLORIDE 5 MG: 5 TABLET ORAL at 00:26

## 2021-08-15 RX ADMIN — ACETAMINOPHEN 1000 MG: 500 TABLET ORAL at 22:43

## 2021-08-15 RX ADMIN — ENOXAPARIN SODIUM 40 MG: 40 INJECTION SUBCUTANEOUS at 08:56

## 2021-08-15 RX ADMIN — MIRTAZAPINE 15 MG: 15 TABLET, FILM COATED ORAL at 22:43

## 2021-08-15 RX ADMIN — ACETAMINOPHEN 1000 MG: 500 TABLET ORAL at 14:45

## 2021-08-15 RX ADMIN — OLANZAPINE 10 MG: 10 TABLET, FILM COATED ORAL at 22:43

## 2021-08-15 RX ADMIN — PANTOPRAZOLE SODIUM 40 MG: 40 INJECTION, POWDER, FOR SOLUTION INTRAVENOUS at 08:57

## 2021-08-15 RX ADMIN — CYCLOBENZAPRINE 10 MG: 10 TABLET, FILM COATED ORAL at 22:43

## 2021-08-15 RX ADMIN — PIPERACILLIN AND TAZOBACTAM 3375 MG: 3; .375 INJECTION, POWDER, LYOPHILIZED, FOR SOLUTION INTRAVENOUS at 09:04

## 2021-08-15 ASSESSMENT — PAIN DESCRIPTION - ORIENTATION: ORIENTATION: MID

## 2021-08-15 ASSESSMENT — PAIN SCALES - GENERAL
PAINLEVEL_OUTOF10: 3
PAINLEVEL_OUTOF10: 5
PAINLEVEL_OUTOF10: 7
PAINLEVEL_OUTOF10: 7
PAINLEVEL_OUTOF10: 3
PAINLEVEL_OUTOF10: 3
PAINLEVEL_OUTOF10: 5
PAINLEVEL_OUTOF10: 7
PAINLEVEL_OUTOF10: 6
PAINLEVEL_OUTOF10: 3
PAINLEVEL_OUTOF10: 7

## 2021-08-15 ASSESSMENT — PAIN DESCRIPTION - PAIN TYPE: TYPE: SURGICAL PAIN

## 2021-08-15 ASSESSMENT — PAIN DESCRIPTION - ONSET: ONSET: GRADUAL

## 2021-08-15 ASSESSMENT — PAIN DESCRIPTION - FREQUENCY: FREQUENCY: INTERMITTENT

## 2021-08-15 ASSESSMENT — PAIN - FUNCTIONAL ASSESSMENT: PAIN_FUNCTIONAL_ASSESSMENT: PREVENTS OR INTERFERES SOME ACTIVE ACTIVITIES AND ADLS

## 2021-08-15 ASSESSMENT — PAIN DESCRIPTION - LOCATION: LOCATION: ABDOMEN

## 2021-08-15 ASSESSMENT — PAIN DESCRIPTION - DESCRIPTORS: DESCRIPTORS: ACHING

## 2021-08-15 ASSESSMENT — PAIN DESCRIPTION - PROGRESSION: CLINICAL_PROGRESSION: NOT CHANGED

## 2021-08-15 NOTE — PROGRESS NOTES
[facility]  Office: [address], DO, Marijafernando Snider, DO, Nickiesaad Dumont, DO, Shayna Remy Blood, DO, Karyn Gusman MD, Jessica Vee MD, Darryle So, MD, Chari Addison MD, Roman Greene MD, Ty Guajardo MD, Kenny Soto MD, Aminta Pradhan, DO, Thalia Smith MD, Louie Du DO, Yi Khan MD,  Whitley Stafford DO, Brittanie Harrison MD, Ottoniel Rodriguez MD, Guero Sotelo MD, Sherwin Conley MD, Sherman Machado MD, Ronaldo Fisher MD, Debbie Tenorio MD, Ailin Reed [facility], [facility], CNP, Reagan Mike, CNP, Valente Owens, CNS, Lino Mrecado, Stella Mora, CNP, Kristin Poole, CNP, Denver Lennon, CNP, Jessica Esquivel, CNP, Chani Servin PA-C, Wilver Duncan, GAIL, Kenton Ramirez, CNP, Sonja Chapin, CNP, Josh García, CNP, Grzegorz Alexandre, CNP, Jason Lockhart, CNP, Tawana Rubinstein, CNP, Levy Ovalle, CNP, Juju Acosta, [address] [place] [address]    Progress Note    8/15/2021    7:42 AM    Name:   Nasreen Morales  MRN:     [id number]     Acct:      [de-identified]   Room:   [address] [place] [address] Day:  3  Admit Date:  8/12/2021  8:35 AM    PCP:   GRAY Chacon CNP  Code Status:  Full Code    Subjective:     C/C: Fistula complication      Interval History Status: not changed. Pt was seen and examined this morning  No acute events overnight   Feeling well at this time with no complaints    Review of Systems:     12 point ROS performed and negative for anything other than what was stated in subjective    Medications:      Allergies:  No Known Allergies    Current Meds:   Scheduled Meds:    sodium chloride flush  5-40 mL Intravenous 2 times per day    cyclobenzaprine  10 mg Oral TID    acetaminophen  1,000 mg Oral 3 times per day    fat emulsion  200 mL Intravenous Daily    sodium chloride flush  5-40 mL Intravenous 2 times per day    enoxaparin  40 mg Subcutaneous Daily    busPIRone  15 mg Oral TID    ferrous sulfate  325 mg Oral BID WC    mirtazapine  15 mg Oral Nightly    OLANZapine  10 mg Oral Nightly    piperacillin-tazobactam  3,375 mg Intravenous Q8H    pantoprazole  40 mg Intravenous Daily    And    sodium chloride (PF)  10 mL Intravenous Daily     Continuous Infusions:    lactated ringers 125 mL/hr at 08/15/21 0730    sodium chloride      PN-Adult 2-in-1 Central Line (Standard) 60 mL/hr at 21 1838    sodium chloride Stopped (21 1727)    sodium chloride       PRN Meds: fentanNYL, sodium chloride flush, sodium chloride, midazolam, morphine **OR** morphine, ibuprofen, diatrizoate meglumine-sodium, sodium chloride flush, sodium chloride, potassium chloride **OR** potassium alternative oral replacement **OR** potassium chloride, magnesium sulfate, ondansetron **OR** ondansetron, polyethylene glycol, hydrOXYzine, oxyCODONE    Data:     Past Medical History:   has a past medical history of Cancer ([address] Pelvis, female abscess, and UTI (urinary tract infection). Social History:   reports that she has never smoked. She has never used smokeless tobacco.     Family History: History reviewed. No pertinent family history. Vitals:  /78   Pulse 66   Temp 98.3 °F (36.8 °C) (Oral)   Resp 15   Ht 5' (1.524 m)   Wt 108 lb 4.8 oz (49.1 kg)   LMP 2017   SpO2 99%   BMI 21.15 kg/m²   Temp (24hrs), Av.7 °F (36.5 °C), Min:97.3 °F (36.3 °C), Max:98.3 °F (36.8 °C)    Recent Labs     21  1200 21  1812 21  2356 08/15/21  0639   POCGLU 150* 168* 193* 174*       I/O (24Hr):     Intake/Output Summary (Last 24 hours) at 8/15/2021 0742  Last data filed at 8/15/2021 0543  Gross per 24 hour   Intake 3479 ml   Output 3300 ml   Net 179 ml       Labs:  Hematology:  Recent Labs     21  1405 21  1405 21  1410 21   WBC 12.6*  --   --   --   --    RBC 3.74*  --   --   --   --    HGB 9.6*   < > 10.1* 11.6* 10.4*   HCT 32.5*   < > 34.1* 39.0 08/13/2020     XR HIP 2-3 VW W PELVIS RIGHT    Result Date: 8/12/2021  No acute osseous or soft tissue abnormality. XR ABDOMEN (2 VIEWS)    Result Date: 8/12/2021  Unremarkable radiographs of the abdomen. Physical Examination:        General appearance:  alert, cooperative and no distress  Mental Status:  oriented to person, place and time  Lungs:  clear to auscultation bilaterally, normal effort  Heart:  regular rate and rhythm, no murmur  Abdomen:  soft, nontender, infraumbilical midline incision healing well with staples intact, left lower quadrant end colostomy appears viable, stoma pink, no stool or flatus noted in bag. Extremities:  no edema, redness, tenderness in the calves      Assessment:        Hospital Problems         Last Modified POA    * (Principal) Anastomotic leak of intestine 8/11/2021 Yes    Bipolar I disorder, most recent episode mixed ([address] (Chronic) 8/11/2021 Yes    Major depression with psychotic features ([address] 8/11/2021 Yes    Schizoaffective disorder ([address] 8/11/2021 Yes    Acute metabolic encephalopathy 2/93/8219 Yes    Rectovaginal fistula 8/13/2021 Yes          Plan:        1. Rectovaginal fistula  - Colorectal surgery on board   - GI performed EGD   - OBGYN on board  - patient underwent flexible sigmoidoscopy performed yday along with gastrografin enema completed, both consistent with a rectovaginal fistula   - POD #1 s/p EUA and creation end colostomy  - urology on board given hydronephrosis.    - continue IV zosyn   - per urology will need to get her stents exchanged at scheduled intervals   - v/s per unit protocol   - continue IVF   - started on CLD and tolerating well       Jessica Bourne MD  8/15/2021  7:42 AM

## 2021-08-15 NOTE — PLAN OF CARE
Problem: Safety:  Goal: Free from accidental physical injury  Description: Free from accidental physical injury  8/15/2021 0512 by Cyndie Morgan RN  Outcome: Met This Shift  8/14/2021 1849 by Westley Wadsworth RN  Outcome: Ongoing     Problem: Skin Integrity:  Goal: Absence of new skin breakdown  Description: Absence of new skin breakdown  8/15/2021 0512 by Cyndie Morgan RN  Outcome: Met This Shift  8/14/2021 1849 by Westley Wadsworth RN  Outcome: Ongoing     Problem: Falls - Risk of:  Goal: Will remain free from falls  Description: Will remain free from falls  8/15/2021 0512 by Cyndie Morgan RN  Outcome: Met This Shift  8/14/2021 1849 by Westley Wadsworth RN  Outcome: Ongoing  Goal: Absence of physical injury  Description: Absence of physical injury  8/15/2021 0512 by Cyndie Morgan RN  Outcome: Met This Shift  8/14/2021 1849 by Westley Wadsworth RN  Outcome: Ongoing     Problem: Pain:  Goal: Patient's pain/discomfort is manageable  Description: Patient's pain/discomfort is manageable  8/15/2021 0512 by Cyndie Morgan RN  Outcome: Ongoing  8/14/2021 1849 by Westley Wadsworth RN  Outcome: Ongoing  Goal: Pain level will decrease  Description: Pain level will decrease  8/15/2021 0512 by Cyndie Morgan RN  Outcome: Ongoing  8/14/2021 1849 by Westley Wadsworth RN  Outcome: Ongoing  Goal: Control of acute pain  Description: Control of acute pain  8/15/2021 0512 by Cyndie Morgan RN  Outcome: Ongoing  8/14/2021 1849 by Westley Wadsworth RN  Outcome: Ongoing  Goal: Control of chronic pain  Description: Control of chronic pain  8/15/2021 0512 by Cyndie Morgan RN  Outcome: Ongoing  8/14/2021 1849 by Westley Wadsworth RN  Outcome: Ongoing     Problem: Skin Integrity:  Goal: Skin integrity will stabilize  Description: Skin integrity will stabilize  8/15/2021 0512 by Cyndie Morgan RN  Outcome: Ongoing  8/14/2021 1849 by Westley Wadsworth RN  Outcome: Ongoing     Problem: Skin Integrity:  Goal: Will show no infection signs and symptoms  Description: Will show no infection signs and symptoms  8/15/2021 0512 by Delores Beckett RN  Outcome: Ongoing  8/14/2021 1849 by Lambert Vega RN  Outcome: Ongoing     Problem: Nutrition  Goal: Optimal nutrition therapy  8/14/2021 1849 by Lambert Vega RN  Outcome: Ongoing

## 2021-08-15 NOTE — PLAN OF CARE
Problem: Safety:  Goal: Free from accidental physical injury  Description: Free from accidental physical injury  8/15/2021 1847 by Sisi Simpson RN  Outcome: Ongoing  8/15/2021 0512 by Colleen Salas RN  Outcome: Met This Shift     Problem: Pain:  Goal: Patient's pain/discomfort is manageable  Description: Patient's pain/discomfort is manageable  8/15/2021 1847 by Sisi Simpson RN  Outcome: Ongoing  8/15/2021 0512 by Colleen Salas RN  Outcome: Ongoing  Goal: Pain level will decrease  Description: Pain level will decrease  8/15/2021 1847 by Sisi Simpson RN  Outcome: Ongoing  8/15/2021 0512 by Colleen Salas RN  Outcome: Ongoing  Goal: Control of acute pain  Description: Control of acute pain  8/15/2021 1847 by Sisi Simpson RN  Outcome: Ongoing  8/15/2021 0512 by Colleen Salas RN  Outcome: Ongoing  Goal: Control of chronic pain  Description: Control of chronic pain  8/15/2021 1847 by Sisi Simpson RN  Outcome: Ongoing  8/15/2021 0512 by Colleen Salas RN  Outcome: Ongoing     Problem: Skin Integrity:  Goal: Skin integrity will stabilize  Description: Skin integrity will stabilize  8/15/2021 1847 by Sisi Simpson RN  Outcome: Ongoing  8/15/2021 0512 by Colleen Salas RN  Outcome: Ongoing     Problem: Skin Integrity:  Goal: Will show no infection signs and symptoms  Description: Will show no infection signs and symptoms  8/15/2021 1847 by Sisi Simpson RN  Outcome: Ongoing  8/15/2021 0512 by Colleen Salas RN  Outcome: Ongoing  Goal: Absence of new skin breakdown  Description: Absence of new skin breakdown  8/15/2021 1847 by Sisi Simpson RN  Outcome: Ongoing  8/15/2021 0512 by Colleen Salas RN  Outcome: Met This Shift     Problem: Falls - Risk of:  Goal: Will remain free from falls  Description: Will remain free from falls  8/15/2021 1847 by Sisi Simpson RN  Outcome: Ongoing  8/15/2021 0512 by Colleen Salas RN  Outcome: Met This Shift  Goal: Absence of physical injury  Description: Absence of physical injury  8/15/2021 1847 by Westley Wadsworth RN  Outcome: Ongoing  8/15/2021 0512 by Cyndie Morgan RN  Outcome: Met This Shift     Problem: Nutrition  Goal: Optimal nutrition therapy  Outcome: Ongoing

## 2021-08-15 NOTE — PROGRESS NOTES
POST-OPERATIVE PROGRESS NOTE    Patient: Gilford Sender    DATE: 8/14/2021    Surgery:   1. EUA  2. CREATION END COLOSTOMY    Subjective:   Patient sitting up in bed comfortably. Denies abd pain, nausea, vomiting. No ostomy output. Tolerating sips of clears. All questions answered bedside. Objective:  Vital signs and Nurse's note reviewed  Post-op vital signs: stable    /66   Pulse 53   Temp 98.3 °F (36.8 °C) (Oral)   Resp 15   Ht 5' (1.524 m)   Wt 108 lb 4.8 oz (49.1 kg)   LMP 06/25/2017   SpO2 97%   BMI 21.15 kg/m²    Gen:  A&Ox3, NAD  CV: RRR   Resp: LCTAB, no wheeze or rhonchi  Abd:  Soft, nttp, nd. Ostomy pink with bowel sweat in bag. No ostomy output. Ext:  Warm, no cyanosis or edema    Assessment:   POD # 0 S/P EUA and CREATION END COLOSTOMY  Recovering well post-op     Plan:    Continue current care per primary team   Pain well controlled on current regimen. Monitor ostomy output. Increase activity as tolerated.       Electronically signed by Brandie Shepherd DO  on 8/14/2021 at 9:13 PM

## 2021-08-15 NOTE — PROGRESS NOTES
Colorectal Surgery:  Daily Progress Note                   PATIENT NAME: Nikos Saleem     TODAY'S DATE: 8/15/2021, 9:28 AM    SUBJECTIVE:     Pt seen and examined at bedside this morning. No acute events overnight. Afebrile. Tolerating sips of clears and ice chips. No nausea or vomiting. Patient reports her pain is very well controlled. Ostomy intact, no flatus noted. Denies fever, chills, nausea, vomiting, chest pain, shortness of breath. OBJECTIVE:   VITALS:  /69   Pulse 50   Temp 97.6 °F (36.4 °C) (Oral)   Resp 16   Ht 5' (1.524 m)   Wt 108 lb 4.8 oz (49.1 kg)   LMP 06/25/2017   SpO2 98%   BMI 21.15 kg/m²      INTAKE/OUTPUT:      Intake/Output Summary (Last 24 hours) at 8/15/2021 0185  Last data filed at 8/15/2021 0543  Gross per 24 hour   Intake 3479 ml   Output 2600 ml   Net 879 ml       PHYSICAL EXAM:  General Appearance:  awake, alert, oriented, in no acute distress  HEENT:  Normocephalic, atraumatic, mucus membranes moist   Skin:  Skin color, texture, turgor normal. No rashes or lesions. Lungs:  No chest wall tenderness. Heart:  Heart regular rate and rhythm  Abdomen: Soft, nontender, nondistended, infraumbilical midline incision healing well with staples intact, left lower quadrant end colostomy appears viable, stoma pink, no stool or flatus noted in bag. Extremities: Extremities warm to touch, pink, with no edema.         Data:  CBC with Differential:    Lab Results   Component Value Date    WBC 15.1 08/15/2021    RBC 3.87 08/15/2021    HGB 10.0 08/15/2021    HCT 33.8 08/15/2021     08/15/2021    MCV 88.4 08/15/2021    MCH 25.3 08/15/2021    MCHC 28.7 08/15/2021    RDW 20.4 08/15/2021    LYMPHOPCT 28 07/05/2020    PROMYELOPCT 1 07/05/2020    MONOPCT 4 07/05/2020    MYELOPCT 2 07/05/2020    BASOPCT 0 07/05/2020    MONOSABS 0.36 07/05/2020    LYMPHSABS 2.55 07/05/2020    EOSABS 0.09 07/05/2020    BASOSABS 0.00 07/05/2020    DIFFTYPE NOT REPORTED 07/05/2020     BMP:    Lab Results   Component Value Date     08/15/2021    K 4.6 08/15/2021     08/15/2021    CO2 23 08/15/2021    BUN 12 08/15/2021    LABALBU 2.0 08/15/2021    CREATININE 0.63 08/15/2021    CALCIUM 8.3 08/15/2021    GFRAA >60 08/15/2021    LABGLOM >60 08/15/2021    GLUCOSE 173 08/15/2021         ASSESSMENT:  Active Hospital Problems    Diagnosis Date Noted    Rectovaginal fistula [N82.3]     Acute metabolic encephalopathy [E09.36] 08/12/2021    Anastomotic leak of intestine [K91.89] 08/11/2021    Schizoaffective disorder ([facility] [F25.9] 07/05/2020    Major depression with psychotic features ([facility] [F32.3]     Bipolar I disorder, most recent episode mixed ([facility] [F31.60] 06/20/2020       39 y.o. female postop day 1 status post diversion with creation of end colostomy due to rectovaginal fistula    Plan:  Patient seen examined at bedside this morning. Okay for clear liquid diet  Discussed with patient that she may have continued stool from her vagina but this should resolve over the next few days as we have diverted the colon connection from allowing stool to go into her vagina. All questions and concerns answered. Pain control  Encouraged patient to be out of bed to ambulate/up to chair. PT/OT  We will consult wound ostomy for ostomy education. Patient does have previous history of ostomy as well. Encourage incentive spirometer/deep breathing. Will update patient's fiancé. Electronically signed by Nani Trujillo DO  on 8/15/2021 at 9:28 AM   I Dr. Sandeep Jamil saw and examined the patient. I have edited the above and agree with the above. Aron Branham  Colorectal Surgery

## 2021-08-15 NOTE — PROGRESS NOTES
Comprehensive Nutrition Assessment    Type and Reason for Visit:  Reassess    Nutrition Recommendations/Plan:   -Continue CL diet, advance diet as able   -Suggest ensure clear supplements BID   -Continue TPN central custom @ 60 mL/hr x 24 hrs (1440 mLs) + 20% of 200 mL IV lipids + 200 gms dextrose + 75 gms AA ~> 1380 kcals, 75 gms protein   -Recommend increasing Ca, NaAcetate and decreasing NaCl in next bag  -Will continue to monitor PO intake, TPN, labs, wound healing and weights     Nutrition Assessment:  Pt has been started on a CL diet this morning. TPN continues. Pt s/p EUA and creation end colostomy. Will start nutritional supplements to compliment po intake and will continue to monitor. Malnutrition Assessment:  Malnutrition Status:  Insufficient data    Context:  Acute Illness     Findings of the 6 clinical characteristics of malnutrition:  Energy Intake:  Mild decrease in energy intake (Comment)  Weight Loss:  Unable to assess     Body Fat Loss:  Unable to assess     Muscle Mass Loss:  Unable to assess    Fluid Accumulation:  1 - Mild Extremities   Strength:  Not Performed    Estimated Daily Nutrient Needs:  Energy (kcal):  30-35 ~>0097-1851 kcals/d; Weight Used for Energy Requirements:  Current     Protein (g):  1.3-1.5 gm/kg ~> 64-74 gms/d; Weight Used for Protein Requirements:  Current        Fluid (ml/day):  30 ~> 1450 mLs OR per MD discretion; Method Used for Fluid Requirements:  ml/Kg      Nutrition Related Findings:  colostomy; glucose 174; remeron      Wounds:  Open Wounds       Current Nutrition Therapies:    PN-Adult 2-in-1 Central Line (Standard)  ADULT DIET;  Clear Liquid  Current Parenteral Nutrition Orders:  · Type and Formula: 2-in-1 Custom   · Lipids: Daily (200 mL)  · Duration: Continuous  · Rate/Volume: 60 mL/hr; 1440 mLs  · Current PN Order Provides: 200 gms dextrose + 75 gms AA + 20% of 200 mL IV lipids ~> 1380 kcals, 75 gms protein  · Goal PN Orders Provides: 200 gms dextrose + 75 gms AA + 20% of 200 mL IV lipids ~> 1380 kcals, 75 gms protein      Anthropometric Measures:  · Height: 5' (152.4 cm)  · Current Body Weight: 108 lb (49 kg)   · Admission Body Weight: 108 lb (49 kg)    · Ideal Body Weight: 100 lbs; % Ideal Body Weight 108 %   · BMI: 21.1  · BMI Categories: Normal Weight (BMI 18.5-24. 9)       Nutrition Diagnosis:   · Inadequate protein intake related to altered GI function as evidenced by NPO or clear liquid status due to medical condition, nutrition support - parenteral nutrition (Need for ONS)      Nutrition Interventions:   Food and/or Nutrient Delivery:  Continue Current Diet, Start Oral Nutrition Supplement, Modify Parenteral Nutrition (advance diet as able)  Nutrition Education/Counseling:  Education not indicated   Coordination of Nutrition Care:  Continue to monitor while inpatient    Goals: Achieved   Pt to meet % of est'd needs via TPN/PO daily       Nutrition Monitoring and Evaluation:   Food/Nutrient Intake Outcomes:  Diet Advancement/Tolerance, Food and Nutrient Intake, Supplement Intake, Parenteral Nutrition Intake/Tolerance  Physical Signs/Symptoms Outcomes:  Biochemical Data, Nutrition Focused Physical Findings, Skin, Weight, GI Status, Fluid Status or Edema     Discharge Planning:     Too soon to determine     Electronically signed by Ewa James RD, LD on 8/15/21 at 10:34 AM EDT    Contact: [phone]

## 2021-08-16 LAB
ALBUMIN SERPL-MCNC: 2 G/DL (ref 3.5–5.2)
ALBUMIN/GLOBULIN RATIO: 0.7 (ref 1–2.5)
ALP BLD-CCNC: 87 U/L (ref 35–104)
ALT SERPL-CCNC: 14 U/L (ref 5–33)
ANION GAP SERPL CALCULATED.3IONS-SCNC: 9 MMOL/L (ref 9–17)
AST SERPL-CCNC: 20 U/L
BILIRUB SERPL-MCNC: <0.1 MG/DL (ref 0.3–1.2)
BUN BLDV-MCNC: 14 MG/DL (ref 6–20)
BUN/CREAT BLD: ABNORMAL (ref 9–20)
CALCIUM SERPL-MCNC: 8.1 MG/DL (ref 8.6–10.4)
CHLORIDE BLD-SCNC: 107 MMOL/L (ref 98–107)
CO2: 25 MMOL/L (ref 20–31)
CREAT SERPL-MCNC: 0.72 MG/DL (ref 0.5–0.9)
GFR AFRICAN AMERICAN: >60 ML/MIN
GFR NON-AFRICAN AMERICAN: >60 ML/MIN
GFR SERPL CREATININE-BSD FRML MDRD: ABNORMAL ML/MIN/{1.73_M2}
GFR SERPL CREATININE-BSD FRML MDRD: ABNORMAL ML/MIN/{1.73_M2}
GLUCOSE BLD-MCNC: 127 MG/DL (ref 65–105)
GLUCOSE BLD-MCNC: 87 MG/DL (ref 65–105)
GLUCOSE BLD-MCNC: 88 MG/DL (ref 70–99)
GLUCOSE BLD-MCNC: 89 MG/DL (ref 65–105)
GLUCOSE BLD-MCNC: 97 MG/DL (ref 65–105)
MAGNESIUM: 2.3 MG/DL (ref 1.6–2.6)
PHOSPHORUS: 3.5 MG/DL (ref 2.6–4.5)
POTASSIUM SERPL-SCNC: 4.4 MMOL/L (ref 3.7–5.3)
SODIUM BLD-SCNC: 141 MMOL/L (ref 135–144)
TOTAL PROTEIN: 4.8 G/DL (ref 6.4–8.3)

## 2021-08-16 PROCEDURE — 2500000003 HC RX 250 WO HCPCS: Performed by: COLON & RECTAL SURGERY

## 2021-08-16 PROCEDURE — 99232 SBSQ HOSP IP/OBS MODERATE 35: CPT | Performed by: FAMILY MEDICINE

## 2021-08-16 PROCEDURE — 84100 ASSAY OF PHOSPHORUS: CPT

## 2021-08-16 PROCEDURE — 6370000000 HC RX 637 (ALT 250 FOR IP): Performed by: STUDENT IN AN ORGANIZED HEALTH CARE EDUCATION/TRAINING PROGRAM

## 2021-08-16 PROCEDURE — 6360000002 HC RX W HCPCS: Performed by: STUDENT IN AN ORGANIZED HEALTH CARE EDUCATION/TRAINING PROGRAM

## 2021-08-16 PROCEDURE — 83735 ASSAY OF MAGNESIUM: CPT

## 2021-08-16 PROCEDURE — 76937 US GUIDE VASCULAR ACCESS: CPT

## 2021-08-16 PROCEDURE — C9113 INJ PANTOPRAZOLE SODIUM, VIA: HCPCS | Performed by: STUDENT IN AN ORGANIZED HEALTH CARE EDUCATION/TRAINING PROGRAM

## 2021-08-16 PROCEDURE — 82947 ASSAY GLUCOSE BLOOD QUANT: CPT

## 2021-08-16 PROCEDURE — 1200000000 HC SEMI PRIVATE

## 2021-08-16 PROCEDURE — 97530 THERAPEUTIC ACTIVITIES: CPT

## 2021-08-16 PROCEDURE — 2580000003 HC RX 258: Performed by: STUDENT IN AN ORGANIZED HEALTH CARE EDUCATION/TRAINING PROGRAM

## 2021-08-16 PROCEDURE — 97166 OT EVAL MOD COMPLEX 45 MIN: CPT

## 2021-08-16 PROCEDURE — 99213 OFFICE O/P EST LOW 20 MIN: CPT

## 2021-08-16 PROCEDURE — 2500000003 HC RX 250 WO HCPCS: Performed by: FAMILY MEDICINE

## 2021-08-16 PROCEDURE — 97162 PT EVAL MOD COMPLEX 30 MIN: CPT

## 2021-08-16 PROCEDURE — 80053 COMPREHEN METABOLIC PANEL: CPT

## 2021-08-16 PROCEDURE — 97535 SELF CARE MNGMENT TRAINING: CPT

## 2021-08-16 PROCEDURE — 36415 COLL VENOUS BLD VENIPUNCTURE: CPT

## 2021-08-16 PROCEDURE — 2580000003 HC RX 258: Performed by: ANESTHESIOLOGY

## 2021-08-16 RX ADMIN — I.V. FAT EMULSION 200 ML: 20 EMULSION INTRAVENOUS at 18:03

## 2021-08-16 RX ADMIN — ENOXAPARIN SODIUM 40 MG: 40 INJECTION SUBCUTANEOUS at 08:43

## 2021-08-16 RX ADMIN — ACETAMINOPHEN 1000 MG: 500 TABLET ORAL at 13:59

## 2021-08-16 RX ADMIN — SODIUM CHLORIDE, PRESERVATIVE FREE 10 ML: 5 INJECTION INTRAVENOUS at 08:46

## 2021-08-16 RX ADMIN — PIPERACILLIN AND TAZOBACTAM 3375 MG: 3; .375 INJECTION, POWDER, LYOPHILIZED, FOR SOLUTION INTRAVENOUS at 10:15

## 2021-08-16 RX ADMIN — PIPERACILLIN AND TAZOBACTAM 3375 MG: 3; .375 INJECTION, POWDER, LYOPHILIZED, FOR SOLUTION INTRAVENOUS at 01:56

## 2021-08-16 RX ADMIN — OXYCODONE HYDROCHLORIDE 5 MG: 5 TABLET ORAL at 16:31

## 2021-08-16 RX ADMIN — CYCLOBENZAPRINE 10 MG: 10 TABLET, FILM COATED ORAL at 08:42

## 2021-08-16 RX ADMIN — POTASSIUM CHLORIDE: 2 INJECTION, SOLUTION, CONCENTRATE INTRAVENOUS at 18:03

## 2021-08-16 RX ADMIN — FERROUS SULFATE TAB EC 325 MG (65 MG FE EQUIVALENT) 325 MG: 325 (65 FE) TABLET DELAYED RESPONSE at 08:42

## 2021-08-16 RX ADMIN — CYCLOBENZAPRINE 10 MG: 10 TABLET, FILM COATED ORAL at 13:59

## 2021-08-16 RX ADMIN — SODIUM CHLORIDE, PRESERVATIVE FREE 10 ML: 5 INJECTION INTRAVENOUS at 08:43

## 2021-08-16 RX ADMIN — SODIUM CHLORIDE, PRESERVATIVE FREE 10 ML: 5 INJECTION INTRAVENOUS at 20:16

## 2021-08-16 RX ADMIN — OXYCODONE HYDROCHLORIDE 5 MG: 5 TABLET ORAL at 11:18

## 2021-08-16 RX ADMIN — ACETAMINOPHEN 1000 MG: 500 TABLET ORAL at 06:42

## 2021-08-16 RX ADMIN — PIPERACILLIN AND TAZOBACTAM 3375 MG: 3; .375 INJECTION, POWDER, LYOPHILIZED, FOR SOLUTION INTRAVENOUS at 18:11

## 2021-08-16 RX ADMIN — OXYCODONE HYDROCHLORIDE 5 MG: 5 TABLET ORAL at 06:42

## 2021-08-16 RX ADMIN — ACETAMINOPHEN 1000 MG: 500 TABLET ORAL at 20:16

## 2021-08-16 RX ADMIN — IBUPROFEN 400 MG: 400 TABLET, FILM COATED ORAL at 08:42

## 2021-08-16 RX ADMIN — SODIUM CHLORIDE, PRESERVATIVE FREE 10 ML: 5 INJECTION INTRAVENOUS at 20:21

## 2021-08-16 RX ADMIN — OLANZAPINE 10 MG: 10 TABLET, FILM COATED ORAL at 20:16

## 2021-08-16 RX ADMIN — CYCLOBENZAPRINE 10 MG: 10 TABLET, FILM COATED ORAL at 20:16

## 2021-08-16 RX ADMIN — SODIUM CHLORIDE, PRESERVATIVE FREE 10 ML: 5 INJECTION INTRAVENOUS at 08:42

## 2021-08-16 RX ADMIN — BUSPIRONE HYDROCHLORIDE 15 MG: 15 TABLET ORAL at 20:16

## 2021-08-16 RX ADMIN — MIRTAZAPINE 15 MG: 15 TABLET, FILM COATED ORAL at 20:16

## 2021-08-16 RX ADMIN — FERROUS SULFATE TAB EC 325 MG (65 MG FE EQUIVALENT) 325 MG: 325 (65 FE) TABLET DELAYED RESPONSE at 18:04

## 2021-08-16 RX ADMIN — PANTOPRAZOLE SODIUM 40 MG: 40 INJECTION, POWDER, FOR SOLUTION INTRAVENOUS at 08:42

## 2021-08-16 RX ADMIN — BUSPIRONE HYDROCHLORIDE 15 MG: 15 TABLET ORAL at 08:42

## 2021-08-16 RX ADMIN — BUSPIRONE HYDROCHLORIDE 15 MG: 15 TABLET ORAL at 13:59

## 2021-08-16 RX ADMIN — SODIUM CHLORIDE, POTASSIUM CHLORIDE, SODIUM LACTATE AND CALCIUM CHLORIDE: 600; 310; 30; 20 INJECTION, SOLUTION INTRAVENOUS at 18:02

## 2021-08-16 ASSESSMENT — PAIN DESCRIPTION - LOCATION
LOCATION: ABDOMEN
LOCATION: ABDOMEN
LOCATION: PELVIS
LOCATION: PELVIS

## 2021-08-16 ASSESSMENT — PAIN DESCRIPTION - ONSET: ONSET: GRADUAL

## 2021-08-16 ASSESSMENT — PAIN DESCRIPTION - FREQUENCY: FREQUENCY: INTERMITTENT

## 2021-08-16 ASSESSMENT — PAIN SCALES - GENERAL
PAINLEVEL_OUTOF10: 5
PAINLEVEL_OUTOF10: 7
PAINLEVEL_OUTOF10: 6
PAINLEVEL_OUTOF10: 5
PAINLEVEL_OUTOF10: 7
PAINLEVEL_OUTOF10: 6
PAINLEVEL_OUTOF10: 6
PAINLEVEL_OUTOF10: 5
PAINLEVEL_OUTOF10: 5
PAINLEVEL_OUTOF10: 7
PAINLEVEL_OUTOF10: 5
PAINLEVEL_OUTOF10: 7
PAINLEVEL_OUTOF10: 7
PAINLEVEL_OUTOF10: 6
PAINLEVEL_OUTOF10: 7

## 2021-08-16 ASSESSMENT — PAIN DESCRIPTION - ORIENTATION: ORIENTATION: MID

## 2021-08-16 ASSESSMENT — PAIN DESCRIPTION - PAIN TYPE
TYPE: SURGICAL PAIN
TYPE: SURGICAL PAIN

## 2021-08-16 ASSESSMENT — PAIN DESCRIPTION - PROGRESSION: CLINICAL_PROGRESSION: NOT CHANGED

## 2021-08-16 ASSESSMENT — PAIN DESCRIPTION - DESCRIPTORS: DESCRIPTORS: ACHING;BURNING

## 2021-08-16 ASSESSMENT — PAIN - FUNCTIONAL ASSESSMENT: PAIN_FUNCTIONAL_ASSESSMENT: PREVENTS OR INTERFERES SOME ACTIVE ACTIVITIES AND ADLS

## 2021-08-16 NOTE — PROGRESS NOTES
Comprehensive Nutrition Assessment    Type and Reason for Visit:  Reassess    Nutrition Recommendations/Plan:   - Continue CL diet with Ensure Clear ONS x 2 per day. Encourage intakes and monitor for diet advancement. - Continue TPN at 60 mL/hr. Suggest in next bag increase Dextrose to 230 gm and AA to 80 gm; decrease KPhos and add MVI/TE to next bag. Continue 200 mL 20% IV lipids. Will provide 1502 kcals, 80 gm pro/day. Monitor labs and modify TPN as/if needed. - As diet advanced/PO intakes improve, suggest beginning to wean TPN. - Will monitor bowel function and plan of care. Nutrition Assessment:  TPN/IL continues. Pt remains in Clear Liquid diet. At visit pt working on breakfast tray - reports she has been slowly eating items from meal tray and increasing her intakes. Pt reports she does like Clear Liquid Ensure supplements - encouraged intakes of oral supplements. Discussed TPN with RN. Noted no ostomy output. Unsure of UBW. Labs reviewed: Ca 8.1 mg/dL. Meds reviewed. Malnutrition Assessment:  Malnutrition Status:  Insufficient data    Context:  Acute Illness     Findings of the 6 clinical characteristics of malnutrition:  Energy Intake:  Mild decrease in energy intake - Improving with TPN and start of oral diet. Weight Loss:  Unable to assess     Body Fat Loss:  Unable to assess   Muscle Mass Loss:  1 - Mild muscle mass loss Clavicles (pectoralis & deltoids)  Fluid Accumulation:  1 - Mild Extremities   Strength:  Not Performed    Estimated Daily Nutrient Needs:  Energy (kcal):  30-35 ~>9480-3293 kcals/d; Weight Used for Energy Requirements:  Current     Protein (g):  1.5-2.0 gm/kg = 70-90 gm pro/day; Weight Used for Protein Requirements:  Ideal        Fluid (ml/day):  30 ~> 1450 mLs OR per MD discretion; Method Used for Fluid Requirements:  ml/Kg     Nutrition Related Findings:  Labs/Meds reviewed. +Colostomy - no/minimal output. Hypoactive bowel sounds.   S/p 8/14 EAU and end colostomy creation. Wounds:  Surgical Incision (to abdomen)       Current Nutrition Therapies:    ADULT DIET; Clear Liquid  PN-Adult 2-in-1 Central Line (Standard)  Adult Oral Nutrition Supplement; Clear Liquid Oral Supplement  Current Parenteral Nutrition Orders:  · Type and Formula: 2-in-1 Custom   · Lipids: Daily (200 mL)  · Duration: Continuous  · Rate/Volume: 60 mL/hr; 1440 mLs  · Current PN Order Provides: 200 gms dextrose + 75 gms AA + 20% of 200 mL IV lipids ~> 1380 kcals, 75 gms protein  · Goal PN Orders Provides: 200 gms dextrose + 75 gms AA + 20% of 200 mL IV lipids ~> 1380 kcals, 75 gms protein    Anthropometric Measures:  · Height: 5' (152.4 cm)  · Current Body Weight: 108 lb 4.8 oz (49.1 kg)   · Admission Body Weight: 108 lb (49 kg)    · Ideal Body Weight: 100 lbs; % Ideal Body Weight 108.3 %   · BMI: 21.2  · BMI Categories: Normal Weight (BMI 18.5-24. 9)       Nutrition Diagnosis:   · Inadequate oral intake related to altered GI function (recent GI surgery) as evidenced by NPO or clear liquid status due to medical condition, intake 0-25%, nutrition support - parenteral nutrition    Nutrition Interventions:   Food and/or Nutrient Delivery:  Continue Current Diet, Continue Oral Nutrition Supplement, Modify Parenteral Nutrition (Monitor for diet advancement.)  Nutrition Education/Counseling:  Education completed (Encouraged slowly increasing PO intakes and drinking ONS.)   Coordination of Nutrition Care:  Continue to monitor while inpatient    Goals:  Pt to meet % of est'd needs via TPN/PO daily       Nutrition Monitoring and Evaluation:   Food/Nutrient Intake Outcomes:  Diet Advancement/Tolerance, Food and Nutrient Intake, Supplement Intake, Parenteral Nutrition Intake/Tolerance  Physical Signs/Symptoms Outcomes:  Biochemical Data, GI Status, Fluid Status or Edema, Hemodynamic Status, Nutrition Focused Physical Findings, Weight, Skin     Electronically signed by Ag Salinas RD, LD on 8/16/21 at 11:06 AM EDT    Contact: [phone]

## 2021-08-16 NOTE — CARE COORDINATION
Transitional Planning    Spoke to patient about plan for discharge. She plans to go home. Her fiance is at home with her to help her. [facility] for [facility] care. Might consider SNF pending PT/OT evaluations. 1102 Spoke to Vicki with [facility], they should be able to accept. Will need patient and preferably another person teachable for colostomy care.

## 2021-08-16 NOTE — PLAN OF CARE
Problem: Safety:  Goal: Free from accidental physical injury  Description: Free from accidental physical injury  8/16/2021 0307 by Crissy Echavarria RN  Outcome: Ongoing     Problem: Pain:  Goal: Patient's pain/discomfort is manageable  Description: Patient's pain/discomfort is manageable  8/16/2021 0307 by Crissy Echavarria RN  Outcome: Ongoing     Problem: Pain:  Goal: Pain level will decrease  Description: Pain level will decrease  8/16/2021 0307 by Crissy Echavarria RN  Outcome: Ongoing

## 2021-08-16 NOTE — PROGRESS NOTES
[facility]  Office: [address], DO, Juanpablo Smoker, DO, Anton Dulce, DO, Nelly Carballo Blood, DO, Colonel Mohsen MD, Mansi Dobson MD, Frances Arreola MD, Isabella Murillo MD, Stephanie Monroy MD, Aggie Maxwell MD, Jenifer Mathur MD, Samina Sanderson, DO, Carrillo hCanel MD, Sharath Barfield, DO, Ludmila Castellanos MD,  Surya Gibson, DO, Chayito Hanna MD, Ganesh Johnson MD, Ines Shukla MD, Melissa Wang MD, Anne Oropeza MD, Parris Majano MD, Estephania James MD, Lenny Ervin, [facility], [facility] Rakesh, CNP, Lou Monson, CNP, Silvia Ramirez, CNS, Holden Pugh, Emilee Crain, CNP, Delana Spatz, CNP, Angel Pimentel, CNP, Mari Vega, CNP, Olya Manning PA-C, Cesilia Grimes, [facility], Edwina Butler, CNP, Mindi Simpson, CNP, Shereen Zapien, CNP, Derrell Longoria, CNP, Michael Macedo, CNP, Lukasz Quinones, CNP, Licha Johnson, CNP, Leo Jay, [address] [place] [address]    Progress Note    8/16/2021    7:31 AM    Name:   Promise Schaffer  MRN:     [id number]     Acct:      [de-identified]   Room:   [address] [place] [address] Day:  4  Admit Date:  8/12/2021  8:35 AM    PCP:   GRAY Stover CNP  Code Status:  Full Code    Subjective:     C/C:stool output through vagina      Interval History Status: not changed. Pt was seen and examined this morning   No acute events overnight   Continues to have drainage from vaginal canal   Tolerating diet w/o complaints   No other complaints at this time     Brief History: This is a 39year old female patient who presented to the hospital with rectovaginal fistula. She is POD #2 s/p creation of end colostomy due to fistula. Surgery on board    Review of Systems:     12 point ROS performed and negative for anything other than what was stated in subjective    Medications:      Allergies:  No Known Allergies    Current Meds:   Scheduled Meds:    sodium chloride flush  5-40 mL Intravenous 2 times per day    cyclobenzaprine  10 mg Oral TID    acetaminophen  1,000 mg Oral 3 times per day    fat emulsion  200 mL Intravenous Daily    sodium chloride flush  5-40 mL Intravenous 2 times per day    enoxaparin  40 mg Subcutaneous Daily    busPIRone  15 mg Oral TID    ferrous sulfate  325 mg Oral BID WC    mirtazapine  15 mg Oral Nightly    OLANZapine  10 mg Oral Nightly    piperacillin-tazobactam  3,375 mg Intravenous Q8H    pantoprazole  40 mg Intravenous Daily    And    sodium chloride (PF)  10 mL Intravenous Daily     Continuous Infusions:    PN-Adult 2-in-1 Central Line (Standard) 60 mL/hr at 08/15/21 1835    lactated ringers 125 mL/hr at 08/15/21 1834    sodium chloride      sodium chloride Stopped (21 1727)    sodium chloride       PRN Meds: sodium chloride flush, sodium chloride, midazolam, morphine **OR** morphine, ibuprofen, diatrizoate meglumine-sodium, sodium chloride flush, sodium chloride, potassium chloride **OR** potassium alternative oral replacement **OR** potassium chloride, magnesium sulfate, ondansetron **OR** ondansetron, polyethylene glycol, hydrOXYzine, oxyCODONE    Data:     Past Medical History:   has a past medical history of Cancer ([address] Pelvis, female abscess, and UTI (urinary tract infection). Social History:   reports that she has never smoked. She has never used smokeless tobacco.     Family History: History reviewed. No pertinent family history. Vitals:  /64   Pulse 71   Temp 98.3 °F (36.8 °C) (Oral)   Resp 17   Ht 5' (1.524 m)   Wt 108 lb 4.8 oz (49.1 kg)   LMP 2017   SpO2 95%   BMI 21.15 kg/m²   Temp (24hrs), Av.4 °F (36.9 °C), Min:98.3 °F (36.8 °C), Max:98.5 °F (36.9 °C)    Recent Labs     08/15/21  0639 08/15/21  1143 08/15/21  1742 21  0631   POCGLU 174* 149* 108* 87       I/O (24Hr):     Intake/Output Summary (Last 24 hours) at 2021 0731  Last data filed at 2021 0611  Gross per 24 hour   Intake 2600 ml   Output 3250 ml Net -650 ml       Labs:  Hematology:  Recent Labs     08/14/21  2203 08/15/21  0746 08/15/21  0747   WBC  --  15.1*  --    RBC  --  3.87*  --    HGB 10.4* 9.8* 10.0*   HCT 32.5* 34.2* 33.8*   MCV  --  88.4  --    MCH  --  25.3  --    MCHC  --  28.7  --    RDW  --  20.4*  --    PLT  --  402  --    MPV  --  10.7  --      Chemistry:  Recent Labs     08/14/21  1410 08/15/21  0746 08/16/21  0528   * 140 141   K 4.8 4.6 4.4    109* 107   CO2 18* 23 25   GLUCOSE 184* 173* 88   BUN 7 12 14   CREATININE 0.70 0.63 0.72   MG 2.5 2.4 2.3   ANIONGAP 10 8* 9   LABGLOM >60 >60 >60   GFRAA >60 >60 >60   CALCIUM 8.0* 8.3* 8.1*   PHOS 4.1 2.9 3.5     Recent Labs     08/14/21  1200 08/14/21  1410 08/14/21  1812 08/14/21  2356 08/15/21  0639 08/15/21  0746 08/15/21  1143 08/15/21  1742 08/16/21  0528 08/16/21  0631   PROT  --  5.2*  --   --   --  4.9*  --   --  4.8*  --    LABALBU  --  1.8*  --   --   --  2.0*  --   --  2.0*  --    AST  --  24  --   --   --  9  --   --  20  --    ALT  --  13  --   --   --  12  --   --  14  --    ALKPHOS  --  116*  --   --   --  94  --   --  87  --    BILITOT  --  0.39  --   --   --  0.15*  --   --  <0.10*  --    TRIG  --   --   --   --   --  76  --   --   --   --    POCGLU   < >  --  168* 193* 174*  --  149* 108*  --  87    < > = values in this interval not displayed. ABG:No results found for: POCPH, PHART, PH, POCPCO2, USG2WHW, PCO2, POCPO2, PO2ART, PO2, POCHCO3, KUC7HRA, HCO3, NBEA, PBEA, BEART, BE, THGBART, THB, WVY4PMA, PWXP8GYU, G8OBVYNN, O2SAT, FIO2  Lab Results   Component Value Date/Time    SPECIAL NOT REPORTED 08/12/2021 08:57 PM     Lab Results   Component Value Date/Time    CULTURE NO GROWTH 08/12/2021 08:57 PM       Radiology:  [facility] BARIUM ENEMA    Result Date: 8/13/2021  1. Colovaginal fistulous communication is demonstrated.  2. Pooling of contrast posterior to the rectum in the presacral space consistent with extravasation of contrast most likely from low rectal anastomotic site. This correlates with presacral gas/fluid collection on the comparison CT scan. Findings were discussed with Dr. Pamela Solis . Subhash  (Gastroenterology) on 08/13/2020     XR HIP 2-3 VW W PELVIS RIGHT    Result Date: 8/12/2021  No acute osseous or soft tissue abnormality. XR ABDOMEN (2 VIEWS)    Result Date: 8/12/2021  Unremarkable radiographs of the abdomen. Physical Examination:        General appearance:  alert, cooperative and no distress  Mental Status:  oriented to person, place and time  Lungs:  clear to auscultation bilaterally, normal effort  Heart:  regular rate and rhythm, no murmur  Abdomen:  soft, nontender, infraumbilical midline incision healing well with staples intact, left lower quadrant end colostomy appears viable, stoma pink, no stool or flatus noted in bag. Extremities:  no edema, redness, tenderness in the calves    Assessment:        Hospital Problems         Last Modified POA    * (Principal) Anastomotic leak of intestine 8/11/2021 Yes    Bipolar I disorder, most recent episode mixed ([address] (Chronic) 8/11/2021 Yes    Major depression with psychotic features ([address] 8/11/2021 Yes    Schizoaffective disorder ([address] 8/11/2021 Yes    Acute metabolic encephalopathy 5/69/3836 Yes    Rectovaginal fistula 8/13/2021 Yes          Plan:        1. Rectovaginal fistula  - Colorectal surgery on board   - GI performed EGD   - OBGYN on board  - patient underwent flexible sigmoidoscopy performed yday along with gastrografin enema completed, both consistent with a rectovaginal fistula   - POD #2 s/p EUA and creation end colostomy  - urology on board given hydronephrosis.    - continue IV zosyn   - per urology will need to get her stents exchanged at scheduled intervals   - v/s per unit protocol   - continue IVF   - started on CLD and tolerating well, plan to advance diet per surgery        Houston Frias MD  8/16/2021  7:31 AM

## 2021-08-16 NOTE — PROGRESS NOTES
Screening  Patient Currently in Pain: Yes  Pain Assessment  Pain Assessment: 0-10  Pain Level: 5  Pain Location: Pelvis  Vital Signs  Patient Currently in Pain: Yes     Orientation  Orientation  Overall Orientation Status: Within Functional Limits  Social/Functional History  Social/Functional History  Lives With: Significant other (Fiance)  Type of Home: Trailer  Home Layout: One level  Home Access: Stairs to enter with rails  Entrance Stairs - Number of Steps: 2  Bathroom Shower/Tub: Tub/Shower unit  Bathroom Toilet: Standard  Home Equipment:  (does not use DME at baseline)  Receives Help From: Family  ADL Assistance: Needs assistance (Reports requiring assistance with bathing, dressing at baseline)  Homemaking Assistance: Needs assistance  Homemaking Responsibilities: No  Ambulation Assistance: Independent  Transfer Assistance: Independent  Active : No  Patient's  Info: boyfriend  Occupation: On disability  Leisure & Hobbies: Board games  Cognition      Objective     AROM RLE (degrees)  RLE AROM: WFL  AROM LLE (degrees)  LLE AROM : WFL  AROM RUE (degrees)  RUE AROM : WFL  AROM LUE (degrees)  LUE AROM : WFL  Strength RLE  Strength RLE: WFL  Strength LLE  Strength LLE: WFL  Strength RUE  Strength RUE: WFL  Strength LUE  Strength LUE: WFL     Sensation  Overall Sensation Status: Impaired  Bed mobility  Supine to Sit: Minimal assistance  Sit to Supine: Minimal assistance  Scooting: Minimal assistance  Transfers  Sit to Stand: Minimal Assistance  Stand to sit: Minimal Assistance  Ambulation  Ambulation?: Yes  More Ambulation?: No  Ambulation 1  Surface: level tile  Device: Rolling Walker  Assistance: Contact guard assistance  Distance: amb 25 ft with a RW x CGA     Balance  Posture: Good  Sitting - Static: Good  Sitting - Dynamic: Fair  Standing - Static: Fair  Standing - Dynamic: [address]  Times per week: 5-6x wk  Current Treatment Recommendations: Strengthening, Functional Mobility Training, Gait Training, Safety Education & Training, Endurance Training, Stair training  Safety Devices  Type of devices: Nurse notified, Left in bed, Call light within reach    G-Code  OutComes Score     AM-PAC Score  AM-PAC Inpatient Mobility Raw Score : 19 (08/16/21 1455)  AM-PAC Inpatient T-Scale Score : 45.44 (08/16/21 1455)  Mobility Inpatient CMS 0-100% Score: 41.77 (08/16/21 1455)  Mobility Inpatient CMS G-Code Modifier : CK (08/16/21 1455)       Goals  Short term goals  Time Frame for Short term goals: 10 visits  Short term goal 1: transfers with SBA  Short term goal 2: amb 150 ft with a RW x SBA  Short term goal 3: ascend/descend 4 steps with SBA  Short term goal 4: 20 min exercise program x SBA  Patient Goals   Patient goals : Return home       Therapy Time   Individual Concurrent Group Co-treatment   Time In 6672         Time Out 8564         Minutes 25             1 of [address] [facility], PT

## 2021-08-16 NOTE — PROGRESS NOTES
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 Presents to hospital with non-bloody diarrhea, stool from vagina, and pain. Anastomotic breakdown with a walled off abscess and a colovesical fistula found. End sigmoid colostomy created on 8/14/2021 with Dr Lakhwinder Munoz. Patient cannot recall specifics of ostomy care in past. She reports friends helped her and maybe a home health RN. [employer]. [facility] in the past.        08/16/21 1230   Colostomy LLQ    Placement Date: 08/14/21   Pre-existing: No  Timeout: Patient;Procedure;Site/Side;Appropriate Equipment; Consent Confirmed;Sterile Technique using full body drape;Site prepped with chlorhexidine;Sterile drsg with biopatch; Availability of Implant; [facility]. .. Stomal Appliance Changed  (Coloplast #[id number])   Flange Size (inches) 2.2 Inches   Stoma  Assessment Edema;Pink;Moist;Protrudes  (1 1/2\" slightly oval)   Mucocutaneous Junction Intact   Peristomal Assessment Clean; Intact   Stool Appearance Loose   Stool Color Brown   Stool Amount Smear         Equipment used : Coloplast #[id number] one piece, flat, cut-to-fit system. Discussed with patient components of stoma care including:  Frequency of emptying appliance, frequency of changing appliance, change in size of stoma over 6-8 weeks, frequency of measuring stoma with sizing guide, assessment and care of peristomal skin, consistency of stomal tissue, how to obtain supplies, diet,  and consistency of stool. Patient observed technique of emptying appliance and observed an appliance change. Patient return demonstrated opening and closing the pouch tail with verbal and hand over hand cues. Patient is attentive to education and followed steps to empty pouch.  She could not recall a lot of her history at this time. Reassurance provided. Plan of care:   Cut barrier to fit stoma with no more than 1/8\" of exposed skin  Apply strip paste vs a barrier seal/ring to the cut edge of the pouching system. Empty the pouch when 1/3 to 1/2 full. Change the pouching system twice weekly and prn  Our goal is to keep the skin around the stoma intact and to have a dependable pouching system without leaks. Will require assistance post discharge with ostomy care. Focus for education today is emptying. Please allow patient to empty her ostomy pouch as independently as possible. Call the [address] [place] [address] at [phone] or via [address] [place] [address] by searching \"wound\" and selecting the on-call clinician with questions or concerns.

## 2021-08-16 NOTE — PROGRESS NOTES
Colorectal Surgery:  Daily Progress Note          PATIENT NAME: Cara Caballero     TODAY'S DATE: 8/16/2021, 6:31 AM    SUBJECTIVE:     Pt seen and examined at bedside. No acute overnight events. Pt states overall pain is controlled. C/O of drainage from vaginal canal. Pt reports having chills, however denies any fever, nausea, vomiting, chest pain, and SOB. Pt is tolerating CLD diet . Pt reports passing flatus. Her ostomy is intact, however she reports no ostomy output. OBJECTIVE:   VITALS:  /64   Pulse 71   Temp 98.3 °F (36.8 °C) (Oral)   Resp 17   Ht 5' (1.524 m)   Wt 108 lb 4.8 oz (49.1 kg)   LMP 06/25/2017   SpO2 95%   BMI 21.15 kg/m²      INTAKE/OUTPUT:      Intake/Output Summary (Last 24 hours) at 8/16/2021 0631  Last data filed at 8/16/2021 6548  Gross per 24 hour   Intake 2600 ml   Output 3250 ml   Net -650 ml       PHYSICAL EXAM:  General Appearance: Awake, alert, oriented , in no acute distress   HEENT:  Normocephalic, atraumatic, mucus membranes moist   Heart: Regular rate and rhythm  Lungs: normal effort with symmetric rise and fall of chest wall  Abdomen: Soft, nontender, nondistended, infraumbilical midline incision healing well staples intact, left lower quadrant end colostomy appears well, stoma pink, no stool in bag    Extremities: No cyanosis, pitting edema, rashes noted. Skin: Skin color, texture, turgor normal. No rashes or lesions.       Data:  CBC:   Recent Labs     08/14/21  2203 08/15/21  0746 08/15/21  0747   WBC  --  15.1*  --    HGB 10.4* 9.8* 10.0*   PLT  --  402  --      Chemistry:   Recent Labs     08/13/21  0709 08/14/21  1410 08/15/21  0746   NA  --  134* 140   K  --  4.8 4.6   CL  --  106 109*   CO2  --  18* 23   GLUCOSE  --  184* 173*   BUN  --  7 12   CREATININE  --  0.70 0.63   MG 2.0 2.5 2.4   ANIONGAP  --  10 8*   LABGLOM  --  >60 >60   GFRAA  --  >60 >60   CALCIUM  --  8.0* 8.3*   PHOS 2.9 4.1 2.9     Hepatic:   Recent Labs     08/14/21  5838 RIGHT    Result Date: 8/12/2021  EXAMINATION: ONE XRAY VIEW OF THE PELVIS AND TWO XRAY VIEWS RIGHT HIP 8/12/2021 3:43 pm COMPARISON: None. HISTORY: ORDERING SYSTEM PROVIDED HISTORY: R hip pain TECHNOLOGIST PROVIDED HISTORY: R hip pain Acuity: Unknown Type of Exam: Unknown FINDINGS: The bony pelvis is intact. There is no acute osseous abnormality. The SI joints are maintained with mild degenerative change. The hip joints are maintained. There is a partially visualized right ureteral stent. The surrounding soft tissues are otherwise unremarkable. No acute osseous or soft tissue abnormality. XR ABDOMEN (2 VIEWS)    Result Date: 8/12/2021  EXAMINATION: TWO XRAY VIEWS OF THE ABDOMEN 8/12/2021 10:06 am COMPARISON: CT abdomen and pelvis performed 08/11/2021. HISTORY: ORDERING SYSTEM PROVIDED HISTORY: concern anastomotic leak TECHNOLOGIST PROVIDED HISTORY: concern anastomotic leak Acuity: Unknown FINDINGS: There is a nonobstructive bowel gas pattern. There is no intraperitoneal free air. There are no suspicious calcifications. There is a right ureteral stent. There is no acute osseous abnormality. The surrounding soft tissues are unremarkable. Unremarkable radiographs of the abdomen. ASSESSMENT:  Active Hospital Problems    Diagnosis Date Noted    Rectovaginal fistula [N82.3]     Acute metabolic encephalopathy [K61.48] 08/12/2021    Anastomotic leak of intestine [K91.89] 08/11/2021    Schizoaffective disorder ([facility] [address] [F25.9] 07/05/2020    Major depression with psychotic features ([facility] [address] [F32.3]     Bipolar I disorder, most recent episode [facility]) [F31.60] 06/20/2020       39 y.o. female with POD# 2 s/p diversion with creation of end colostomy due to to rectovaginal fistula.        Plan:    Patient seen and examined at bedside this morning  Analgesia: Pain controlled on Tylenol, Flexeril, fentanyl prn, ibuprofen prn, morphine prn, roxicodone prn  Diet: CLD, Pt tolerating diet well, plan to advance

## 2021-08-16 NOTE — PROGRESS NOTES
Occupational Therapy   Occupational Therapy Initial Assessment  Date: 2021   Patient Name: Diane Parr  MRN: [id number]     : 1975    Date of Service: 2021   Obtained from medical chart: \"The patient is a 39 y.o. Non- / non  female who presents with altered mental status and she is admitted to the hospital for the management of  Anastomotic leak of intestine. Patient is a poor historian. Patient is confused and disoriented. \"    Discharge Recommendations:  Patient would benefit from continued therapy after discharge. Pt unsafe to return to prior living environment at this time d/t deficits impacting pt safety. OT Equipment Recommendations  Equipment Needed: Yes  Mobility Devices: Izell Sarks; ADL Assistive Devices  Walker: Rolling  ADL Assistive Devices: Toileting - 3-in-1 Commode; Shower Chair with back;Grab Bars - toilet;Grab Bars - shower    Assessment   Performance deficits / Impairments: Decreased functional mobility ; Decreased endurance;Decreased ADL status; Decreased sensation;Decreased balance;Decreased strength;Decreased safe awareness;Decreased high-level IADLs;Decreased cognition;Decreased fine motor control  Assessment: Pt presents to hospital with right lateral thigh pain and diarrhea. OT/PT facilitated supine>sit t/f at [facility] and pt demo'd good static/dynamic sitting balance unsupported EOB. Pt engaged in functional transfer at min A and functional mobility household distance>bedside recliner at [facility]. Education provided on appropriate use and navigation with RW with good carryover. Pt retired to bedside recliner to engage in ADL tasks. Pt demo'd good ability to reach distal extremities for LB dressing task but no initiation made. Pt left with call light in reach and RN notified. Pt would benefit from continued skilled OT services to address the above deficits impacting safety and independence in ADLs/IADLs.   Prognosis: Good  Decision Making: Medium Complexity  Patient Education: OT role, OT POC, purpose of eval, benefits of functional activities, AE recommendations, proper hand placement for safe t/f, appropriate use and navigation with RW, adaptive dressing technique, orientation - fair return  REQUIRES OT FOLLOW UP: Yes  Activity Tolerance  Activity Tolerance: Patient Tolerated treatment well;Treatment limited secondary to decreased cognition  Safety Devices  Safety Devices in place: Yes  Type of devices: Left in chair;Nurse notified;Call light within reach;Gait belt  Restraints  Initially in place: No           Patient Diagnosis(es): There were no encounter diagnoses. has a past medical history of Cancer ([facility] [address] Pelvis, female abscess, and UTI (urinary tract infection). has a past surgical history that includes Ureter stent placement (2020); Abscess Drainage; Small intestine surgery (N/A, 07/03/2020); Hysterectomy; Abdomen surgery (08/14/2021); colostomy (08/14/2021); Upper gastrointestinal endoscopy (N/A, 8/13/2021); sigmoidoscopy (8/13/2021); and colostomy (N/A, 8/14/2021). Restrictions  Restrictions/Precautions  Restrictions/Precautions: Up as Tolerated  Required Braces or Orthoses?: No  Position Activity Restriction  Other position/activity restrictions: Colostomy bag    Subjective   General  Patient assessed for rehabilitation services?: Yes  Family / Caregiver Present: No  General Comment  Comments: RN ok'd for OT/PT co-eval this date. Pt agreeable to session and pleasant/cooperative throughout. Patient Currently in Pain: Yes  Pain Assessment  Pain Assessment: 0-10  Pain Level: 5  Pain Location: Pelvis  Non-Pharmaceutical Pain Intervention(s): Ambulation/Increased Activity; Therapeutic presence;Repositioned;Distraction  Response to Pain Intervention: Patient Satisfied  Vital Signs  Patient Currently in Pain: Yes    Social/Functional History  Social/Functional History  Lives With: Significant other (Fiance)  Type of Home: Trailer  Home Layout: One level  Home Access: Stairs to enter with rails  Entrance Stairs - Number of Steps: 2  Bathroom Shower/Tub: Tub/Shower unit  Bathroom Toilet: Standard  Home Equipment:  (does not use DME at baseline)  Receives Help From: Family  ADL Assistance: Needs assistance (Reports requiring assistance with bathing, dressing at baseline)  Homemaking Assistance: Needs assistance  Homemaking Responsibilities: No  Ambulation Assistance: Independent  Transfer Assistance: Independent  Active : No  Patient's  Info: boyfriend  Occupation: On disability  Leisure & Hobbies: Board games  Additional Comments: Pt questionable historian d/t decreased cognition       Objective   Vision: Impaired  Vision Exceptions: Wears glasses at all times  Hearing: Within functional limits    Orientation  Overall Orientation Status: Impaired (Oriented to place [facility]); Disoriented to year (2002), month (November), situation (unsure))     Balance  Sitting Balance: Stand by assistance (Static sitting EOB ~4 min at SBA; Dynamic sitting EOB ~4 min at SBA)  Standing Balance: Contact guard assistance  Standing Balance  Time: ~2 minutes  Activity: EOB in preparation for functional mobility  Comment: With RW  Functional Mobility  Functional - Mobility Device: Rolling Walker  Activity: Other (Household distances)  Assist Level: Contact guard assistance  Functional Mobility Comments: Education provided on appropriate use and navigation with RW with good carryover  ADL  Feeding: Modified independent ;Verbal cueing;Setup; Increased time to complete  Grooming: Verbal cueing; Increased time to complete;Setup;Stand by assistance (Pt washed face sitting supported in bedside recliner)  UE Bathing: Setup;Verbal cueing; Increased time to complete;Contact guard assistance  LE Bathing: Setup;Verbal cueing; Increased time to complete;Minimal assistance  UE Dressing: Setup;Verbal cueing; Increased time to complete;Contact guard assistance  LE Dressing: Setup;Verbal cueing; Increased time to complete;Minimal assistance (Therapist facilitated  doff/don sock sitting supported in bedside recliner; able to reach distal extremity but unable no initiation made)  Toileting: Setup; Increased time to complete;Minimal assistance  Additional Comments: Pt lethargic and required increased time throughout session  Tone RUE  RUE Tone: Normotonic  Tone LUE  LUE Tone: Normotonic  Coordination  Movements Are Fluid And Coordinated: No  Coordination and Movement description: Fine motor impairments;Right UE;Left UE;Decreased accuracy; Decreased speed     Bed mobility  Supine to Sit: Contact guard assistance  Sit to Supine: Contact guard assistance  Scooting: Contact guard assistance  Transfers  Sit to stand: Minimal assistance  Stand to sit: Minimal assistance  Transfer Comments: Education provided on proper hand placement for safe t/f with good carryover     Cognition  Overall Cognitive Status: Exceptions  Following Commands: Follows one step commands with increased time; Follows one step commands with repetition  Attention Span: Attends with cues to redirect; Difficulty attending to directions; Difficulty dividing attention  Memory: Decreased recall of recent events  Safety Judgement: Decreased awareness of need for assistance;Decreased awareness of need for safety  Problem Solving: Assistance required to identify errors made;Assistance required to correct errors made;Assistance required to generate solutions;Assistance required to implement solutions  Insights: Decreased awareness of deficits  Initiation: Requires cues for some  Sequencing: Requires cues for some        Sensation  Overall Sensation Status: Impaired (pt reports history of n/t in B hands/feet)        LUE AROM : WFL  Left Hand AROM: WFL  RUE AROM : WFL  Right Hand AROM: WFL  LUE Strength  Gross LUE Strength: WFL  L Shoulder Flex: 4+/5  L Shoulder Ext: 4+/5  L Elbow Flex: 4/5  L Elbow Ext: 4/5  L Hand General: 4+/5  RUE Strength  Gross RUE Strength: WFL  R Shoulder Flex: 4+/5  R Shoulder Ext: 4+/5  R Elbow Flex: 4/5  R Elbow Ext: 4/5  R Hand General: 4+/5      Plan   Plan  Times per week: 3-4x/wk  Current Treatment Recommendations: Patient/Caregiver Education & Training, Home Management Training, Strengthening, Equipment Evaluation, Education, & procurement, Balance Training, Cognitive Reorientation, Functional Mobility Training, Endurance Training, Cognitive/Perceptual Training, Pain Management, Self-Care / ADL, Safety Education & Training    AM-PAC Score        AM-[facility] Inpatient Daily Activity Raw Score: 19 (08/16/21 1525)  AM-PAC Inpatient ADL T-Scale Score : 40.22 (08/16/21 1525)  ADL Inpatient CMS 0-100% Score: 42.8 (08/16/21 1525)  ADL Inpatient CMS G-Code Modifier : CK (08/16/21 1525)    Goals  Short term goals  Time Frame for Short term goals: Patient will, by discharge:  Short term goal 1: demo UB ADLs at [facility]  Short term goal 2: demo LB ADLs/toileting at [facility] using AE PRN  Short term goal 3: demo functional transfers/mobility at [facility] using LRD to increase engagement in ADLs/IADLs  Short term goal 4: demo dynamic standing tolerance 12+ minutes at [facility] while reaching outside DINESH to promote participation in functional activities  Short term goal 5: demo good safety awareness with 1 VC to improve safety for functional activities  Short term goal 6: follow 3-step command during functional activities to increase engagement       Therapy Time   Individual Concurrent Group Co-treatment   Time In 1426         Time Out 1447         Minutes 21         Timed Code Treatment Minutes: 8 Minutes       [address]/OT

## 2021-08-17 LAB
ABSOLUTE EOS #: 0.45 K/UL (ref 0–0.4)
ABSOLUTE IMMATURE GRANULOCYTE: 0.15 K/UL (ref 0–0.3)
ABSOLUTE LYMPH #: 2.11 K/UL (ref 1–4.8)
ABSOLUTE MONO #: 0.91 K/UL (ref 0.1–0.8)
ALBUMIN SERPL-MCNC: 2.2 G/DL (ref 3.5–5.2)
ALBUMIN/GLOBULIN RATIO: 0.7 (ref 1–2.5)
ALP BLD-CCNC: 105 U/L (ref 35–104)
ALT SERPL-CCNC: 15 U/L (ref 5–33)
ANION GAP SERPL CALCULATED.3IONS-SCNC: 12 MMOL/L (ref 9–17)
AST SERPL-CCNC: 13 U/L
BASOPHILS # BLD: 1 % (ref 0–2)
BASOPHILS ABSOLUTE: 0.15 K/UL (ref 0–0.2)
BILIRUB SERPL-MCNC: 0.18 MG/DL (ref 0.3–1.2)
BUN BLDV-MCNC: 14 MG/DL (ref 6–20)
BUN/CREAT BLD: ABNORMAL (ref 9–20)
CALCIUM SERPL-MCNC: 8.4 MG/DL (ref 8.6–10.4)
CHLORIDE BLD-SCNC: 105 MMOL/L (ref 98–107)
CO2: 24 MMOL/L (ref 20–31)
CREAT SERPL-MCNC: 0.7 MG/DL (ref 0.5–0.9)
DIFFERENTIAL TYPE: ABNORMAL
EOSINOPHILS RELATIVE PERCENT: 3 % (ref 1–4)
GFR AFRICAN AMERICAN: >60 ML/MIN
GFR NON-AFRICAN AMERICAN: >60 ML/MIN
GFR SERPL CREATININE-BSD FRML MDRD: ABNORMAL ML/MIN/{1.73_M2}
GFR SERPL CREATININE-BSD FRML MDRD: ABNORMAL ML/MIN/{1.73_M2}
GLUCOSE BLD-MCNC: 100 MG/DL (ref 65–105)
GLUCOSE BLD-MCNC: 101 MG/DL (ref 65–105)
GLUCOSE BLD-MCNC: 109 MG/DL (ref 65–105)
GLUCOSE BLD-MCNC: 116 MG/DL (ref 65–105)
GLUCOSE BLD-MCNC: 116 MG/DL (ref 70–99)
GLUCOSE BLD-MCNC: 95 MG/DL (ref 65–105)
HCT VFR BLD CALC: 38.9 % (ref 36.3–47.1)
HEMOGLOBIN: 11.3 G/DL (ref 11.9–15.1)
IMMATURE GRANULOCYTES: 1 %
LYMPHOCYTES # BLD: 14 % (ref 24–44)
MAGNESIUM: 2.2 MG/DL (ref 1.6–2.6)
MCH RBC QN AUTO: 25.5 PG (ref 25.2–33.5)
MCHC RBC AUTO-ENTMCNC: 29 G/DL (ref 28.4–34.8)
MCV RBC AUTO: 87.8 FL (ref 82.6–102.9)
MONOCYTES # BLD: 6 % (ref 1–7)
MORPHOLOGY: ABNORMAL
NRBC AUTOMATED: 0 PER 100 WBC
PDW BLD-RTO: 21.1 % (ref 11.8–14.4)
PHOSPHORUS: 3.3 MG/DL (ref 2.6–4.5)
PLATELET # BLD: 488 K/UL (ref 138–453)
PLATELET ESTIMATE: ABNORMAL
PMV BLD AUTO: 10.2 FL (ref 8.1–13.5)
POTASSIUM SERPL-SCNC: 3.9 MMOL/L (ref 3.7–5.3)
RBC # BLD: 4.43 M/UL (ref 3.95–5.11)
RBC # BLD: ABNORMAL 10*6/UL
SEG NEUTROPHILS: 75 % (ref 36–66)
SEGMENTED NEUTROPHILS ABSOLUTE COUNT: 11.33 K/UL (ref 1.8–7.7)
SODIUM BLD-SCNC: 141 MMOL/L (ref 135–144)
TOTAL PROTEIN: 5.4 G/DL (ref 6.4–8.3)
WBC # BLD: 15.1 K/UL (ref 3.5–11.3)
WBC # BLD: ABNORMAL 10*3/UL

## 2021-08-17 PROCEDURE — 85025 COMPLETE CBC W/AUTO DIFF WBC: CPT

## 2021-08-17 PROCEDURE — 99232 SBSQ HOSP IP/OBS MODERATE 35: CPT | Performed by: INTERNAL MEDICINE

## 2021-08-17 PROCEDURE — 2580000003 HC RX 258: Performed by: STUDENT IN AN ORGANIZED HEALTH CARE EDUCATION/TRAINING PROGRAM

## 2021-08-17 PROCEDURE — 6370000000 HC RX 637 (ALT 250 FOR IP): Performed by: STUDENT IN AN ORGANIZED HEALTH CARE EDUCATION/TRAINING PROGRAM

## 2021-08-17 PROCEDURE — 80053 COMPREHEN METABOLIC PANEL: CPT

## 2021-08-17 PROCEDURE — 97116 GAIT TRAINING THERAPY: CPT

## 2021-08-17 PROCEDURE — 82947 ASSAY GLUCOSE BLOOD QUANT: CPT

## 2021-08-17 PROCEDURE — C9113 INJ PANTOPRAZOLE SODIUM, VIA: HCPCS | Performed by: STUDENT IN AN ORGANIZED HEALTH CARE EDUCATION/TRAINING PROGRAM

## 2021-08-17 PROCEDURE — 36415 COLL VENOUS BLD VENIPUNCTURE: CPT

## 2021-08-17 PROCEDURE — 97530 THERAPEUTIC ACTIVITIES: CPT

## 2021-08-17 PROCEDURE — 2500000003 HC RX 250 WO HCPCS: Performed by: COLON & RECTAL SURGERY

## 2021-08-17 PROCEDURE — 6360000002 HC RX W HCPCS: Performed by: STUDENT IN AN ORGANIZED HEALTH CARE EDUCATION/TRAINING PROGRAM

## 2021-08-17 PROCEDURE — 84100 ASSAY OF PHOSPHORUS: CPT

## 2021-08-17 PROCEDURE — 2580000003 HC RX 258: Performed by: ANESTHESIOLOGY

## 2021-08-17 PROCEDURE — 83735 ASSAY OF MAGNESIUM: CPT

## 2021-08-17 PROCEDURE — 1200000000 HC SEMI PRIVATE

## 2021-08-17 RX ORDER — LANOLIN ALCOHOL/MO/W.PET/CERES
325 CREAM (GRAM) TOPICAL EVERY OTHER DAY
Status: DISCONTINUED | OUTPATIENT
Start: 2021-08-18 | End: 2021-08-18 | Stop reason: HOSPADM

## 2021-08-17 RX ADMIN — PANTOPRAZOLE SODIUM 40 MG: 40 INJECTION, POWDER, FOR SOLUTION INTRAVENOUS at 08:22

## 2021-08-17 RX ADMIN — PIPERACILLIN AND TAZOBACTAM 3375 MG: 3; .375 INJECTION, POWDER, LYOPHILIZED, FOR SOLUTION INTRAVENOUS at 17:40

## 2021-08-17 RX ADMIN — SODIUM CHLORIDE, PRESERVATIVE FREE 10 ML: 5 INJECTION INTRAVENOUS at 08:27

## 2021-08-17 RX ADMIN — MIRTAZAPINE 15 MG: 15 TABLET, FILM COATED ORAL at 20:50

## 2021-08-17 RX ADMIN — SODIUM CHLORIDE, PRESERVATIVE FREE 10 ML: 5 INJECTION INTRAVENOUS at 20:50

## 2021-08-17 RX ADMIN — IBUPROFEN 400 MG: 400 TABLET, FILM COATED ORAL at 08:22

## 2021-08-17 RX ADMIN — PIPERACILLIN AND TAZOBACTAM 3375 MG: 3; .375 INJECTION, POWDER, LYOPHILIZED, FOR SOLUTION INTRAVENOUS at 09:48

## 2021-08-17 RX ADMIN — ACETAMINOPHEN 1000 MG: 500 TABLET ORAL at 20:50

## 2021-08-17 RX ADMIN — OXYCODONE HYDROCHLORIDE 5 MG: 5 TABLET ORAL at 05:10

## 2021-08-17 RX ADMIN — FERROUS SULFATE TAB EC 325 MG (65 MG FE EQUIVALENT) 325 MG: 325 (65 FE) TABLET DELAYED RESPONSE at 08:22

## 2021-08-17 RX ADMIN — ENOXAPARIN SODIUM 40 MG: 40 INJECTION SUBCUTANEOUS at 08:22

## 2021-08-17 RX ADMIN — BUSPIRONE HYDROCHLORIDE 15 MG: 15 TABLET ORAL at 13:59

## 2021-08-17 RX ADMIN — OXYCODONE HYDROCHLORIDE 5 MG: 5 TABLET ORAL at 17:40

## 2021-08-17 RX ADMIN — PIPERACILLIN AND TAZOBACTAM 3375 MG: 3; .375 INJECTION, POWDER, LYOPHILIZED, FOR SOLUTION INTRAVENOUS at 02:44

## 2021-08-17 RX ADMIN — BUSPIRONE HYDROCHLORIDE 15 MG: 15 TABLET ORAL at 08:22

## 2021-08-17 RX ADMIN — BUSPIRONE HYDROCHLORIDE 15 MG: 15 TABLET ORAL at 20:50

## 2021-08-17 RX ADMIN — POTASSIUM CHLORIDE: 2 INJECTION, SOLUTION, CONCENTRATE INTRAVENOUS at 17:40

## 2021-08-17 RX ADMIN — ACETAMINOPHEN 1000 MG: 500 TABLET ORAL at 13:59

## 2021-08-17 RX ADMIN — CYCLOBENZAPRINE 10 MG: 10 TABLET, FILM COATED ORAL at 13:59

## 2021-08-17 RX ADMIN — CYCLOBENZAPRINE 10 MG: 10 TABLET, FILM COATED ORAL at 20:50

## 2021-08-17 RX ADMIN — SODIUM CHLORIDE, PRESERVATIVE FREE 10 ML: 5 INJECTION INTRAVENOUS at 08:22

## 2021-08-17 RX ADMIN — SODIUM CHLORIDE, POTASSIUM CHLORIDE, SODIUM LACTATE AND CALCIUM CHLORIDE: 600; 310; 30; 20 INJECTION, SOLUTION INTRAVENOUS at 17:45

## 2021-08-17 RX ADMIN — CYCLOBENZAPRINE 10 MG: 10 TABLET, FILM COATED ORAL at 08:22

## 2021-08-17 RX ADMIN — OLANZAPINE 10 MG: 10 TABLET, FILM COATED ORAL at 20:50

## 2021-08-17 RX ADMIN — ACETAMINOPHEN 1000 MG: 500 TABLET ORAL at 06:13

## 2021-08-17 ASSESSMENT — PAIN SCALES - GENERAL
PAINLEVEL_OUTOF10: 7
PAINLEVEL_OUTOF10: 0
PAINLEVEL_OUTOF10: 6
PAINLEVEL_OUTOF10: 8
PAINLEVEL_OUTOF10: 7
PAINLEVEL_OUTOF10: 2
PAINLEVEL_OUTOF10: 5

## 2021-08-17 NOTE — PLAN OF CARE
Problem: Safety:  Goal: Free from accidental physical injury  Description: Free from accidental physical injury  8/17/2021 0405 by Chandrika Agudelo RN  Outcome: Ongoing  8/17/2021 0403 by Chandrika Agudelo RN  Outcome: Ongoing  8/16/2021 1722 by Deric Long RN  Outcome: Ongoing     Problem: Pain:  Goal: Patient's pain/discomfort is manageable  Description: Patient's pain/discomfort is manageable  8/17/2021 0405 by Chandrika Agudelo RN  Outcome: Ongoing  8/17/2021 0403 by Chandrika Agudelo RN  Outcome: Ongoing  8/16/2021 1722 by Deric Long RN  Outcome: Ongoing  Goal: Pain level will decrease  Description: Pain level will decrease  8/17/2021 0405 by Chandrika Agudelo RN  Outcome: Ongoing  8/17/2021 0403 by Chandrika Agudelo RN  Outcome: Ongoing  8/16/2021 1722 by Deric Long RN  Outcome: Ongoing  Goal: Control of acute pain  Description: Control of acute pain  8/17/2021 0405 by Chandrika Agudelo RN  Outcome: Ongoing  8/17/2021 0403 by Chandrika Agudelo RN  Outcome: Ongoing  8/16/2021 1722 by Deric Long RN  Outcome: Ongoing  Goal: Control of chronic pain  Description: Control of chronic pain  8/17/2021 0405 by Chandrika Agudelo RN  Outcome: Ongoing  8/17/2021 0403 by Chandrika Agudelo RN  Outcome: Ongoing  8/16/2021 1722 by Deric Long RN  Outcome: Ongoing     Problem: Skin Integrity:  Goal: Skin integrity will stabilize  Description: Skin integrity will stabilize  8/17/2021 0405 by Chandrika Agudelo RN  Outcome: Ongoing  8/17/2021 0403 by Chandrika Agudelo RN  Outcome: Ongoing  8/16/2021 1722 by Deric Long RN  Outcome: Ongoing     Problem: Skin Integrity:  Goal: Will show no infection signs and symptoms  Description: Will show no infection signs and symptoms  8/17/2021 0405 by Chandrika Agudelo RN  Outcome: Ongoing  8/17/2021 0403 by Chandrika Agudelo RN  Outcome: Ongoing  8/16/2021 1722 by Deric Long RN  Outcome: Ongoing  Goal: Absence of new skin breakdown  Description: Absence of new skin breakdown  8/17/2021 0405 by Richard Bowman Jackee Romberg, RN  Outcome: Ongoing  8/17/2021 0403 by Mikie Brizuela RN  Outcome: Ongoing  8/16/2021 1722 by Corina Lewis RN  Outcome: Ongoing     Problem: Falls - Risk of:  Goal: Will remain free from falls  Description: Will remain free from falls  8/17/2021 0405 by Mikie Brizuela RN  Outcome: Ongoing  8/17/2021 0403 by Mikie Brizuela RN  Outcome: Ongoing  8/16/2021 1722 by Corina Lewis RN  Outcome: Ongoing  Goal: Absence of physical injury  Description: Absence of physical injury  8/17/2021 0405 by Mikie Brizuela RN  Outcome: Ongoing  8/17/2021 0403 by Mikie Brizuela RN  Outcome: Ongoing  8/16/2021 1722 by Corina Lewis RN  Outcome: Ongoing     Problem: Nutrition  Goal: Optimal nutrition therapy  8/17/2021 0405 by Mikie Brizuela RN  Outcome: Ongoing  8/17/2021 0403 by Mikie Brizuela RN  Outcome: Ongoing  8/16/2021 1722 by Corina Lewis RN  Outcome: Ongoing     Problem: Musculor/Skeletal Functional Status  Goal: Highest potential functional level  8/17/2021 0405 by Mikie Brizuela RN  Outcome: Ongoing  8/17/2021 0403 by Mikie Brizuela RN  Outcome: Ongoing  8/16/2021 1722 by Corina Lewis RN  Outcome: Ongoing

## 2021-08-17 NOTE — PROGRESS NOTES
Comprehensive Nutrition Assessment    Type and Reason for Visit:  Reassess    Nutrition Recommendations/Plan:   - Continue Full Liquid diet. Will provide Ensure Enlive ONS in chocolate x 2 per day. Encourage intakes and monitor for diet advancement. - Continue TPN at 60 mL/hr. Discontinue IV lipids with diet advancement. Suggest in next bag increase K and remove MVI/TE. Will provide 1102 kcals, 80 gm pro/day. Monitor labs and modify TPN as/if needed. - As diet advanced/PO intakes improve, continue to wean TPN. Nutrition Assessment:  Pt has been advanced to a Full Liquid diet and is looking forward to eating solid foods. This morning pt ate 50-75% of her breakfast tray. Noted yesterday pt only eating about 25% of meal trays. Discussed PO intakes and TPN with RN. TPN to continue - suggest discontinuing IV lipids with diet advancement and increased PO intakes. Pt with small amount of colostomy output. Labs/Meds reviewed. Malnutrition Assessment:  Malnutrition Status:  Insufficient data    Context:  Acute Illness     Findings of the 6 clinical characteristics of malnutrition:  Energy Intake:  Mild decrease in energy intake - Improving with TPN and start of oral diet. Weight Loss:  Unable to assess     Body Fat Loss:  Unable to assess   Muscle Mass Loss:  1 - Mild muscle mass loss Clavicles (pectoralis & deltoids)  Fluid Accumulation:  1 - Mild Extremities   Strength:  Not Performed    Estimated Daily Nutrient Needs:  Energy (kcal):  30-35 ~>3514-1616 kcals/d; Weight Used for Energy Requirements:  Current     Protein (g):  1.5-2.0 gm/kg = 70-90 gm pro/day; Weight Used for Protein Requirements:  Ideal        Fluid (ml/day):  30 ~> 1450 mLs OR per MD discretion; Method Used for Fluid Requirements:  ml/Kg     Nutrition Related Findings:  Labs/Meds reviewed. +Colostomy - small amount of output. Hypoactive bowel sounds. S/p 8/14 EAU and end colostomy creation.       Wounds:  Surgical Incision (to

## 2021-08-17 NOTE — PROGRESS NOTES
Physical Therapy  Facility/Department: [facility]  Daily Treatment Note  NAME: Diane Parr  : 1975  MRN: [id number]    Date of Service: 2021    Discharge Recommendations:  Patient would benefit from continued therapy after discharge   PT Equipment Recommendations  Equipment Needed: Yes  Mobility Devices: John New: Rolling  Other: Pt stated she would like a walker for home    Assessment   Body structures, Functions, Activity limitations: Decreased functional mobility ; Decreased endurance;Decreased strength  Assessment: Pt ambulated 25 ft and 15 ft with a RW x CGA. Pt ambulates very slowly and cautiously, deviated path, min a for RW negoitation/advancment when turning, pt sat in chair for a few moments than asked to return to bed. She could benefit from a continuation of PT for gait and strenthening following her DC  Prognosis: Good  PT Education: Goals;PT Role;Plan of Care;Energy Conservation;General Safety;Home Exercise Program  REQUIRES PT FOLLOW UP: Yes  Activity Tolerance  Activity Tolerance: Patient Tolerated treatment well     Patient Diagnosis(es): There were no encounter diagnoses. has a past medical history of Cancer ([address] Pelvis, female abscess, and UTI (urinary tract infection). has a past surgical history that includes Ureter stent placement (); Abscess Drainage; Small intestine surgery (N/A, 2020); Hysterectomy; Abdomen surgery (2021); colostomy (2021); Upper gastrointestinal endoscopy (N/A, 2021); sigmoidoscopy (2021); and colostomy (N/A, 2021).     Restrictions  Restrictions/Precautions  Restrictions/Precautions: Up as Tolerated  Required Braces or Orthoses?: No  Position Activity Restriction  Other position/activity restrictions: Colostomy bag  Subjective   General  Chart Reviewed: Yes  Response To Previous Treatment: Not applicable  Family / Caregiver Present: No  Subjective  Subjective: RN and pt agreeable to PT, pt laying in bed recieving V-Bag care from nursing, pt eager for therapy  Pain Screening  Patient Currently in Pain: No  Pain Assessment  Pain Assessment: 0-10  Pain Level: 0  Patient's Stated Pain Goal: No pain  Vital Signs  Patient Currently in Pain: No       Orientation  Orientation  Overall Orientation Status: Within Normal Limits  Cognition   Cognition  Overall Cognitive Status: Exceptions  Following Commands: Follows one step commands with increased time; Follows one step commands with repetition  Attention Span: Attends with cues to redirect; Difficulty attending to directions; Difficulty dividing attention  Memory: Decreased recall of recent events  Safety Judgement: Decreased awareness of need for assistance;Decreased awareness of need for safety  Problem Solving: Assistance required to identify errors made;Assistance required to correct errors made;Assistance required to generate solutions;Assistance required to implement solutions  Insights: Decreased awareness of deficits  Initiation: Requires cues for some  Sequencing: Requires cues for some  Objective   Bed mobility  Supine to Sit: Contact guard assistance  Sit to Supine: Contact guard assistance  Scooting: Contact guard assistance  Comment: CGA assist dt 2 IVs and a hollins  Transfers  Sit to Stand: Contact guard assistance  Stand to sit: Contact guard assistance  Bed to Chair: Contact guard assistance  Comment: Pt very slow movements, fixated on IV, therapist assisted with cords, pt transfereed to chair, than requested to return to bed.   Ambulation  Ambulation?: Yes  More Ambulation?: No  Ambulation 1  Surface: level tile  Assistance: Contact guard assistance;Minimal assistance  Gait Deviations: Slow Leisa;Deviated path;Shuffles  Distance: amb 15 ft  and 25 ft with a RW x CGA  Comments: Therapist assisted with cords, pt required verbal cues and min a with RW negoitation/advancment, ambulated around room, turned around in hallway, sat in chair and requested to return to bed     Balance  Posture: Good  Sitting - Static: Good  Sitting - Dynamic: Good  Standing - Static: Fair;+  Standing - Dynamic: Fair  Comments: Balance assessed with RW  Exercises  Ankle Pumps: 20 reps to increase circulation     AM-PAC Score  AM-PAC Inpatient Mobility Raw Score : 18 (08/17/21 1539)  AM-PAC Inpatient T-Scale Score : 43.63 (08/17/21 1539)  Mobility Inpatient CMS 0-100% Score: 46.58 (08/17/21 1539)  Mobility Inpatient CMS G-Code Modifier : CK (08/17/21 1539)     Goals  Short term goals  Time Frame for Short term goals: 10 visits  Short term goal 1: transfers with SBA  Short term goal 2: amb 150 ft with a RW x SBA  Short term goal 3: ascend/descend 4 steps with SBA  Short term goal 4: 20 min exercise program x SBA  Patient Goals   Patient goals : Return home    Plan    Plan  Times per week: 5-6x wk  Current Treatment Recommendations: Strengthening, Functional Mobility Training, Gait Training, Safety Education & Training, Endurance Training, Stair training  Safety Devices  Type of devices: Nurse notified, Left in bed, Call light within reach, Bed alarm in place, Gait belt     Therapy Time   Individual Concurrent Group Co-treatment   Time In 1452         Time Out 1517         Minutes 25         Timed Code Treatment Minutes: [address] PTA

## 2021-08-17 NOTE — PROGRESS NOTES
[facility]  Office: [address], DO, Ofe Nobles, DO, Mariam Lambert, DO, Gustabo Shepard Blood, DO, Laurence Marroquin MD, Shiela Cuevas MD, Queen Juan Luis MD, Monika Arrington MD, Sabrina Mcmahan MD, Iman Echols MD, Franci Sanchez MD, Joan Solares, DO, Matthew Rizzo MD, Eric Sofia DO, Nick Coleman MD,  Oleg Son DO, Kalani Bowman MD, Franci Jacobs MD, Chio Burt MD, Thor Curiel MD, Navid White MD, Essie Solitario MD, Mikaela Khalil MD, Gerry Malone, [facility], [facility] Rosa, CNP, Shantaisaak Washburn, CNP, Josiah Rae, CNS, Kuldip Pace, Imelda Allen, CNP, Carm[address] Street, CNP, Canelo Mckinley, CNP, Harsha Jones CNP, Mel Nobles PA-C, Corina Ariza, [facility], Navneet Campbell, CNP, Lissett Hoover, CNP, Elissa Watt, [facility], Melba Cerna, CNP, Jamaal James, CNP, Agustín Webber, CNP, Inocencia Louis CNP, [facility] [place], [address] [place] [address]    Progress Note    8/17/2021    8:54 AM    Name:   Aníbal Rossi  MRN:     [id number]     Acct:      [de-identified]   Room:   [address] [place] [address]  IP Day:  5  Admit Date:  8/12/2021  8:35 AM    PCP:   GRAY Thakkar CNP  Code Status:  Full Code    Subjective:     C/C: Diarrhea confusion, rectal pain  Interval History Status: not changed. Patient seen and examined at bedside today. Patient is in good spirits. Denies any nausea vomiting diarrhea. She still has abdominal pain but is improving. Denies any fevers or chills. She remains on TPN. She is tolerating fluids    Brief History: This is a 55-year-old female who presented to the hospital with rectovaginal fistula and is currently postop day 3 s/p creation of end colostomy due to fistula.     Review of Systems:     Constitutional:  negative for chills, fevers, sweats  Respiratory:  negative for cough, dyspnea on exertion, shortness of breath, wheezing  Cardiovascular:  negative for chest pain, chest pressure/discomfort, lower extremity edema, palpitations  Gastrointestinal: Admits to improving abdominal pain, admits to diarrhea output from ostomy denies, nausea, vomiting  Neurological:  negative for dizziness, headache    Medications: Allergies:  No Known Allergies    Current Meds:   Scheduled Meds:    sodium chloride flush  5-40 mL Intravenous 2 times per day    cyclobenzaprine  10 mg Oral TID    acetaminophen  1,000 mg Oral 3 times per day    fat emulsion  200 mL Intravenous Daily    sodium chloride flush  5-40 mL Intravenous 2 times per day    enoxaparin  40 mg Subcutaneous Daily    busPIRone  15 mg Oral TID    ferrous sulfate  325 mg Oral BID WC    mirtazapine  15 mg Oral Nightly    OLANZapine  10 mg Oral Nightly    piperacillin-tazobactam  3,375 mg Intravenous Q8H    pantoprazole  40 mg Intravenous Daily    And    sodium chloride (PF)  10 mL Intravenous Daily     Continuous Infusions:    PN-Adult 2-in-1 Central Line (Standard) 60 mL/hr at 08/16/21 1803    lactated ringers 125 mL/hr at 08/16/21 1802    sodium chloride      sodium chloride Stopped (08/13/21 1727)    sodium chloride       PRN Meds: sodium chloride flush, sodium chloride, morphine **OR** morphine, ibuprofen, diatrizoate meglumine-sodium, sodium chloride flush, sodium chloride, potassium chloride **OR** potassium alternative oral replacement **OR** potassium chloride, magnesium sulfate, ondansetron **OR** ondansetron, polyethylene glycol, hydrOXYzine, oxyCODONE    Data:     Past Medical History:   has a past medical history of Cancer ([address] Pelvis, female abscess, and UTI (urinary tract infection). Social History:   reports that she has never smoked. She has never used smokeless tobacco.     Family History: History reviewed. No pertinent family history.     Vitals:  /71   Pulse 98   Temp 98.4 °F (36.9 °C) (Oral)   Resp 18   Ht 5' (1.524 m)   Wt 111 lb 3.2 oz (50.4 kg)   LMP 06/25/2017   SpO2 93%   BMI 21.72 kg/m²   Temp SPECIAL NOT REPORTED 08/12/2021 08:57 PM     Lab Results   Component Value Date/Time    CULTURE NO GROWTH 08/12/2021 08:57 PM       Radiology:  [place] BARIUM ENEMA    Result Date: 8/13/2021  1. Colovaginal fistulous communication is demonstrated. 2. Pooling of contrast posterior to the rectum in the presacral space consistent with extravasation of contrast most likely from low rectal anastomotic site. This correlates with presacral gas/fluid collection on the comparison CT scan. Findings were discussed with Dr. Rosal[facility] Cullen  (Gastroenterology) on 08/13/2020     XR HIP 2-3 VW W PELVIS RIGHT    Result Date: 8/12/2021  No acute osseous or soft tissue abnormality. XR ABDOMEN (2 VIEWS)    Result Date: 8/12/2021  Unremarkable radiographs of the abdomen. Physical Examination:        General appearance:  alert, cooperative and no distress  Mental Status:  oriented to person, place and time and normal affect  Lungs:  clear to auscultation bilaterally, normal effort  Heart:  regular rate and rhythm, no murmur  Abdomen:  soft, nontender, nondistended, no hepatosplenomegaly. Incisions are stapled, clean dry intact. Ostomy in place with stool noted   extremities:  no edema, redness, tenderness in the calves  Skin:  no gross lesions, rashes, induration    Assessment:        Hospital Problems         Last Modified POA    * (Principal) Anastomotic leak of intestine 8/11/2021 Yes    Bipolar I disorder, most recent episode mixed ([address] (Chronic) 8/11/2021 Yes    Major depression with psychotic features ([address] 8/11/2021 Yes    Schizoaffective disorder ([address] 8/11/2021 Yes    Acute metabolic encephalopathy 4/30/7210 Yes    Rectovaginal fistula 8/13/2021 Yes          Plan:        1. Rectovaginal fistula: S/p diversion with creation of end colostomy on 8/14/2021. Continue IV zosyn. Continue multimodal pain control. 2. Right Hydronephrosis secondary to pelvic abscess and urinary retention: S/p right stent placement.   Needs void trial prior to discharge. 3. Bipolar disorder: Continue Zyprexa 10 mg nightly. 4. Iron deficiency anemia: Continue iron supplementation  5. Severe PCM: Continue TPN  6. GERD: continue PPI  7. JOSHUA: continue Buspar  8. DVT ppx  9. PT/OT  10.  Discharge planning per Jony Hirsch DO  8/17/2021  8:54 AM

## 2021-08-17 NOTE — PROGRESS NOTES
Colorectal Surgery:  Daily Progress Note          PATIENT NAME: Justen Velez     TODAY'S DATE: 8/17/2021, 5:44 AM    SUBJECTIVE:     Pt seen and examined at bedside. No acute overnight events. Pt c/o of abdominal pain this morning. She still feels that there is some drainage from the vaginal canal. However, she denies any fever, chills, nausea, vomiting, chest pain, and SOB. Pt reports that she has only been drinking water. Her ostomy is intact with stool output approximately 50ml. OBJECTIVE:   VITALS:  /77   Pulse 65   Temp 97.9 °F (36.6 °C) (Oral)   Resp 16   Ht 5' (1.524 m)   Wt 111 lb 3.2 oz (50.4 kg)   LMP 06/25/2017   SpO2 97%   BMI 21.72 kg/m²      INTAKE/OUTPUT:      Intake/Output Summary (Last 24 hours) at 8/17/2021 0544  Last data filed at 8/17/2021 0518  Gross per 24 hour   Intake 3576 ml   Output 6300 ml   Net -2724 ml       Ostomy output approximately 50 ml     PHYSICAL EXAM:  General Appearance: awake, alert, in no acute distress  HEENT:  Normocephalic, atraumatic, mucus membranes moist   Heart: Regular rate and rhythm  Lungs: normal effort with symmetric rise and fall of chest wall  Abdomen: Soft, tender to palpation, nondistended, infraumbilical midline incision heling well staples intact, left lower quadrant end colostomy appears well, stoma pink and stool in bag   Extremities: No cyanosis, pitting edema, rashes noted. Skin: Skin color, texture, turgor normal. No rashes or lesions.       Data:  CBC:   Recent Labs     08/14/21  2203 08/15/21  0746 08/15/21  0747   WBC  --  15.1*  --    HGB 10.4* 9.8* 10.0*   PLT  --  402  --      Chemistry:   Recent Labs     08/14/21  1410 08/15/21  0746 08/16/21  0528   * 140 141   K 4.8 4.6 4.4    109* 107   CO2 18* 23 25   GLUCOSE 184* 173* 88   BUN 7 12 14   CREATININE 0.70 0.63 0.72   MG 2.5 2.4 2.3   ANIONGAP 10 8* 9   LABGLOM >60 >60 >60   GFRAA >60 >60 >60   CALCIUM 8.0* 8.3* 8.1*   PHOS 4.1 2.9 3.5     Hepatic: Recent Labs     08/14/21  1410 08/15/21  0746 08/16/21  0528   AST 24 9 20   ALT 13 12 14   ALKPHOS 116* 94 87   BILITOT 0.39 0.15* <0.10*     Coagulation:   Recent Labs     08/14/21  1410 08/15/21  0746 08/16/21  0528   PROT 5.2* 4.9* 4.8*         Radiology Review:    [place] BARIUM ENEMA    Result Date: 8/13/2021  EXAMINATION: SINGLE CONTRAST GASTROGRAFIN ENEMA 8/13/2021 TECHNIQUE: The enema tip was inserted in a low position. 240 mL GASTROGRAFIN/1000 mL WATER was instilled retrogradely into the rectum under gravity under direct fluoroscopic visualization. Static and cine images acquired during installation. FLUOROSCOPY DOSE AND TYPE OR TIME AND EXPOSURES: DAP 30.843 abscess CT 08/11/2021Gycm2 COMPARISON: None HISTORY: ORDERING SYSTEM PROVIDED HISTORY: concern for colovaginal fistula, stool from vagina TECHNOLOGIST PROVIDED HISTORY: Please use gastrografin for enema Electronically signed by Karina Valadez DO on 8/13/2021 at 7:38 AM Reason for exam:->concern for colovaginal fistula, stool from vagina Reason for Exam: colovaginal fistula  3. 2minft FINDINGS: Cine images (thumbnail image A5 in PACS) acquired during very 1st installation of contrast into the rectum. Contrast promptly fills the rectum but at same time there is abnormal extension and pooling anterior inferior to the lower rectum consistent with contrast collection in the vaginal canal. Additionally there is pooling of contrast in the presacral space which would correlate with fluid/gas collection in the presacral space on the comparison CT scan. This would be indicative of extravasation from the low rectal anastomotic site. 1. Colovaginal fistulous communication is demonstrated. 2. Pooling of contrast posterior to the rectum in the presacral space consistent with extravasation of contrast most likely from low rectal anastomotic site. This correlates with presacral gas/fluid collection on the comparison CT scan.  Findings were discussed with  Thad Cullen  (Gastroenterology) on 08/13/2020     XR HIP 2-3 VW W PELVIS RIGHT    Result Date: 8/12/2021  EXAMINATION: ONE XRAY VIEW OF THE PELVIS AND TWO XRAY VIEWS RIGHT HIP 8/12/2021 3:43 pm COMPARISON: None. HISTORY: ORDERING SYSTEM PROVIDED HISTORY: R hip pain TECHNOLOGIST PROVIDED HISTORY: R hip pain Acuity: Unknown Type of Exam: Unknown FINDINGS: The bony pelvis is intact. There is no acute osseous abnormality. The SI joints are maintained with mild degenerative change. The hip joints are maintained. There is a partially visualized right ureteral stent. The surrounding soft tissues are otherwise unremarkable. No acute osseous or soft tissue abnormality. XR ABDOMEN (2 VIEWS)    Result Date: 8/12/2021  EXAMINATION: TWO XRAY VIEWS OF THE ABDOMEN 8/12/2021 10:06 am COMPARISON: CT abdomen and pelvis performed 08/11/2021. HISTORY: ORDERING SYSTEM PROVIDED HISTORY: concern anastomotic leak TECHNOLOGIST PROVIDED HISTORY: concern anastomotic leak Acuity: Unknown FINDINGS: There is a nonobstructive bowel gas pattern. There is no intraperitoneal free air. There are no suspicious calcifications. There is a right ureteral stent. There is no acute osseous abnormality. The surrounding soft tissues are unremarkable. Unremarkable radiographs of the abdomen. ASSESSMENT:  Active Hospital Problems    Diagnosis Date Noted    Rectovaginal fistula [N82.3]     Acute metabolic encephalopathy [A85.89] 08/12/2021    Anastomotic leak of intestine [K91.89] 08/11/2021    Schizoaffective disorder ([facility] [F25.9] 07/05/2020    Major depression with psychotic features ([facility] [F32.3]     Bipolar I disorder, most recent episode mixed [facility]) [F31.60] 06/20/2020       39 y.o. female POD# 3 s/p diversion with creation of end colostomy due to rectovaginal fistula. Plan:  Patient seen and examined at bedside this morning  Analgesia: Tylenol, flexeril, Ibuprofen prn, morphine prn, roxicodone prn.   Diet: CLD with

## 2021-08-17 NOTE — PLAN OF CARE
Problem: Safety:  Goal: Free from accidental physical injury  Description: Free from accidental physical injury  8/17/2021 0403 by Victoria Barker RN  Outcome: Ongoing  8/16/2021 1722 by Leonila Rea RN  Outcome: Ongoing     Problem: Pain:  Goal: Patient's pain/discomfort is manageable  Description: Patient's pain/discomfort is manageable  8/17/2021 0403 by Victoria Barker RN  Outcome: Ongoing  8/16/2021 1722 by Leonila Rea RN  Outcome: Ongoing  Goal: Pain level will decrease  Description: Pain level will decrease  8/17/2021 0403 by Victoria Barker RN  Outcome: Ongoing  8/16/2021 1722 by Leonila Rea RN  Outcome: Ongoing  Goal: Control of acute pain  Description: Control of acute pain  8/17/2021 0403 by Victoria Barker RN  Outcome: Ongoing  8/16/2021 1722 by Leonila Rea RN  Outcome: Ongoing  Goal: Control of chronic pain  Description: Control of chronic pain  8/17/2021 0403 by Victoria Barker RN  Outcome: Ongoing  8/16/2021 1722 by Leonila Rea RN  Outcome: Ongoing     Problem: Skin Integrity:  Goal: Skin integrity will stabilize  Description: Skin integrity will stabilize  8/17/2021 0403 by Victoria Barker RN  Outcome: Ongoing  8/16/2021 1722 by Leonila Rea RN  Outcome: Ongoing     Problem: Skin Integrity:  Goal: Will show no infection signs and symptoms  Description: Will show no infection signs and symptoms  8/17/2021 0403 by Victoria Barker RN  Outcome: Ongoing  8/16/2021 1722 by Leonila Rea RN  Outcome: Ongoing  Goal: Absence of new skin breakdown  Description: Absence of new skin breakdown  8/17/2021 0403 by Victoria Barker RN  Outcome: Ongoing  8/16/2021 1722 by Leonila Rea RN  Outcome: Ongoing     Problem: Falls - Risk of:  Goal: Will remain free from falls  Description: Will remain free from falls  8/17/2021 0403 by Victoria Barker RN  Outcome: Ongoing  8/16/2021 1722 by Leonila Rea RN  Outcome: Ongoing  Goal: Absence of physical injury  Description: Absence of physical injury  8/17/2021 0403 by Betty Woody RN  Outcome: Ongoing  8/16/2021 1722 by Shelia Morales RN  Outcome: Ongoing     Problem: Nutrition  Goal: Optimal nutrition therapy  8/17/2021 0403 by Betty Woody RN  Outcome: Ongoing  8/16/2021 1722 by Shelia Morales RN  Outcome: Ongoing     Problem: Musculor/Skeletal Functional Status  Goal: Highest potential functional level  8/17/2021 0403 by Betty Woody RN  Outcome: Ongoing  8/16/2021 1722 by Shelia Morales RN  Outcome: Ongoing

## 2021-08-18 VITALS
RESPIRATION RATE: 20 BRPM | HEART RATE: 65 BPM | SYSTOLIC BLOOD PRESSURE: 146 MMHG | HEIGHT: 60 IN | DIASTOLIC BLOOD PRESSURE: 73 MMHG | TEMPERATURE: 98.1 F | OXYGEN SATURATION: 97 % | WEIGHT: 110.3 LBS | BODY MASS INDEX: 21.65 KG/M2

## 2021-08-18 LAB
ABSOLUTE EOS #: 0.46 K/UL (ref 0–0.44)
ABSOLUTE IMMATURE GRANULOCYTE: 0.11 K/UL (ref 0–0.3)
ABSOLUTE LYMPH #: 2.28 K/UL (ref 1.1–3.7)
ABSOLUTE MONO #: 0.57 K/UL (ref 0.1–1.2)
ALBUMIN SERPL-MCNC: 2.1 G/DL (ref 3.5–5.2)
ALBUMIN/GLOBULIN RATIO: 0.7 (ref 1–2.5)
ALP BLD-CCNC: 116 U/L (ref 35–104)
ALT SERPL-CCNC: 14 U/L (ref 5–33)
ANION GAP SERPL CALCULATED.3IONS-SCNC: 9 MMOL/L (ref 9–17)
AST SERPL-CCNC: 14 U/L
BASOPHILS # BLD: 0 % (ref 0–2)
BASOPHILS ABSOLUTE: 0 K/UL (ref 0–0.2)
BILIRUB SERPL-MCNC: 0.17 MG/DL (ref 0.3–1.2)
BUN BLDV-MCNC: 14 MG/DL (ref 6–20)
BUN/CREAT BLD: ABNORMAL (ref 9–20)
CALCIUM SERPL-MCNC: 8.2 MG/DL (ref 8.6–10.4)
CHLORIDE BLD-SCNC: 109 MMOL/L (ref 98–107)
CO2: 24 MMOL/L (ref 20–31)
CREAT SERPL-MCNC: 0.71 MG/DL (ref 0.5–0.9)
DIFFERENTIAL TYPE: ABNORMAL
EOSINOPHILS RELATIVE PERCENT: 4 % (ref 1–4)
GFR AFRICAN AMERICAN: >60 ML/MIN
GFR NON-AFRICAN AMERICAN: >60 ML/MIN
GFR SERPL CREATININE-BSD FRML MDRD: ABNORMAL ML/MIN/{1.73_M2}
GFR SERPL CREATININE-BSD FRML MDRD: ABNORMAL ML/MIN/{1.73_M2}
GLUCOSE BLD-MCNC: 111 MG/DL (ref 65–105)
GLUCOSE BLD-MCNC: 114 MG/DL (ref 65–105)
GLUCOSE BLD-MCNC: 117 MG/DL (ref 65–105)
GLUCOSE BLD-MCNC: 129 MG/DL (ref 70–99)
GLUCOSE BLD-MCNC: 88 MG/DL (ref 65–105)
HCT VFR BLD CALC: 33.8 % (ref 36.3–47.1)
HEMOGLOBIN: 10 G/DL (ref 11.9–15.1)
IMMATURE GRANULOCYTES: 1 %
LYMPHOCYTES # BLD: 20 % (ref 24–43)
MAGNESIUM: 2.2 MG/DL (ref 1.6–2.6)
MCH RBC QN AUTO: 25.7 PG (ref 25.2–33.5)
MCHC RBC AUTO-ENTMCNC: 29.6 G/DL (ref 28.4–34.8)
MCV RBC AUTO: 86.9 FL (ref 82.6–102.9)
MONOCYTES # BLD: 5 % (ref 3–12)
MORPHOLOGY: ABNORMAL
NRBC AUTOMATED: 0 PER 100 WBC
PDW BLD-RTO: 20.9 % (ref 11.8–14.4)
PHOSPHORUS: 2.4 MG/DL (ref 2.6–4.5)
PLATELET # BLD: 510 K/UL (ref 138–453)
PLATELET ESTIMATE: ABNORMAL
PMV BLD AUTO: 10.5 FL (ref 8.1–13.5)
POTASSIUM SERPL-SCNC: 4 MMOL/L (ref 3.7–5.3)
RBC # BLD: 3.89 M/UL (ref 3.95–5.11)
RBC # BLD: ABNORMAL 10*6/UL
SEG NEUTROPHILS: 70 % (ref 36–65)
SEGMENTED NEUTROPHILS ABSOLUTE COUNT: 7.98 K/UL (ref 1.5–8.1)
SODIUM BLD-SCNC: 142 MMOL/L (ref 135–144)
TOTAL PROTEIN: 5.2 G/DL (ref 6.4–8.3)
WBC # BLD: 11.4 K/UL (ref 3.5–11.3)
WBC # BLD: ABNORMAL 10*3/UL

## 2021-08-18 PROCEDURE — 83735 ASSAY OF MAGNESIUM: CPT

## 2021-08-18 PROCEDURE — 97530 THERAPEUTIC ACTIVITIES: CPT

## 2021-08-18 PROCEDURE — C9113 INJ PANTOPRAZOLE SODIUM, VIA: HCPCS | Performed by: STUDENT IN AN ORGANIZED HEALTH CARE EDUCATION/TRAINING PROGRAM

## 2021-08-18 PROCEDURE — 99239 HOSP IP/OBS DSCHRG MGMT >30: CPT | Performed by: INTERNAL MEDICINE

## 2021-08-18 PROCEDURE — 6360000002 HC RX W HCPCS: Performed by: INTERNAL MEDICINE

## 2021-08-18 PROCEDURE — 6370000000 HC RX 637 (ALT 250 FOR IP): Performed by: INTERNAL MEDICINE

## 2021-08-18 PROCEDURE — 85025 COMPLETE CBC W/AUTO DIFF WBC: CPT

## 2021-08-18 PROCEDURE — 99212 OFFICE O/P EST SF 10 MIN: CPT

## 2021-08-18 PROCEDURE — 51798 US URINE CAPACITY MEASURE: CPT

## 2021-08-18 PROCEDURE — 36415 COLL VENOUS BLD VENIPUNCTURE: CPT

## 2021-08-18 PROCEDURE — 2580000003 HC RX 258: Performed by: STUDENT IN AN ORGANIZED HEALTH CARE EDUCATION/TRAINING PROGRAM

## 2021-08-18 PROCEDURE — 84100 ASSAY OF PHOSPHORUS: CPT

## 2021-08-18 PROCEDURE — 6370000000 HC RX 637 (ALT 250 FOR IP): Performed by: STUDENT IN AN ORGANIZED HEALTH CARE EDUCATION/TRAINING PROGRAM

## 2021-08-18 PROCEDURE — 94760 N-INVAS EAR/PLS OXIMETRY 1: CPT

## 2021-08-18 PROCEDURE — 97116 GAIT TRAINING THERAPY: CPT

## 2021-08-18 PROCEDURE — 82947 ASSAY GLUCOSE BLOOD QUANT: CPT

## 2021-08-18 PROCEDURE — 80053 COMPREHEN METABOLIC PANEL: CPT

## 2021-08-18 PROCEDURE — 6360000002 HC RX W HCPCS: Performed by: STUDENT IN AN ORGANIZED HEALTH CARE EDUCATION/TRAINING PROGRAM

## 2021-08-18 RX ORDER — OXYCODONE HYDROCHLORIDE 5 MG/1
5 TABLET ORAL EVERY 4 HOURS PRN
Qty: 18 TABLET | Refills: 0 | Status: SHIPPED | OUTPATIENT
Start: 2021-08-18 | End: 2021-08-21

## 2021-08-18 RX ORDER — CYCLOBENZAPRINE HCL 10 MG
10 TABLET ORAL 3 TIMES DAILY
Qty: 9 TABLET | Refills: 0 | Status: SHIPPED | OUTPATIENT
Start: 2021-08-18 | End: 2021-08-21

## 2021-08-18 RX ORDER — HEPARIN SODIUM (PORCINE) LOCK FLUSH IV SOLN 100 UNIT/ML 100 UNIT/ML
500 SOLUTION INTRAVENOUS ONCE
Status: COMPLETED | OUTPATIENT
Start: 2021-08-18 | End: 2021-08-18

## 2021-08-18 RX ORDER — AMOXICILLIN AND CLAVULANATE POTASSIUM 500; 125 MG/1; MG/1
1 TABLET, FILM COATED ORAL 3 TIMES DAILY
Qty: 21 TABLET | Refills: 0 | Status: SHIPPED | OUTPATIENT
Start: 2021-08-18 | End: 2021-08-25

## 2021-08-18 RX ADMIN — PIPERACILLIN AND TAZOBACTAM 3375 MG: 3; .375 INJECTION, POWDER, LYOPHILIZED, FOR SOLUTION INTRAVENOUS at 10:29

## 2021-08-18 RX ADMIN — PANTOPRAZOLE SODIUM 40 MG: 40 INJECTION, POWDER, FOR SOLUTION INTRAVENOUS at 09:19

## 2021-08-18 RX ADMIN — CYCLOBENZAPRINE 10 MG: 10 TABLET, FILM COATED ORAL at 16:07

## 2021-08-18 RX ADMIN — SODIUM CHLORIDE, PRESERVATIVE FREE 10 ML: 5 INJECTION INTRAVENOUS at 09:20

## 2021-08-18 RX ADMIN — CYCLOBENZAPRINE 10 MG: 10 TABLET, FILM COATED ORAL at 09:19

## 2021-08-18 RX ADMIN — HEPARIN 500 UNITS: 100 SYRINGE at 18:04

## 2021-08-18 RX ADMIN — ENOXAPARIN SODIUM 40 MG: 40 INJECTION SUBCUTANEOUS at 09:20

## 2021-08-18 RX ADMIN — OXYCODONE HYDROCHLORIDE 5 MG: 5 TABLET ORAL at 16:09

## 2021-08-18 RX ADMIN — BUSPIRONE HYDROCHLORIDE 15 MG: 15 TABLET ORAL at 09:19

## 2021-08-18 RX ADMIN — ACETAMINOPHEN 1000 MG: 500 TABLET ORAL at 05:56

## 2021-08-18 RX ADMIN — BUSPIRONE HYDROCHLORIDE 15 MG: 15 TABLET ORAL at 16:07

## 2021-08-18 RX ADMIN — FERROUS SULFATE TAB EC 325 MG (65 MG FE EQUIVALENT) 325 MG: 325 (65 FE) TABLET DELAYED RESPONSE at 09:19

## 2021-08-18 RX ADMIN — ACETAMINOPHEN 1000 MG: 500 TABLET ORAL at 16:07

## 2021-08-18 RX ADMIN — PIPERACILLIN AND TAZOBACTAM 3375 MG: 3; .375 INJECTION, POWDER, LYOPHILIZED, FOR SOLUTION INTRAVENOUS at 02:00

## 2021-08-18 RX ADMIN — OXYCODONE HYDROCHLORIDE 5 MG: 5 TABLET ORAL at 11:44

## 2021-08-18 ASSESSMENT — PAIN DESCRIPTION - LOCATION: LOCATION: ABDOMEN

## 2021-08-18 ASSESSMENT — PAIN DESCRIPTION - PAIN TYPE: TYPE: SURGICAL PAIN

## 2021-08-18 ASSESSMENT — PAIN SCALES - GENERAL
PAINLEVEL_OUTOF10: 7
PAINLEVEL_OUTOF10: 7
PAINLEVEL_OUTOF10: 4
PAINLEVEL_OUTOF10: 7
PAINLEVEL_OUTOF10: 7

## 2021-08-18 NOTE — DISCHARGE INSTR - COC
hematuria N30.00    Pelvic abscess in female N73.9    Calculus of gallbladder without cholecystitis without obstruction K80.20    Leukocytosis D72.829    Elevated C-reactive protein (CRP) R79.82    Severe protein-calorie malnutrition Albino Killer: less than 60% of standard weight) ([facility]) E43    Bipolar I disorder, most recent episode mixed ([facility]) F31.60    Major depression with psychotic features ([facility]) F32.3    Tachycardia R00.0    Diarrhea R19.7    Anemia D64.9    History of rectal cancer Z85.048    Rectal fistula K60.4    Schizoaffective disorder ([facility]) F25.9    Intellectual disability F79    Anorexia nervosa F50.00    Anastomotic leak of intestine K91.89    Acute metabolic encephalopathy A43.15    Rectovaginal fistula N82.3       Isolation/Infection:   Isolation            Contact          Patient Infection Status       Infection Onset Added Last Indicated Last Indicated By Review Planned Expiration Resolved Resolved By    MDRO (multi-drug resistant organism)  07/05/20 07/05/20 Rigoberto Soni RN        Eco-06/2020 gluteal abscess    Resolved    C-diff Rule Out 08/12/21 08/12/21 08/12/21 C DIFF TOXIN/ANTIGEN (Ordered)   08/13/21 Rule-Out Test Resulted    C-diff Rule Out 06/26/20 06/26/20 06/26/20 C DIFF TOXIN/ANTIGEN (Ordered)   06/26/20 Rule-Out Test Resulted    C-diff Rule Out 06/20/20 06/20/20 06/20/20 C DIFF TOXIN/ANTIGEN (Ordered)   06/20/20 Rule-Out Test Resulted            Nurse Assessment:  Last Vital Signs: BP (!) 146/73   Pulse 65   Temp 98.1 °F (36.7 °C) (Oral)   Resp 20   Ht 5' (1.524 m)   Wt 110 lb 4.8 oz (50 kg)   LMP 06/25/2017   SpO2 97%   BMI 21.54 kg/m²     Last documented pain score (0-10 scale): Pain Level: 4  Last Weight:   Wt Readings from Last 1 Encounters:   08/18/21 110 lb 4.8 oz (50 kg)     Mental Status:  oriented and alert    IV Access:  { ASPEN IV ACCESS:[id number]}    Nursing Mobility/ADLs:  Walking   Independent  Transfer  Independent  Bathing Independent  Dressing  Independent  Toileting  Independent  Feeding  Independent  Med Admin  Independent  Med Delivery   whole    Wound Care Documentation and Therapy:        Elimination:  Continence:   · Bowel: No  · Bladder: Yes  Urinary Catheter: None   Colostomy/Ileostomy/Ileal Conduit: Yes  Colostomy LLQ -Stomal Appliance: Changed (Coloplast #[id number])  Colostomy LLQ -Flange Size (inches): 2.2 Inches  Colostomy LLQ -Stoma  Assessment: Pink, Moist  Colostomy LLQ -Mucocutaneous Junction: Intact  Colostomy LLQ -Peristomal Assessment: Clean, Intact  Colostomy LLQ -Stool Appearance: Loose  Colostomy LLQ -Stool Color: Brown  Colostomy LLQ -Stool Amount: Small  Colostomy LLQ -Output (mL): 100 ml    Date of Last BM: 8/18/2021    Intake/Output Summary (Last 24 hours) at 8/18/2021 0943  Last data filed at 8/18/2021 0556  Gross per 24 hour   Intake 2890 ml   Output 4300 ml   Net -1410 ml     I/O last 3 completed shifts: In: 6450 [P.O.:240; I.V.:2650]  Out: 5100 [Urine:4800; Stool:300]    Safety Concerns:     None    Impairments/Disabilities:      None    Nutrition Therapy:  Current Nutrition Therapy:   - Oral Diet:  General    Routes of Feeding: Oral  Liquids: No Restrictions  Daily Fluid Restriction: no  Last Modified Barium Swallow with Video (Video Swallowing Test): not done    Treatments at the Time of Hospital Discharge:   Respiratory Treatments: ***  Oxygen Therapy:  is not on home oxygen therapy.   Ventilator:    - No ventilator support    Rehab Therapies: Physical Therapy and Occupational Therapy  Weight Bearing Status/Restrictions: No weight bearing restirctions  Other Medical Equipment (for information only, NOT a DME order):  {EQUIPMENT:[id number]}  Other Treatments: Midline abdominal wound care    Patient's personal belongings (please select all that are sent with patient):  Patient belongings bag    RN SIGNATURE:  Electronically signed by Elijah Hillman RN on 8/18/21 at 2:50 PM EDT    CASE MANAGEMENT/SOCIAL WORK SECTION    Inpatient Status Date: ***    Readmission Risk Assessment Score:  Readmission Risk              Risk of Unplanned Readmission:  12           Discharging to Facility/ Agency   · Name: [facility] [address] [place],  [place] [address]         Phone: [phone]       Fax: [phone]          Dialysis Facility (if applicable)   · Name:  · Address:  · Dialysis Schedule:  · Phone:  · Fax:    / signature: Electronically signed by Wilson Serrato RN on 8/18/21 at 9:43 AM EDT    PHYSICIAN SECTION    Prognosis: Fair    Condition at Discharge: Stable    Rehab Potential (if transferring to Rehab): Good    Recommended Labs or Other Treatments After Discharge:   -Follow-up with your primary care physician within 1 week of discharge for posthospitalization follow-up. You will need repeat CBC to make sure your hemoglobin is improving. Also recommend labs for renal function to make sure creatinine stays stable. -Follow-up with Dr. Kb Lucio in 1 week's time for reevaluation of your wound after having diverting colectomy for treatment of rectovaginal fistula. He will have staples removed at this time.  -Take all medications as prescribed. -Continue PT OT  -Turn to the hospital if you develop worsening fevers, chills, nausea, vomiting, diarrhea. -Monitor postvoid residuals. Return to the hospital if you notice decreasing urine output. Needs follow-up with urology outpatient .   -Check CBC, RFP, Mag in one week. Ostomy care:   Cut barrier to fit stoma with no more than 1/8\" of exposed skin  Empty the pouch when 1/3 to 1/2 full. Change the pouching system twice weekly and prn  Our goal is to keep the skin around the stoma intact and to have a dependable pouching system without leaks. System used in hospital:  Coloplast #[id number] flat, cut-to-fit drainable with velcro closure with filter. Coloplast #[id number] may be better covered by insurance.  (No filter)  [employer] Skin Barrier Wipes #[id number] and Coloplast [address] [facility] [id number]. [address] [address]  Nurses are available at [phone] for outpatient appointments and calls for any ostomy related concerns. Physician Certification: I certify the above information and transfer of Faby Cowart  is necessary for the continuing treatment of the diagnosis listed and that she requires Home Care for greater 30 days.      Update Admission H&P: No change in H&P    PHYSICIAN SIGNATURE:  Electronically signed by Zaina Saenz DO on 8/18/21 at 11:27 AM EDT

## 2021-08-18 NOTE — PROGRESS NOTES
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        Presents to hospital with non-bloody diarrhea, stool from vagina, and pain. Anastomotic breakdown with a walled off abscess and a colovesical fistula found. End sigmoid colostomy created on 8/14/2021 with Dr Lakhwinder Munoz. Alert today. Brighter affect. Enganged in conversation and self care. Remembers and tells of her prior ostomy experience. [facility] established for care post-discharge. Patient reports her fiance' is helpful in her care as well.          08/18/21 1449   Colostomy LLQ    Placement Date: 08/14/21   Pre-existing: No  Timeout: Patient;Procedure;Site/Side;Appropriate Equipment; Consent Confirmed;Sterile Technique using full body drape;Site prepped with chlorhexidine;Sterile drsg with biopatch; Availability of Implant; [facility]. .. Stomal Appliance Clean;Dry; Intact; Changed   Flange Size (inches) 2.2 Inches   Stoma  Assessment Edema; Moist;Pink;Red  (43 mm circular)   Mucocutaneous Junction Intact   Peristomal Assessment Clean; Intact   Treatment Bag change; Liquid skin barrier;Site care   Stool Appearance Loose   Stool Color Brown   Output (mL) 50 ml   Incision 08/14/21 Abdomen   Date First Assessed: 08/14/21   Present on Hospital Admission: No  Primary Wound Type: Incision  Location: Abdomen   Incision Cleansed Soap and water   Dressing/Treatment Open to air   Closure Staples   Margins Approximated   Incision Assessment Dry   Drainage Amount None   Odor None   Niru-incision Assessment Intact       Equipment used : Coloplast #[id number] one piece, flat, cut-to-fit system. [employer] Adhesive Remover Wipes #[id number], and [employer] Wipes #[id number]. Reviewed ostomy care steps including washing the peristomal skin with warm water only.  May use residue

## 2021-08-18 NOTE — PROGRESS NOTES
[facility]  Office: [address], DO, Ashish Wilson, DO, [place], DO, Catbrennen Campoverde Blood, DO, Nai Telles MD, Lucy Vieyra MD, Dawson Boogie MD, Alex Salazar MD, Latosha Leyva MD, Christian Max MD, Darling Mederos MD, Christopher Robledo, DO, Nyoka Hodgkins, MD, Sayda Rasmussen DO, Jadene Boast, MD,  Krystal Nixon DO, Kevin Gr MD, Darius Amanda MD, Leopoldo Jorgensen MD, Sheila Lema MD, Candida Griffin MD, Kendell Guevara MD, Luciana Machado MD, Tricia Leach CNP, [facility] [place], CNP, Reena Boyd, CNP, Shant Del Castillo, CNS, Kira Dalla, CNP, Gwen Guzman, CNP, Fritz Foot, CNP, Darvin Gamma, CNP, Maggy Loser, CNP, Alanna Mayorga PA-C, Court Mckeon [facility], Favian Marcelo, CNP, Ayanna Antonio, CNP, Melissa Vale, CNP, Glenna Gutierrez, CNP, Vinod Lowe, CNP, Aman Amaya, CNP, Cathy Lu, CNP, Ahmet Wagner, [address] [place] [address]    Progress Note    8/18/2021    8:34 AM    Name:   Justen Velez  MRN:     [id number]     Acct:      [de-identified]   Room:   [address] [place] [address]  IP Day:  6  Admit Date:  8/12/2021  8:35 AM    PCP:   GRAY Strong CNP  Code Status:  Full Code    Subjective:     C/C: Diarrhea confusion, rectal pain  Interval History Status: Improved  Vital signs have been stable overnight. Patient looks significantly better. Denies any chest pain, nausea, vomiting, diarrhea. Has been having good output through ostomy. Discussed case with Dr. Belia Biswas, patient is medically stable for discharge from him is and if she can tolerate a regular diet. Brief History: This is a 43-year-old female who presented to the hospital with rectovaginal fistula and is currently postop day 3 s/p creation of end colostomy due to fistula. Hepatic function panel, CBC stable. Hemoglobin stable, WBC downtrending from 15-11. Patient's pain is controlled.   She does note some drainage from vaginal canal.  No fevers, chills, nausea, vomiting, chest pain, shortness of breath. She is tolerating full liquid diet. She is urinating good. Ostomy is intact with good output    Review of Systems:     Constitutional:  negative for chills, fevers, sweats  Respiratory:  negative for cough, dyspnea on exertion, shortness of breath, wheezing  Cardiovascular:  negative for chest pain, chest pressure/discomfort, lower extremity edema, palpitations  Gastrointestinal: Denies abdominal pain, admits to diarrhea output from ostomy denies, nausea, vomiting  Neurological:  negative for dizziness, headache    Medications: Allergies:  No Known Allergies    Current Meds:   Scheduled Meds:    ferrous sulfate  325 mg Oral Every Other Day    sodium chloride flush  5-40 mL Intravenous 2 times per day    cyclobenzaprine  10 mg Oral TID    acetaminophen  1,000 mg Oral 3 times per day    sodium chloride flush  5-40 mL Intravenous 2 times per day    enoxaparin  40 mg Subcutaneous Daily    busPIRone  15 mg Oral TID    mirtazapine  15 mg Oral Nightly    OLANZapine  10 mg Oral Nightly    piperacillin-tazobactam  3,375 mg Intravenous Q8H    pantoprazole  40 mg Intravenous Daily    And    sodium chloride (PF)  10 mL Intravenous Daily     Continuous Infusions:    PN-Adult 2-in-1 Central Line (Standard) 60 mL/hr at 08/17/21 1740    sodium chloride      sodium chloride Stopped (08/13/21 1727)    sodium chloride       PRN Meds: sodium chloride flush, sodium chloride, morphine **OR** morphine, ibuprofen, diatrizoate meglumine-sodium, sodium chloride flush, sodium chloride, potassium chloride **OR** potassium alternative oral replacement **OR** potassium chloride, magnesium sulfate, ondansetron **OR** ondansetron, polyethylene glycol, hydrOXYzine, oxyCODONE    Data:     Past Medical History:   has a past medical history of Cancer ([address] Pelvis, female abscess, and UTI (urinary tract infection).     Social History:   reports that she has never smoked. She has never used smokeless tobacco.     Family History: History reviewed. No pertinent family history. Vitals:  BP (!) 146/73   Pulse 65   Temp 98.1 °F (36.7 °C) (Oral)   Resp 20   Ht 5' (1.524 m)   Wt 110 lb 4.8 oz (50 kg)   LMP 2017   SpO2 97%   BMI 21.54 kg/m²   Temp (24hrs), Av °F (36.7 °C), Min:97.6 °F (36.4 °C), Max:98.2 °F (36.8 °C)    Recent Labs     21  16521  02021  0824   POCGLU 109* 101 117* 114*       I/O (24Hr): Intake/Output Summary (Last 24 hours) at 2021 0834  Last data filed at 2021 0556  Gross per 24 hour   Intake 2890 ml   Output 4300 ml   Net -1410 ml       Labs:  Hematology:  Recent Labs     21  0630 21  0447   WBC 15.1* 11.4*   RBC 4.43 3.89*   HGB 11.3* 10.0*   HCT 38.9 33.8*   MCV 87.8 86.9   MCH 25.5 25.7   MCHC 29.0 29.6   RDW 21.1* 20.9*   * 510*   MPV 10.2 10.5     Chemistry:  Recent Labs     21  0528 21  0605 21  0447    141 142   K 4.4 3.9 4.0    105 109*   CO2 25 24 24   GLUCOSE 88 116* 129*   BUN 14 14 14   CREATININE 0.72 0.70 0.71   MG 2.3 2.2 2.2   ANIONGAP 9 12 9   LABGLOM >60 >60 >60   GFRAA >60 >60 >60   CALCIUM 8.1* 8.4* 8.2*   PHOS 3.5 3.3 2.4*     Recent Labs     21  0528 21  0631 21  0238 21  0605 21  1233 21  1651 213 21  0201 21  0447 21  0824   PROT 4.8*  --   --  5.4*  --   --   --   --  5.2*  --    LABALBU 2.0*  --   --  2.2*  --   --   --   --  2.1*  --    AST 20  --   --  13  --   --   --   --  14  --    ALT 14  --   --  15  --   --   --   --  14  --    ALKPHOS 87  --   --  105*  --   --   --   --  116*  --    BILITOT <0.10*  --   --  0.18*  --   --   --   --  0.17*  --    POCGLU  --    < > 95  --  116* 109* 101 117*  --  114*    < > = values in this interval not displayed.      ABG:No results found for: POCPH, PHART, PH, POCPCO2, DYO8RLW, PCO2, POCPO2, PO2ART, PO2, POCHCO3, UZX2BOX, HCO3, NBEA, PBEA, BEART, BE, THGBART, THB, XUL5ZBI, KOSJ4KJT, M9PCYWDK, O2SAT, FIO2  Lab Results   Component Value Date/Time    SPECIAL NOT REPORTED 08/12/2021 08:57 PM     Lab Results   Component Value Date/Time    CULTURE NO GROWTH 08/12/2021 08:57 PM       Radiology:  [facility] BARIUM ENEMA    Result Date: 8/13/2021  1. Colovaginal fistulous communication is demonstrated. 2. Pooling of contrast posterior to the rectum in the presacral space consistent with extravasation of contrast most likely from low rectal anastomotic site. This correlates with presacral gas/fluid collection on the comparison CT scan. Findings were discussed with Dr. Lo Cullen  (Gastroenterology) on 08/13/2020     XR HIP 2-3 VW W PELVIS RIGHT    Result Date: 8/12/2021  No acute osseous or soft tissue abnormality. XR ABDOMEN (2 VIEWS)    Result Date: 8/12/2021  Unremarkable radiographs of the abdomen. Physical Examination:        General appearance:  alert, cooperative and no distress  Mental Status:  oriented to person, place and time and normal affect  Lungs:  clear to auscultation bilaterally, normal effort  Heart:  regular rate and rhythm, no murmur  Abdomen:  soft, nontender, nondistended, no hepatosplenomegaly. Incisions are stapled, clean dry intact.  Ostomy in place with stool noted   extremities:  no edema, redness, tenderness in the calves  Skin:  no gross lesions, rashes, induration    Assessment:        Hospital Problems         Last Modified POA    * (Principal) Anastomotic leak of intestine 8/11/2021 Yes    Severe protein-calorie malnutrition [facility]: less than 60% of standard weight) ([address] 8/17/2021 Yes    Bipolar I disorder, most recent episode mixed ([address] (Chronic) 8/11/2021 Yes    Major depression with psychotic features ([address] 8/11/2021 Yes    Schizoaffective disorder ([address] 8/11/2021 Yes    Acute metabolic encephalopathy 3/93/4651 Yes    Rectovaginal fistula 8/13/2021 Yes          Plan: 1. Rectovaginal fistula: S/p diversion with creation of end colostomy on 8/14/2021. Continue IV zosyn. Continue multimodal pain control. Currently plan to advance diet since patient is tolerating full liquid. If she tolerates, can discharge today and stop TPN/IVF. Remainder management per colorectal surgery. She will need follow-up Dr. Brigette Rodgers to remove staples in 1 week  2. Right Hydronephrosis secondary to pelvic abscess and urinary retention: S/p right stent placement. Needs void trial prior to discharge. If normal will discharge off of it. 3. Bipolar disorder: Continue Zyprexa 10 mg nightly. 4. Iron deficiency anemia: Continue iron supplementation  5. Severe PCM: Continue TPN  6. GERD: continue PPI  7. JOSHUA: continue Buspar  8. DVT ppx  9.  PT/OT  Disposition: discharge today if she can eat solid and if she passes [employer] 1475, DO  8/18/2021  8:34 AM

## 2021-08-18 NOTE — PROGRESS NOTES
Patient discharged by self and with all belongings. Discharge paperwork provided and discussed. All questions answered. One peripheral IV removed with no complications and catheter intact. One right subclavian port de-accessed with 500 units of heparin. Two medications provided to patient via meds to beds. One paper prescription provided to patient. Patient left unit via wheelchair.

## 2021-08-18 NOTE — PLAN OF CARE
Nutrition Problem #1: Inadequate oral intake  Intervention: Food and/or Nutrient Delivery: Continue Current Diet, Continue Oral Nutrition Supplement, Discontinue Parenteral Nutrition  Nutritional Goals: Meet % of estimated nutrition needs.

## 2021-08-18 NOTE — PROGRESS NOTES
CLINICAL PHARMACY NOTE: MEDS TO BEDS    Total # of Prescriptions Filled: 2   The following medications were delivered to the patient:  · Flexeril 10mg tablet  · augmentin 500-125mg tablet    Additional Documentation: meds delivered to the pt in room 437 on 08.18.21 at 17:20, no co pay

## 2021-08-18 NOTE — DISCHARGE SUMMARY
[facility]  Office: [address], , Radha De Los Santos DO, Justin Cortez DO, Lennox Mars Blood, DO, Sydni Munoz MD, Carlitos Porter MD, Federico Hamilton MD, Mitchell Crowley MD, Hermila Sandoval MD, Linda Flores MD, Sully Rojas MD, Diana Thompson, DO, Rao Quezada MD, Susie Holloway, DO, Yohana Aragon MD,  Ceferino Acevedo DO, [place] Cockayne, MD, Viola Churchill MD, Antonio Alvarez MD, Corrie Mari MD, Courtney Lopez MD, Red Sanchez MD, Donald Tavares MD, Ankit Aparicio [facility], [facility], CNP, Elise Orourke, CNP, Paddy Duane, CNS, Freddy Thompson, CNP, Shilpi Nicole, CNP, Franci Cardona, CNP, Germania Gardner, CNP, Mai Levine CNP, Sarabjit Pineda PA-C, Daisey Meigs, [facility], Camelia Holbrook, CNP, Cheli Alvarado, CNP, Ector Lennon, CNP, Mariza Allan, CNP, Te Levine, CNP, Vero Hammer, CNP, Rupert Florez CNP, Yaritza Mccrary, [facility]    Discharge Summary     Patient ID: Gera Mckeon  :  1975   MRN: [id number]     ACCOUNT:  [de-identified]   Patient's PCP: GRAY Auguste CNP  Admit Date: 2021   Discharge Date: 2021     Length of Stay: 6  Code Status:  Full Code  Admitting Physician: [place] Cockayne, MD  Discharge Physician: Iron Duggan DO     Active Discharge Diagnoses:     Hospital Problem Lists:  Principal Problem:    Anastomotic leak of intestine  Active Problems:    Severe protein-calorie malnutrition Josiane Champ: less than 60% of standard weight) ([facility]    Bipolar I disorder, most recent episode mixed ([facility]    Major depression with psychotic features ([facility]    Schizoaffective disorder ([facility]    Acute metabolic encephalopathy    Rectovaginal fistula  Resolved Problems:    * No resolved hospital problems.  *      Admission Condition:  serious     Discharged Condition: stable    Hospital Stay:     Hospital Course:  Gera Mckeon is a 39 y.o. female with a history of rectal cancer diagnosed 12/20/2014 with colon resection/right hemicolectomy 2015, chemo and radiation, transverse loop colostomy on 7/30/2020 with reversal of trans versus loop colostomy in 521, bipolar disorder, right hydronephrosis due to abscess status post right stent who presented to our hospital for right thigh pain and diarrhea. On admission, patient was found to be anemic and was transfused 2 units of PRBCs for hemoglobin of 5.4 with improvement in symptoms. She was evaluated by gastroenterology and underwent diagnostic EGD with diagnostic sigmoidoscopy which showed a 2 cm opening 3 to 4 cm from the anal verge which was likely from rectal anastomotic dehiscence. This opened into an abscess cavity in the posterior rectal fossa which appeared to be walled off. A fistulous tract was found about 5 cm from the anal rectal verge that communicated with the posterior aspect of the vagina this was confirmed via bimanual and speculum exam done by the GYN attending during the procedure. Intraprocedural consultation was done to colorectal surgery colorectal surgery. Gastrografin study was done which showed colovaginal fistulous communication along with pulling of contrast posterior to the rectum in the presacral space consistent with extravasation of contrast most likely from low rectal anastomotic site this correlates with presacral gas/fluid collection on the comparison CT scan. She underwent exploratory laparotomy with creation of colostomy on 8/14/2021. Patient tolerated the procedure well. She was able to advance her diet prior to discharge was tolerating solid foods. Patient was taken off TPN. Currently, patient medically stable for discharge. She will need to follow with  in 1 week's time for staple removal.  Patient's ostomy did have good output on day of discharge. She will also need follow up with Leticia Singh from Urology for management of stents.    Patient will be discharged home with home health care since family did not want acute rehab. She will need some care at home as well as PT OT and continue therapy. Significant therapeutic interventions: see above    Significant Diagnostic Studies:   Labs / Micro:  CBC:   Lab Results   Component Value Date    WBC 11.4 08/18/2021    RBC 3.89 08/18/2021    HGB 10.0 08/18/2021    HCT 33.8 08/18/2021    MCV 86.9 08/18/2021    MCH 25.7 08/18/2021    MCHC 29.6 08/18/2021    RDW 20.9 08/18/2021     08/18/2021     BMP:    Lab Results   Component Value Date    GLUCOSE 129 08/18/2021     08/18/2021    K 4.0 08/18/2021     08/18/2021    CO2 24 08/18/2021    ANIONGAP 9 08/18/2021    BUN 14 08/18/2021    CREATININE 0.71 08/18/2021    BUNCRER NOT REPORTED 08/18/2021    CALCIUM 8.2 08/18/2021    LABGLOM >60 08/18/2021    GFRAA >60 08/18/2021    GFR      08/18/2021    GFR NOT REPORTED 08/18/2021     HFP:    Lab Results   Component Value Date    PROT 5.2 08/18/2021        Radiology:  FL BARIUM ENEMA    Result Date: 8/13/2021  1. Colovaginal fistulous communication is demonstrated. 2. Pooling of contrast posterior to the rectum in the presacral space consistent with extravasation of contrast most likely from low rectal anastomotic site. This correlates with presacral gas/fluid collection on the comparison CT scan. Findings were discussed with Dr. Lizzette Cullen  (Gastroenterology) on 08/13/2020     XR HIP 2-3 VW W PELVIS RIGHT    Result Date: 8/12/2021  No acute osseous or soft tissue abnormality. XR ABDOMEN (2 VIEWS)    Result Date: 8/12/2021  Unremarkable radiographs of the abdomen.        Consultations:    Consults:     Final Specialist Recommendations/Findings:   IP CONSULT TO COLORECTAL SURGERY  IP CONSULT TO GI  IP CONSULT TO UROLOGY  IP CONSULT TO DIETITIAN  IP CONSULT TO IV TEAM  IP CONSULT TO CASE MANAGEMENT  IP CONSULT TO HOME CARE NEEDS      The patient was seen and examined on day of discharge and this discharge summary is in conjunction with any daily progress note from day of discharge. Discharge plan:     Disposition: Home    Physician Follow Up:     Amarilis Freire MD  [address]  [phone]      Follow up 1 week after discharge. Urology    GRAY Monten[place]ro - CNP  [id number]  [phone]    Schedule an appointment as soon as possible for a visit  hospital follow up     [address] [facility] MD Derek  [phone] [address], [address] [address] [facility] [address] [phone]    In 1 week  staple removal and hospital follow up        Requiring Further Evaluation/Follow Up POST HOSPITALIZATION/Incidental Findings:     -Follow-up with your primary care physician within 1 week of discharge for posthospitalization follow-up. You will need repeat CBC to make sure your hemoglobin is improving. Also recommend labs for renal function to make sure creatinine stays stable. -Follow-up with Dr. Surinder Pizarro in 1 week's time for reevaluation of your wound after having diverting colectomy for treatment of rectovaginal fistula. He will have staples removed at this time.  -Take all medications as prescribed. -Continue PT OT  -Turn to the hospital if you develop worsening fevers, chills, nausea, vomiting, diarrhea. -Monitor postvoid residuals. Return to the hospital if you notice decreasing urine output. Needs follow-up with urology outpatient . Ostomy care: Cut barrier to fit stoma with no more than 1/8\" of exposed skin  Apply strip paste vs a barrier seal/ring to the cut edge of the pouching system. Empty the pouch when 1/3 to 1/2 full. Change the pouching system twice weekly and prn  Our goal is to keep the skin around the stoma intact and to have a dependable pouching system without leaks.     Diet: regular diet    Activity: As tolerated    Instructions to Patient: see above    Discharge Medications:      Medication List      START taking these medications    amoxicillin-clavulanate 500-125 MG per tablet  Commonly known as: Augmentin  Take 1 tablet by mouth 3 times daily for 7 days     cyclobenzaprine 10 MG tablet  Commonly known as: FLEXERIL  Take 1 tablet by mouth 3 times daily for 3 days     oxyCODONE 5 MG immediate release tablet  Commonly known as: ROXICODONE  Take 1 tablet by mouth every 4 hours as needed for Pain for up to 3 days. CONTINUE taking these medications    busPIRone 15 MG tablet  Commonly known as: BUSPAR  Take 15 mg by mouth 3 times daily     ferrous sulfate 325 (65 Fe) MG tablet  Commonly known as: IRON 325  Take 1 tablet by mouth 2 times daily (with meals)     hydrOXYzine 50 MG capsule  Commonly known as: VISTARIL     mirtazapine 15 MG tablet  Commonly known as: REMERON     OLANZapine 10 MG tablet  Commonly known as: ZYPREXA  Take 1 tablet by mouth nightly           Where to Get Your Medications      These medications were sent to [facility] [address] [facility], [address] [place] [address] Pkwy, [place] [address]    Phone: [phone]   · amoxicillin-clavulanate 500-125 MG per tablet  · cyclobenzaprine 10 MG tablet     You can get these medications from any pharmacy    Bring a paper prescription for each of these medications  · oxyCODONE 5 MG immediate release tablet         No discharge procedures on file. Time Spent on discharge is  40 mins in patient examination, evaluation, counseling as well as medication reconciliation, prescriptions for required medications, discharge plan and follow up. Electronically signed by   Bertin Duff DO  8/18/2021  11:26 AM      Thank you Dr. Aleksey Quintero, APRN - CNP for the opportunity to be involved in this patient's care.

## 2021-08-18 NOTE — PROGRESS NOTES
Physical Therapy  Facility/Department: [facility] ONC/MED SURG  Daily Treatment Note  NAME: Payal Novak  : 1975  MRN: [id number]    Date of Service: 2021    Discharge Recommendations:  Patient would benefit from continued therapy after discharge        Assessment   Body structures, Functions, Activity limitations: Decreased functional mobility ; Decreased endurance;Decreased strength  Assessment: Improved gait distance to 200' with walker. Patient notes she's glad she's getting a walker, verbalizes fatigue with this increased distance. Patient needs further PT to regain functional independence. PT Education: Goals;PT Role;Plan of Care;Energy Conservation;General Safety;Home Exercise Program;Equipment;Gait Training; Injury Prevention  REQUIRES PT FOLLOW UP: Yes  Activity Tolerance  Activity Tolerance: Patient Tolerated treatment well     Patient Diagnosis(es): The encounter diagnosis was Rectovaginal fistula. has a past medical history of Cancer ([facility] Pelvis, female abscess, and UTI (urinary tract infection). has a past surgical history that includes Ureter stent placement (); Abscess Drainage; Small intestine surgery (N/A, 2020); Hysterectomy; Abdomen surgery (2021); colostomy (2021); Upper gastrointestinal endoscopy (N/A, 2021); sigmoidoscopy (2021); and colostomy (N/A, 2021). Restrictions  Restrictions/Precautions  Restrictions/Precautions: Up as Tolerated  Required Braces or Orthoses?: No  Position Activity Restriction  Other position/activity restrictions: Colostomy bag  Subjective   General  Chart Reviewed: Yes  Family / Caregiver Present: No  Subjective  Subjective: RN and pt agreeable to PT. Pain Screening  Patient Currently in Pain: Yes  Pain Assessment  Pain Assessment: 0-10  Pain Level: 7  Pain Type: Surgical pain  Pain Location: Abdomen  Vital Signs  Patient Currently in Pain: Yes            Cognition   Cognition  Following Commands:  Follows one step commands with increased time; Follows one step commands with repetition  Attention Span: Appears intact  Memory: Appears intact  Initiation: Does not require cues  Sequencing: Requires cues for some  Objective      Transfers  Sit to Stand: Contact guard assistance  Stand to sit: Contact guard assistance  Ambulation  Ambulation?: Yes  Ambulation 1  Surface: level tile  Device: Rolling Walker  Assistance: Stand by assistance;Contact guard assistance  Gait Deviations: Slow Leisa  Distance: 200'  Comments: Steady                    AM-PAC Score  AM-PAC Inpatient Mobility Raw Score : 20 (08/18/21 1346)  AM-PAC Inpatient T-Scale Score : 47.67 (08/18/21 1346)  Mobility Inpatient CMS 0-100% Score: 35.83 (08/18/21 1346)  Mobility Inpatient CMS G-Code Modifier : Pooja Ramos (08/18/21 1346)          Goals  Short term goals  Time Frame for Short term goals: 10 visits  Short term goal 1: transfers with SBA  Short term goal 2: amb 150 ft with a RW x SBA  Short term goal 3: ascend/descend 4 steps with SBA  Short term goal 4: 20 min exercise program x SBA  Patient Goals   Patient goals : Return home    Plan    Plan  Times per week: 5-6x wk  Current Treatment Recommendations: Strengthening, Functional Mobility Training, Gait Training, Safety Education & Training, Endurance Training, Stair training, Home Exercise Program  Safety Devices  Type of devices: Nurse notified, Call light within reach, Gait belt, Left in chair, All fall risk precautions in place     Therapy Time   Individual Concurrent Group Co-treatment   Time In 1310         Time Out 1335         Minutes 25         Timed Code Treatment Minutes: Matilda 1690, PT

## 2021-08-18 NOTE — PROGRESS NOTES
General Surgery:  Daily Progress Note          PATIENT NAME: Ballard Phoenix     TODAY'S DATE: 8/18/2021, 6:15 AM    SUBJECTIVE:     Pt seen and examined at bedside. No acute overnight events. Pt states overall pain is controlled. Pt is still C/O drainage from vaginal canal. However,  denies any fever, chills, nausea, vomiting, chest pain, and SOB. Pt Tolerating full liquid diet. Pt reports she has been drinking and urinating adequately. She reports being able to ambulate without difficulty. Her ostomy is intact with stool in the bag. OBJECTIVE:   VITALS:  BP (!) 140/75   Pulse 71   Temp 98 °F (36.7 °C) (Oral)   Resp 16   Ht 5' (1.524 m)   Wt 110 lb 4.8 oz (50 kg)   LMP 06/25/2017   SpO2 100%   BMI 21.54 kg/m²      INTAKE/OUTPUT:      Intake/Output Summary (Last 24 hours) at 8/18/2021 0615  Last data filed at 8/18/2021 0556  Gross per 24 hour   Intake 5720 ml   Output 5100 ml   Net 620 ml       PHYSICAL EXAM:  General Appearance: awake, alert, in no acute distress  HEENT:  Normocephalic, atraumatic, mucus membranes moist   Heart: Regular rate and rhythm  Lungs: normal effort with symmetric rise and fall of chest wall  Abdomen: Soft, tender to palpation, nondistended, infraumbilical midline incision heling well staples intact, left lower quadrant end colostomy appears well, stoma pink and stool in bag    Extremities: No cyanosis, pitting edema, rashes noted. Skin: Skin color, texture, turgor normal. No rashes or lesions. Data:  CBC:   Recent Labs     08/15/21  0746 08/15/21  0746 08/15/21  0747 08/17/21  0630 08/18/21  0447   WBC 15.1*  --   --  15.1* 11.4*   HGB 9.8*   < > 10.0* 11.3* 10.0*     --   --  488* 510*    < > = values in this interval not displayed.      Chemistry:   Recent Labs     08/16/21  0528 08/17/21  0605 08/18/21  0447    141 142   K 4.4 3.9 4.0    105 109*   CO2 25 24 24   GLUCOSE 88 116* 129*   BUN 14 14 14   CREATININE 0.72 0.70 0.71   MG 2.3 2.2 2. 2   ANIONGAP 9 12 9   LABGLOM >60 >60 >60   GFRAA >60 >60 >60   CALCIUM 8.1* 8.4* 8.2*   PHOS 3.5 3.3 2.4*     Hepatic:   Recent Labs     08/16/21  0528 08/17/21  0605 08/18/21  0447   AST 20 13 14   ALT 14 15 14   ALKPHOS 87 105* 116*   BILITOT <0.10* 0.18* 0.17*     Coagulation:   Recent Labs     08/16/21  0528 08/17/21  0605 08/18/21  0447   PROT 4.8* 5.4* 5.2*         Radiology Review:    FL BARIUM ENEMA    Result Date: 8/13/2021  EXAMINATION: SINGLE CONTRAST GASTROGRAFIN ENEMA 8/13/2021 TECHNIQUE: The enema tip was inserted in a low position. 240 mL GASTROGRAFIN/1000 mL WATER was instilled retrogradely into the rectum under gravity under direct fluoroscopic visualization. Static and cine images acquired during installation. FLUOROSCOPY DOSE AND TYPE OR TIME AND EXPOSURES: DAP 30.843 abscess CT 08/11/2021Gycm2 COMPARISON: None HISTORY: ORDERING SYSTEM PROVIDED HISTORY: concern for colovaginal fistula, stool from vagina TECHNOLOGIST PROVIDED HISTORY: Please use gastrografin for enema Electronically signed by Karyle Aye, DO on 8/13/2021 at 7:38 AM Reason for exam:->concern for colovaginal fistula, stool from vagina Reason for Exam: colovaginal fistula  3. 2minft FINDINGS: Cine images (thumbnail image A5 in PACS) acquired during very 1st installation of contrast into the rectum. Contrast promptly fills the rectum but at same time there is abnormal extension and pooling anterior inferior to the lower rectum consistent with contrast collection in the vaginal canal. Additionally there is pooling of contrast in the presacral space which would correlate with fluid/gas collection in the presacral space on the comparison CT scan. This would be indicative of extravasation from the low rectal anastomotic site. 1. Colovaginal fistulous communication is demonstrated.  2. Pooling of contrast posterior to the rectum in the presacral space consistent with extravasation of contrast most likely from low rectal anastomotic site. This correlates with presacral gas/fluid collection on the comparison CT scan. Findings were discussed with Dr. Gwen Rodgers . Subhash  (Gastroenterology) on 08/13/2020     XR HIP 2-3 VW W PELVIS RIGHT    Result Date: 8/12/2021  EXAMINATION: ONE XRAY VIEW OF THE PELVIS AND TWO XRAY VIEWS RIGHT HIP 8/12/2021 3:43 pm COMPARISON: None. HISTORY: ORDERING SYSTEM PROVIDED HISTORY: R hip pain TECHNOLOGIST PROVIDED HISTORY: R hip pain Acuity: Unknown Type of Exam: Unknown FINDINGS: The bony pelvis is intact. There is no acute osseous abnormality. The SI joints are maintained with mild degenerative change. The hip joints are maintained. There is a partially visualized right ureteral stent. The surrounding soft tissues are otherwise unremarkable. No acute osseous or soft tissue abnormality. XR ABDOMEN (2 VIEWS)    Result Date: 8/12/2021  EXAMINATION: TWO XRAY VIEWS OF THE ABDOMEN 8/12/2021 10:06 am COMPARISON: CT abdomen and pelvis performed 08/11/2021. HISTORY: ORDERING SYSTEM PROVIDED HISTORY: concern anastomotic leak TECHNOLOGIST PROVIDED HISTORY: concern anastomotic leak Acuity: Unknown FINDINGS: There is a nonobstructive bowel gas pattern. There is no intraperitoneal free air. There are no suspicious calcifications. There is a right ureteral stent. There is no acute osseous abnormality. The surrounding soft tissues are unremarkable. Unremarkable radiographs of the abdomen.        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 female

## 2021-08-18 NOTE — PROGRESS NOTES
Comprehensive Nutrition Assessment    Type and Reason for Visit:  Reassess    Nutrition Recommendations/Plan:   - Continue current diet with Ensure Enlive oral supplements x 2 per day. Encourage/monitor PO intakes as tolerated. - TPN to be discontinued today. Discussed with RN.  - Will monitor PO intakes, ostomy function, labs, weights, and plan of care. Nutrition Assessment:  Pt advanced to a General diet today. Pt reports she ate what she could of her breakfast tray but it was \"heavy\" - observed tray which pt ate about 25% of an omelet and potatoes. Pt reports she like the Ensure Enlive oral supplements in chocolate/strawberry and does like the Ensure Clear Liquid supplements. TPN running at 60 mL/hr - at visit RN decreased rate to 30 mL/hr. Discussed discontinuing TPN after lunch with RN. Noted pt having colostomy output. Labs reviewed: Phos 2.4 mg/dL. Meds reviewed. Malnutrition Assessment:  Malnutrition Status:  Severe malnutrition    Context:  Acute Illness     Findings of the 6 clinical characteristics of malnutrition:  Energy Intake:  Mild decrease in energy - Improved with TPN and start of oral diet/ONS. Weight Loss:  Unable to assess     Body Fat Loss:  7 - Moderate body fat loss (to Severe) Orbital, Buccal region   Muscle Mass Loss:  7 - Moderate muscle mass loss Clavicles (pectoralis & deltoids), Hand (interosseous)  Fluid Accumulation:  1 - Mild Extremities   Strength:  Not Performed    Estimated Daily Nutrient Needs:  Energy (kcal):  30-35 ~>0626-9376 kcals/d; Weight Used for Energy Requirements:  Current     Protein (g):  1.5-2.0 gm/kg = 70-90 gm pro/day; Weight Used for Protein Requirements:  Ideal        Fluid (ml/day):  30 ~> 1450 mLs OR per MD discretion; Method Used for Fluid Requirements:  ml/Kg     Nutrition Related Findings:  Labs/Meds reviewed. +Colostomy with output. S/p 8/14 EAU and end colostomy creation.       Wounds:  Surgical Incision (to abdomen)       Current Nutrition Therapies:    PN-Adult 2-in-1 Central Line (Standard)  Adult Oral Nutrition Supplement; Standard High Calorie/High Protein Oral Supplement  ADULT DIET; Regular  Current Parenteral Nutrition Orders:  · Type and Formula: 2-in-1 Custom (230 gm Dextrose, 80 gm AA)   · Lipids: None (Lipids discontinued yesterday.)  · Duration: Continuous  · Rate/Volume: 60 mL/hr (1440 mL/day) - rate cut in half to 30 mL/hr - plan to discontinue after lunch today  · Current PN Order Provides: 230 gm Dextrose, 80 gm AA = 1102 kcals, 80 gm pro/day    Anthropometric Measures:  · Height: 5' (152.4 cm)  · Current Body Weight: 110 lb 4.8 oz (50 kg)   · Admission Body Weight: 108 lb (49 kg)    · Ideal Body Weight: 100 lbs; % Ideal Body Weight 110.3 %   · BMI: 21.5  · BMI Categories: Normal Weight (BMI 18.5-24. 9)       Nutrition Diagnosis:   · Inadequate oral intake related to altered GI function (recent GI surgery) as evidenced by intake 26-50%, intake 51-75% (improving PO intakes; need for ONS; need for parenteral nutrition support)    Nutrition Interventions:   Food and/or Nutrient Delivery:  Continue Current Diet, Continue Oral Nutrition Supplement, Discontinue Parenteral Nutrition  Nutrition Education/Counseling:  Education completed (Encouraged slowly increasing PO intakes and drinking ONS.)   Coordination of Nutrition Care:  Continue to monitor while inpatient    Goals:  Meet % of estimated nutrition needs.        Nutrition Monitoring and Evaluation:   Food/Nutrient Intake Outcomes:  Food and Nutrient Intake, Supplement Intake, Parenteral Nutrition Intake/Tolerance  Physical Signs/Symptoms Outcomes:  Biochemical Data, GI Status, Nausea or Vomiting, Fluid Status or Edema, Nutrition Focused Physical Findings, Skin, Weight     Electronically signed by Beckie Muro RD, LD on 8/18/21 at 12:06 PM EDT    Contact: [phone]

## 2021-08-18 NOTE — CARE COORDINATION
Aníbal Rossi was evaluated today and a DME order was entered for a wheeled walker because she requires this to successfully complete daily living tasks of eating, bathing, toileting, personal cares, ambulating, grooming, hygiene, dressing upper body, dressing lower body, meal preparation and taking own medications. A wheeled walker is necessary due to the patient's unsteady gait, upper body weakness, and inability to  an ambulation device; and she can ambulate only by pushing a walker instead of a lesser assistive device such as a cane, crutch, or standard walker. The need for this equipment was discussed with the patient and she understands and is in agreement.

## 2021-08-18 NOTE — PLAN OF CARE
Problem: Safety:  Goal: Free from accidental physical injury  Description: Free from accidental physical injury  8/18/2021 0420 by Steve Jon RN  Outcome: Ongoing  8/17/2021 1835 by Carlos Grant RN  Outcome: Ongoing     Problem: Pain:  Goal: Patient's pain/discomfort is manageable  Description: Patient's pain/discomfort is manageable  8/18/2021 0420 by Steve Jon RN  Outcome: Ongoing  8/17/2021 1835 by Carlos Grant RN  Outcome: Ongoing  Goal: Pain level will decrease  Description: Pain level will decrease  8/18/2021 0420 by Steve Jon RN  Outcome: Ongoing  8/17/2021 1835 by Carlos Grant RN  Outcome: Ongoing  Goal: Control of acute pain  Description: Control of acute pain  8/18/2021 0420 by Steve Jon RN  Outcome: Ongoing  8/17/2021 1835 by Carlos Grant RN  Outcome: Ongoing  Goal: Control of chronic pain  Description: Control of chronic pain  8/18/2021 0420 by Steve Jon RN  Outcome: Ongoing  8/17/2021 1835 by Carlos Grant RN  Outcome: Ongoing     Problem: Skin Integrity:  Goal: Skin integrity will stabilize  Description: Skin integrity will stabilize  8/18/2021 0420 by Steve Jon RN  Outcome: Ongoing  8/17/2021 1835 by Carlos Grant RN  Outcome: Ongoing     Problem: Skin Integrity:  Goal: Will show no infection signs and symptoms  Description: Will show no infection signs and symptoms  8/18/2021 0420 by Steve Jon RN  Outcome: Ongoing  8/17/2021 1835 by Carlos Grant RN  Outcome: Ongoing  Goal: Absence of new skin breakdown  Description: Absence of new skin breakdown  8/18/2021 0420 by Steve Jon RN  Outcome: Ongoing  8/17/2021 1835 by Carlos Grant RN  Outcome: Ongoing     Problem: Falls - Risk of:  Goal: Will remain free from falls  Description: Will remain free from falls  8/18/2021 0420 by Steve Jon RN  Outcome: Ongoing  8/17/2021 [address] by Carlos Grant RN  Outcome: Ongoing  Goal: Absence of physical injury  Description: Absence of physical

## 2021-09-01 ENCOUNTER — HOSPITAL ENCOUNTER (INPATIENT)
Age: 46
LOS: 3 days | Discharge: HOME HEALTH | DRG: 247 | End: 2021-09-04
Attending: INTERNAL MEDICINE | Admitting: INTERNAL MEDICINE
Payer: MEDICARE

## 2021-09-01 PROBLEM — K56.609 SMALL BOWEL OBSTRUCTION (HCC): Status: ACTIVE | Noted: 2021-09-01

## 2021-09-01 PROCEDURE — 1200000000 HC SEMI PRIVATE

## 2021-09-01 PROCEDURE — 2580000003 HC RX 258: Performed by: CLINICAL NURSE SPECIALIST

## 2021-09-01 RX ORDER — POTASSIUM CHLORIDE 7.45 MG/ML
10 INJECTION INTRAVENOUS PRN
Status: DISCONTINUED | OUTPATIENT
Start: 2021-09-01 | End: 2021-09-04 | Stop reason: HOSPADM

## 2021-09-01 RX ORDER — SODIUM CHLORIDE 9 MG/ML
INJECTION, SOLUTION INTRAVENOUS CONTINUOUS
Status: DISCONTINUED | OUTPATIENT
Start: 2021-09-01 | End: 2021-09-04 | Stop reason: HOSPADM

## 2021-09-01 RX ORDER — POTASSIUM CHLORIDE 20 MEQ/1
40 TABLET, EXTENDED RELEASE ORAL PRN
Status: DISCONTINUED | OUTPATIENT
Start: 2021-09-01 | End: 2021-09-04 | Stop reason: HOSPADM

## 2021-09-01 RX ORDER — SODIUM CHLORIDE 0.9 % (FLUSH) 0.9 %
10 SYRINGE (ML) INJECTION PRN
Status: DISCONTINUED | OUTPATIENT
Start: 2021-09-01 | End: 2021-09-04 | Stop reason: HOSPADM

## 2021-09-01 RX ORDER — SODIUM CHLORIDE 9 MG/ML
25 INJECTION, SOLUTION INTRAVENOUS PRN
Status: DISCONTINUED | OUTPATIENT
Start: 2021-09-01 | End: 2021-09-04 | Stop reason: HOSPADM

## 2021-09-01 RX ORDER — LANOLIN ALCOHOL/MO/W.PET/CERES
325 CREAM (GRAM) TOPICAL 2 TIMES DAILY WITH MEALS
Status: DISCONTINUED | OUTPATIENT
Start: 2021-09-02 | End: 2021-09-04 | Stop reason: HOSPADM

## 2021-09-01 RX ORDER — POLYETHYLENE GLYCOL 3350 17 G/17G
17 POWDER, FOR SOLUTION ORAL DAILY PRN
Status: DISCONTINUED | OUTPATIENT
Start: 2021-09-01 | End: 2021-09-04 | Stop reason: HOSPADM

## 2021-09-01 RX ORDER — ONDANSETRON 2 MG/ML
4 INJECTION INTRAMUSCULAR; INTRAVENOUS EVERY 6 HOURS PRN
Status: DISCONTINUED | OUTPATIENT
Start: 2021-09-01 | End: 2021-09-04 | Stop reason: HOSPADM

## 2021-09-01 RX ORDER — SODIUM CHLORIDE 0.9 % (FLUSH) 0.9 %
5-40 SYRINGE (ML) INJECTION EVERY 12 HOURS SCHEDULED
Status: DISCONTINUED | OUTPATIENT
Start: 2021-09-01 | End: 2021-09-04 | Stop reason: HOSPADM

## 2021-09-01 RX ORDER — MAGNESIUM SULFATE 1 G/100ML
1000 INJECTION INTRAVENOUS PRN
Status: DISCONTINUED | OUTPATIENT
Start: 2021-09-01 | End: 2021-09-04 | Stop reason: HOSPADM

## 2021-09-01 RX ORDER — ONDANSETRON 4 MG/1
4 TABLET, ORALLY DISINTEGRATING ORAL EVERY 8 HOURS PRN
Status: DISCONTINUED | OUTPATIENT
Start: 2021-09-01 | End: 2021-09-04 | Stop reason: HOSPADM

## 2021-09-01 RX ORDER — BUSPIRONE HYDROCHLORIDE 15 MG/1
15 TABLET ORAL 3 TIMES DAILY
Status: DISCONTINUED | OUTPATIENT
Start: 2021-09-01 | End: 2021-09-04 | Stop reason: HOSPADM

## 2021-09-01 RX ORDER — ACETAMINOPHEN 650 MG/1
650 SUPPOSITORY RECTAL EVERY 6 HOURS PRN
Status: DISCONTINUED | OUTPATIENT
Start: 2021-09-01 | End: 2021-09-04 | Stop reason: HOSPADM

## 2021-09-01 RX ORDER — MIRTAZAPINE 15 MG/1
15 TABLET, FILM COATED ORAL NIGHTLY
Status: DISCONTINUED | OUTPATIENT
Start: 2021-09-01 | End: 2021-09-04 | Stop reason: HOSPADM

## 2021-09-01 RX ORDER — OLANZAPINE 10 MG/1
10 TABLET ORAL NIGHTLY
Status: DISCONTINUED | OUTPATIENT
Start: 2021-09-01 | End: 2021-09-04 | Stop reason: HOSPADM

## 2021-09-01 RX ORDER — HYDROXYZINE 50 MG/1
50 TABLET, FILM COATED ORAL 3 TIMES DAILY PRN
Status: DISCONTINUED | OUTPATIENT
Start: 2021-09-01 | End: 2021-09-04 | Stop reason: HOSPADM

## 2021-09-01 RX ORDER — ACETAMINOPHEN 325 MG/1
650 TABLET ORAL EVERY 6 HOURS PRN
Status: DISCONTINUED | OUTPATIENT
Start: 2021-09-01 | End: 2021-09-04 | Stop reason: HOSPADM

## 2021-09-01 RX ADMIN — SODIUM CHLORIDE: 9 INJECTION, SOLUTION INTRAVENOUS at 22:51

## 2021-09-01 ASSESSMENT — PAIN SCALES - GENERAL: PAINLEVEL_OUTOF10: 0

## 2021-09-02 ENCOUNTER — APPOINTMENT (OUTPATIENT)
Dept: GENERAL RADIOLOGY | Age: 46
DRG: 247 | End: 2021-09-02
Attending: INTERNAL MEDICINE
Payer: MEDICARE

## 2021-09-02 PROBLEM — N13.30 HYDRONEPHROSIS OF RIGHT KIDNEY: Status: ACTIVE | Noted: 2021-09-02

## 2021-09-02 PROBLEM — N30.01 ACUTE CYSTITIS WITH HEMATURIA: Status: ACTIVE | Noted: 2020-06-16

## 2021-09-02 LAB
-: NORMAL
AMORPHOUS: NORMAL
ANION GAP SERPL CALCULATED.3IONS-SCNC: 20 MMOL/L (ref 9–17)
BACTERIA: NORMAL
BILIRUBIN URINE: NEGATIVE
BUN BLDV-MCNC: 14 MG/DL (ref 6–20)
BUN/CREAT BLD: ABNORMAL (ref 9–20)
CALCIUM SERPL-MCNC: 8.5 MG/DL (ref 8.6–10.4)
CASTS UA: NORMAL /LPF (ref 0–8)
CHLORIDE BLD-SCNC: 103 MMOL/L (ref 98–107)
CO2: 17 MMOL/L (ref 20–31)
COLOR: YELLOW
COMMENT UA: ABNORMAL
CREAT SERPL-MCNC: 0.7 MG/DL (ref 0.5–0.9)
CRYSTALS, UA: NORMAL /HPF
EPITHELIAL CELLS UA: NORMAL /HPF (ref 0–5)
GFR AFRICAN AMERICAN: >60 ML/MIN
GFR NON-AFRICAN AMERICAN: >60 ML/MIN
GFR SERPL CREATININE-BSD FRML MDRD: ABNORMAL ML/MIN/{1.73_M2}
GFR SERPL CREATININE-BSD FRML MDRD: ABNORMAL ML/MIN/{1.73_M2}
GLUCOSE BLD-MCNC: 86 MG/DL (ref 70–99)
GLUCOSE URINE: NEGATIVE
HCT VFR BLD CALC: 37.5 % (ref 36.3–47.1)
HEMOGLOBIN: 11.7 G/DL (ref 11.9–15.1)
INR BLD: 1.1
KETONES, URINE: ABNORMAL
LEUKOCYTE ESTERASE, URINE: ABNORMAL
MCH RBC QN AUTO: 25.8 PG (ref 25.2–33.5)
MCHC RBC AUTO-ENTMCNC: 31.2 G/DL (ref 28.4–34.8)
MCV RBC AUTO: 82.8 FL (ref 82.6–102.9)
MUCUS: NORMAL
NITRITE, URINE: NEGATIVE
NRBC AUTOMATED: 0 PER 100 WBC
OTHER OBSERVATIONS UA: NORMAL
PDW BLD-RTO: 21.1 % (ref 11.8–14.4)
PH UA: 5.5 (ref 5–8)
PLATELET # BLD: 453 K/UL (ref 138–453)
PMV BLD AUTO: 10.7 FL (ref 8.1–13.5)
POTASSIUM SERPL-SCNC: 3.6 MMOL/L (ref 3.7–5.3)
PROTEIN UA: ABNORMAL
PROTHROMBIN TIME: 12 SEC (ref 9.1–12.3)
RBC # BLD: 4.53 M/UL (ref 3.95–5.11)
RBC UA: NORMAL /HPF (ref 0–4)
RENAL EPITHELIAL, UA: NORMAL /HPF
SODIUM BLD-SCNC: 140 MMOL/L (ref 135–144)
SPECIFIC GRAVITY UA: 1.02 (ref 1–1.03)
TRICHOMONAS: NORMAL
TURBIDITY: ABNORMAL
URINE HGB: ABNORMAL
UROBILINOGEN, URINE: NORMAL
WBC # BLD: 18 K/UL (ref 3.5–11.3)
WBC UA: NORMAL /HPF (ref 0–5)
YEAST: NORMAL

## 2021-09-02 PROCEDURE — 97530 THERAPEUTIC ACTIVITIES: CPT

## 2021-09-02 PROCEDURE — 87086 URINE CULTURE/COLONY COUNT: CPT

## 2021-09-02 PROCEDURE — 6360000002 HC RX W HCPCS: Performed by: INTERNAL MEDICINE

## 2021-09-02 PROCEDURE — 97535 SELF CARE MNGMENT TRAINING: CPT

## 2021-09-02 PROCEDURE — 6370000000 HC RX 637 (ALT 250 FOR IP): Performed by: CLINICAL NURSE SPECIALIST

## 2021-09-02 PROCEDURE — APPSS45 APP SPLIT SHARED TIME 31-45 MINUTES: Performed by: NURSE PRACTITIONER

## 2021-09-02 PROCEDURE — 2500000003 HC RX 250 WO HCPCS: Performed by: NURSE PRACTITIONER

## 2021-09-02 PROCEDURE — 99223 1ST HOSP IP/OBS HIGH 75: CPT | Performed by: INTERNAL MEDICINE

## 2021-09-02 PROCEDURE — 81001 URINALYSIS AUTO W/SCOPE: CPT

## 2021-09-02 PROCEDURE — 87040 BLOOD CULTURE FOR BACTERIA: CPT

## 2021-09-02 PROCEDURE — 36415 COLL VENOUS BLD VENIPUNCTURE: CPT

## 2021-09-02 PROCEDURE — 6360000002 HC RX W HCPCS: Performed by: CLINICAL NURSE SPECIALIST

## 2021-09-02 PROCEDURE — 99254 IP/OBS CNSLTJ NEW/EST MOD 60: CPT | Performed by: INTERNAL MEDICINE

## 2021-09-02 PROCEDURE — 80048 BASIC METABOLIC PNL TOTAL CA: CPT

## 2021-09-02 PROCEDURE — 97167 OT EVAL HIGH COMPLEX 60 MIN: CPT

## 2021-09-02 PROCEDURE — 85027 COMPLETE CBC AUTOMATED: CPT

## 2021-09-02 PROCEDURE — 97162 PT EVAL MOD COMPLEX 30 MIN: CPT

## 2021-09-02 PROCEDURE — 85610 PROTHROMBIN TIME: CPT

## 2021-09-02 PROCEDURE — 2580000003 HC RX 258: Performed by: NURSE PRACTITIONER

## 2021-09-02 PROCEDURE — 1200000000 HC SEMI PRIVATE

## 2021-09-02 PROCEDURE — 74018 RADEX ABDOMEN 1 VIEW: CPT

## 2021-09-02 PROCEDURE — 6360000002 HC RX W HCPCS: Performed by: NURSE PRACTITIONER

## 2021-09-02 RX ORDER — VANCOMYCIN HYDROCHLORIDE 1 G/200ML
1000 INJECTION, SOLUTION INTRAVENOUS
Status: DISCONTINUED | OUTPATIENT
Start: 2021-09-02 | End: 2021-09-03

## 2021-09-02 RX ORDER — MORPHINE SULFATE 2 MG/ML
2 INJECTION, SOLUTION INTRAMUSCULAR; INTRAVENOUS
Status: DISCONTINUED | OUTPATIENT
Start: 2021-09-02 | End: 2021-09-04 | Stop reason: HOSPADM

## 2021-09-02 RX ORDER — MORPHINE SULFATE 4 MG/ML
4 INJECTION, SOLUTION INTRAMUSCULAR; INTRAVENOUS
Status: DISCONTINUED | OUTPATIENT
Start: 2021-09-02 | End: 2021-09-04 | Stop reason: HOSPADM

## 2021-09-02 RX ORDER — KETOROLAC TROMETHAMINE 15 MG/ML
15 INJECTION, SOLUTION INTRAMUSCULAR; INTRAVENOUS EVERY 6 HOURS PRN
Status: DISCONTINUED | OUTPATIENT
Start: 2021-09-02 | End: 2021-09-04 | Stop reason: HOSPADM

## 2021-09-02 RX ADMIN — MIRTAZAPINE 15 MG: 15 TABLET, FILM COATED ORAL at 00:48

## 2021-09-02 RX ADMIN — OLANZAPINE 10 MG: 10 TABLET, FILM COATED ORAL at 00:48

## 2021-09-02 RX ADMIN — KETOROLAC TROMETHAMINE 15 MG: 15 INJECTION, SOLUTION INTRAMUSCULAR; INTRAVENOUS at 13:44

## 2021-09-02 RX ADMIN — METRONIDAZOLE 500 MG: 500 INJECTION, SOLUTION INTRAVENOUS at 02:35

## 2021-09-02 RX ADMIN — ENOXAPARIN SODIUM 40 MG: 40 INJECTION SUBCUTANEOUS at 09:48

## 2021-09-02 RX ADMIN — PIPERACILLIN AND TAZOBACTAM 3375 MG: 3; .375 INJECTION, POWDER, FOR SOLUTION INTRAVENOUS at 18:34

## 2021-09-02 RX ADMIN — PIPERACILLIN AND TAZOBACTAM 3375 MG: 3; .375 INJECTION, POWDER, FOR SOLUTION INTRAVENOUS at 05:22

## 2021-09-02 RX ADMIN — BUSPIRONE HYDROCHLORIDE 15 MG: 15 TABLET ORAL at 00:48

## 2021-09-02 RX ADMIN — VANCOMYCIN HYDROCHLORIDE 1000 MG: 1 INJECTION, SOLUTION INTRAVENOUS at 10:54

## 2021-09-02 RX ADMIN — METRONIDAZOLE 500 MG: 500 INJECTION, SOLUTION INTRAVENOUS at 09:49

## 2021-09-02 RX ADMIN — PIPERACILLIN AND TAZOBACTAM 3375 MG: 3; .375 INJECTION, POWDER, FOR SOLUTION INTRAVENOUS at 12:07

## 2021-09-02 RX ADMIN — METRONIDAZOLE 500 MG: 500 INJECTION, SOLUTION INTRAVENOUS at 16:44

## 2021-09-02 ASSESSMENT — ENCOUNTER SYMPTOMS
CHEST TIGHTNESS: 0
VOMITING: 1
ABDOMINAL DISTENTION: 0
PHOTOPHOBIA: 0
ABDOMINAL PAIN: 1
NAUSEA: 1
COLOR CHANGE: 0
SORE THROAT: 0
COUGH: 0
SHORTNESS OF BREATH: 0
TROUBLE SWALLOWING: 0
DIARRHEA: 0

## 2021-09-02 ASSESSMENT — PAIN DESCRIPTION - DESCRIPTORS
DESCRIPTORS: ACHING;DISCOMFORT
DESCRIPTORS: DULL

## 2021-09-02 ASSESSMENT — PAIN DESCRIPTION - ORIENTATION
ORIENTATION: RIGHT
ORIENTATION: RIGHT;LEFT

## 2021-09-02 ASSESSMENT — PAIN SCALES - GENERAL
PAINLEVEL_OUTOF10: 4
PAINLEVEL_OUTOF10: 0
PAINLEVEL_OUTOF10: 2
PAINLEVEL_OUTOF10: 4
PAINLEVEL_OUTOF10: 0
PAINLEVEL_OUTOF10: 6

## 2021-09-02 ASSESSMENT — PAIN DESCRIPTION - ONSET: ONSET: ON-GOING

## 2021-09-02 ASSESSMENT — PAIN DESCRIPTION - FREQUENCY
FREQUENCY: CONTINUOUS
FREQUENCY: INTERMITTENT

## 2021-09-02 ASSESSMENT — PAIN DESCRIPTION - LOCATION
LOCATION: ABDOMEN

## 2021-09-02 ASSESSMENT — PAIN DESCRIPTION - PAIN TYPE: TYPE: ACUTE PAIN

## 2021-09-02 NOTE — PLAN OF CARE
a regular elimination pattern will improve  Outcome: Ongoing     Problem: Nutritional:  Goal: Ability to follow a diet with enough fiber (20 to 30 grams) for normal bowel function will improve  Description: Ability to follow a diet with enough fiber (20 to 30 grams) for normal bowel function will improve  Outcome: Ongoing

## 2021-09-02 NOTE — H&P
[facility]  Office: [address], DO, Ofe Nobles, DO, Mariam Lambert, DO, Gustabo Shepard Blood, DO, Laurence Marroquin MD, Shiela Cuevas MD, Queen Juan Luis MD, Monika Arrington MD, Sabrina Mcmahan MD, Iman Echols MD, Franci Sanchez MD, Joan Solares DO, Matthew Rizzo MD, Eric Sofia DO, Nick Coleman MD,  Oleg Son DO, Kalani Bowman MD, Franci Jacobs MD, Chio Burt MD, Thor Curiel MD, Navid White MD, Essie Solitario MD, Mikaela Khalil MD, Gerry Malone, [facility], [address], [facility], Shantaisaak Washburn, CNP, Josiah Rae, CNS, Kuldip Pace, CNP, Florencio Andrade, CNP, [address], CNP, Canelo Mckinley, CNP, Harsha Jones, CNP, Mel Nobles PA-C, Corina Ariza, [facility], Navneet Campbell, CNP, Lissett Hoover, CNP, Elissa Watt, [facility], Melba Cerna, CNP, Jamaal James, CNP, Agustín Wbeber, CNP, Inocencia Louis, CNP, Naida Jaquez, [facility]   [address] [facility]    HISTORY AND PHYSICAL EXAMINATION            Date:   9/2/2021  Patient name:  Aníbal Rossi  Date of admission:  9/1/2021  9:09 PM  MRN:   [id number]  Account:  [de-identified]  YOB: 1975  PCP:    GRAY Thakkar CNP  Room:   [facility]/9394-48  Code Status:    Full Code    Chief Complaint:     Nausea and vomiting    History Obtained From:     patient, electronic medical record    History of Present Illness:     Aníbal Rossi is a 39 y.o. Non- / non  female who presents with No chief complaint on file. and is admitted to the hospital for the management of Small bowel obstruction ([facility] [address]     This is a 39 yr old female, very poor historian, with history of rectal cancer (diagnosed in 12/2014) with colon resection and right hemicolectomy in 2015, s/p chemo/radiation, transverse loop colostomy with reversal, right hydronephrosis due to abscess formation with stents in place and Bipolar disorder who presents initially to [facility] ER with nausea and vomiting FLEXIBLE performed by Vamshi Soriano MD at [address] N/A 07/03/2020    TRANSVERSE COLOSTOMY LOOP performed by Laurence Westbrook MD at [address] [facility] [address] 8/13/2021    EGD DIAGNOSTIC ONLY performed by Vamshi Soriano MD at [address] [facility]        Medications Prior to Admission:     Prior to Admission medications    Medication Sig Start Date End Date Taking? Authorizing Provider   busPIRone (BUSPAR) 15 MG tablet Take 15 mg by mouth 3 times daily 7/9/20  Yes America Mayorga MD   OLANZapine (ZYPREXA) 10 MG tablet Take 1 tablet by mouth nightly 7/9/20  Yes America Mayorga MD   ferrous sulfate (IRON 325) 325 (65 Fe) MG tablet Take 1 tablet by mouth 2 times daily (with meals) 7/9/20  Yes America Mayorga MD   hydrOXYzine (VISTARIL) 50 MG capsule Take 50 mg by mouth 3 times daily as needed   Yes Historical Provider, MD   mirtazapine (REMERON) 15 MG tablet Take 15 mg by mouth nightly   Yes Historical Provider, MD        Allergies:     Patient has no known allergies. Social History:     Tobacco:    reports that she has never smoked. She has never used smokeless tobacco.  Alcohol:      has no history on file for alcohol use. Drug Use:  has no history on file for drug use. Family History:     No family history on file. Review of Systems:     Positive and Negative as described in HPI. Review of Systems   Constitutional: Positive for appetite change and fatigue. Negative for chills and fever. HENT: Negative for sore throat and trouble swallowing. Eyes: Negative for photophobia. Respiratory: Negative for cough, chest tightness and shortness of breath. Cardiovascular: Negative for chest pain and palpitations. Gastrointestinal: Positive for abdominal pain, nausea and vomiting. Negative for abdominal distention and diarrhea. Endocrine: Negative for polyphagia and polyuria.    Genitourinary: Negative for decreased urine volume, difficulty urinating and urgency. Musculoskeletal: Negative for arthralgias and myalgias. Skin: Negative for color change, rash and wound. Neurological: Negative for dizziness, syncope, light-headedness and headaches. Hematological: Negative for adenopathy. Psychiatric/Behavioral: Negative for agitation, behavioral problems and confusion. The patient is not nervous/anxious. Physical Exam:   BP (!) 130/90   Pulse 126   Temp 98.6 °F (37 °C) (Oral)   Resp 16   Ht 5' (1.524 m)   Wt 103 lb (46.7 kg)   LMP 2017   SpO2 95%   BMI 20.12 kg/m²   Temp (24hrs), Av.6 °F (37 °C), Min:98.6 °F (37 °C), Max:98.6 °F (37 °C)    No results for input(s): POCGLU in the last 72 hours. Intake/Output Summary (Last 24 hours) at 2021 0205  Last data filed at 2021 0100  Gross per 24 hour   Intake --   Output 25 ml   Net -25 ml       Physical Exam  Vitals and nursing note reviewed. Constitutional:       General: She is not in acute distress. Appearance: She is ill-appearing. She is not toxic-appearing. Comments: chronically ill appearing   HENT:      Head: Normocephalic and atraumatic. Right Ear: External ear normal.      Left Ear: External ear normal.      Nose: Nose normal. No congestion or rhinorrhea. Mouth/Throat:      Mouth: Mucous membranes are dry. Pharynx: Oropharynx is clear. Eyes:      Extraocular Movements: Extraocular movements intact. Conjunctiva/sclera: Conjunctivae normal.      Pupils: Pupils are equal, round, and reactive to light. Cardiovascular:      Rate and Rhythm: Normal rate and regular rhythm. Pulses: Normal pulses. Heart sounds: Normal heart sounds. No murmur heard. No friction rub. No gallop. Comments: Right chest port  Pulmonary:      Effort: Pulmonary effort is normal. No respiratory distress. Breath sounds: Normal breath sounds. No wheezing, rhonchi or rales.    Abdominal:      General: Bowel sounds are normal. There is no distension. Palpations: Abdomen is soft. Tenderness: There is no abdominal tenderness. Comments: colostomy   Musculoskeletal:         General: No tenderness or signs of injury. Cervical back: Normal range of motion and neck supple. No rigidity or tenderness. Right lower leg: No edema. Left lower leg: No edema. Skin:     General: Skin is warm and dry. Capillary Refill: Capillary refill takes less than 2 seconds. Coloration: Skin is pale. Findings: No bruising or lesion. Neurological:      General: No focal deficit present. Mental Status: She is alert and oriented to person, place, and time. Mental status is at baseline. Cranial Nerves: No cranial nerve deficit. Sensory: No sensory deficit. Motor: No weakness. Psychiatric:         Mood and Affect: Mood normal.         Behavior: Behavior normal.         Thought Content: Thought content normal.         Judgment: Judgment normal.         Investigations:      Laboratory Testing:  No results found for this or any previous visit (from the past 24 hour(s)). Imaging/Diagnostics:  No results found. Assessment :      Hospital Problems         Last Modified POA    * (Principal) Small bowel obstruction ([address] 9/2/2021 Yes    H/O malignant neoplasm of colon 9/2/2021 Yes    Pelvic abscess in female 9/2/2021 Yes    Bipolar I disorder, most recent episode mixed ([address] (Chronic) 9/2/2021 Yes    Rectovaginal fistula 9/2/2021 Yes    Hydronephrosis of right kidney 9/2/2021 Yes          Plan:     Patient status inpatient in the Med/Surge    1. SBO: General surgery consulted, re-insert NG tube and keep NPO, NGT to LIS and IV fluid hydration  2. Pelvic Abscess: abscess is chronic, imaging with concerns for sacral osteomyelitis, ID consulted  3. Colovaginal Fistula s/p recent colostomy creation  4.  Right hydronephrosis with stent in place: concern for infection on imaging, check UA and blood cultures, may need urology consult while inpatient and definite outpatient follow up  5. Bipolar Disorder: no behavioral concerns, on zyprexa, remeron and buspar  6. Routine labs, home medications reviewed and held due to NPO status  7. Reinsert NG tube and place to LIS  8. Continue Zosyn and Falgyl  9. DVT prophylaxis  10. Full Code    Consultations:   IP CONSULT TO GENERAL SURGERY  IP CONSULT TO INFECTIOUS DISEASES  PHARMACY TO DOSE VANCOMYCIN    Patient is admitted as inpatient status because of co-morbidities listed above, severity of signs and symptoms as outlined, requirement for current medical therapies and most importantly because of direct risk to patient if care not provided in a hospital setting. Expected length of stay > 48 hours.     GRAY Dill NP  9/2/2021  2:05 AM    Copy sent to Dr. Leonie Negron APRN - CNP

## 2021-09-02 NOTE — CONSULTS
General Surgery:  Consult Note        PATIENT NAME: Nasreen Morales   YOB: 1975    ADMISSION DATE: 9/1/2021  9:09 PM     Admitting Provider: Aminta Pradhan    Consulted Physician: Pippa Samuel DATE: 9/2/2021    Chief Complaint:  Nausea, vomiting, abdominal pain  Consult Regarding:  sbo    HISTORY OF PRESENT ILLNESS:  The patient is a 39 y.o. female  who was admitted on 9/1/21with 4 days of nausea, vomiting, and abdominal pain. She continues to have output from ostomy site, although it is decreased per patient. She has hx of rectal cancer. Underwent right hemicolectomy with resection, transverse loop colostomy, chemotherapy, reversal in June 2021. 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 CT from [facility] showed dilated loops of small bowel with collapse of distal small bowel concerning for obstruction, with a discrete transition zone on left. Presacral air/fluid collection again seen, with concern for osteomyelitis of anterior sacrum. Labs are significant for WBC count of 18.          Past Medical History:        Diagnosis Date    Cancer [facility])     Rectal    Pelvis, female abscess     pre sacral drain put in 6/19/2020    UTI (urinary tract infection)     stent place june 2020       Past Surgical History:        Procedure Laterality Date    ABDOMEN SURGERY  08/14/2021    EUA/colostomy    ABSCESS DRAINAGE      pre sacral pelvic abscess with drain placement to right buttock    COLOSTOMY  08/14/2021    eua; creation end colostomy    COLOSTOMY N/A 8/14/2021    EUA, CREATION END COLOSTOMY performed by Arleen Lemons MD at [place] [address]  8/13/2021    [address] [facility] performed by Marina North MD at [address] [place] [address] N/A 07/03/2020    TRANSVERSE COLOSTOMY LOOP performed by Michell Melissa MD at [place] [address] GASTROINTESTINAL ENDOSCOPY N/A 8/13/2021    EGD DIAGNOSTIC ONLY performed by García Melgoza MD at [address]       Medications Prior to Admission:   Medications Prior to Admission: busPIRone (BUSPAR) 15 MG tablet, Take 15 mg by mouth 3 times daily  OLANZapine (ZYPREXA) 10 MG tablet, Take 1 tablet by mouth nightly  ferrous sulfate (IRON 325) 325 (65 Fe) MG tablet, Take 1 tablet by mouth 2 times daily (with meals)  hydrOXYzine (VISTARIL) 50 MG capsule, Take 50 mg by mouth 3 times daily as needed  mirtazapine (REMERON) 15 MG tablet, Take 15 mg by mouth nightly    Allergies:  Patient has no known allergies. Social History:   Social History     Socioeconomic History    Marital status: Single     Spouse name: Not on file    Number of children: Not on file    Years of education: Not on file    Highest education level: Not on file   Occupational History    Not on file   Tobacco Use    Smoking status: Never Smoker    Smokeless tobacco: Never Used    Tobacco comment: pt is a non smoker   Substance and Sexual Activity    Alcohol use: Not on file    Drug use: Not on file    Sexual activity: Not on file   Other Topics Concern    Not on file   Social History Narrative    Not on file     Social Determinants of Health     Financial Resource Strain:     Difficulty of Paying Living Expenses:    Food Insecurity:     Worried About [address] in the Last Year:     [address] in the Last Year:    Transportation Needs:     Lack of Transportation (Medical):     Lack of Transportation (Non-Medical):    Physical Activity:     Days of Exercise per Week:     Minutes of Exercise per Session:    Stress:     Feeling of Stress :    Social Connections:     Frequency of Communication with Friends and Family:     Frequency of Social Gatherings with Friends and Family:     Attends Jew Services:      Active Member of Clubs or Organizations:     Attends Club or Organization Meetings: Marital Status:    Intimate Partner Violence:     Fear of Current or Ex-Partner:     Emotionally Abused:     Physically Abused:     Sexually Abused:        Family History:   No family history on file. REVIEW OF SYSTEMS:    CONSTITUTIONAL: Denies recent weight loss, fatigue, fevers, chills. HEENT: Denies rhinorrhea, dysphagia, odynphagia. CARDIOVASCULAR: Denies history of MI, recent chest pain. RESPIRATORY: Denies recent history of shortness of breath or history of PE. GASTROINTESTINAL: admits to nausea, vomiting, although currently improved  GENITOURINARY: Denies increased frequency or dysuria. HEMATOLOGIC/LYMPHATIC: Denies history of DVTs. ENDOCRINE: Denies history of thyroid problems or diabetes. NEURO: Denies history of CVA, TIA. Review of systems negative unless listed above. PHYSICAL EXAM:    VITALS:  BP (!) 130/90   Pulse 126   Temp 98.6 °F (37 °C) (Oral)   Resp 16   Ht 5' (1.524 m)   Wt 103 lb (46.7 kg)   LMP 06/25/2017   SpO2 95%   BMI 20.12 kg/m²   INTAKE/OUTPUT:     Intake/Output Summary (Last 24 hours) at 9/2/2021 0204  Last data filed at 9/2/2021 0100  Gross per 24 hour   Intake --   Output 25 ml   Net -25 ml       CONSTITUTIONAL:  awake, alert, not distressed and normal weight  HEENT: Normocephalic/atraumatic, without obvious abnormality. NGT in place  NECK:  Supple, symmetrical, trachea midline   CARDIOVASCULAR: Regular rate and rhythm  LUNGS: equal chest rise and fall, no increased WOB, no audible stridor or wheeze  ABDOMEN: soft, nondistender, tender in RLQ, midline incision with opeing at proximal portion, ostomy in LLQ  MUSCULOSKELETAL: Muscle strength intact in all extremities bilaterally. NEUROLOGIC: Gross motor intact without focal weakness. SKIN: No cyanosis, rashes, or edema noted.    Orientation:   oriented to person, place, and time      CBC with Differential:    Lab Results   Component Value Date    WBC 18.0 09/02/2021    RBC 4.53 09/02/2021    HGB 11.7 09/02/2021 HCT 37.5 09/02/2021     09/02/2021    MCV 82.8 09/02/2021    MCH 25.8 09/02/2021    MCHC 31.2 09/02/2021    RDW 21.1 09/02/2021    LYMPHOPCT 20 08/18/2021    PROMYELOPCT 1 07/05/2020    MONOPCT 5 08/18/2021    MYELOPCT 2 07/05/2020    BASOPCT 0 08/18/2021    MONOSABS 0.57 08/18/2021    LYMPHSABS 2.28 08/18/2021    EOSABS 0.46 08/18/2021    BASOSABS 0.00 08/18/2021    DIFFTYPE NOT REPORTED 08/18/2021     BMP:    Lab Results   Component Value Date     09/02/2021    K 3.6 09/02/2021     09/02/2021    CO2 17 09/02/2021    BUN 14 09/02/2021    LABALBU 2.1 08/18/2021    CREATININE 0.70 09/02/2021    CALCIUM 8.5 09/02/2021    GFRAA >60 09/02/2021    LABGLOM >60 09/02/2021    GLUCOSE 86 09/02/2021       Pertinent Radiology:   No results found. ASSESSMENT:  Active Hospital Problems    Diagnosis Date Noted    Small bowel obstruction ([facility] [address] [D30.918] 09/01/2021       Ostomy with output   ngt with minimal output    Plan:  Continue medical mgmt and supportive care per primary  Continue NGT to LIWS  Currently have ostomy output, if nausea continues to improve and ostomy output maintained, will dc ngt. If patient begins to have increased NGT output/nausea/vomititng/decreased ostomy output, will followup with SBFT  Awaiting images from [facility] for review  Monitor ostomy output/ngt output      Electronically signed by Alejandra Lai DO  on 9/2/2021 at 2:04 AM   I Dr. Colten Stern saw and examined the patient. I have edited the above and agree with the above. Aron Branham  Colorectal Surgery

## 2021-09-02 NOTE — PROGRESS NOTES
Occupational Therapy   Occupational Therapy Initial Assessment  Date: 2021   Patient Name: Aníbal Rossi  MRN: [id number]     : 1975    Date of Service: 2021  Iman Echols is a 39 yr old female admitted here - with anastomotic leak of intestine. A colostomy was created . An abscess in the posterior rectal fossa appeared to be walled off. There is a colovaginal fistula. She has prior Rt ureteral stent treating hydronephrosis due to abscess and needs to follow-up with Dr. Harpreet Franklin.       Patient presented to [place] ED  with a 4-day history of abdominal pain, nausea and vomiting. She is able to keep liquids down. CT scan concerning for small bowel obstruction and osteomyelitis of the sacrum. Interestingly the colostomy appears to be functioning. WBCs 13.9, Hgb 11.9. She received Vanco, Zosyn, Flagyl, Protonix and Zofran in the ED. Discharge Recommendations:    Further therapy recommended at discharge. Assessment   Performance deficits / Impairments: Decreased functional mobility ; Decreased ADL status; Decreased balance;Decreased strength;Decreased high-level IADLs;Decreased safe awareness;Decreased endurance;Decreased cognition  Assessment: Pt would benefit from increased support at prior living arrangements. Pt would also benefit from further acute therapy and further therapy upon d/c in order to increase independence w/ functional mobility and ADLs. Prognosis: Good  Decision Making: High Complexity  Patient Education: Pt educated on POC, purpose of eval, importance of out of bed activity, figure 4 technique. Pt demo fair return  REQUIRES OT FOLLOW UP: Yes  Activity Tolerance  Activity Tolerance: Patient Tolerated treatment well  Safety Devices  Safety Devices in place: Yes  Type of devices: Left in chair;Patient at risk for falls;Gait belt; Chair alarm in place;Call light within reach  Restraints  Initially in place: No           Patient Diagnosis(es): There were no encounter diagnoses. has a past medical history of Cancer ([facility] [address] Pelvis, female abscess, and UTI (urinary tract infection). has a past surgical history that includes Ureter stent placement (2020); Abscess Drainage; Small intestine surgery (N/A, 07/03/2020); Hysterectomy; Abdomen surgery (08/14/2021); colostomy (08/14/2021); Upper gastrointestinal endoscopy (N/A, 8/13/2021); sigmoidoscopy (8/13/2021); and colostomy (N/A, 8/14/2021). Restrictions  Restrictions/Precautions  Restrictions/Precautions: Fall Risk  Required Braces or Orthoses?: No  Position Activity Restriction  Other position/activity restrictions: up w/ assist, NG tube, colostomy bag, hx of bipolar    Subjective   General  Patient assessed for rehabilitation services?: Yes  Family / Caregiver Present: No  Diagnosis: small bowel obstruction  General Comment  Comments: RN ok'd for therapy this morning, pt agreeable and cooperative throguhout. Pt demo flat affect  Patient Currently in Pain: Yes  Pain Assessment  Pain Assessment: 0-10  Pain Level: 4  Pain Type: Acute pain  Pain Location: Abdomen  Pain Orientation: Right;Left  Pain Descriptors: Aching;Discomfort  Pain Frequency: Continuous  Pain Onset: On-going  Non-Pharmaceutical Pain Intervention(s): Ambulation/Increased Activity; Distraction; Therapeutic presence  Response to Pain Intervention: Patient Satisfied  Vital Signs  Level of Consciousness: Alert (0)  Patient Currently in Pain: Yes  Social/Functional History  Social/Functional History  Lives With: Significant other  Type of Home: Mobile home  Home Layout: One level  Home Access: Stairs to enter with rails  Entrance Stairs - Number of Steps: 4  Entrance Stairs - Rails: Both  Bathroom Shower/Tub: Tub/Shower unit  Bathroom Toilet: Standard  Home Equipment: Rolling walker (pt reports no DME use at baseline)  ADL Assistance: [address]: Independent  Homemaking Responsibilities: Yes (pt splits IADLs w/ fiance)  Meal Prep Responsibility: Secondary  Laundry Responsibility: Secondary  Cleaning Responsibility: Secondary  Shopping Responsibility: Secondary  Ambulation Assistance: Independent  Transfer Assistance: Independent  Active : No  Patient's  Info: fiance  Mode of Transportation: SUV  Type of occupation: pt reports receiving SSI  Leisure & Hobbies: listen to music  Additional Comments: Pt fiance not working during the day, able to assist all the time       Objective   Vision: Impaired  Vision Exceptions: Wears glasses at all times  Hearing: Within functional limits    Orientation  Overall Orientation Status: Within Functional Limits     Balance  Sitting Balance: Stand by assistance (~15 minutes on EOB and in chair)  Standing Balance: Contact guard assistance  Standing Balance  Time: ~6 minutes  Functional Mobility  Functional - Mobility Device: Rolling Walker  Activity: Other  Assist Level: Contact guard assistance  Functional Mobility Comments: Pt completed functional mobility to/from doorway to chair w/ CGA and RW. Pt demo slight unsteadiness however no LOB occurrecd. Pt required increased time to complete and reported fatigue upon completion  ADL  Feeding: Modified independent ;Setup; Increased time to complete  Grooming: Modified independent ;Setup; Increased time to complete  UE Bathing: Increased time to complete;Setup;Supervision  LE Bathing: Stand by assistance;Setup; Increased time to complete  UE Dressing: Supervision;Setup; Increased time to complete  LE Dressing: Stand by assistance;Setup; Increased time to complete  Toileting: Stand by assistance;Setup; Increased time to complete  Additional Comments: OT facilitated pt in doffing/donning B socks on EOB w/ SBA. Pt required increased time to complete and educated on figure 4 technique, pt demo fair return. Lastly pt completed oral care using mouthwash and facewashing while seated in chair w/ mod I and set up.   Tone RUE  RUE Tone: Normotonic  Tone LUE  LUE Tone: Normotonic  Coordination  Movements Are Fluid And Coordinated: Yes     Bed mobility  Supine to Sit: Stand by assistance  Sit to Supine: Unable to assess (pt retired to chair at end of session)  Scooting: Stand by assistance  Comment: HOB elevated  Transfers  Sit to stand: Contact guard assistance  Stand to sit: Contact guard assistance  Transfer Comments: w/ RW and required min v/c for hand placement     Cognition  Overall Cognitive Status: Exceptions  Arousal/Alertness: Delayed responses to stimuli  Following Commands: Follows multistep commands with increased time; Follows multistep commands with repitition  Safety Judgement: Decreased awareness of need for safety  Cognition Comment: Pt required min v/c and demonstration for intiation and sequencing.  Pt required min v/c for hand placement and to maintain safety during functional transfers/functional mobility        Sensation  Overall Sensation Status: WFL (pt denies N/T)        LUE AROM (degrees)  LUE AROM : WFL  Left Hand AROM (degrees)  Left Hand AROM: WFL  RUE AROM (degrees)  RUE AROM : WFL  Right Hand AROM (degrees)  Right Hand AROM: WFL  LUE Strength  Gross LUE Strength: Exceptions to [facility] [place]  L Shoulder Flex: 4+/5  L Elbow Flex: 4+/5  L Elbow Ext: 3/5  L Hand General: 4+/5  RUE Strength  Gross RUE Strength: Exceptions to [facility] [place]  R Shoulder Flex: 4+/5  R Elbow Flex: 4+/5  R Elbow Ext: 3/5  R Hand General: 4+/5                   Plan   Plan  Times per week: 1-3 x/wk      AM-PAC Score  AM-[facility] Inpatient Daily Activity Raw Score: 21 (09/02/21 1104)  AM-PAC Inpatient ADL T-Scale Score : 44.27 (09/02/21 1104)  ADL Inpatient CMS 0-100% Score: 32.79 (09/02/21 1104)  ADL Inpatient CMS G-Code Modifier : Alma Hobson (09/02/21 1104)    Goals  Short term goals  Time Frame for Short term goals: pt will; by d/c  Short term goal 1: Demo UB ADLs w/ mod I and set up  Short term goal 2: Demo LB ADLs and toileting w/ supervision and set up  Short term goal 3: complete functional

## 2021-09-02 NOTE — CONSULTS
Jm Marcial, Fred Bunch, Chase Crandall, & Shamar   Urology Consult      Patient:  Ainsley Ellis  MRN: [id number]  YOB: 1975    CHIEF COMPLAINT: Right hydronephrosis    HISTORY OF PRESENT ILLNESS:   The patient is a 39 y.o. female who presents with nausea, vomiting. Urology is consulted for right hydronephrosis. Briefly, patient has history of rectal cancer status post colon resection with hemicolectomy in 2015 with concomitant chemoradiation. She subsequently had a transverse loop colectomy with subsequent reversal.  She has been hospitalized multiple times for small bowel obstruction among other things. Most recently, she was hospitalized for pelvic abscess, colovaginal fistula anastomotic leak for which she underwent exploratory laparotomy with colostomy creation on 8/14/2021. Urologically, patient has right right-sided hydronephrosis that developed secondary to pelvic abscess formation; this was managed by stent exchange and she recently underwent right stent exchange on 6/4/2021 with Dr. Helena Morales. [facility].  Currently, she denies any flank pain. She notes dysuria, slight burning urination, cloudy urine and has history of E. coli, multidrug-resistant UTIs. She denies any gross hematuria, sensation of incomplete emptying. Upon presentation, CT abdomen pelvis shows stent in good position with persistent mild right hydronephrosis unchanged from previous imaging. Urinalysis shows moderate leukocyte esterase, pyuria. Creatinine within normal limits at 0.70. Patient's old records, notes and chart reviewed and summarized above.     Past Medical History:    Past Medical History:   Diagnosis Date    Cancer [facility])     Rectal    Pelvis, female abscess     pre sacral drain put in 6/19/2020    UTI (urinary tract infection)     stent place june 2020       Past Surgical History:    Past Surgical History:   Procedure Laterality Date    ABDOMEN SURGERY  08/14/2021 EUA/colostomy    ABSCESS DRAINAGE      pre sacral pelvic abscess with drain placement to right buttock    COLOSTOMY  08/14/2021    eua; creation end colostomy    COLOSTOMY N/A 8/14/2021    EUA, CREATION END COLOSTOMY performed by Abiola Briceño MD at [address] [facility]  8/13/2021    [address] performed by Radha Lopez MD at [address] N/A 07/03/2020    TRANSVERSE COLOSTOMY LOOP performed by Pavan Rueda MD at [address] [facility] 8/13/2021    EGD DIAGNOSTIC ONLY performed by Radha Lopez MD at [address] [facility]       Medications:      Current Facility-Administered Medications:     piperacillin-tazobactam (ZOSYN) 3,375 mg in dextrose 5 % 50 mL IVPB (mini-bag), 3,375 mg, IntraVENous, Q8H, Patience Osler, APRN - CNP, Last Rate: 12.5 mL/hr at 09/02/21 1834, 3,375 mg at 09/02/21 1834    metronidazole (FLAGYL) 500 mg in NaCl 100 mL IVPB premix, 500 mg, IntraVENous, Q8H, Patience Osler, APRN - CNP, Stopped at 09/02/21 1749    vancomycin (VANCOCIN) 1000 mg in dextrose 5% 200 mL IVPB, 1,000 mg, IntraVENous, Q18H, Deisi Capone MD, Stopped at 09/02/21 1148    vancomycin (VANCOCIN) intermittent dosing (placeholder), , Other, RX Placeholder, Deisi Capone MD    ketorolac (TORADOL) injection 15 mg, 15 mg, IntraVENous, Q6H PRN, Elissa Ceja DO, 15 mg at 09/02/21 1344    morphine (PF) injection 2 mg, 2 mg, IntraVENous, Q2H PRN **OR** morphine injection 4 mg, 4 mg, IntraVENous, Q2H PRN, Elin Alonzo DO    [Held by provider] busPIRone (BUSPAR) tablet 15 mg, 15 mg, Oral, TID, Larry Solo APRN - CNS, 15 mg at 09/02/21 0048    [Held by provider] ferrous sulfate (FE TABS 325) EC tablet 325 mg, 325 mg, Oral, BID WC, Larry Solo APRN - CNS    hydrOXYzine (ATARAX) tablet 50 mg, 50 mg, Oral, TID PRN, GRAY Roman CNS    [Held by on file   Occupational History    Not on file   Tobacco Use    Smoking status: Never Smoker    Smokeless tobacco: Never Used    Tobacco comment: pt is a non smoker   Substance and Sexual Activity    Alcohol use: Not on file    Drug use: Not on file    Sexual activity: Not on file   Other Topics Concern    Not on file   Social History Narrative    Not on file     Social Determinants of Health     Financial Resource Strain:     Difficulty of Paying Living Expenses:    Food Insecurity:     Worried About Running Out of Food in the Last Year:     [address] in the Last Year:    Transportation Needs:     Lack of Transportation (Medical):  Lack of Transportation (Non-Medical):    Physical Activity:     Days of Exercise per Week:     Minutes of Exercise per Session:    Stress:     Feeling of Stress :    Social Connections:     Frequency of Communication with Friends and Family:     Frequency of Social Gatherings with Friends and Family:     Attends Spiritism Services:     Active Member of Clubs or Organizations:     Attends Club or Organization Meetings:     Marital Status:    Intimate Partner Violence:     Fear of Current or Ex-Partner:     Emotionally Abused:     Physically Abused:     Sexually Abused:        Family History:  No family history on file. REVIEW OF SYSTEMS:  A comprehensive 14 point review of systems was obtained. Constitutional: No fatigue  Eyes: No blurry vision  Ears, nose, mouth, throat, face: No ringing in the ears; no facial droop. Respiratory: No cough or cold. Cardiovascular: No palpitations  Gastrointestinal: No diarrhea or constipation. Genitourinary: No burning with urination  Integument/Skin: No rashes  Hematologic/Lymphatic: No easy bruising  Musculoskeletal: No muscle pains  Neurologic: No weakness in the extremities. Psychiatric: No depression or suicidal thoughts. Endocrine: No heat or cold intolerances.   Allergic/Immunologic: No current seasonal allergies; no skin hives. Physical Exam:      This a 39 y.o. female   Vitals:    09/02/21 0700   BP: (!) 144/89   Pulse: 104   Resp: 16   Temp: 98.4 °F (36.9 °C)   SpO2: 100%     Constitutional: Patient in no acute distress. Neuro: alert and oriented to person place and time. Head: Atraumatic and normocephalic. Neck: Trachea midline. Ext: 2+ radial pulses bilaterally. Psych: Mood and affect normal.  Skin: No rashes or bruising present. Lungs: Respiratory effort normal.  Cardiovascular:  Regular rhythm. Abdomen: Soft, non-tender, non-distended. Ostomy site productive with stool and gas present in bag. Bladder non-tender and not distended. Lymphatics: no palpable lymphadenopathy  Pelvic exam: deferred. Rectal exam not indicated. Labs:  Recent Labs     09/02/21 0213   WBC 18.0*   HGB 11.7*   HCT 37.5   MCV 82.8        Recent Labs     09/02/21 0213      K 3.6*      CO2 17*   BUN 14   CREATININE 0.70       Recent Labs     09/02/21 0215   COLORU YELLOW   PHUR 5.5   WBCUA TOO NUMEROUS TO COUNT   RBCUA TOO NUMEROUS TO COUNT   MUCUS NOT REPORTED   TRICHOMONAS NOT REPORTED   YEAST NOT REPORTED   BACTERIA NOT REPORTED   SPECGRAV 1.020   LEUKOCYTESUR MODERATE*   UROBILINOGEN Normal   BILIRUBINUR NEGATIVE           -----------------------------------------------------------------  Imaging Results:  XR ABDOMEN FOR NG/OG/NE TUBE PLACEMENT    Result Date: 9/2/2021  EXAMINATION: ONE SUPINE XRAY VIEW(S) OF THE ABDOMEN 9/2/2021 1:49 am COMPARISON: Abdomen radiograph 08/12/2021, abdomen and pelvis CT 08/11/2021 HISTORY: ORDERING SYSTEM PROVIDED HISTORY: NG TUBE PLACEMENT TECHNOLOGIST PROVIDED HISTORY: NG TUBE PLACEMENT Portable? ->Yes Reason for Exam: supine FINDINGS: Partially included right subclavian central venous port catheter and right ureteral stent.   New gastric tube terminating in the gastric fundus with the sideport in the distal thoracic esophagus just above the gastroesophageal junction. Exclusion of the pelvis from the field of view. Clear included lungs. Mildly dilated small bowel loop in the medial right lower quadrant. No significant stool. Peripherally calcified gallstone in the right upper quadrant. 1. The sideport of a new gastric tube is located in the distal thoracic esophagus just above the gastroesophageal junction. Recommend advancement of greater than 1.4 cm. 2. A mildly dilated small bowel loop could be due to ileus or enteritis with partial obstruction considered less likely.        Assessment and Plan   Impression:    problem list:  Right-sided hydronephrosis  History of recurrent urinary tract infections    Plan:   -Bladder scan patient to ensure complete emptying; if PVRs greater than 350 cc may benefit from Koch catheter placement for decompression  -Pain and nausea control as needed  -Encourage ambulation, incentive spirometry use  - Trend Cr  - Follow up with primary urologist for stent exchange upon dc  - Follow up on urine culture and treat with culture sensitive antibiotics          Alex Prasad MD  7:40 PM 9/2/2021

## 2021-09-02 NOTE — CARE COORDINATION
Case Management Initial Discharge Plan  Gaby Alba,             Met with:patient to discuss discharge plans. Information verified: address, contacts, phone number, , insurance Yes  Insurance Provider: [address] [place] [address]    Emergency Contact/Next of Kin name & number:   Gagan Saul, [phone]    Who are involved in patient's support system? roseann    PCP: GRAY Putnam CNP  Date of last visit: a few weeks ago      Discharge Planning    Living Arrangements:  Spouse/Significant Other     Home has 1 stories  4 stairs to climb to get into front door, 0 stairs to climb to reach second floor  Location of bedroom/bathroom in home main    Patient able to perform ADL's:Independent    Current Services (outpatient & in home) DME  DME equipment: has walker, does not use at baseline  DME provider:     Is patient receiving oral anticoagulation therapy? No    If indicated:   Physician managing anticoagulation treatment:   Where does patient obtain lab work for ATC treatment? Potential Assistance Needed:  Home Care    Patient agreeable to home care: Yes- current with [facility]  Freedom of choice provided:  yes    Prior SNF/Rehab Placement and Facility: none  Agreeable to SNF/Rehab: No  [place] of choice provided: n/a     Evaluation: n/a    Expected Discharge date:  21    Patient expects to be discharged to: If home: is the family and/or caregiver wiling & able to provide support at home? yes  Who will be providing this support? roseann    Follow Up Appointment: Best Day/ Time: Tuesday AM    Transportation provider: will need  Transportation arrangements needed for discharge: Yes    Readmission Risk              Risk of Unplanned Readmission:  11             Does patient have a readmission risk score greater than 14?: No  If yes, follow-up appointment must be made within 7 days of discharge.      Goals of Care:       Educated patient on transitional options, provided freedom of choice and are agreeable with plan      Discharge Plan: home with roseann and Yobany Lauren- referral sent to [facility]- pt stated that she is current with them, has walker at home, will need ride    [phone]- called [facility] to see if pt is current with them- spoke with Kenji Garnett. She stated that the pt is current with their services.       Electronically signed by Tony Raymond RN on 9/2/21 at 5:14 PM EDT

## 2021-09-02 NOTE — PROGRESS NOTES
Pharmacy Note  Vancomycin Consult    Bebeto Pete is a 39 y.o. female started on Vancomycin for pelvic abscess with concern for sacral osteomyelitis; consult received from Dr. Kristi Hernandez to manage therapy. Also receiving the following antibiotics: Zosyn and metronidazole. Patient Active Problem List   Diagnosis    Psychosis ([facility] [address]    H/O malignant neoplasm of colon    Acute cystitis without hematuria    Pelvic abscess in female    Calculus of gallbladder without cholecystitis without obstruction    Leukocytosis    Elevated C-reactive protein (CRP)    Severe protein-calorie malnutrition Justin Sully: less than 60% of standard weight) ([facility])    Bipolar I disorder, most recent episode mixed ([facility] [address]    Major depression with psychotic features ([facility] [address]    Tachycardia    Diarrhea    Anemia    History of rectal cancer    Rectal fistula    Schizoaffective disorder ([facility])    Intellectual disability    Anorexia nervosa    Anastomotic leak of intestine    Acute metabolic encephalopathy    Rectovaginal fistula    Small bowel obstruction ([facility])    Hydronephrosis of right kidney     Allergies:  Patient has no known allergies. Temp max: 98.6    Recent Labs     09/02/21  0213   BUN 14   CREATININE 0.70   WBC 18.0*       Intake/Output Summary (Last 24 hours) at 9/2/2021 0349  Last data filed at 9/2/2021 0100  Gross per 24 hour   Intake --   Output 25 ml   Net -25 ml     Culture Date      Source                       Results  penidng    Ht Readings from Last 1 Encounters:   09/01/21 5' (1.524 m)        Wt Readings from Last 1 Encounters:   09/01/21 103 lb (46.7 kg)       Body mass index is 20.12 kg/m². Estimated Creatinine Clearance: 73 mL/min (based on SCr of 0.7 mg/dL). Goal Trough Level: 13-17 mcg/mL    Assessment/Plan:  Will initiate Vancomycin 1000 mg IV every 18 hours. Timing of trough level will be determined based on culture results, renal function, and clinical response. Thank you for the consult.   Will continue to follow.     Roman Portillo [address]  9/2/2021 3:53 AM

## 2021-09-02 NOTE — PROGRESS NOTES
Physical Therapy    Facility/Department: [facility] ONC/MED SURG  Initial Assessment    NAME: Jerry Barry  : 1975  MRN: [id number]    Date of Service: 2021    Discharge Recommendations: Further therapy recommended at discharge. No chief complaint on file. The patient is a 39 y.o. female  who was admitted on 21with 4 days of nausea, vomiting, and abdominal pain. She continues to have output from ostomy site, although it is decreased per patient.     She has hx of rectal cancer. Underwent right hemicolectomy with resection, transverse loop colostomy, chemotherapy, reversal in 2021. She was found to have colovaginal fistula and anastomotic leak during admission on 2021 and underwent exlap with colostomy creation.     [facility] showed dilated loops of small bowel with collapse of distal small bowel concerning for obstruction, with a discrete transition zone on left. Presacral air/fluid collection again seen, with concern for osteomyelitis of anterior sacrum. PT Equipment Recommendations  Equipment Needed: No    Assessment   Body structures, Functions, Activity limitations: Decreased functional mobility ; Decreased high-level IADLs;Decreased ADL status; Decreased endurance;Decreased coordination;Decreased strength;Decreased balance;Decreased safe awareness; Increased pain  Assessment: Pt ambulates 30 ft with RW and CGA. pt benefits from 24 hr support to complete functional mobility. Pt would benefit from continued therapy to promote endurance, balance, and strengthening. Prognosis: Good  Decision Making: Medium Complexity  PT Education: Goals;PT Role;Plan of Care  Barriers to Learning: none  REQUIRES PT FOLLOW UP: Yes  Activity Tolerance  Activity Tolerance: Patient limited by endurance       Patient Diagnosis(es): There were no encounter diagnoses. has a past medical history of Cancer ([address] Pelvis, female abscess, and UTI (urinary tract infection).    has a past surgical history that includes Ureter stent placement (2020); Abscess Drainage; Small intestine surgery (N/A, 07/03/2020); Hysterectomy; Abdomen surgery (08/14/2021); colostomy (08/14/2021); Upper gastrointestinal endoscopy (N/A, 8/13/2021); sigmoidoscopy (8/13/2021); and colostomy (N/A, 8/14/2021). Restrictions  Restrictions/Precautions  Restrictions/Precautions: Fall Risk  Required Braces or Orthoses?: No  Position Activity Restriction  Other position/activity restrictions: up w A, colostomy  Vision/Hearing  Vision: Impaired  Vision Exceptions: Wears glasses at all times     Subjective  General  Patient assessed for rehabilitation services?: Yes  Response To Previous Treatment: Not applicable  Family / Caregiver Present: No  Follows Commands: Within Functional Limits  Subjective  Subjective: RN and pt agreeable to PT. pt agreeable and pleasant. NG tube to suction t/o eval.  Pain Screening  Patient Currently in Pain: Yes  Pain Assessment  Pain Assessment: 0-10  Pain Level: 4  Pain Type: Acute pain  Pain Location: Abdomen  Pain Orientation: Right;Left  Pain Descriptors: Aching;Discomfort  Pain Frequency: Continuous  Pain Onset: On-going  Non-Pharmaceutical Pain Intervention(s): Repositioned; Ambulation/Increased Activity  Response to Pain Intervention: Patient Satisfied  Vital Signs  Patient Currently in Pain: Yes       Orientation  Orientation  Overall Orientation Status: Within Functional Limits  Social/Functional History  Social/Functional History  Lives With: Significant other  Type of Home: Mobile home  Home Layout: One level  Home Access: Stairs to enter with rails  Entrance Stairs - Number of Steps: 4  Entrance Stairs - Rails: Both  Bathroom Shower/Tub: Tub/Shower unit  Bathroom Toilet: Standard  Home Equipment: Rolling walker (pt reports no DME use at baseline)  ADL Assistance: [address]: Independent  Homemaking Responsibilities: Yes (pt splits IADLs w/ fiance)  Meal Prep Responsibility: Secondary  Laundry Responsibility: Secondary  Cleaning Responsibility: Secondary  Shopping Responsibility: Secondary  Ambulation Assistance: Independent  Transfer Assistance: Independent  Active : No  Patient's  Info: fiance  Mode of Transportation: [facility]  Type of occupation: pt reports receiving SSI  Leisure & Hobbies: listen to music  Additional Comments: Pt fiance not working during the day, able to assist all the time  Cognition   Cognition  Overall Cognitive Status: Exceptions  Arousal/Alertness: Delayed responses to stimuli  Following Commands: Follows multistep commands with increased time; Follows multistep commands with repitition  Safety Judgement: Decreased awareness of need for safety    Objective          Joint Mobility  Spine: WFL  ROM RLE: WFL  ROM LLE: WFL  ROM RUE: WFL  ROM LUE: WFL  Strength RLE  Strength RLE: Exception  R Hip Flexion: 4/5  R Knee Flexion: 4/5  R Knee Extension: 4/5  R Ankle Dorsiflexion: 4/5  R Ankle Plantar flexion: 4/5  Strength LLE  Strength LLE: Exception  L Hip Flexion: 4/5  L Knee Flexion: 4/5  L Knee Extension: 4/5  L Ankle Dorsiflexion: 4/5  L Ankle Plantar Flexion: 4/5  Strength RUE  Strength RUE: WFL  Strength LUE  Strength LUE: WFL  Tone RLE  RLE Tone: Normotonic  Tone LLE  LLE Tone: Normotonic  Motor Control  Gross Motor?: WFL  Sensation  Overall Sensation Status: WFL (pt denies n/t)  Bed mobility  Supine to Sit: Stand by assistance  Sit to Supine: Stand by assistance  Scooting: Stand by assistance  Comment: HOB elevated  Transfers  Sit to Stand: Contact guard assistance  Stand to sit: Contact guard assistance  Comment: Cues for hand placement with RW with poor return, pt pulls with both hands from RW.   Ambulation  Ambulation?: Yes  More Ambulation?: No  Ambulation 1  Surface: level tile  Device: Rolling Walker  Assistance: Contact guard assistance  Gait Deviations: Slow Leisa;Decreased step length;Decreased step height  Distance: 30 ft  Comments: No LOB noted. Stairs/Curb  Stairs?: No     Balance  Posture: Good  Sitting - Static: Good  Sitting - Dynamic: Good;-  Standing - Static: Fair  Standing - Dynamic: Fair  Comments: Assessed with RW        Plan   Plan  Times per week: 5-6x  Current Treatment Recommendations: Strengthening, Endurance Training, Transfer Training, Neuromuscular Re-education, Patient/Caregiver Education & Training, ADL/Self-care Training, Equipment Evaluation, Education, & procurement, Balance Training, Gait Training, IADL Training, Home Exercise Program, Functional Mobility Training, Stair training, Safety Education & Training  Safety Devices  Type of devices:  All fall risk precautions in place, Call light within reach, Chair alarm in place, Left in chair, Gait belt, Nurse notified                                                 AM-PAC Score  AM-PAC Inpatient Mobility Raw Score : 18 (09/02/21 1021)  AM-PAC Inpatient T-Scale Score : 43.63 (09/02/21 1021)  Mobility Inpatient CMS 0-100% Score: 46.58 (09/02/21 1021)  Mobility Inpatient CMS G-Code Modifier : CK (09/02/21 1021)          Goals  Short term goals  Time Frame for Short term goals: 14 visits  Short term goal 1: Complete transfers Independently least restrictive v no AD  Short term goal 2: Complete 300 ft of gait independently least restrictive v no AD  Short term goal 3: Complete 4 steps with RHR independently  Short term goal 4: Participate in 30 minutes of therapy to promote endurance       Therapy Time   Individual Concurrent Group Co-treatment   Time In 0910         Time Out 0940         Minutes 30         Timed Code Treatment Minutes: [address] [facility],

## 2021-09-02 NOTE — PROGRESS NOTES
General Surgery:  Daily Progress Notes          PATIENT NAME: Nasreen Morales     TODAY'S DATE: 9/2/2021, 5:57 AM  CC:  Abdominal pain    SUBJECTIVE:     Pt seen and examined at bedside. No acute events overnight. Afebrile overnight. Pt states overall pain is 5/10 in the midline of the lower abdomen. C/O chills overnight. Denies fever, nausea, vomiting, chest pain, and SOB. She is tolerating sips of water, has been able to urinate, ambulate, and ileostomy bag is functioning well.       + Activity. - flatus, + BM. OBJECTIVE:   VITALS:  BP (!) 130/90   Pulse 126   Temp 98.6 °F (37 °C) (Oral)   Resp 16   Ht 5' (1.524 m)   Wt 103 lb (46.7 kg)   LMP 06/25/2017   SpO2 95%   BMI 20.12 kg/m²      INTAKE/OUTPUT:      Intake/Output Summary (Last 24 hours) at 9/2/2021 0557  Last data filed at 9/2/2021 0100  Gross per 24 hour   Intake --   Output 25 ml   Net -25 ml       PHYSICAL EXAM:  General Appearance:  awake, alert, oriented, in no acute distress  HEENT:  Normocephalic, atraumatic, mucus membranes moist   NGT in place  Skin:  Skin color, texture, turgor normal. No rashes or lesions. Lungs:  Normal expansion. Clear to auscultation. No rales, rhonchi, or wheezing. Heart:  Heart sounds are normal.  Regular rate and rhythm without murmur, gallop or rub. Abdomen: soft, ND, NTTP, +bowel sounds  Wounds/Incisions: Location: no drainage, induration, or edema noted. Extremities: Extremities warm to touch, pink, with no edema.       IV Access: +  Koch:  -      Data:  CBC with Differential:    Lab Results   Component Value Date    WBC 18.0 09/02/2021    RBC 4.53 09/02/2021    HGB 11.7 09/02/2021    HCT 37.5 09/02/2021     09/02/2021    MCV 82.8 09/02/2021    MCH 25.8 09/02/2021    MCHC 31.2 09/02/2021    RDW 21.1 09/02/2021    LYMPHOPCT 20 08/18/2021    PROMYELOPCT 1 07/05/2020    MONOPCT 5 08/18/2021    MYELOPCT 2 07/05/2020    BASOPCT 0 08/18/2021    MONOSABS 0.57 08/18/2021    LYMPHSABS 2.28 08/18/2021    EOSABS 0.46 08/18/2021    BASOSABS 0.00 08/18/2021    DIFFTYPE NOT REPORTED 08/18/2021     BMP:    Lab Results   Component Value Date     09/02/2021    K 3.6 09/02/2021     09/02/2021    CO2 17 09/02/2021    BUN 14 09/02/2021    LABALBU 2.1 08/18/2021    CREATININE 0.70 09/02/2021    CALCIUM 8.5 09/02/2021    GFRAA >60 09/02/2021    LABGLOM >60 09/02/2021    GLUCOSE 86 09/02/2021       ASSESSMENT:  Active Hospital Problems    Diagnosis Date Noted    Hydronephrosis of right kidney [N13.30] 09/02/2021    Small bowel obstruction ([address] [K56.609] 09/01/2021    Rectovaginal fistula [N82.3]     Bipolar I disorder, most recent episode mixed ([address] [F31.60] 06/20/2020    Pelvic abscess in female [N73.9]     H/O malignant neoplasm of colon [Z85.038] 06/16/2020       39 y.o. female with SBO  Ostomy with output  NGT with minimal output     Plan:  Continue medical mgmt and supportive care per primary  Consider NG clamp trial as she is tolerating liquids and denies nausea  Consider SBFT if nausea continues without passage of flatus     Electronically signed by Luwana Favre, MS3  on 9/2/2021 at 5:57 AM       General Surgery Resident Statement/Addendum  I have discussed the case, including pertinent history and exam findings with the above medical student. I have personally seen the patient. Pt seen and examined at bedside. I performed both history and physical exam.      Note was edited and changes made by me. Assessment and plan reviewed and changes made by me. I agree with the assessment and plan as stated above. We are waiting for CT scans to be uploaded and will review when ready. Otherwise we may need to repeat CT scan of the abdomen to assess for small bowel obstruction. Rivas Wing,  PGY-1  9/2/21 7:27 AM  I Dr. Rupal Krishna saw and examined the patient. I have edited the above and agree with the above. Aron Branham  Colorectal Surgery

## 2021-09-02 NOTE — CONSULTS
[facility] - Initial Consult Note  Today's Date and Time: 9/2/2021, 10:47 AM    Impression :   · Hydronephrosis of right kidney  · Small bowel obstruction  · Rectovaginal fistula  · Bipolar 1 disorder  · Pelvic abscess in female. S/P IR drainage on 6-19-20. Culture: E coli, Candida albicans  · H/O malignant neoplasm of colon    Recommendations:   · Continue Zosyn   · D/C Flagyl  · DVT prophylaxis    Medical Decision Making/Summary/Discussion:9/2/2021     ·   Infection Control Recommendations   · [place] Precautions  · Contact Isolation Hx MDRO    Antimicrobial Stewardship Recommendations     · Simplification of therapy  · Targeted therapy    Coordination of Outpatient Care:   · Estimated Length of IV antimicrobials: TBD  · Patient will need Midline Catheter Insertion:   · Patient will need PICC line Insertion:  · Patient will need: Home IV , [place],  SNF,  LTAC:TBD  · Patient will need outpatient wound care:Yes    Chief complaint/reason for consultation:   · Pelvic abscess (Chronic) and concerns for sacral osteomyelitis      History of Present Illness:   Nasreen Morales is a 39y.o.-year-old female who was initially admitted on 9/1/2021. Patient seen at the request of . INITIAL HISTORY:    Patient is unable to provide a good history. Review of records shows a history of rectal cancer (diagnosed in 12/14) with colon resection and hemicolectomy in 2015. She is status post chemo/radiation. She had a transverse loop cholectomy with subsequent reversal.     She also developed right hydronephrosis due to abscess formation. She has stents in place. She suffers from bipolar disorder. Patient has been hospitalized several times. Most recently was discharged on 8/18/2021 after admission on 8-12-21 and treatment for a colovaginal fistula and anastomotic leak. Patient underwent exploratory lap with colostomy creation on 8/14/2021 by Dr. Morteza Meek.       Patient indicated inability to keep food down and emesis of brown, stool colored material.    A CTAB revealed SBO with persistent presacral- pelvic abscess and raised concerns of sacral osteomyelitis. There is also mild right hydronephrosis with nephroureteral stent in place, mild enhancement of the proximal renal collecting system with concern of infection and moderate urinary bladder wall thickening. Colorectal surgery was consulted from outlying facility and requested patient be transferred to [facility] for further evaluation and management. CURRENT EVALUATION : 9/2/2021    Afebrile  VS stable    Patient is resting in bed and hemodynamically stable. Patient denies any current pain, nausea, fever, chest pain or shortness of breath. Patient states that her nose feels itchy due to NG tube placement. She has trouble expressing herself and providing a good history. Patient denies any myalgia or arthralgias or any signs of discomfort. CT abdomen images not presently available. Will await downloading to evaluate further. Labs, X rays reviewed: 9/2/2021    BUN:14  Cr:0.70    WBC:18.0  Hb:11.7  Plat: 453    Cultures:  Urine:  ·   Blood: 9/2/21: No Growth  ·   Sputum :  ·   Wound:  ·     Discussed with patient, RN, IM. I have personally reviewed the past medical history, past surgical history, medications, social history, and family history, and I have updated the database accordingly.   Past Medical History:     Past Medical History:   Diagnosis Date    Cancer [facility])     Rectal    Pelvis, female abscess     pre sacral drain put in 6/19/2020    UTI (urinary tract infection)     stent place june 2020       Past Surgical  History:     Past Surgical History:   Procedure Laterality Date    ABDOMEN SURGERY  08/14/2021    EUA/colostomy    ABSCESS DRAINAGE      pre sacral pelvic abscess with drain placement to right buttock    COLOSTOMY  08/14/2021    eua; creation end colostomy    COLOSTOMY N/A of Transportation (Non-Medical):    Physical Activity:     Days of Exercise per Week:     Minutes of Exercise per Session:    Stress:     Feeling of Stress :    Social Connections:     Frequency of Communication with Friends and Family:     Frequency of Social Gatherings with Friends and Family:     Attends Restorationist Services:     Active Member of Clubs or Organizations:     Attends Club or Organization Meetings:     Marital Status:    Intimate Partner Violence:     Fear of Current or Ex-Partner:     Emotionally Abused:     Physically Abused:     Sexually Abused:        Family History:   No family history on file. Allergies:   Patient has no known allergies. Review of Systems:   Constitutional: No fevers. Had chills. No systemic complaints  Head: No headaches  Eyes: No double vision or blurry vision. No conjunctival inflammation. ENT: No sore throat or runny nose. . No hearing loss, tinnitus or vertigo. Cardiovascular: No chest pain or palpitations. No shortness of breath. No VELARDE  Lung: No shortness of breath or cough. No sputum production  Abdomen: Nausea, vomiting. No diarrhea, or abdominal pain. Tima Dilcia No cramps. Has ileostomy bag. Genitourinary: No increased urinary frequency, or dysuria. No hematuria. No suprapubic or CVA pain  Musculoskeletal: No muscle aches or pains. No joint effusions, swelling or deformities  Hematologic: No bleeding or bruising. Neurologic: No headache, weakness, numbness, or tingling. Integument: No rash, no ulcers. Psychiatric: No depression. Endocrine: No polyuria, no polydipsia, no polyphagia. Physical Examination :     Patient Vitals for the past 8 hrs:   BP Temp Temp src Pulse Resp SpO2   09/02/21 0700 (!) 144/89 98.4 °F (36.9 °C) Oral 104 16 100 %     General Appearance: Awake, alert, and in no apparent distress  Head:  Normocephalic, no trauma  Eyes: Pupils equal, round, reactive to light; sclera anicteric; conjunctivae pink. No embolic phenomena.   ENT: Oropharynx clear, without erythema, exudate, or thrush. No tenderness of sinuses. Mouth/throat: mucosa pink and moist. No lesions. Dentition in good repair. NG tube in place. Neck:Supple, without lymphadenopathy. Thyroid normal, No bruits. Pulmonary/Chest: Clear to auscultation, without wheezes, rales, or rhonchi. No dullness to percussion. Cardiovascular: Regular rate and rhythm without murmurs, rubs, or gallops. Abdomen: Soft, non tender. Bowel sounds normal. No organomegaly. Ileostomy bag in place. All four Extremities: No cyanosis, clubbing, edema, or effusions. Neurologic: No gross sensory or motor deficits. Skin: Warm and dry with good turgor. No signs of peripheral arterial or venous insufficiency. No ulcerations. No open wounds. Medical Decision Making -Laboratory:   I have independently reviewed/ordered the following labs:    CBC with Differential:   Recent Labs     09/02/21 0213   WBC 18.0*   HGB 11.7*   HCT 37.5        BMP:   Recent Labs     09/02/21 0213      K 3.6*      CO2 17*   BUN 14   CREATININE 0.70     Hepatic Function Panel: No results for input(s): PROT, LABALBU, BILIDIR, IBILI, BILITOT, ALKPHOS, ALT, AST in the last 72 hours. No results for input(s): RPR in the last 72 hours. No results for input(s): HIV in the last 72 hours. No results for input(s): BC in the last 72 hours.   Lab Results   Component Value Date    MUCUS NOT REPORTED 08/12/2021    RBC 4.53 09/02/2021    TRICHOMONAS NOT REPORTED 08/12/2021    WBC 18.0 09/02/2021    YEAST NOT REPORTED 08/12/2021    TURBIDITY CLOUDY 08/12/2021     Lab Results   Component Value Date    CREATININE 0.70 09/02/2021    GLUCOSE 86 09/02/2021       Medical Decision Making-Imaging:     EXAMINATION:   SINGLE CONTRAST GASTROGRAFIN ENEMA 8/13/2021       TECHNIQUE:   The enema tip was inserted in a low position.       240 mL GASTROGRAFIN/1000 mL WATER was instilled retrogradely into the rectum   under gravity under direct fluoroscopic visualization.  Static and cine   images acquired during installation.       FLUOROSCOPY DOSE AND TYPE OR TIME AND EXPOSURES:   DAP 30.843 abscess CT 08/11/2021Gycm2       COMPARISON:   None       HISTORY:   ORDERING SYSTEM PROVIDED HISTORY: concern for colovaginal fistula, stool from   vagina   TECHNOLOGIST PROVIDED HISTORY:   Please use gastrografin for enema       Electronically signed by Juan José Yu DO on 8/13/2021 at 7:38 AM   Reason for exam:->concern for colovaginal fistula, stool from vagina   Reason for Exam: colovaginal fistula  3.2minft       FINDINGS:   Cine images (thumbnail image A5 in PACS) acquired during very 1st   installation of contrast into the rectum.  Contrast promptly fills the rectum   but at same time there is abnormal extension and pooling anterior inferior to   the lower rectum consistent with contrast collection in the vaginal canal.   Additionally there is pooling of contrast in the presacral space which would   correlate with fluid/gas collection in the presacral space on the comparison   CT scan.  This would be indicative of extravasation from the low rectal   anastomotic site.           Impression   1. Colovaginal fistulous communication is demonstrated. 2. Pooling of contrast posterior to the rectum in the presacral space   consistent with extravasation of contrast most likely from low rectal   anastomotic site.  This correlates with presacral gas/fluid collection on the   comparison CT scan. Findings were discussed with Dr. Annalee Ruano . Subhash  (Gastroenterology) on   08/13/2020           EXAMINATION:   ONE SUPINE XRAY VIEW(S) OF THE ABDOMEN       9/2/2021 1:49 am       COMPARISON:   Abdomen radiograph 08/12/2021, abdomen and pelvis CT 08/11/2021       HISTORY:   ORDERING SYSTEM PROVIDED HISTORY: NG TUBE PLACEMENT   TECHNOLOGIST PROVIDED HISTORY:   NG TUBE PLACEMENT   Portable? ->Yes   Reason for Exam: supine       FINDINGS:   Partially included right subclavian central venous port catheter and right   ureteral stent.  New gastric tube terminating in the gastric fundus with the   sideport in the distal thoracic esophagus just above the gastroesophageal   junction.       Exclusion of the pelvis from the field of view.  Clear included lungs. Mildly dilated small bowel loop in the medial right lower quadrant.  No   significant stool.  Peripherally calcified gallstone in the right upper   quadrant.           Impression   1. The sideport of a new gastric tube is located in the distal thoracic   esophagus just above the gastroesophageal junction.  Recommend advancement of   greater than 1.4 cm.   2. A mildly dilated small bowel loop could be due to ileus or enteritis with   partial obstruction considered less likely. Medical Decision Usaubm-[id number]:       Medical Decision Making-Other:     Note:  · Labs, medications, radiologic studies were reviewed with personal review of films  · Large amounts of data were reviewed  · Discussed with nursing Staff  · Infection Control and Prevention measures reviewed  · All prior entries were reviewed  · Administer medications as ordered  · Prognosis: Fair    Thank you for allowing us to participate in the care of this patient. Please call with questions. Joe Angel DPM     ATTESTATION:    I have discussed the case, including pertinent history and exam findings with the residents and students. I have seen and examined the patient and the key elements of the encounter have been performed by me. I was present when the student obtained his information or examined the patient. I have reviewed the laboratory data, other diagnostic studies and discussed them with the residents. I have updated the medical record where necessary. I agree with the assessment, plan and orders as documented by the resident/ student.     Garrett Brown MD.    Pager: [phone] - Office: [phone]

## 2021-09-03 LAB
ABSOLUTE EOS #: 0.41 K/UL (ref 0–0.4)
ABSOLUTE IMMATURE GRANULOCYTE: 0 K/UL (ref 0–0.3)
ABSOLUTE LYMPH #: 2.04 K/UL (ref 1–4.8)
ABSOLUTE MONO #: 0.82 K/UL (ref 0.1–0.8)
ANION GAP SERPL CALCULATED.3IONS-SCNC: 11 MMOL/L (ref 9–17)
BASOPHILS # BLD: 1 % (ref 0–2)
BASOPHILS ABSOLUTE: 0.14 K/UL (ref 0–0.2)
BUN BLDV-MCNC: 11 MG/DL (ref 6–20)
BUN/CREAT BLD: ABNORMAL (ref 9–20)
CALCIUM SERPL-MCNC: 8.2 MG/DL (ref 8.6–10.4)
CHLORIDE BLD-SCNC: 107 MMOL/L (ref 98–107)
CO2: 20 MMOL/L (ref 20–31)
CREAT SERPL-MCNC: 1.61 MG/DL (ref 0.5–0.9)
CULTURE: NORMAL
DIFFERENTIAL TYPE: ABNORMAL
EOSINOPHILS RELATIVE PERCENT: 3 % (ref 1–4)
GFR AFRICAN AMERICAN: 42 ML/MIN
GFR NON-AFRICAN AMERICAN: 35 ML/MIN
GFR SERPL CREATININE-BSD FRML MDRD: ABNORMAL ML/MIN/{1.73_M2}
GFR SERPL CREATININE-BSD FRML MDRD: ABNORMAL ML/MIN/{1.73_M2}
GLUCOSE BLD-MCNC: 107 MG/DL (ref 70–99)
HCT VFR BLD CALC: 35 % (ref 36.3–47.1)
HEMOGLOBIN: 10.8 G/DL (ref 11.9–15.1)
IMMATURE GRANULOCYTES: 0 %
INTERVENTION: NORMAL
LYMPHOCYTES # BLD: 15 % (ref 24–44)
Lab: NORMAL
MAGNESIUM: 1.7 MG/DL (ref 1.6–2.6)
MCH RBC QN AUTO: 26 PG (ref 25.2–33.5)
MCHC RBC AUTO-ENTMCNC: 30.9 G/DL (ref 28.4–34.8)
MCV RBC AUTO: 84.3 FL (ref 82.6–102.9)
MONOCYTES # BLD: 6 % (ref 1–7)
MORPHOLOGY: ABNORMAL
NRBC AUTOMATED: 0 PER 100 WBC
PDW BLD-RTO: 21.2 % (ref 11.8–14.4)
PLATELET # BLD: 433 K/UL (ref 138–453)
PLATELET ESTIMATE: ABNORMAL
PMV BLD AUTO: 11.3 FL (ref 8.1–13.5)
POTASSIUM SERPL-SCNC: 3.1 MMOL/L (ref 3.7–5.3)
RBC # BLD: 4.15 M/UL (ref 3.95–5.11)
RBC # BLD: ABNORMAL 10*6/UL
SEG NEUTROPHILS: 75 % (ref 36–66)
SEGMENTED NEUTROPHILS ABSOLUTE COUNT: 10.19 K/UL (ref 1.8–7.7)
SODIUM BLD-SCNC: 138 MMOL/L (ref 135–144)
SPECIMEN DESCRIPTION: NORMAL
VANCOMYCIN RANDOM DATE LAST DOSE: NORMAL
VANCOMYCIN RANDOM DOSE AMOUNT: NORMAL
VANCOMYCIN RANDOM TIME LAST DOSE: NORMAL
VANCOMYCIN RANDOM: 40.8 UG/ML
WBC # BLD: 13.6 K/UL (ref 3.5–11.3)
WBC # BLD: ABNORMAL 10*3/UL

## 2021-09-03 PROCEDURE — 97116 GAIT TRAINING THERAPY: CPT

## 2021-09-03 PROCEDURE — 94760 N-INVAS EAR/PLS OXIMETRY 1: CPT

## 2021-09-03 PROCEDURE — 80202 ASSAY OF VANCOMYCIN: CPT

## 2021-09-03 PROCEDURE — 99232 SBSQ HOSP IP/OBS MODERATE 35: CPT | Performed by: INTERNAL MEDICINE

## 2021-09-03 PROCEDURE — 2580000003 HC RX 258: Performed by: CLINICAL NURSE SPECIALIST

## 2021-09-03 PROCEDURE — 6360000002 HC RX W HCPCS: Performed by: NURSE PRACTITIONER

## 2021-09-03 PROCEDURE — 1200000000 HC SEMI PRIVATE

## 2021-09-03 PROCEDURE — 6360000002 HC RX W HCPCS: Performed by: INTERNAL MEDICINE

## 2021-09-03 PROCEDURE — 80048 BASIC METABOLIC PNL TOTAL CA: CPT

## 2021-09-03 PROCEDURE — 6370000000 HC RX 637 (ALT 250 FOR IP): Performed by: CLINICAL NURSE SPECIALIST

## 2021-09-03 PROCEDURE — 6360000002 HC RX W HCPCS: Performed by: CLINICAL NURSE SPECIALIST

## 2021-09-03 PROCEDURE — 2580000003 HC RX 258: Performed by: NURSE PRACTITIONER

## 2021-09-03 PROCEDURE — 36415 COLL VENOUS BLD VENIPUNCTURE: CPT

## 2021-09-03 PROCEDURE — 83735 ASSAY OF MAGNESIUM: CPT

## 2021-09-03 PROCEDURE — 85025 COMPLETE CBC W/AUTO DIFF WBC: CPT

## 2021-09-03 PROCEDURE — 2500000003 HC RX 250 WO HCPCS: Performed by: NURSE PRACTITIONER

## 2021-09-03 PROCEDURE — 2580000003 HC RX 258: Performed by: INTERNAL MEDICINE

## 2021-09-03 PROCEDURE — 97110 THERAPEUTIC EXERCISES: CPT

## 2021-09-03 RX ORDER — MAGNESIUM SULFATE HEPTAHYDRATE 40 MG/ML
4000 INJECTION, SOLUTION INTRAVENOUS ONCE
Status: COMPLETED | OUTPATIENT
Start: 2021-09-03 | End: 2021-09-03

## 2021-09-03 RX ADMIN — ACETAMINOPHEN 650 MG: 325 TABLET ORAL at 13:47

## 2021-09-03 RX ADMIN — BUSPIRONE HYDROCHLORIDE 15 MG: 15 TABLET ORAL at 21:12

## 2021-09-03 RX ADMIN — VANCOMYCIN HYDROCHLORIDE 1000 MG: 1 INJECTION, SOLUTION INTRAVENOUS at 00:26

## 2021-09-03 RX ADMIN — PIPERACILLIN AND TAZOBACTAM 3375 MG: 3; .375 INJECTION, POWDER, FOR SOLUTION INTRAVENOUS at 03:07

## 2021-09-03 RX ADMIN — POTASSIUM CHLORIDE 40 MEQ: 1500 TABLET, EXTENDED RELEASE ORAL at 08:26

## 2021-09-03 RX ADMIN — METRONIDAZOLE 500 MG: 500 INJECTION, SOLUTION INTRAVENOUS at 01:36

## 2021-09-03 RX ADMIN — SODIUM CHLORIDE, PRESERVATIVE FREE 10 ML: 5 INJECTION INTRAVENOUS at 21:12

## 2021-09-03 RX ADMIN — PIPERACILLIN AND TAZOBACTAM 3375 MG: 3; .375 INJECTION, POWDER, FOR SOLUTION INTRAVENOUS at 10:58

## 2021-09-03 RX ADMIN — MIRTAZAPINE 15 MG: 15 TABLET, FILM COATED ORAL at 21:12

## 2021-09-03 RX ADMIN — ONDANSETRON 4 MG: 2 INJECTION INTRAMUSCULAR; INTRAVENOUS at 10:10

## 2021-09-03 RX ADMIN — PIPERACILLIN AND TAZOBACTAM 3375 MG: 3; .375 INJECTION, POWDER, FOR SOLUTION INTRAVENOUS at 21:17

## 2021-09-03 RX ADMIN — MAGNESIUM SULFATE IN WATER 4000 MG: 40 INJECTION, SOLUTION INTRAVENOUS at 17:10

## 2021-09-03 RX ADMIN — OLANZAPINE 10 MG: 10 TABLET, FILM COATED ORAL at 21:12

## 2021-09-03 RX ADMIN — FERROUS SULFATE TAB EC 325 MG (65 MG FE EQUIVALENT) 325 MG: 325 (65 FE) TABLET DELAYED RESPONSE at 16:38

## 2021-09-03 RX ADMIN — METRONIDAZOLE 500 MG: 500 INJECTION, SOLUTION INTRAVENOUS at 08:26

## 2021-09-03 RX ADMIN — ENOXAPARIN SODIUM 40 MG: 40 INJECTION SUBCUTANEOUS at 08:26

## 2021-09-03 ASSESSMENT — PAIN SCALES - GENERAL
PAINLEVEL_OUTOF10: 2
PAINLEVEL_OUTOF10: 0
PAINLEVEL_OUTOF10: 5

## 2021-09-03 NOTE — PROGRESS NOTES
General Surgery:  Daily Progress Note           PATIENT NAME: Gaby Alba     TODAY'S DATE: 9/3/2021, 5:35 AM  CC:  Abdominal pain    SUBJECTIVE:     Pt seen and examined at bedside. No acute events overnight. Afebrile overnight. Pt C/O 5/10 right flank pain . She denies fever, chills, nausea, vomiting, chest pain, and SOB. She is tolerating full liquid diet and has been able to urinate and ambulate. Ileostomy bag functioning well.    + Activity. + flatus, + BM. OBJECTIVE:   VITALS:  /86   Pulse 102   Temp 98.2 °F (36.8 °C) (Oral)   Resp 16   Ht 5' (1.524 m)   Wt 103 lb (46.7 kg)   LMP 06/25/2017   SpO2 100%   BMI 20.12 kg/m²      INTAKE/OUTPUT:      Intake/Output Summary (Last 24 hours) at 9/3/2021 0535  Last data filed at 9/2/2021 2350  Gross per 24 hour   Intake 2264 ml   Output 975 ml   Net 1289 ml       PHYSICAL EXAM:  General Appearance:  awake, alert, oriented, in no acute distress  HEENT:  Normocephalic, atraumatic, mucus membranes moist   NGT removed  Skin:  Skin color, texture, turgor normal. No rashes or lesions. Lungs:  Normal expansion. Clear to auscultation. No rales, rhonchi, or wheezing. Heart:  Heart sounds are normal.  Regular rate and rhythm without murmur, gallop or rub. Abdomen: soft, ND, NTTP, +bowel sounds  Wounds/Incisions: no drainage, induration, or edema noted. Extremities: Extremities warm to touch, pink, with no edema.       IV Access:  +  Koch: -    Data:  CBC with Differential:    Lab Results   Component Value Date    WBC 18.0 09/02/2021    RBC 4.53 09/02/2021    HGB 11.7 09/02/2021    HCT 37.5 09/02/2021     09/02/2021    MCV 82.8 09/02/2021    MCH 25.8 09/02/2021    MCHC 31.2 09/02/2021    RDW 21.1 09/02/2021    LYMPHOPCT 20 08/18/2021    PROMYELOPCT 1 07/05/2020    MONOPCT 5 08/18/2021    MYELOPCT 2 07/05/2020    BASOPCT 0 08/18/2021    MONOSABS 0.57 08/18/2021    LYMPHSABS 2.28 08/18/2021    EOSABS 0.46 08/18/2021    BASOSABS 0.00

## 2021-09-03 NOTE — PROGRESS NOTES
[facility] - Progress Note    Today's Date and Time: 9/3/2021, 10:15 AM    Impression :   · Hydronephrosis of right kidney  · Small bowel obstruction  · Rectovaginal fistula  · Bipolar 1 disorder  · Pelvic abscess in female. S/P IR drainage on 6-19-20. Culture: E coli, Candida albicans  · H/O malignant neoplasm of colon    Recommendations:   · Continue Zosyn   · D/C Flagyl  · D/C Vancomycin  · DVT prophylaxis  · Case management working on setting up [facility] after discharge    Medical Decision Making/Summary/Discussion:9/3/2021     ·   Infection Control Recommendations   · [place] Precautions  · Contact Isolation Hx MDRO    Antimicrobial Stewardship Recommendations     · Simplification of therapy  · Targeted therapy    Coordination of Outpatient Care:   · Estimated Length of IV antimicrobials: TBD  · Patient will need Midline Catheter Insertion:   · Patient will need PICC line Insertion:  · Patient will need: Home IV , [place],  SNF,  LTAC:TBD  · Patient will need outpatient wound care:Yes    Chief complaint/reason for consultation:   · Pelvic abscess (Chronic) and concerns for sacral osteomyelitis      History of Present Illness:   Aníbal Rossi is a 39y.o.-year-old female who was initially admitted on 9/1/2021. Patient seen at the request of . INITIAL HISTORY:    Patient is unable to provide a good history. Review of records shows a history of rectal cancer (diagnosed in 12/14) with colon resection and hemicolectomy in 2015. She is status post chemo/radiation. She had a transverse loop cholectomy with subsequent reversal.     She also developed right hydronephrosis due to abscess formation. She has stents in place. She suffers from bipolar disorder. Patient has been hospitalized several times. Most recently was discharged on 8/18/2021 after admission on 8-12-21 and treatment for a colovaginal fistula and anastomotic leak.   Patient underwent exploratory lap with colostomy creation on 8/14/2021 by Dr. Nahid Dallas. Patient indicated inability to keep food down and emesis of brown, stool colored material.    A CTAB revealed SBO with persistent presacral- pelvic abscess and raised concerns of sacral osteomyelitis. There is also mild right hydronephrosis with nephroureteral stent in place, mild enhancement of the proximal renal collecting system with concern of infection and moderate urinary bladder wall thickening. Colorectal surgery was consulted from outlying facility and requested patient be transferred to [facility] for further evaluation and management. CURRENT EVALUATION : 9/3/2021    Afebrile  VS stable    Patient is resting in bed and hemodynamically stable. Patient denies any current pain, nausea, fever, chest pain or shortness of breath. Patient denies any myalgia or arthralgias or any signs of discomfort. CT abdomen images not presently available. Will await downloading to evaluate further. Labs, X rays reviewed: 9/3/2021    BUN:14  Cr:0.70    WBC:18.0  Hb:11.7  Plat: 453    Cultures:  Urine:  ·   Blood: 9/2/21: No Growth  ·   Sputum :  ·   Wound:  ·     Discussed with patient, RN, IM. I have personally reviewed the past medical history, past surgical history, medications, social history, and family history, and I have updated the database accordingly.   Past Medical History:     Past Medical History:   Diagnosis Date    Cancer [facility])     Rectal    Pelvis, female abscess     pre sacral drain put in 6/19/2020    UTI (urinary tract infection)     stent place june 2020       Past Surgical  History:     Past Surgical History:   Procedure Laterality Date    ABDOMEN SURGERY  08/14/2021    EUA/colostomy    ABSCESS DRAINAGE      pre sacral pelvic abscess with drain placement to right buttock    COLOSTOMY  08/14/2021    eua; creation end colostomy    COLOSTOMY N/A 8/14/2021    EUA, CREATION END COLOSTOMY performed by Aron LANGSTON Jamie Duong MD at [address]  8/13/2021    [address] [facility] performed by Jordyn Barreto MD at [address] N/A 07/03/2020    TRANSVERSE COLOSTOMY LOOP performed by Yocasta Elliott MD [facility] [address] [facility] N/A 8/13/2021    EGD DIAGNOSTIC ONLY performed by Jordyn Barreto MD [address] [address]       Medications:      piperacillin-tazobactam  3,375 mg IntraVENous Q8H    metroNIDAZOLE  500 mg IntraVENous Q8H    vancomycin  1,000 mg IntraVENous Q18H    vancomycin (VANCOCIN) intermittent dosing (placeholder)   Other RX Placeholder    [Held by provider] busPIRone  15 mg Oral TID    [Held by provider] ferrous sulfate  325 mg Oral BID WC    [Held by provider] mirtazapine  15 mg Oral Nightly    [Held by provider] OLANZapine  10 mg Oral Nightly    sodium chloride flush  5-40 mL IntraVENous 2 times per day    enoxaparin  40 mg SubCUTAneous Daily       Social History:     Social History     Socioeconomic History    Marital status: Single     Spouse name: Not on file    Number of children: Not on file    Years of education: Not on file    Highest education level: Not on file   Occupational History    Not on file   Tobacco Use    Smoking status: Never Smoker    Smokeless tobacco: Never Used    Tobacco comment: pt is a non smoker   Substance and Sexual Activity    Alcohol use: Not on file    Drug use: Not on file    Sexual activity: Not on file   Other Topics Concern    Not on file   Social History Narrative    Not on file     Social Determinants of Health     Financial Resource Strain:     Difficulty of Paying Living Expenses:    Food Insecurity:     Worried About Running Out of Food in the Last Year:     Ran Out of Food in the Last Year:    Transportation Needs:     Lack of Transportation (Medical):      Lack of Transportation (Non-Medical):    Physical Activity:     Days of Exercise per Week:     Minutes of Exercise per Session:    Stress:     Feeling of Stress :    Social Connections:     Frequency of Communication with Friends and Family:     Frequency of Social Gatherings with Friends and Family:     Attends Taoist Services:     Active Member of Clubs or Organizations:     Attends Club or Organization Meetings:     Marital Status:    Intimate Partner Violence:     Fear of Current or Ex-Partner:     Emotionally Abused:     Physically Abused:     Sexually Abused:        Family History:   No family history on file. Allergies:   Patient has no known allergies. Review of Systems:   Constitutional: No fevers. Had chills. No systemic complaints  Head: No headaches  Eyes: No double vision or blurry vision. No conjunctival inflammation. ENT: No sore throat or runny nose. . No hearing loss, tinnitus or vertigo. Cardiovascular: No chest pain or palpitations. No shortness of breath. No VELARDE  Lung: No shortness of breath or cough. No sputum production  Abdomen: Nausea, vomiting. No diarrhea, or abdominal pain. Marnell Kelechi No cramps. Has ileostomy bag. Genitourinary: No increased urinary frequency, or dysuria. No hematuria. No suprapubic or CVA pain  Musculoskeletal: No muscle aches or pains. No joint effusions, swelling or deformities  Hematologic: No bleeding or bruising. Neurologic: No headache, weakness, numbness, or tingling. Integument: No rash, no ulcers. Psychiatric: No depression. Endocrine: No polyuria, no polydipsia, no polyphagia. Physical Examination :     Patient Vitals for the past 8 hrs:   BP Temp Temp src Pulse Resp SpO2 Weight   09/03/21 0700 118/82 98.6 °F (37 °C) Axillary 120 18 98 % --   09/03/21 0555 -- -- -- -- -- -- 96 lb (43.5 kg)     General Appearance: Awake, alert, and in no apparent distress  Head:  Normocephalic, no trauma  Eyes: Pupils equal, round, reactive to light; sclera anicteric; conjunctivae pink. No embolic phenomena.   ENT: Oropharynx clear, without erythema, exudate, or thrush. No tenderness of sinuses. Mouth/throat: mucosa pink and moist. No lesions. Dentition in good repair. NG tube in place. Neck:Supple, without lymphadenopathy. Thyroid normal, No bruits. Pulmonary/Chest: Clear to auscultation, without wheezes, rales, or rhonchi. No dullness to percussion. Cardiovascular: Regular rate and rhythm without murmurs, rubs, or gallops. Abdomen: Soft, non tender. Bowel sounds normal. No organomegaly. Ileostomy bag in place. All four Extremities: No cyanosis, clubbing, edema, or effusions. Neurologic: No gross sensory or motor deficits. Skin: Warm and dry with good turgor. No signs of peripheral arterial or venous insufficiency. No ulcerations. No open wounds. Medical Decision Making -Laboratory:   I have independently reviewed/ordered the following labs:    CBC with Differential:   Recent Labs     09/02/21  0213 09/03/21  0657   WBC 18.0* 13.6*   HGB 11.7* 10.8*   HCT 37.5 35.0*    433   LYMPHOPCT  --  15*   MONOPCT  --  6     BMP:   Recent Labs     09/02/21  0213 09/03/21  0657    138   K 3.6* 3.1*    107   CO2 17* 20   BUN 14 11   CREATININE 0.70 1.61*   MG  --  1.7     Hepatic Function Panel: No results for input(s): PROT, LABALBU, BILIDIR, IBILI, BILITOT, ALKPHOS, ALT, AST in the last 72 hours. No results for input(s): RPR in the last 72 hours. No results for input(s): HIV in the last 72 hours. No results for input(s): BC in the last 72 hours.   Lab Results   Component Value Date    MUCUS NOT REPORTED 09/02/2021    RBC 4.15 09/03/2021    TRICHOMONAS NOT REPORTED 09/02/2021    WBC 13.6 09/03/2021    YEAST NOT REPORTED 09/02/2021    TURBIDITY CLOUDY 09/02/2021     Lab Results   Component Value Date    CREATININE 1.61 09/03/2021    GLUCOSE 107 09/03/2021       Medical Decision Making-Imaging:     EXAMINATION:   SINGLE CONTRAST GASTROGRAFIN ENEMA 8/13/2021       TECHNIQUE:   The enema tip was inserted in a low position.       240 mL GASTROGRAFIN/1000 mL WATER was instilled retrogradely into the rectum   under gravity under direct fluoroscopic visualization.  Static and cine   images acquired during installation.       FLUOROSCOPY DOSE AND TYPE OR TIME AND EXPOSURES:   DAP 30.843 abscess CT 08/11/2021Gycm2       COMPARISON:   None       HISTORY:   ORDERING SYSTEM PROVIDED HISTORY: concern for colovaginal fistula, stool from   vagina   TECHNOLOGIST PROVIDED HISTORY:   Please use gastrografin for enema       Electronically signed by Ashely Flanagan DO on 8/13/2021 at 7:38 AM   Reason for exam:->concern for colovaginal fistula, stool from vagina   Reason for Exam: colovaginal fistula  3.2minft       FINDINGS:   Cine images (thumbnail image A5 in PACS) acquired during very 1st   installation of contrast into the rectum.  Contrast promptly fills the rectum   but at same time there is abnormal extension and pooling anterior inferior to   the lower rectum consistent with contrast collection in the vaginal canal.   Additionally there is pooling of contrast in the presacral space which would   correlate with fluid/gas collection in the presacral space on the comparison   CT scan.  This would be indicative of extravasation from the low rectal   anastomotic site.           Impression   1. Colovaginal fistulous communication is demonstrated. 2. Pooling of contrast posterior to the rectum in the presacral space   consistent with extravasation of contrast most likely from low rectal   anastomotic site.  This correlates with presacral gas/fluid collection on the   comparison CT scan. Findings were discussed with Dr. Marco A Manjarrez .  Subhash  (Gastroenterology) on   08/13/2020           EXAMINATION:   ONE SUPINE XRAY VIEW(S) OF THE ABDOMEN       9/2/2021 1:49 am       COMPARISON:   Abdomen radiograph 08/12/2021, abdomen and pelvis CT 08/11/2021       HISTORY:   ORDERING SYSTEM PROVIDED HISTORY: NG TUBE PLACEMENT   TECHNOLOGIST PROVIDED HISTORY:   NG TUBE PLACEMENT   Portable? ->Yes   Reason for Exam: supine       FINDINGS:   Partially included right subclavian central venous port catheter and right   ureteral stent.  New gastric tube terminating in the gastric fundus with the   sideport in the distal thoracic esophagus just above the gastroesophageal   junction.       Exclusion of the pelvis from the field of view.  Clear included lungs. Mildly dilated small bowel loop in the medial right lower quadrant.  No   significant stool.  Peripherally calcified gallstone in the right upper   quadrant.           Impression   1. The sideport of a new gastric tube is located in the distal thoracic   esophagus just above the gastroesophageal junction.  Recommend advancement of   greater than 1.4 cm.   2. A mildly dilated small bowel loop could be due to ileus or enteritis with   partial obstruction considered less likely. Medical Decision [id number]:       Medical Decision Making-Other:     Note:  · Labs, medications, radiologic studies were reviewed with personal review of films  · Large amounts of data were reviewed  · Discussed with nursing Staff  · Infection Control and Prevention measures reviewed  · All prior entries were reviewed  · Administer medications as ordered  · Prognosis: Fair    Thank you for allowing us to participate in the care of this patient. Please call with questions. Jorge Richardson DPM       ATTESTATION:    I have discussed the case, including pertinent history and exam findings with the residents and students. I have seen and examined the patient and the key elements of the encounter have been performed by me. I was present when the student obtained his information or examined the patient. I have reviewed the laboratory data, other diagnostic studies and discussed them with the residents. I have updated the medical record where necessary.     I agree with the assessment, plan and orders as documented by the resident/ student.     Tommy Cespedes MD.

## 2021-09-03 NOTE — PLAN OF CARE
Problem: Infection:  Goal: Will remain free from infection  Description: Will remain free from infection  9/3/2021 1518 by Sahara Louis RN  Outcome: Ongoing  9/3/2021 0212 by Kelly Julian RN  Outcome: Ongoing     Problem: Safety:  Goal: Free from accidental physical injury  Description: Free from accidental physical injury  9/3/2021 1518 by Sahara Louis RN  Outcome: Ongoing  9/3/2021 0212 by Kelly Julian RN  Outcome: Met This Shift  Goal: Free from intentional harm  Description: Free from intentional harm  9/3/2021 1518 by Sahara Louis RN  Outcome: Ongoing  9/3/2021 0212 by Kelly Julian RN  Outcome: Met This Shift     Problem: Daily Care:  Goal: Daily care needs are met  Description: Daily care needs are met  9/3/2021 1518 by Sahara Louis RN  Outcome: Ongoing  9/3/2021 0212 by Kelly Julian RN  Outcome: Ongoing     Problem: Pain:  Goal: Patient's pain/discomfort is manageable  Description: Patient's pain/discomfort is manageable  9/3/2021 1518 by Sahara Louis RN  Outcome: Ongoing  9/3/2021 0212 by Kelly Julian RN  Outcome: Ongoing  Goal: Pain level will decrease  Description: Pain level will decrease  9/3/2021 1518 by Sahara Louis RN  Outcome: Ongoing  9/3/2021 0212 by Kelly Julian RN  Outcome: Ongoing  Goal: Control of acute pain  Description: Control of acute pain  9/3/2021 1518 by Sahara Louis RN  Outcome: Ongoing  9/3/2021 0212 by Kelly Julian RN  Outcome: Ongoing  Goal: Control of chronic pain  Description: Control of chronic pain  Outcome: Ongoing     Problem: Skin Integrity:  Goal: Skin integrity will stabilize  Description: Skin integrity will stabilize  9/3/2021 1518 by Sahara Louis RN  Outcome: Ongoing  9/3/2021 0212 by Kelly Julian RN  Outcome: Ongoing     Problem: Discharge Planning:  Goal: Patients continuum of care needs are met  Description: Patients continuum of care needs are met  Outcome: Ongoing     Problem:  Bowel/Gastric:  Goal: Control of bowel function will improve  Description: Control of bowel function will improve  Outcome: Ongoing  Goal: Ability to achieve a regular elimination pattern will improve  Description: Ability to achieve a regular elimination pattern will improve  Outcome: Ongoing     Problem: Nutritional:  Goal: Ability to follow a diet with enough fiber (20 to 30 grams) for normal bowel function will improve  Description: Ability to follow a diet with enough fiber (20 to 30 grams) for normal bowel function will improve  Outcome: Ongoing

## 2021-09-03 NOTE — PROGRESS NOTES
Pt has improved since presentation and is tolerating a regular diet. No nausea or vomiting and NG tube has been removed for 2 days. At this time there is nothing more we plan on doing from a surgical standpoint so General Surgery will sign off. Thank you for this consult.     Lise Isaacs,  PGY-1  9/3/21 12:27 PM

## 2021-09-03 NOTE — PROGRESS NOTES
Physical Therapy  Facility/Department: [facility]  Daily Treatment Note  NAME: Cheryle Antonio  : 1975  MRN: [id number]    Date of Service: 9/3/2021    Discharge Recommendations:  Patient would benefit from continued therapy after discharge   PT Equipment Recommendations  Equipment Needed: Yes  Mobility Devices: Maria Esther Christopher: Rolling  Other: Pt currently requires a RW for safe ambulation. Assessment   Body structures, Functions, Activity limitations: Decreased functional mobility ; Decreased high-level IADLs;Decreased ADL status; Decreased endurance;Decreased coordination;Decreased strength;Decreased balance;Decreased safe awareness; Increased pain  Assessment: Pt ambulated 250ft with RW and CGA. Pt steady throughout ambulation with no LOB noted. Would benefit from continued PT to perform stairs and promote endurance, balance, and strengthening. Prognosis: Good  PT Education: Goals;PT Role;Plan of Care  REQUIRES PT FOLLOW UP: Yes  Activity Tolerance  Activity Tolerance: Patient Tolerated treatment well     Patient Diagnosis(es): There were no encounter diagnoses. has a past medical history of Cancer ([address] Pelvis, female abscess, and UTI (urinary tract infection). has a past surgical history that includes Ureter stent placement (); Abscess Drainage; Small intestine surgery (N/A, 2020); Hysterectomy; Abdomen surgery (2021); colostomy (2021); Upper gastrointestinal endoscopy (N/A, 2021); sigmoidoscopy (2021); and colostomy (N/A, 2021). Restrictions  Restrictions/Precautions  Restrictions/Precautions: Fall Risk  Required Braces or Orthoses?: No  Position Activity Restriction  Other position/activity restrictions: up w/ assist, colostomy bag, hx of bipolar  Subjective   General  Chart Reviewed: Yes  Response To Previous Treatment: Patient with no complaints from previous session.   Family / Caregiver Present: No  Subjective  Subjective: Pt and RN agreeable to PT this afternoon. Pt supine in bed upon arrival with no c/o pain. Pt pleasant and cooperative throughout. General Comment  Comments: Pt retired to chair at end of session with call light in reach. Pain Screening  Patient Currently in Pain: Denies  Vital Signs  Patient Currently in Pain: Denies       Orientation  Orientation  Overall Orientation Status: Within Functional Limits  Cognition   Cognition  Overall Cognitive Status: WFL  Objective   Bed mobility  Supine to Sit: Stand by assistance  Sit to Supine: Unable to assess (Pt retired to chair at end of session.)  Scooting: Stand by assistance  Comment: HOB elevated. Transfers  Sit to Stand: Contact guard assistance  Stand to sit: Contact guard assistance  Comment: RW used, good hand placement throughout. Ambulation 1  Surface: level tile  Device: Rolling Walker  Assistance: Contact guard assistance  Gait Deviations: Slow Leisa;Decreased step length;Decreased step height  Distance: 250ft  Comments: No LOB noted. Stairs/Curb  Stairs?: No     Balance  Posture: Good  Sitting - Static: Good  Sitting - Dynamic: Good;-  Standing - Static: Fair  Standing - Dynamic: Fair  Comments: Assessed with RW  Exercises  Hip Flexion: seated marches: x15  Hip Abduction: x15  Knee Long Arc Quad: x15  Ankle Pumps: x20  Comments: LE exercises performed while seated in chair.      AM-PAC Score  AM-PAC Inpatient Mobility Raw Score : 21 (09/03/21 1525)  AM-PAC Inpatient T-Scale Score : 50.25 (09/03/21 1525)  Mobility Inpatient CMS 0-100% Score: 28.97 (09/03/21 1525)  Mobility Inpatient CMS G-Code Modifier : CJ (09/03/21 1525)          Goals  Short term goals  Time Frame for Short term goals: 14 visits  Short term goal 1: Complete transfers Independently least restrictive v no AD  Short term goal 2: Complete 300 ft of gait independently least restrictive v no AD  Short term goal 3: Complete 4 steps with RHR independently  Short term goal 4: Participate in 30 minutes of therapy to promote endurance    Plan    Plan  Times per week: 5-6x  Current Treatment Recommendations: Strengthening, Endurance Training, Transfer Training, Neuromuscular Re-education, Patient/Caregiver Education & Training, ADL/Self-care Training, Equipment Evaluation, Education, & procurement, Balance Training, Gait Training, IADL Training, Home Exercise Program, Functional Mobility Training, Stair training, Safety Education & Training  Safety Devices  Type of devices:  All fall risk precautions in place, Call light within reach, Chair alarm in place, Left in chair, Gait belt, Nurse notified  Restraints  Initially in place: No     Therapy Time   Individual Concurrent Group Co-treatment   Time In 1424         Time Out 1447         Minutes 23         Timed Code Treatment Minutes: [facility] [facility], [facility]

## 2021-09-03 NOTE — PROGRESS NOTES
[facility]  Office: [address], DO, Violetta Prasad, DO, Pietro Kelley, DO, Diego Frost Blood, DO, Hansa Mcmahon MD, Gladys Reddy MD, Yi Ho MD, Donaldo Dixon MD, Shiloh Sanchez MD, Milly Steinberg MD, Evelyn Chavez MD, Adelita Mack DO, Mallory Arrington MD, Jerome Guzman DO, Sally Martines MD,  Nathanial Apgar, DO, Terrence Davis MD, Brendan Walker MD, Ramandeep Gill MD, Al Encarnacion MD, Rex Isbell MD, Cornelio Freire MD, Tempie Mcburney, MD, Alexandra Chew [employer], [facility], CNP, Imelda Allen, CNP, Nadine Ribera, CNS, Malinda Fret, CNP, Cayetano Carrillo, CNP, Samir Mcgee, CNP, Tarsha Vizcaino, CNP, Sweetie Bills, CNP, Sonam Lyon PA-C, Mynor Guaman, [facility], Ko Jama, CNP, Zakiya Aj, CNP, Brigido Escalante, CNP, Zulema Freire, CNP, Zakia Mccracken, CNP, Gabriele Oliveira, CNP, Tatum Mike, CNP, Jeromy Finnegan, [address] [place] [address]    Progress Note    9/3/2021    4:53 PM    Name:   Spring Morales  MRN:     [id number]     Acct:      [de-identified]   Room:   [address] [place] [address] Day:  2  Admit Date:  2021  9:09 PM    PCP:   GRAY Heart CNP  Code Status:  Full Code    Subjective:     C/C: N/V    Interval History Status: improved. She is nausea and vomiting is improved, seen by general surgery, infectious disease. Patient's diet has been advanced to regular. NG tube was removed. Patient had 2 bowel movements last 24 hours which were both soft and formed.   No other issues overnight, patient is eager for discharge back home    Brief History:     From H&P  This is a 39 yr old female, very poor historian, with history of rectal cancer (diagnosed in 2014) with colon resection and right hemicolectomy in 2015, s/p chemo/radiation, transverse loop colostomy with reversal, right hydronephrosis due to abscess formation with stents in place and Bipolar disorder who presents initially to [facility] with nausea and vomiting over the last 4 days. Patient recently discharged from our service on 8/18/21 after treatment/finding colovaginal fistula and anastomotic leak. Patient underwent es-lap with colostomy creation on 8/14/21 via Dr. Morteza Meek. Patient has been unable to keep any PO intake down and describes emesis as brown and stool colored. CTAP revealed SBO, persistent presacral/pelvic abscess with concern for sacral osteomyelitis, mild right hydronephrosis with nephroureteral stent in place, mild enhancement of the proximal renal collecting system with concern for infection and moderate urinary bladder wall thickening. Patient was given doses of IV vancomycin, IV Zosyn and IV Flagyl. Colorectal surgery was consulted from outlying facility and patient is transferred to [facility] for further evaluation and management.      On my evaluation, patient is resting in bed and is hemodynamically stable. ABD is soft and NT on palpation. Colostomy present with dark brown stool noted to colostomy bag. NG tube was inserted prior to arrival, but has since been dislodged. Patient admits to feeling better since NG tube placement. She denies any CP, SOB, dysuria or changes in urinary habits, HA/dizziness, cough or fevers. Denies myalgias or arthralgias or any symptoms of discomfort. Review of Systems:     Constitutional:  negative for chills, fevers, sweats  Respiratory:  negative for cough, dyspnea on exertion, shortness of breath, wheezing  Cardiovascular:  negative for chest pain, chest pressure/discomfort, lower extremity edema, palpitations  Gastrointestinal:  negative for abdominal pain, constipation, diarrhea, nausea, vomiting  Neurological:  negative for dizziness, headache    Medications:      Allergies:  No Known Allergies    Current Meds:   Scheduled Meds:    magnesium sulfate  4,000 mg IntraVENous Once    piperacillin-tazobactam  3,375 mg IntraVENous Q8H    busPIRone  15 mg Oral TID    ferrous sulfate  325 mg Oral BID WC    mirtazapine  15 mg Oral Nightly    OLANZapine  10 mg Oral Nightly    sodium chloride flush  5-40 mL IntraVENous 2 times per day    enoxaparin  40 mg SubCUTAneous Daily     Continuous Infusions:    sodium chloride      sodium chloride 75 mL/hr at 21 2251     PRN Meds: ketorolac, morphine **OR** morphine, hydrOXYzine, sodium chloride flush, sodium chloride, potassium chloride **OR** potassium alternative oral replacement **OR** potassium chloride, magnesium sulfate, ondansetron **OR** ondansetron, polyethylene glycol, acetaminophen **OR** acetaminophen    Data:     Past Medical History:   has a past medical history of Cancer ([address] Pelvis, female abscess, and UTI (urinary tract infection). Social History:   reports that she has never smoked. She has never used smokeless tobacco.     Family History: No family history on file. Vitals:  /77   Pulse 94   Temp 97.5 °F (36.4 °C) (Oral)   Resp 18   Ht 5' (1.524 m)   Wt 96 lb (43.5 kg)   LMP 2017   SpO2 100%   BMI 18.75 kg/m²   Temp (24hrs), Av °F (36.7 °C), Min:97.5 °F (36.4 °C), Max:98.6 °F (37 °C)    No results for input(s): POCGLU in the last 72 hours. I/O (24Hr):     Intake/Output Summary (Last 24 hours) at 9/3/2021 1653  Last data filed at 9/3/2021 1527  Gross per 24 hour   Intake 1768 ml   Output 2525 ml   Net -757 ml       Labs:  Hematology:  Recent Labs     21  0657   WBC 18.0* 13.6*   RBC 4.53 4.15   HGB 11.7* 10.8*   HCT 37.5 35.0*   MCV 82.8 84.3   MCH 25.8 26.0   MCHC 31.2 30.9   RDW 21.1* 21.2*    433   MPV 10.7 11.3   INR 1.1  --      Chemistry:  Recent Labs     21  0657    138   K 3.6* 3.1*    107   CO2 17* 20   GLUCOSE 86 107*   BUN 14 11   CREATININE 0.70 1.61*   MG  --  1.7   ANIONGAP 20* 11   LABGLOM >60 35*   GFRAA >60 42*   CALCIUM 8.5* 8.2*   No results for input(s): PROT, LABALBU, LABA1C, R5JUWCO, O4ZVDVS, FT4, TSH, AST, ALT, LDH, GGT, ALKPHOS, LABGGT, BILITOT, BILIDIR, AMMONIA, AMYLASE, LIPASE, LACTATE, CHOL, HDL, LDLCHOLESTEROL, CHOLHDLRATIO, TRIG, VLDL, BMR02BI, PHENYTOIN, PHENYF, URICACID, POCGLU in the last 72 hours. ABG:No results found for: POCPH, PHART, PH, POCPCO2, LNO0IAD, PCO2, POCPO2, PO2ART, PO2, POCHCO3, PUU1UGP, HCO3, NBEA, PBEA, BEART, BE, THGBART, THB, ADR1OPR, ROSR7XFL, W1ELDUFA, O2SAT, FIO2  Lab Results   Component Value Date/Time    SPECIAL NOT REPORTED 09/02/2021 02:02 PM     Lab Results   Component Value Date/Time    CULTURE NO SIGNIFICANT GROWTH 09/02/2021 02:02 PM       Radiology:  XR ABDOMEN FOR NG/OG/NE TUBE PLACEMENT    Result Date: 9/2/2021  1. The sideport of a new gastric tube is located in the distal thoracic esophagus just above the gastroesophageal junction. Recommend advancement of greater than 1.4 cm. 2. A mildly dilated small bowel loop could be due to ileus or enteritis with partial obstruction considered less likely. Physical Examination:        General appearance:  alert, cooperative and no distress  Mental Status:  oriented to person, place and time and normal affect  Lungs:  clear to auscultation bilaterally, normal effort  Heart:  regular rate and rhythm, no murmur  Abdomen:  soft, nontender, nondistended, normal bowel sounds, no masses, hepatomegaly, splenomegaly  Extremities:  no edema, redness, tenderness in the calves  Skin:  no gross lesions, rashes, induration    Assessment:        Hospital Problems         Last Modified POA    * (Principal) Small bowel obstruction ([address] 9/2/2021 Yes    H/O malignant neoplasm of colon 9/2/2021 Yes    Acute cystitis with hematuria 9/2/2021 Yes    Pelvic abscess in female 9/2/2021 Yes    Bipolar I disorder, most recent episode mixed ([address] (Chronic) 9/2/2021 Yes    Rectovaginal fistula 9/2/2021 Yes    Hydronephrosis of right kidney 9/2/2021 Yes          Plan:        1. SBO - had 2 BM, GS cleared for regular diet. No further workup, will monitor  2.  Sacral osteomyelitis -continue Zosyn, infectious diseases following  3. Hydronephrosis-monitor creatinine, check PVR. Stent exchange with primary urologist, follow-up on urine culture  4. Acute kidney injury-possibly secondary to low intravascular volume. Continue antibiotics, monitor overnight. Possible discharge tomorrow if this improves. 5. Bipolar disorder  6. Rectovaginal fistula  7. History of malignant neoplasm of colon  8. Pelvic abscess  9. Plan  1. Monitor overnight with IV fluids. Hold diuretics and ACE/ARB  2. Continue Zosyn for infectious disease  3. Continue diet  4. Outpatient follow-up with urology  5. Discharge once antibiotics and decision regarding sacral osteomyelitis is confirmed. Patient does ambulate at home minimally, spends most of her time sitting in the chair.   Will need home care and maybe home health aide    Vee Pham,   9/3/2021  4:53 PM

## 2021-09-03 NOTE — DISCHARGE INSTR - COC
Continuity of Care Form    Patient Name: Reagan Zee   :  1975  MRN:  [id number]    Admit date:  2021  Discharge date:  21    Code Status Order: Full Code   Advance Directives:      Admitting Physician:  Monse Andino DO  PCP: GRAY Moore - CNP    Discharging Nurse: [facility] Unit/Room#: 5144/8012-16  Discharging Unit Phone Number: [phone]    Emergency Contact:   Extended Emergency Contact Information  Primary Emergency Contact: Aliyah Hill, [address] Phone: [phone]  Work Phone: [phone]  Mobile Phone: [phone]  Relation: Other    Past Surgical History:  Past Surgical History:   Procedure Laterality Date    ABDOMEN SURGERY  2021    EUA/colostomy    ABSCESS DRAINAGE      pre sacral pelvic abscess with drain placement to right buttock    COLOSTOMY  2021    eua; creation end colostomy    COLOSTOMY N/A 2021    EUA, CREATION END COLOSTOMY performed by Oscar oHffman MD at [place] [address]  2021    [address] [place] [address] performed by Merle Choi MD at [address] [place] [address] N/A 2020    TRANSVERSE COLOSTOMY LOOP performed by Emma Thornton MD at [address] [place] [address] 2021    EGD DIAGNOSTIC ONLY performed by Merle Choi MD at [address] [place] [address]         Immunization History: There is no immunization history on file for this patient.     Active Problems:  Patient Active Problem List   Diagnosis Code    Psychosis ([facility] F29    H/O malignant neoplasm of colon Z85.038    Acute cystitis with hematuria N30.01    Pelvic abscess in female N73.9    Calculus of gallbladder without cholecystitis without obstruction K80.20    Leukocytosis D72.829    Elevated C-reactive protein (CRP) R79.82    Severe protein-calorie malnutrition Russella [place]: less than 60% of standard weight) ([facility] E43    Bipolar I disorder, most recent episode mixed ([facility]) F31.60    Major depression with psychotic features ([facility]) F32.3    Tachycardia R00.0    Diarrhea R19.7    Anemia D64.9    History of rectal cancer Z85.048    Rectal fistula K60.4    Schizoaffective disorder ([facility]) F25.9    Intellectual disability F79    Anorexia nervosa F50.00    Anastomotic leak of intestine V37.78    Acute metabolic encephalopathy N67.09    Rectovaginal fistula N82.3    Small bowel obstruction ([facility]) K56.609    Hydronephrosis of right kidney N13.30       Isolation/Infection:   Isolation            Contact          Patient Infection Status       Infection Onset Added Last Indicated Last Indicated By Review Planned Expiration Resolved Resolved By    MDRO (multi-drug resistant organism)  07/05/20 07/05/20 Nick Barton RN        Eco-06/2020 gluteal abscess    Resolved    C-diff Rule Out 08/12/21 08/12/21 08/12/21 C DIFF TOXIN/ANTIGEN (Ordered)   08/13/21 Rule-Out Test Resulted    C-diff Rule Out 06/26/20 06/26/20 06/26/20 C DIFF TOXIN/ANTIGEN (Ordered)   06/26/20 Rule-Out Test Resulted    C-diff Rule Out 06/20/20 06/20/20 06/20/20 C DIFF TOXIN/ANTIGEN (Ordered)   06/20/20 Rule-Out Test Resulted            Nurse Assessment:  Last Vital Signs: /86   Pulse 102   Temp 98.2 °F (36.8 °C) (Oral)   Resp 16   Ht 5' (1.524 m)   Wt 96 lb (43.5 kg)   LMP 06/25/2017   SpO2 100%   BMI 18.75 kg/m²     Last documented pain score (0-10 scale): Pain Level: 2  Last Weight:   Wt Readings from Last 1 Encounters:   09/03/21 96 lb (43.5 kg)     Mental Status:  oriented, alert, coherent, logical, thought processes intact, and able to concentrate and follow conversation    IV Access:  - None    Nursing Mobility/ADLs:  Walking   Independent  Transfer  Independent  Bathing  Independent  Dressing  Independent  [address]  Independent  Med Delivery   whole    Wound Care Documentation and Therapy:        Elimination:  Continence:    Bowel: No  Bladder: Yes  Urinary Catheter: None   Colostomy/Ileostomy/Ileal Conduit: Yes  Colostomy LLQ -Stomal Appliance: 1 piece  Colostomy LLQ -Flange Size (inches): 2.2 Inches  Colostomy LLQ -Stoma  Assessment: Pink, Moist  Colostomy LLQ -Mucocutaneous Junction: Intact  Colostomy LLQ -Peristomal Assessment: Clean, Intact  Colostomy LLQ -Treatment: Irrigation  Colostomy LLQ -Stool Appearance: Loose  Colostomy LLQ -Stool Color: Aura Dubach  Colostomy LLQ -Stool Amount: Large  Colostomy LLQ -Output (mL): 525 ml    Date of Last BM: colostomy    Intake/Output Summary (Last 24 hours) at 9/3/2021 0837  Last data filed at 9/3/2021 0556  Gross per 24 hour   Intake 2668 ml   Output 975 ml   Net 1693 ml     I/O last 3 completed shifts: In: 2668 [P.O.:900; I.V.:1768]  Out: 975 [Emesis/NG output:450; Stool:525]    Safety Concerns: At Risk for Falls    Impairments/Disabilities:      None    Nutrition Therapy:  Current Nutrition Therapy:   - Oral Diet:  General    Routes of Feeding: Oral  Liquids: No Restrictions  Daily Fluid Restriction: no  Last Modified Barium Swallow with Video (Video Swallowing Test): not done    Treatments at the Time of Hospital Discharge:   Respiratory Treatments: none  Oxygen Therapy:  is not on home oxygen therapy.   Ventilator:    - No ventilator support    Rehab Therapies: none  Weight Bearing Status/Restrictions: No weight bearing restirctions  Other Medical Equipment (for information only, NOT a DME order):  bath bench and colostomy supplies  Other Treatments: none    Patient's personal belongings (please select all that are sent with patient):  Glasses    RN SIGNATURE:  Electronically signed by Margaret Ruby on 9/4/21 at 1:25 PM EDT    CASE MANAGEMENT/SOCIAL WORK SECTION    Inpatient Status Date: ***    Readmission Risk Assessment Score:  Readmission Risk              Risk of Unplanned Readmission:  11           Discharging to Facility/ Agency   Name: [address] [facility], [place] [address]         Phone: [phone]       Fax: [phone]          Dialysis Facility (if applicable)   Name:  Address:  Dialysis Schedule:  Phone:  Fax:    / signature: Electronically signed by Yisel Lyon RN on 9/3/21 at 8:37 AM EDT    PHYSICIAN SECTION    Prognosis: Good    Condition at Discharge: Stable    Rehab Potential (if transferring to Rehab): Good    Recommended Labs or Other Treatments After Discharge: see PCP 1 week, check kidney function    Physician Certification: I certify the above information and transfer of Gustavo Grimes  is necessary for the continuing treatment of the diagnosis listed and that she requires Home Care for greater 30 days.      Update Admission H&P: No change in H&P    PHYSICIAN SIGNATURE:  Electronically signed by Beatrice Dowd DO on 9/4/21 at 1:08 PM EDT

## 2021-09-03 NOTE — PLAN OF CARE
Problem: Safety:  Goal: Free from accidental physical injury  Description: Free from accidental physical injury  9/3/2021 0212 by Jitendra Baca RN  Outcome: Met This Shift     Problem: Safety:  Goal: Free from intentional harm  Description: Free from intentional harm  9/3/2021 0212 by Jitendra Baca RN  Outcome: Met This Shift     Problem: Infection:  Goal: Will remain free from infection  Description: Will remain free from infection  9/3/2021 0212 by Jitendra Baca RN  Outcome: Ongoing     Problem: Daily Care:  Goal: Daily care needs are met  Description: Daily care needs are met  9/3/2021 0212 by Jitendra Baca RN  Outcome: Ongoing     Problem: Pain:  Goal: Patient's pain/discomfort is manageable  Description: Patient's pain/discomfort is manageable  9/3/2021 0212 by Jitendra Baca RN  Outcome: Ongoing     Problem: Pain:  Goal: Pain level will decrease  Description: Pain level will decrease  9/3/2021 0212 by Jitendra Baca RN  Outcome: Ongoing     Problem: Pain:  Goal: Control of acute pain  Description: Control of acute pain  9/3/2021 0212 by Jitendra Baca RN  Outcome: Ongoing     Problem: Skin Integrity:  Goal: Skin integrity will stabilize  Description: Skin integrity will stabilize  9/3/2021 0212 by Jitendra Baca RN  Outcome: Ongoing

## 2021-09-04 ENCOUNTER — APPOINTMENT (OUTPATIENT)
Dept: CT IMAGING | Age: 46
DRG: 247 | End: 2021-09-04
Attending: INTERNAL MEDICINE
Payer: MEDICARE

## 2021-09-04 VITALS
TEMPERATURE: 98.2 F | RESPIRATION RATE: 18 BRPM | HEIGHT: 60 IN | BODY MASS INDEX: 19.14 KG/M2 | SYSTOLIC BLOOD PRESSURE: 119 MMHG | OXYGEN SATURATION: 99 % | WEIGHT: 97.5 LBS | HEART RATE: 87 BPM | DIASTOLIC BLOOD PRESSURE: 73 MMHG

## 2021-09-04 LAB
ALBUMIN SERPL-MCNC: 2.7 G/DL (ref 3.5–5.2)
ANION GAP SERPL CALCULATED.3IONS-SCNC: 12 MMOL/L (ref 9–17)
BUN BLDV-MCNC: 7 MG/DL (ref 6–20)
BUN/CREAT BLD: ABNORMAL (ref 9–20)
CALCIUM SERPL-MCNC: 8.5 MG/DL (ref 8.6–10.4)
CHLORIDE BLD-SCNC: 109 MMOL/L (ref 98–107)
CO2: 18 MMOL/L (ref 20–31)
CREAT SERPL-MCNC: 1.59 MG/DL (ref 0.5–0.9)
GFR AFRICAN AMERICAN: 43 ML/MIN
GFR NON-AFRICAN AMERICAN: 35 ML/MIN
GFR SERPL CREATININE-BSD FRML MDRD: ABNORMAL ML/MIN/{1.73_M2}
GFR SERPL CREATININE-BSD FRML MDRD: ABNORMAL ML/MIN/{1.73_M2}
GLUCOSE BLD-MCNC: 97 MG/DL (ref 70–99)
HCT VFR BLD CALC: 38.1 % (ref 36.3–47.1)
HEMOGLOBIN: 11.3 G/DL (ref 11.9–15.1)
MAGNESIUM: 3.4 MG/DL (ref 1.6–2.6)
MCH RBC QN AUTO: 26.2 PG (ref 25.2–33.5)
MCHC RBC AUTO-ENTMCNC: 29.7 G/DL (ref 28.4–34.8)
MCV RBC AUTO: 88.4 FL (ref 82.6–102.9)
NRBC AUTOMATED: 0 PER 100 WBC
PDW BLD-RTO: 21.5 % (ref 11.8–14.4)
PHOSPHORUS: 3 MG/DL (ref 2.6–4.5)
PLATELET # BLD: 417 K/UL (ref 138–453)
PMV BLD AUTO: 10.3 FL (ref 8.1–13.5)
POTASSIUM SERPL-SCNC: 3.8 MMOL/L (ref 3.7–5.3)
RBC # BLD: 4.31 M/UL (ref 3.95–5.11)
SODIUM BLD-SCNC: 139 MMOL/L (ref 135–144)
WBC # BLD: 14.8 K/UL (ref 3.5–11.3)

## 2021-09-04 PROCEDURE — 80069 RENAL FUNCTION PANEL: CPT

## 2021-09-04 PROCEDURE — 94761 N-INVAS EAR/PLS OXIMETRY MLT: CPT

## 2021-09-04 PROCEDURE — 99232 SBSQ HOSP IP/OBS MODERATE 35: CPT | Performed by: INTERNAL MEDICINE

## 2021-09-04 PROCEDURE — 2580000003 HC RX 258: Performed by: INTERNAL MEDICINE

## 2021-09-04 PROCEDURE — 36415 COLL VENOUS BLD VENIPUNCTURE: CPT

## 2021-09-04 PROCEDURE — 85027 COMPLETE CBC AUTOMATED: CPT

## 2021-09-04 PROCEDURE — 6360000002 HC RX W HCPCS: Performed by: INTERNAL MEDICINE

## 2021-09-04 PROCEDURE — 83735 ASSAY OF MAGNESIUM: CPT

## 2021-09-04 PROCEDURE — 99239 HOSP IP/OBS DSCHRG MGMT >30: CPT | Performed by: INTERNAL MEDICINE

## 2021-09-04 PROCEDURE — 74176 CT ABD & PELVIS W/O CONTRAST: CPT

## 2021-09-04 PROCEDURE — 6370000000 HC RX 637 (ALT 250 FOR IP): Performed by: CLINICAL NURSE SPECIALIST

## 2021-09-04 RX ORDER — HEPARIN SODIUM 5000 [USP'U]/ML
5000 INJECTION, SOLUTION INTRAVENOUS; SUBCUTANEOUS EVERY 8 HOURS SCHEDULED
Status: DISCONTINUED | OUTPATIENT
Start: 2021-09-04 | End: 2021-09-04 | Stop reason: HOSPADM

## 2021-09-04 RX ORDER — HEPARIN SODIUM (PORCINE) LOCK FLUSH IV SOLN 100 UNIT/ML 100 UNIT/ML
500 SOLUTION INTRAVENOUS PRN
Status: DISCONTINUED | OUTPATIENT
Start: 2021-09-04 | End: 2021-09-04 | Stop reason: HOSPADM

## 2021-09-04 RX ADMIN — PIPERACILLIN AND TAZOBACTAM 3375 MG: 3; .375 INJECTION, POWDER, FOR SOLUTION INTRAVENOUS at 11:58

## 2021-09-04 RX ADMIN — HEPARIN SODIUM 5000 UNITS: 5000 INJECTION INTRAVENOUS; SUBCUTANEOUS at 09:29

## 2021-09-04 RX ADMIN — PIPERACILLIN AND TAZOBACTAM 3375 MG: 3; .375 INJECTION, POWDER, FOR SOLUTION INTRAVENOUS at 04:11

## 2021-09-04 RX ADMIN — FERROUS SULFATE TAB EC 325 MG (65 MG FE EQUIVALENT) 325 MG: 325 (65 FE) TABLET DELAYED RESPONSE at 09:29

## 2021-09-04 RX ADMIN — BUSPIRONE HYDROCHLORIDE 15 MG: 15 TABLET ORAL at 09:29

## 2021-09-04 RX ADMIN — BUSPIRONE HYDROCHLORIDE 15 MG: 15 TABLET ORAL at 13:44

## 2021-09-04 RX ADMIN — MORPHINE SULFATE 4 MG: 4 INJECTION INTRAVENOUS at 00:22

## 2021-09-04 RX ADMIN — HEPARIN 500 UNITS: 100 SYRINGE at 15:41

## 2021-09-04 ASSESSMENT — PAIN SCALES - GENERAL
PAINLEVEL_OUTOF10: 7
PAINLEVEL_OUTOF10: 0

## 2021-09-04 NOTE — CARE COORDINATION
09/04/21 0857   Readmission Assessment   Number of Days since last admission? 8-30 days   Previous Disposition Home with Home Health  ([facility] originally recommended, patient declined, preferring return home with [facility] and obtained DME at previous d/c)   Who is being Interviewed Patient   What was the patient's/caregiver's perception as to why they think they needed to return back to the hospital? Other (Comment); Did not realize care needs would be so extensive  (Worsening of symptoms, developed SBO.)   Did you visit your Primary Care Physician after you left the hospital, before you returned this time? Yes   Did you see a specialist, such as Cardiac, Pulmonary, Orthopedic Physician, etc. after you left the hospital? Yes   Who advised the patient to return to the hospital? Self-referral   Does the patient report anything that got in the way of taking their medications? No   In our efforts to provide the best possible care to you and others like you, can you think of anything that we could have done to help you after you left the hospital the first time, so that you might not have needed to return so soon? Arrange for more help when leaving the hospital;Teaching during hospitalization regarding your illness; Additional Community resources available for illness support

## 2021-09-04 NOTE — PROGRESS NOTES
[facility]  Office: [address], DO, Tiarra Borgesland, DO, Asher Cruz, DO, Haylee Hickory Blood, DO, Janet Sifuentes MD, Stephany Preston MD, João Handley MD, David Weathers MD, Amaris Lizarraga MD, Memo Frank MD, Lalo Dominguez MD, Francine Crowley, DO, Selena Parr MD, Ap Yin, DO, Irwin Cronin MD,  Saintclair Lank, DO, Jennie Ness MD, Diana Sykes MD, Leticia Wynne MD, Fahad Tim MD, Carlo Brannon MD, Leda Oconnell MD, Gage Mckenzie MD, Daryle Springer, [facility], [facility] Shahabfarideh, CNP, Salazar Loyola, CNP, Shay Hooker, CNS, Simran Mary, CNP, Hardeep Perez, CNP, Jessie Spivey, CNP, Sagrario Peterson, CNP, Jerry Stafford, CNP, CASSANDRA VarnerC, Addy Palacios, [facility], Andreas Leach, CNP, Royal Blue, CNP, Ritchie Whitten, CNP, Violeta Zapata, CNP, David Burnham, CNP, Sheila Smith, CNP, Kenton Gomez, CNP, Victory Donald, [address] [place] [address]    Progress Note    9/4/2021    1:04 PM    Name:   Rodolfo Booth  MRN:     [id number]     Acct:      [de-identified]   Room:   [address] [place] [address] Day:  3  Admit Date:  9/1/2021  9:09 PM    PCP:   GRAY Ogden CNP  Code Status:  Full Code    Subjective:     C/C: N/V    Interval History Status: improved. Patient seen and examined, CT abdomen pelvis was ordered this morning to evaluate for persistent VERO. Small stones were noted with stents in place, no hydronephrosis. Suspect this is from dehydration versus drug-induced. Patient feeling fine, no abdominal pain, had bowel movements.   SBO is resolved from outside hospital.  We discussed outpatient follow-up, she will see her primary care physician in 1 week    Brief History:     From H&P  This is a 39 yr old female, very poor historian, with history of rectal cancer (diagnosed in 12/2014) with colon resection and right hemicolectomy in 2015, s/p chemo/radiation, transverse loop colostomy with reversal, right hydronephrosis due to abscess formation with stents in place and Bipolar disorder who presents initially to [facility] ER with nausea and vomiting over the last 4 days. Patient recently discharged from our service on 8/18/21 after treatment/finding colovaginal fistula and anastomotic leak. Patient underwent es-lap with colostomy creation on 8/14/21 via Dr. Surinder Pizarro. Patient has been unable to keep any PO intake down and describes emesis as brown and stool colored. CTAP revealed SBO, persistent presacral/pelvic abscess with concern for sacral osteomyelitis, mild right hydronephrosis with nephroureteral stent in place, mild enhancement of the proximal renal collecting system with concern for infection and moderate urinary bladder wall thickening. Patient was given doses of IV vancomycin, IV Zosyn and IV Flagyl. Colorectal surgery was consulted from outlying facility and patient is transferred to [facility] for further evaluation and management.      On my evaluation, patient is resting in bed and is hemodynamically stable. ABD is soft and NT on palpation. Colostomy present with dark brown stool noted to colostomy bag. NG tube was inserted prior to arrival, but has since been dislodged. Patient admits to feeling better since NG tube placement. She denies any CP, SOB, dysuria or changes in urinary habits, HA/dizziness, cough or fevers. Denies myalgias or arthralgias or any symptoms of discomfort. Review of Systems:     Constitutional:  negative for chills, fevers, sweats  Respiratory:  negative for cough, dyspnea on exertion, shortness of breath, wheezing  Cardiovascular:  negative for chest pain, chest pressure/discomfort, lower extremity edema, palpitations  Gastrointestinal:  negative for abdominal pain, constipation, diarrhea, nausea, vomiting  Neurological:  negative for dizziness, headache    Medications:      Allergies:  No Known Allergies    Current Meds:   Scheduled Meds:    heparin (porcine)  5,000 Units SubCUTAneous 3 times per day    busPIRone  15 mg Oral TID    ferrous sulfate  325 mg Oral BID WC    mirtazapine  15 mg Oral Nightly    OLANZapine  10 mg Oral Nightly    sodium chloride flush  5-40 mL IntraVENous 2 times per day     Continuous Infusions:    sodium chloride      sodium chloride 75 mL/hr at 21 2251     PRN Meds: ketorolac, morphine **OR** morphine, hydrOXYzine, sodium chloride flush, sodium chloride, potassium chloride **OR** potassium alternative oral replacement **OR** potassium chloride, magnesium sulfate, ondansetron **OR** ondansetron, polyethylene glycol, acetaminophen **OR** acetaminophen    Data:     Past Medical History:   has a past medical history of Cancer ([address] Pelvis, female abscess, and UTI (urinary tract infection). Social History:   reports that she has never smoked. She has never used smokeless tobacco.     Family History: No family history on file. Vitals:  /73   Pulse 87   Temp 98.2 °F (36.8 °C) (Oral)   Resp 18   Ht 5' (1.524 m)   Wt 97 lb 8 oz (44.2 kg)   LMP 2017   SpO2 99%   BMI 19.04 kg/m²   Temp (24hrs), Av.9 °F (36.6 °C), Min:97.5 °F (36.4 °C), Max:98.2 °F (36.8 °C)    No results for input(s): POCGLU in the last 72 hours. I/O (24Hr):     Intake/Output Summary (Last 24 hours) at 2021 1304  Last data filed at 2021 1105  Gross per 24 hour   Intake --   Output 1300 ml   Net -1300 ml       Labs:  Hematology:  Recent Labs     21  0213 21  0657 21  0540   WBC 18.0* 13.6* 14.8*   RBC 4.53 4.15 4.31   HGB 11.7* 10.8* 11.3*   HCT 37.5 35.0* 38.1   MCV 82.8 84.3 88.4   MCH 25.8 26.0 26.2   MCHC 31.2 30.9 29.7   RDW 21.1* 21.2* 21.5*    433 417   MPV 10.7 11.3 10.3   INR 1.1  --   --      Chemistry:  Recent Labs     21  0213 21  0657 21  0540    138 139   K 3.6* 3.1* 3.8    107 109*   CO2 17* 20 18*   GLUCOSE 86 107* 97   BUN 14 11 7   CREATININE 0.70 1.61* 1.59*   MG  --  1.7 3.4*   ANIONGAP 20* 11 12   LABGLOM >60 35* 35*   GFRAA >60 42* 43*   CALCIUM 8.5* 8.2* 8.5*   PHOS  --   --  3.0     Recent Labs     09/04/21  0540   LABALBU 2.7*     ABG:No results found for: POCPH, PHART, PH, POCPCO2, OKH7OGZ, PCO2, POCPO2, PO2ART, PO2, POCHCO3, EQH2FXZ, HCO3, NBEA, PBEA, BEART, BE, THGBART, THB, LRT3BUL, HPOZ9SSF, R2FUOLDZ, O2SAT, FIO2  Lab Results   Component Value Date/Time    SPECIAL NOT REPORTED 09/02/2021 02:02 PM     Lab Results   Component Value Date/Time    CULTURE NO SIGNIFICANT GROWTH 09/02/2021 02:02 PM       Radiology:  XR ABDOMEN FOR NG/OG/NE TUBE PLACEMENT    Result Date: 9/2/2021  1. The sideport of a new gastric tube is located in the distal thoracic esophagus just above the gastroesophageal junction. Recommend advancement of greater than 1.4 cm. 2. A mildly dilated small bowel loop could be due to ileus or enteritis with partial obstruction considered less likely. Physical Examination:        General appearance:  alert, cooperative and no distress  Mental Status:  oriented to person, place and time and normal affect  Lungs:  clear to auscultation bilaterally, normal effort  Heart:  regular rate and rhythm, no murmur  Abdomen:  soft, nontender, nondistended, normal bowel sounds, no masses, hepatomegaly, splenomegaly  Extremities:  no edema, redness, tenderness in the calves  Skin:  no gross lesions, rashes, induration    Assessment:        Hospital Problems         Last Modified POA    * (Principal) Small bowel obstruction ([address] 9/2/2021 Yes    H/O malignant neoplasm of colon 9/2/2021 Yes    Acute cystitis with hematuria 9/2/2021 Yes    Pelvic abscess in female 9/2/2021 Yes    Bipolar I disorder, most recent episode mixed ([address] (Chronic) 9/2/2021 Yes    Rectovaginal fistula 9/2/2021 Yes    Hydronephrosis of right kidney 9/2/2021 Yes          Plan:        1. SBO - had 2 BM, resolved on CT  2.  Sacral osteomyelitis - infectious diseases following, zosyn  3. Hydronephrosis-monitor creatinine, check PVR. Stent exchange with primary urologist, follow-up on urine culture  4. Acute kidney injury-possibly secondary to low intravascular volume. Continue antibiotics, monitor overnight. CT without obstruction, recheck 1 week  5. Bipolar disorder  6. Rectovaginal fistula  7. History of malignant neoplasm of colon  8. Pelvic abscess  9. Plan  1. Monitor overnight with IV fluids. Hold diuretics and ACE/ARB  2. Outpatient follow-up with urology  3. No antibiotics per infectious disease.     Amanda Garcia DO  9/4/2021  1:04 PM

## 2021-09-04 NOTE — PLAN OF CARE
Problem: Infection:  Goal: Will remain free from infection  Description: Will remain free from infection  9/4/2021 1315 by Babs Alexander RN  Outcome: Completed  9/4/2021 0032 by Sienna Gomez RN  Outcome: Ongoing     Problem: Safety:  Goal: Free from accidental physical injury  Description: Free from accidental physical injury  9/4/2021 1315 by Babs Alexander RN  Outcome: Completed  9/4/2021 0032 by Sienna Gomez RN  Outcome: Ongoing  Goal: Free from intentional harm  Description: Free from intentional harm  9/4/2021 1315 by Babs Alexander RN  Outcome: Completed  9/4/2021 0032 by Sienna Gomez RN  Outcome: Ongoing     Problem: Daily Care:  Goal: Daily care needs are met  Description: Daily care needs are met  9/4/2021 1315 by Babs Alexander RN  Outcome: Completed  9/4/2021 0032 by Sienna Gomez RN  Outcome: Ongoing     Problem: Pain:  Goal: Patient's pain/discomfort is manageable  Description: Patient's pain/discomfort is manageable  9/4/2021 1315 by Babs Alexander RN  Outcome: Completed  9/4/2021 0032 by Sienna Gomez RN  Outcome: Ongoing  Goal: Pain level will decrease  Description: Pain level will decrease  9/4/2021 1315 by Babs Alexander RN  Outcome: Completed  9/4/2021 0032 by Sienna Gomez RN  Outcome: Ongoing  Goal: Control of acute pain  Description: Control of acute pain  9/4/2021 1315 by Babs Alexander RN  Outcome: Completed  9/4/2021 0032 by Sienna Gomez RN  Outcome: Ongoing  Goal: Control of chronic pain  Description: Control of chronic pain  9/4/2021 1315 by Babs Alexander RN  Outcome: Completed  9/4/2021 0032 by Sienna Gomez RN  Outcome: Ongoing     Problem: Skin Integrity:  Goal: Skin integrity will stabilize  Description: Skin integrity will stabilize  9/4/2021 1315 by Babs Alexander RN  Outcome: Completed  9/4/2021 0032 by Sienna Gomez RN  Outcome: Ongoing     Problem: Discharge Planning:  Goal: Patients continuum of care needs are met  Description: Patients continuum of care needs are met  9/4/2021 1315 by Missael Shukla RN  Outcome: Completed  9/4/2021 0032 by Cj Watt RN  Outcome: Ongoing     Problem:  Bowel/Gastric:  Goal: Control of bowel function will improve  Description: Control of bowel function will improve  9/4/2021 1315 by Missael Shukla RN  Outcome: Completed  9/4/2021 0032 by Cj Watt RN  Outcome: Ongoing  Goal: Ability to achieve a regular elimination pattern will improve  Description: Ability to achieve a regular elimination pattern will improve  9/4/2021 1315 by Missael Shukla RN  Outcome: Completed  9/4/2021 0032 by Cj Watt RN  Outcome: Ongoing     Problem: Nutritional:  Goal: Ability to follow a diet with enough fiber (20 to 30 grams) for normal bowel function will improve  Description: Ability to follow a diet with enough fiber (20 to 30 grams) for normal bowel function will improve  9/4/2021 1315 by Missael Shukla RN  Outcome: Completed  9/4/2021 0032 by Cj Watt RN  Outcome: Ongoing

## 2021-09-04 NOTE — DISCHARGE SUMMARY
Discussed with the patient and all questioned fully answered. She will call me if any problems arise. Port de-accessed. She was wheeled off unit by a RN to lobby to await cab that is expected at 4:30.

## 2021-09-04 NOTE — FLOWSHEET NOTE
Assessment: Patient was sitting up in her chair when  visited. Patient was in good spirit and seemed to be doing well. When asked how she was feeling, patient responded; \"better. \" Patient was raised [place]. Intervention:  offered support, prayed with patient and reassured her that she was in good hands. Outcome: Patient expressed appreciation for the spiritual support she received. Plan: Follow up visits recommended for more prayers and support. 09/04/21 1441   Encounter Summary   Services provided to: Patient   Place of [facility]    Continue Visiting   (09/04/2021)   Complexity of Encounter Moderate   Length of Encounter 15 minutes   Spiritual Assessment Completed Yes   Routine   Type Initial   Assessment Calm; Approachable; Hopeful   Intervention Active listening;Prayer;[place]   Outcome Expressed gratitude   Spiritual/Confucianist   Type Spiritual support

## 2021-09-04 NOTE — PROGRESS NOTES
[facility] - Progress Note    Today's Date and Time: 9/4/2021, 12:06 PM    Impression :   · Hydronephrosis of right kidney  · Small bowel obstruction  · Rectovaginal fistula  · Bipolar 1 disorder  · Pelvic abscess in female. S/P IR drainage on 6-19-20. Culture: E coli, Candida albicans  · H/O malignant neoplasm of colon    Recommendations:   · Monitor off antibiotics  · D/C Zosyn   · D/C Flagyl  · D/C Vancomycin  · DVT prophylaxis  · Local wound care  · Case management working on setting up [facility] after discharge    Medical Decision Making/Summary/Discussion:9/4/2021     ·   Infection Control Recommendations   · [place] Precautions  · Contact Isolation Hx MDRO    Antimicrobial Stewardship Recommendations     · Simplification of therapy  · Targeted therapy    Coordination of Outpatient Care:   · Estimated Length of IV antimicrobials: TBD  · Patient will need Midline Catheter Insertion:   · Patient will need PICC line Insertion:  · Patient will need: Home IV , [place],  SNF,  LTAC:TBD  · Patient will need outpatient wound care:Yes    Chief complaint/reason for consultation:   · Pelvic abscess (Chronic) and concerns for sacral osteomyelitis      History of Present Illness:   Ballard Phoenix is a 39y.o.-year-old female who was initially admitted on 9/1/2021. Patient seen at the request of . INITIAL HISTORY:    Patient is unable to provide a good history. Review of records shows a history of rectal cancer (diagnosed in 12/14) with colon resection and hemicolectomy in 2015. She is status post chemo/radiation. She had a transverse loop cholectomy with subsequent reversal.     She also developed right hydronephrosis due to abscess formation. She has stents in place. She suffers from bipolar disorder. Patient has been hospitalized several times.  Most recently was discharged on 8/18/2021 after admission on 8-12-21 and treatment for a colovaginal fistula and anastomotic leak. Patient underwent exploratory lap with colostomy creation on 8/14/2021 by Dr. Brigette Rodgers. Patient indicated inability to keep food down and emesis of brown, stool colored material.    A CTAB revealed SBO with persistent presacral- pelvic abscess and raised concerns of sacral osteomyelitis. There is also mild right hydronephrosis with nephroureteral stent in place, mild enhancement of the proximal renal collecting system with concern of infection and moderate urinary bladder wall thickening. Colorectal surgery was consulted from outlying facility and requested patient be transferred to [facility] for further evaluation and management. CURRENT EVALUATION : 9/4/2021    Afebrile  VS stable    Patient is resting in bed and hemodynamically stable. Patient denies any current pain, nausea, fever, chest pain or shortness of breath. Patient denies any myalgia or arthralgias or any signs of discomfort. CT scan 9-4-21 shows no signs of sacral osteomyelitis at this time. Will DC zosyn. Labs, X rays reviewed: 9/4/2021    BUN:14 -->7  Cr:0.70-->1.59    WBC:18.0-->14.8  Hb:11.7-->11.3  Plat: 453-->417    Cultures:  Urine:  ·   Blood: 9/2/21: No Growth  ·   Sputum :  ·   Wound:  ·     Discussed with patient, RN, IM. I have personally reviewed the past medical history, past surgical history, medications, social history, and family history, and I have updated the database accordingly.   Past Medical History:     Past Medical History:   Diagnosis Date    Cancer [facility])     Rectal    Pelvis, female abscess     pre sacral drain put in 6/19/2020    UTI (urinary tract infection)     stent place june 2020       Past Surgical  History:     Past Surgical History:   Procedure Laterality Date    ABDOMEN SURGERY  08/14/2021    EUA/colostomy    ABSCESS DRAINAGE      pre sacral pelvic abscess with drain placement to right buttock    COLOSTOMY  08/14/2021    eua; creation end colostomy    COLOSTOMY N/A 8/14/2021    EUA, CREATION END COLOSTOMY performed by Rhea Cross MD at [address] [facility]  8/13/2021    [address] [facility] performed by Ml Baez MD at [address] N/A 07/03/2020    TRANSVERSE COLOSTOMY LOOP performed by Angelica Carlton MD at [address] [facility] N/A 8/13/2021    EGD DIAGNOSTIC ONLY performed by Ml Baez MD at [address] [facility]       Medications:      heparin (porcine)  5,000 Units SubCUTAneous 3 times per day    piperacillin-tazobactam  3,375 mg IntraVENous Q8H    busPIRone  15 mg Oral TID    ferrous sulfate  325 mg Oral BID WC    mirtazapine  15 mg Oral Nightly    OLANZapine  10 mg Oral Nightly    sodium chloride flush  5-40 mL IntraVENous 2 times per day       Social History:     Social History     Socioeconomic History    Marital status: Single     Spouse name: Not on file    Number of children: Not on file    Years of education: Not on file    Highest education level: Not on file   Occupational History    Not on file   Tobacco Use    Smoking status: Never Smoker    Smokeless tobacco: Never Used    Tobacco comment: pt is a non smoker   Substance and Sexual Activity    Alcohol use: Not on file    Drug use: Not on file    Sexual activity: Not on file   Other Topics Concern    Not on file   Social History Narrative    Not on file     Social Determinants of Health     Financial Resource Strain:     Difficulty of Paying Living Expenses:    Food Insecurity:     Worried About Running Out of Food in the Last Year:     Beatris of Food in the Last Year:    Transportation Needs:     Lack of Transportation (Medical):      Lack of Transportation (Non-Medical):    Physical Activity:     Days of Exercise per Week:     Minutes of Exercise per Session:    Stress:     Feeling of Stress :    Social Connections:     Frequency of Communication with Friends and Family:     Frequency of Social Gatherings with Friends and Family:     Attends Restorationism Services:     Active Member of Clubs or Organizations:     Attends Club or Organization Meetings:     Marital Status:    Intimate Partner Violence:     Fear of Current or Ex-Partner:     Emotionally Abused:     Physically Abused:     Sexually Abused:        Family History:   No family history on file. Allergies:   Patient has no known allergies. Review of Systems:   Constitutional: No fevers. Had chills. No systemic complaints  Head: No headaches  Eyes: No double vision or blurry vision. No conjunctival inflammation. ENT: No sore throat or runny nose. . No hearing loss, tinnitus or vertigo. Cardiovascular: No chest pain or palpitations. No shortness of breath. No VELARDE  Lung: No shortness of breath or cough. No sputum production  Abdomen: Nausea, vomiting. No diarrhea, or abdominal pain. Barbara Sharper No cramps. Has ileostomy bag. Genitourinary: No increased urinary frequency, or dysuria. No hematuria. No suprapubic or CVA pain  Musculoskeletal: No muscle aches or pains. No joint effusions, swelling or deformities  Hematologic: No bleeding or bruising. Neurologic: No headache, weakness, numbness, or tingling. Integument: No rash, no ulcers. Psychiatric: No depression. Endocrine: No polyuria, no polydipsia, no polyphagia. Physical Examination :     Patient Vitals for the past 8 hrs:   BP Temp Temp src Pulse Resp SpO2 Weight   09/04/21 0700 119/73 98.2 °F (36.8 °C) Oral 87 18 99 % --   09/04/21 0557 -- -- -- -- -- -- 97 lb 8 oz (44.2 kg)     General Appearance: Awake, alert, and in no apparent distress  Head:  Normocephalic, no trauma  Eyes: Pupils equal, round, reactive to light; sclera anicteric; conjunctivae pink. No embolic phenomena. ENT: Oropharynx clear, without erythema, exudate, or thrush. No tenderness of sinuses. Mouth/throat: mucosa pink and moist. No lesions. Dentition in good repair. NG tube in place. Neck:Supple, without lymphadenopathy. Thyroid normal, No bruits. Pulmonary/Chest: Clear to auscultation, without wheezes, rales, or rhonchi. No dullness to percussion. Cardiovascular: Regular rate and rhythm without murmurs, rubs, or gallops. Abdomen: Soft, non tender. Bowel sounds normal. No organomegaly. Ileostomy bag in place. All four Extremities: No cyanosis, clubbing, edema, or effusions. Neurologic: No gross sensory or motor deficits. Skin: Warm and dry with good turgor. No signs of peripheral arterial or venous insufficiency. No ulcerations. No open wounds. Medical Decision Making -Laboratory:   I have independently reviewed/ordered the following labs:    CBC with Differential:   Recent Labs     09/03/21  0657 09/04/21  0540   WBC 13.6* 14.8*   HGB 10.8* 11.3*   HCT 35.0* 38.1    417   LYMPHOPCT 15*  --    MONOPCT 6  --      BMP:   Recent Labs     09/03/21  0657 09/04/21  0540    139   K 3.1* 3.8    109*   CO2 20 18*   BUN 11 7   CREATININE 1.61* 1.59*   MG 1.7 3.4*     Hepatic Function Panel:   Recent Labs     09/04/21  0540   LABALBU 2.7*     No results for input(s): RPR in the last 72 hours. No results for input(s): HIV in the last 72 hours. No results for input(s): BC in the last 72 hours.   Lab Results   Component Value Date    MUCUS NOT REPORTED 09/02/2021    RBC 4.31 09/04/2021    TRICHOMONAS NOT REPORTED 09/02/2021    WBC 14.8 09/04/2021    YEAST NOT REPORTED 09/02/2021    TURBIDITY CLOUDY 09/02/2021     Lab Results   Component Value Date    CREATININE 1.59 09/04/2021    GLUCOSE 97 09/04/2021       Medical Decision Making-Imaging:     EXAMINATION:   SINGLE CONTRAST GASTROGRAFIN ENEMA 8/13/2021       TECHNIQUE:   The enema tip was inserted in a low position.       240 mL GASTROGRAFIN/1000 mL WATER was instilled retrogradely into the rectum   under gravity under direct fluoroscopic visualization.  Static and cine   images acquired during installation.     in the gastric fundus with the   sideport in the distal thoracic esophagus just above the gastroesophageal   junction.       Exclusion of the pelvis from the field of view.  Clear included lungs. Mildly dilated small bowel loop in the medial right lower quadrant.  No   significant stool.  Peripherally calcified gallstone in the right upper   quadrant.           Impression   1. The sideport of a new gastric tube is located in the distal thoracic   esophagus just above the gastroesophageal junction.  Recommend advancement of   greater than 1.4 cm.   2. A mildly dilated small bowel loop could be due to ileus or enteritis with   partial obstruction considered less likely. Medical Decision Dtfbin-Ecnnhbnd-Umckr:       Medical Decision Making-Other:     Note:  · Labs, medications, radiologic studies were reviewed with personal review of films  · Large amounts of data were reviewed  · Discussed with nursing Staff  · Infection Control and Prevention measures reviewed  · All prior entries were reviewed  · Administer medications as ordered  · Prognosis: Fair    Thank you for allowing us to participate in the care of this patient. Please call with questions. Lai Dwyer DPM     ATTESTATION:    I have discussed the case, including pertinent history and exam findings with the residents and students. I have seen and examined the patient and the key elements of the encounter have been performed by me. I was present when the student obtained his information or examined the patient. I have reviewed the laboratory data, other diagnostic studies and discussed them with the residents. I have updated the medical record where necessary. I agree with the assessment, plan and orders as documented by the resident/ student.     Von Tinsley MD.

## 2021-09-04 NOTE — DISCHARGE SUMMARY
[facility]  Office: [address], , Sherrie Simple, DO, Aashish Mcgregor, DO, Deloris Cedillo Blood, DO, Britney Strickland MD, Jigna Edwards MD, Janie Khalil MD, Leydi Jules MD, Roly Lombardo MD, Terrence Payan MD, Yanique Street MD, Eliza Beltre DO, Pedro Pablo Marquis MD, Samara Dhillon DO, Angel Rodriguez MD,  Chula Harrison DO, Pallavi Lima MD, Jonel Lester MD, Rahul Wakefield MD, Christine Cohen MD, Cayla Interiano MD, Elijah Stanton MD, Sumeet Samuel MD, Xochitl Chapman, CNP, Michael Cameron CNP, Nanette Carlton, CNP, Brenda Saucedo, CNS, Marquis Jamison, CNP, Francisca Azul, CNP, Jovani Verdin, CNP, Teddy Gee, CNP, Yoan Royal, CNP, Stacy Crawley PA-C, Marky Su, [facility], Maryuri Kwan, CNP, Tracy Monson, CNP, Trey Callejas, CNP, Lillian Steve, CNP, Pilar Chao, CNP, Pastor Brooks, CNP, Carmen Garcia, CNP, Claudia Emery, [facility]    Discharge Summary     Patient ID: Saqib Nunez  :  1975   MRN: [id number]     ACCOUNT:  [de-identified]   Patient's PCP: GRAY Dos Santos CNP  Admit Date: 2021   Discharge Date: 2021     Length of Stay: 3  Code Status:  Full Code  Admitting Physician: Cally Olvera DO  Discharge Physician: Samara Dhillon DO     Active Discharge Diagnoses:     Hospital Problem Lists:  Principal Problem:    Small bowel obstruction [facility])  Active Problems:    H/O malignant neoplasm of colon    Acute cystitis with hematuria    Pelvic abscess in female    Bipolar I disorder, most recent episode mixed ([facility] [address]    Rectovaginal fistula    Hydronephrosis of right kidney  Resolved Problems:    * No resolved hospital problems.  *      Admission Condition:  fair     Discharged Condition: fair    Hospital Stay:     Hospital Course:  Saqib Nunez is a 39 y.o. female who was admitted for the management of   Small bowel obstruction ([address] , presented to ER with nausea and vomiting and anorexia. Patient was seen at outside hospital and underwent CT scan showing a small bowel obstruction and was transferred to [place] for surgical evaluation. NG tube was placed, she was kept n.p.o. and bowel function returned. Patient's NG tube was removed, she was advanced to regular diet which she tolerated well. Patient also with bilateral ureteral stents which was evaluated by urology, kidney function increased slightly during hospitalization which was attributed to hypotension and dehydration. CT abdomen pelvis was ordered showing nonobstructive stones and stents which are patent with no hydronephrosis. Patient was told to follow-up outpatient with urology and PCP in 1 week.   Patient is asymptomatic on day of discharge        Significant therapeutic interventions: none    Significant Diagnostic Studies:   Labs / Micro:  CBC:   Lab Results   Component Value Date    WBC 14.8 09/04/2021    RBC 4.31 09/04/2021    HGB 11.3 09/04/2021    HCT 38.1 09/04/2021    MCV 88.4 09/04/2021    MCH 26.2 09/04/2021    MCHC 29.7 09/04/2021    RDW 21.5 09/04/2021     09/04/2021     BMP:    Lab Results   Component Value Date    GLUCOSE 97 09/04/2021     09/04/2021    K 3.8 09/04/2021     09/04/2021    CO2 18 09/04/2021    ANIONGAP 12 09/04/2021    BUN 7 09/04/2021    CREATININE 1.59 09/04/2021    BUNCRER NOT REPORTED 09/04/2021    CALCIUM 8.5 09/04/2021    LABGLOM 35 09/04/2021    GFRAA 43 09/04/2021    GFR      09/04/2021    GFR NOT REPORTED 09/04/2021     HFP:    Lab Results   Component Value Date    PROT 5.2 08/18/2021     CMP:    Lab Results   Component Value Date    GLUCOSE 97 09/04/2021     09/04/2021    K 3.8 09/04/2021     09/04/2021    CO2 18 09/04/2021    BUN 7 09/04/2021    CREATININE 1.59 09/04/2021    ANIONGAP 12 09/04/2021    ALKPHOS 116 08/18/2021    ALT 14 08/18/2021    AST 14 08/18/2021    BILITOT 0.17 08/18/2021    LABALBU 2.7 09/04/2021    ALBUMIN 0.7 08/18/2021 LABGLOM 35 09/04/2021    GFRAA 43 09/04/2021    GFR      09/04/2021    GFR NOT REPORTED 09/04/2021    PROT 5.2 08/18/2021    CALCIUM 8.5 09/04/2021      Radiology:  CT ABDOMEN PELVIS WO CONTRAST Additional Contrast? None    Result Date: 9/4/2021  2 or 3 very small distal right ureteral calculi along the right ureteral stent. Mild residual dilatation of the right renal pelvis and collecting system. No calculus seen in either kidney. Probable slight nonobstructive ileus of the proximal small bowel. Suspected slight increase in the amount of abnormal soft tissue and fluid in the presacral and pre coccygeal region. Previously this area had the appearance of an abscess. Cholelithiasis without finding of acute cholecystitis. XR ABDOMEN FOR NG/OG/NE TUBE PLACEMENT    Result Date: 9/2/2021  1. The sideport of a new gastric tube is located in the distal thoracic esophagus just above the gastroesophageal junction. Recommend advancement of greater than 1.4 cm. 2. A mildly dilated small bowel loop could be due to ileus or enteritis with partial obstruction considered less likely. Consultations:    Consults:     Final Specialist Recommendations/Findings:   IP CONSULT TO GENERAL SURGERY  IP CONSULT TO INFECTIOUS DISEASES  IP CONSULT TO UROLOGY      The patient was seen and examined on day of discharge and this discharge summary is in conjunction with any daily progress note from day of discharge.     Discharge plan:     Disposition: Home    Physician Follow Up:     Lisette Santoyo MD  [address] [place] [address]    In 3 days  after discharge    Jonathon Georges, APRN - CNP  [address] [place] [address]  [phone]    Schedule an appointment as soon as possible for a visit in 1 week  hospital followup       Requiring Further Evaluation/Follow Up POST HOSPITALIZATION/Incidental Findings: none    Diet: regular diet    Activity: As tolerated    Instructions to Patient: none    Discharge Medications: Medication List      CONTINUE taking these medications    busPIRone 15 MG tablet  Commonly known as: BUSPAR  Take 15 mg by mouth 3 times daily     ferrous sulfate 325 (65 Fe) MG tablet  Commonly known as: IRON 325  Take 1 tablet by mouth 2 times daily (with meals)     hydrOXYzine 50 MG capsule  Commonly known as: VISTARIL     mirtazapine 15 MG tablet  Commonly known as: REMERON     OLANZapine 10 MG tablet  Commonly known as: ZYPREXA  Take 1 tablet by mouth nightly            No discharge procedures on file. Time Spent on discharge is  39 mins in patient examination, evaluation, counseling as well as medication reconciliation, prescriptions for required medications, discharge plan and follow up. Electronically signed by   Media SensorDO  9/4/2021  1:08 PM      Thank you Dr. Aleksey Quintero, APRN - ALEXANDRE for the opportunity to be involved in this patient's care.

## 2021-09-04 NOTE — PLAN OF CARE
Problem: Infection:  Goal: Will remain free from infection  Description: Will remain free from infection  9/4/2021 0032 by Mihaela Shay RN  Outcome: Ongoing  9/3/2021 1518 by Tam Oro RN  Outcome: Ongoing     Problem: Safety:  Goal: Free from accidental physical injury  Description: Free from accidental physical injury  9/4/2021 0032 by Mihaela Shay RN  Outcome: Ongoing  9/3/2021 1518 by Tam Oro RN  Outcome: Ongoing  Goal: Free from intentional harm  Description: Free from intentional harm  9/4/2021 0032 by Mihaela Shay RN  Outcome: Ongoing  9/3/2021 1518 by Tam Oro RN  Outcome: Ongoing     Problem: Daily Care:  Goal: Daily care needs are met  Description: Daily care needs are met  9/4/2021 0032 by Mihaela Shay RN  Outcome: Ongoing  9/3/2021 1518 by Tam Oro RN  Outcome: Ongoing     Problem: Pain:  Goal: Patient's pain/discomfort is manageable  Description: Patient's pain/discomfort is manageable  9/4/2021 0032 by Mihaela Shay RN  Outcome: Ongoing  9/3/2021 1518 by Tam Oro RN  Outcome: Ongoing  Goal: Pain level will decrease  Description: Pain level will decrease  9/4/2021 0032 by Mihaela Shay RN  Outcome: Ongoing  9/3/2021 1518 by Tam Oro RN  Outcome: Ongoing  Goal: Control of acute pain  Description: Control of acute pain  9/4/2021 0032 by Mihaela Shay RN  Outcome: Ongoing  9/3/2021 1518 by Tam Oro RN  Outcome: Ongoing  Goal: Control of chronic pain  Description: Control of chronic pain  9/4/2021 0032 by Mihaela Shay RN  Outcome: Ongoing  9/3/2021 1518 by Tam Oro RN  Outcome: Ongoing     Problem: Skin Integrity:  Goal: Skin integrity will stabilize  Description: Skin integrity will stabilize  9/4/2021 0032 by Mihaela Shay RN  Outcome: Ongoing  9/3/2021 1518 by Tam Oro RN  Outcome: Ongoing     Problem: Discharge Planning:  Goal: Patients continuum of care needs are met  Description: Patients continuum of care needs are met  9/4/2021 0032 by Rai Lei RN  Outcome: Ongoing  9/3/2021 1518 by Alison Sandoval RN  Outcome: Ongoing     Problem:  Bowel/Gastric:  Goal: Control of bowel function will improve  Description: Control of bowel function will improve  9/4/2021 0032 by Rai Lei RN  Outcome: Ongoing  9/3/2021 1518 by Alison Sandoval RN  Outcome: Ongoing  Goal: Ability to achieve a regular elimination pattern will improve  Description: Ability to achieve a regular elimination pattern will improve  9/4/2021 0032 by Rai Lei RN  Outcome: Ongoing  9/3/2021 1518 by Alison Sandoval RN  Outcome: Ongoing

## 2021-09-08 LAB
CULTURE: NORMAL
Lab: NORMAL
SPECIMEN DESCRIPTION: NORMAL

## 2021-09-27 ENCOUNTER — INITIAL CONSULT (OUTPATIENT)
Dept: ONCOLOGY | Age: 46
End: 2021-09-27
Payer: MEDICARE

## 2021-09-27 DIAGNOSIS — Z85.048 HISTORY OF RECTAL CANCER: Primary | ICD-10-CM

## 2021-09-27 PROCEDURE — 96040 PR GENETIC COUNSELING, EACH 30 MIN: CPT | Performed by: GENETIC COUNSELOR, MS

## 2021-09-27 NOTE — PROGRESS NOTES
3 [facility]   Hereditary Cancer Risk Assessment     Name: Dwaine Griffiths   YOB: 1975   Date of Consultation: 9/27/21     Ms. Osito Matthew was seen at the [facility] for genetic counseling on 9/27/21. Ms. Osito Matthew was referred by Franki Agudelo MD due to her personal history of early onset cancer. PERSONAL HISTORY   Ms. Osito Matthew is a 55 y.o.  female with a prior history of rectal cancer diagnosed in [facility] (age 41y). It was a T3N1 stage 3 adenocarcinoma. She underwent neoadjuvant chemotherapy followed by surgery. There was no residual tumor identified at that time. Pathology is not available for review to determine if mismatch repair studies were performed. Her treatment was performed in [place] and [place] has since relocated to [place] and re-established care with a medical oncologist.     [address] [facility]  Ms. Osito Matthew has 1 son(s) and 1 daughter(s). She denies any family history of cancer in her extended maternal and paternal families. Ms. Osito Matthew reports unknown ancestry and denies any known Ashkenazi Hol[place] heritage. RISK ASSESSMENT   We discussed that approximately 5-10% of cancers are due to a hereditary gene mutation which causes an increased risk for certain cancers. Hereditary cancers are typically diagnosed at younger ages (under age 46y) and occur in multiple generations of a family. Multiple individuals with the same type of cancer (example: breast or colorectal) or uncommon cancers (example: ovarian, pancreatic, male breast cancer) are also features of hereditary cancers. We discussed that Ms. Rapp's personal history is somewhat concerning for a hereditary predisposition given that she was diagnosed with rectal cancer at a young age. In summary, Ms. Osito Matthew meets the [address] [place] [address] (NCCN) guidelines for genetic testing of the Huang syndrome genes based on her diagnosis of rectal cancer under age 48.  It does

## 2021-10-20 ENCOUNTER — TELEPHONE (OUTPATIENT)
Dept: ONCOLOGY | Age: 46
End: 2021-10-20

## 2021-10-20 NOTE — TELEPHONE ENCOUNTER
3 [facility] Program   Hereditary Cancer Risk Assessment      Name: Nhan Tellez  YOB: 1975  Date of Results Disclosure: 10/20/21      HISTORY   Ms. Ridge Fontana was seen for genetic counseling at the request of Dr. Anne Liu due to her personal history of colorectal cancer. At that time, Ms. Ridge Fontana chose to pursue genetic testing CancerNext Expanded + RNA gene panel gene panel. These results were discussed with Ms. Ridge Fontana on 10/20/21. A summary of Ms. Rapp's results and recommendations are below. RESULTS  [employer] CancerNext-Expanded Panel + RNAinsight:   POSITIVE - PATHOGENIC MUTATION DETECTED IN BRCA1 GENE p.*  This panel included the analysis of 77 genes associated with hereditary cancer including: AIP, ALK, APC, MIKEL, BAP1, BARD1, BLM, BMPR1A, BRCA1, BRCA2, BRIP1, CDC73, CDH1, CDK4, CDKN1B, CDKN2A, CHEK2, CTNNA1, DICER1, EGFR, EGLN1, EPCAM, FANCC, FH, FLCN, GALNT12, GREM1, HOXB13, KIF1B, KIT1, LZTR1, MAX, MEN1, MET, MITF, MLH1, MSH2, MSH3, MSH6, MUTYH, NBN, NF1, NF2, NTHL1, PALB2, PDGFRA, PHOX2B, PMS2, POLD1, POLE, POT1, PFNDL7H, PTCH1, PTEN, RAD51C, RAD51D, RB1, RECQL, RET, SDHA, SDHAF2, SDHB, SDHC, ,SDHD, SMAD4, SMARCA4, SMARCB1, SMARCE1, STK11, SUFU, LNGM020, TP53, TSC1, TSC2, VHL, and XRCC2. In addition, no clinically relevant aberrant RNA transcripts were detected in select genes. Please refer to genetic test report for technical details. Ms. Melba Olson results identified a pathogenic BRCA1 gene mutation which causes hereditary breast and ovarian cancer syndrome (HBOC). Mutations in the BRCA1 gene are associated with an increased risk for the development of certain cancers as outlined below.        General Population BRCA1 Carriers    Female Breast  12% 57-87%   Ovarian   (includes fallopian tube and primary peritoneal cancer) 1.5% 39-40%   Prostate  16% Elevated   Male Breast  <1% Elevated    Pancreatic  <1%  Elevated   Melanoma <1% Elevated      Women who have already been diagnosed with breast cancer have an increased risk to develop a second primary breast cancer as high as 43% within 10 years of their initial diagnosis with no intervention. RECOMMENDATIONS  Individuals who carry a BRCA1 mutation are encouraged to follow the [employer] (NCCN) Version 2.2021 guidelines for cancer screening and prevention as outlined below. FEMALE BREAST CANCER      NCCN Recommendation Age to Begin Frequency    Breast awareness - Women should be familiar with their breasts and promptly report changes to their healthcare provider. Periodic, consistent breast self-examination (BSE) may be beneficial  Individualized  N/A    Clinical Breast Examination  22years old  Every 6-12 months   Breast MRI with contrast  22years old  Annual   Mammogram (consider tomosynthesis)  27years old  Annual    Consider risk reducing mastectomy  Individualized  N/A   Consider risk reducing agents, such as chemoprevention (i.e. Tamoxifen)  Individualized  N/A      OVARIAN CANCER      NCCN Recommendation Age to Begin Frequency   Bilateral salpingo-oophorectomy   (removal of ovaries and fallopian tubes)  35-40 years  N/A   Consider risk reducing agents (i.e. oral contraceptives)  Individualized  N/A      *If risk reducing surgery is not performed, ovarian cancer screening by transvaginal ultrasound and/or serum CA-125 may be considered at the clinician's discretion beginning at age 32-31. Although, there is no proven benefit to ovarian cancer screening at this time. PROSTATE CANCER      NCCN Recommendation Age to Begin  Frequency   Consider prostate screening including PSA and digital rectal exam (REGLA) 40 years  1-4 years   based on results        MALE BREAST CANCER     NCCN Recommendation Age to Begin  Frequency    Breast awareness - Men should be familiar with their breasts and promptly report changes to their healthcare provider.  Periodic, consistent breast self-examination (BSE) may be beneficial   35 years Periodic and consistent   Clinical Breast Exam 35 years  Yearly    Consider mammogram screening in men with gynecomastia (excessive breast tissue) 48years old or 10 years prior to the earliest diagnosis of male breast cancer in the family   Yearly       PANCREATIC CANCER  There is emerging data to suggest screening by contrast enhanced MRI/magnetic resonance cholangiopancreatography (MRCP) and/or endoscopic ultrasound (EUS) for individuals who are at high risk for pancreatic cancer. It is recommended that screening be performed in an experienced, high volume center, ideally under research conditions. It is also recommended that such screening take place after having an in-depth discussion regarding the potential limitations including: cost, high incidence of pancreatic abnormalities and uncertainties about the potential benefits of screening. For BRCA1 gene mutation carriers who have a family history of  ?1 first or second degree relatives with exocrine pancreatic cancer, screening may begin at age 48 (or 8 years earlier than the youngest diagnosis of pancreatic cancer in the family, whichever is earlier). MELANOMA   No specific screening guidelines exist for melanoma; however, annual full body skin examination is appropriate and minimizing UV exposure. OTHER CANCER     At this time, there is no clear evidence that suggests individuals with a BRCA1 gene mutation have an increased risk for other cancers. Thus, individuals should complete an annual physician exam and follow general population screening guidelines for all other cancers at the direction of their physician.      CANCER TREATMENT RECOMMENDATIONS   PARP (poly ADP-ribose polymerase) inhibitors may be considered for individuals with HER2-negative metastatic breast cancer, chemotherapy-refractory ovarian cancer, metastatic castration resistant prostate cancer and maintenance therapy for metastatic pancreatic cancer. RECOMMENDATIONS FOR FAMILY MEMBERS   First degree relatives (parents, siblings, and children) of individuals who carry a BRCA1 mutation each have a 50% chance to inherit the familial mutation. 1) Therefore, we recommend that Ms. Rapp's children and siblings consider genetic counseling and testing to clarify their carrier status. 2) Due to the family history of cancer on both her maternal and paternal side, it is difficult to determine which side of the family the BRCA1 mutation was inherited from. Thus, we recommend that both her paternal and maternal extended relatives consider genetic counseling and testing until this can be clarified. 3) At risk relatives who have not undergone genetic testing for the BRCA1 mutation are encouraged to follow the NCCN recommendations above until their carrier status is clarified. Relatives may contact the [employer] at [phone] or locate a genetic counselor at www. [employer]. Vpon      SUMMARY & PLAN   1) Ms. Adiel Donnelly was found to carry the p.* mutation in the BRCA1 gene which causes an increased risk for several cancers including: female breast, ovarian, male breast, prostate, pancreatic and melanoma. 2) A follow up genetic counseling appointment was recommended and offered; however, Ms. Adiel Donnelly declined. She was agreeable to have me contact her nurse navigator, Michael Arita RN to assist with coordinating a possible appointment and follow up care. 3) Based on Ms. Rapp's test result, her lifetime risk to develop breast cancer is increased. Therefore, we recommend that she consider the NCCN screening and risk reducing options outlined above. A consultation with a surgeon to discuss risk reducing mastectomy is recommended. 4) Based on Ms. Rapp's test result, her lifetime risk to develop ovarian cancer is increased. We recommend that Ms. Adiel Donnelly undergo risk reducing bilateral salpingo oophorectomy between ages 28-36. She should also continue gynecologic cancer screening as directed by her physicians. 5) Based on Ms. Rapp's test result, her lifetime risk for pancreatic cancer may be increased. At this time, pancreatic screening is only recommended for individuals who have one or more close relative with pancreatic cancer. Benefits and limitations of pancreatic cancer screening are outlined above. She may benefit from a referral to a high volume pancreatic cancer such as:     Audie Rivera of [facility]. Request an Appointment: [phone]   The [address], [place] [facility]. Request an Appointment: [phone]      6) Based on Ms. Rapp's test result, her lifetime risk for melanoma may be increased; however, the exact risk is unclear at this time. She may consider annual dermatologic evaluation and minimizing UV/sun exposure. 7) We encourage Ms. Jessica Mercado to share her genetic test results with herfamily members. Support and resources were given to Ms. Jessica Mercado, including:      Facing Our Risk of Cancer Empowered (FORCE) http://www.[facility].Insightpool/   [address] Decision Making Model http://brcatool. [employer].edu/       8) We encourage Ms. Jessica Mercado to contact us with updates to her personal and/or family's cancer history as this information may alter our assessment and/or recommendations. The [address] [place] [address] National Program would be glad to offer our assistance should you have any questions or concerns about this information. Please feel free to contact us at [phone]. Alta Bhandari.  Shubham Maldonado MS, [facility]   Licensed Genetic Counselor       CC:   Ms. Mariusz Eddy MD

## 2022-11-08 ENCOUNTER — TELEPHONE (OUTPATIENT)
Dept: ONCOLOGY | Age: 47
End: 2022-11-08

## 2022-11-08 NOTE — TELEPHONE ENCOUNTER
Referral received from [address] [facility] stating \"Patient has been seen by Kishore Matson in the past and is needing follow up for high risk\". Chart review indicates that patient was being followed by Dr. Asif Null and referred to [address] but failed to follow up after multiple attempts by nurse navigator Daya Rubi. I attempted to contact patient today to get an updated status on where she is living and if she needs assistance with scheduling her high risk screenings. Mobile phone number listed goes to a home and [employer] store. Emergency contact, Jude Soni, called but VM is not set up and unable to leave message.

## 2022-11-30 ENCOUNTER — TELEPHONE (OUTPATIENT)
Dept: ONCOLOGY | Age: 47
End: 2022-11-30

## 2022-12-05 ENCOUNTER — TELEPHONE (OUTPATIENT)
Dept: ONCOLOGY | Age: 47
End: 2022-12-05

## 2022-12-20 ENCOUNTER — TELEPHONE (OUTPATIENT)
Dept: ONCOLOGY | Age: 47
End: 2022-12-20

## 2023-04-27 NOTE — BH NOTE
Line of Sight   Patient is asleep in bed with eyes closed, colostomy bag and diamond drainage intact. No labored breathing, or distress noted,will continue to monitor. no

## 2024-04-24 NOTE — PROGRESS NOTES
[facility]   DISCHARGE SUMMARY NOTE    ADMISSION DATE:  4/20/2024  DISCHARGE DATE:  4/24/2024  DISCHARGING PHYSICIAN:  Benjamin Costello MD  ATTENDING PHYSICIAN:  Elvia Salinas MD  PCP: Yessica Putnam CNP   CONSULTS:  Pulmonary Medicine, Dr. Sosa     DISCHARGE DIAGNOSIS:     Patient Active Problem List   Diagnosis    Debility    ACP (advance care planning)    Anticoagulated    Status post total right knee replacement    Type 2 diabetes mellitus with complication, with long-term current use of insulin (CMD)    Hypertension    Paroxysmal atrial fibrillation (CMD)    Essential hypertension    Coronary artery disease involving native coronary artery of native heart without angina pectoris    Ischemic cardiomyopathy    Acute respiratory failure with hypoxia (CMD)    Acute on chronic combined systolic and diastolic congestive heart failure (CMD)    ARF (acute renal failure) (CMD)    Paroxysmal atrial fibrillation (CMD)    Coronary artery disease involving coronary bypass graft    Essential hypertension    HTN (hypertension), benign    Iron deficiency anemia    Weakness generalized    Acute on chronic respiratory failure with hypoxia (CMD)    Community acquired pneumonia of right middle lobe of lung    Aspiration pneumonia of right lower lobe (CMD)    CHF (congestive heart failure), NYHA class IV, unspecified failure chronicity, unspecified type (CMD)    Chronic respiratory failure with hypoxia (CMD)    Generalized weakness        OTHER DIAGNOSES:    Past Medical History:   Diagnosis Date    Arthritis     Colon polyps     Congestive cardiac failure (CMD)     COVID-19 10/31/2022    Diabetes mellitus (CMD)     Essential (primary) hypertension     Fracture     High cholesterol     Hx of CABG     Myocardial infarction (CMD)     Paroxysmal atrial fibrillation (CMD)     Sleep apnea     \"unable to wear mask\"       DISCHARGE DISPOSITION:    Home-patient refused home health but ill will arrange home oxygen    CONDITION AT  Contacted Dr. Sima Najjar through [facility] in regards to setting up a new psych consult for this patient to be readmitted to [facility] following medical clearance. Femi Myers is scheduled to do this consult tomorrow(6/20/20) morning at 8:45am.    I have informed the RN to have signed consent and the telehealth camera in the room. DISCHARGE:    stable    DISCHARGE INSTRUCTIONS:      FOLLOW-UP:  No follow-ups on file.  DIET:  diabetic diet  ACTIVITY:  activity as tolerated        REASON FOR ADMISSION:    80 F, a patient of David Gomez MD, with past medical history of chronic resp failure on 2L home O2 congestive cardiac failure, PAF diabetes mellitus, myocardial infarction, hypertension, presented to the ED on 4/20 with a chief complaint of shortness of breath and 103 fever; CXR showed RLL PNA  Ucx + e coli    HOSPITAL COURSE:    Acute on chronic hypoxic respiratory failure d/t recurrent PNA mostly in RLL-likely aspiration  In 4/2023 patient was hospitalized for aspiration pneumonia; seen by ST/ had VFSS- no straws and small bites or sips was recommended  4/20 CXR +RLL infiltrate  Quantitative immunoglobulins normal  Wbc normal  Procalcitonin 1.23  Plan  4/20 sp Rocephin/doxy-discharge on Augmentin for 7 more days  Diet per speech  Will arrange for home oxygen portable concentrator  D/w Pulm follows input appreciated     UTI with e coli  Sp Rocephin   continue Augmentin as above per sens    ischemic cardiomyopathy with chronic systolic congestive heart failure-clinically compensated  CAD-stable  Clinically compensated  6/2023 TTE EF 50%; NM ST normal/ seen by dr Hector Meyer neg  Cpm with bbloc/statin /imdur  Jardiance and Cozaar currently on hold     PAF -rate controled  In ED EKG A fib vent rate in 80s  HR stable in 70s  Continued Eliquis, beta-blockers     Diabetes mellitus type 2 on insulin  Glycemia stable  A1c-6.7,   Continue home regimen with Lantus 8 units in the morning and 6 units at night  ISS for correction   \    PROCEDURES:    none    DISCHARGE MEDICATIONS:       Summary of your Discharge Medications        Take these Medications        Details   acetaminophen 325 MG tablet  Commonly known as: TYLENOL   Take 2 tablets by mouth every 4 hours as needed for Pain (mild).     albuterol-ipratropium 100-20 MCG/ACT  inhaler  Commonly known as: COMBIVENT RESPIMAT   Inhale 1 puff into the lungs 4 times daily as needed for Shortness of Breath.     amoxicillin-clavulanate 875-125 MG per tablet  Commonly known as: AUGMENTIN   Take 1 tablet by mouth in the morning and 1 tablet in the evening. Do all this for 7 days.     apixaBAN 5 MG Tab  Commonly known as: ELIQUIS   Take 5 mg by mouth every 12 hours. Indications: Cerebrovascular accident secondary to Atrial Fibrillation     [employer] Blood Glucose Meter w/Device Kit   1 Application.     DULoxetine 30 MG capsule  Commonly known as: CYMBALTA   Take 30 mg by mouth daily.     empagliflozin 25 MG tablet  Commonly known as: Jardiance   Take 1 tablet by mouth daily (before breakfast).     escitalopram 10 MG tablet  Commonly known as: LEXAPRO   TAKE 1 TABLET BY MOUTH DAILY     ferrous sulfate 325 (65 FE) MG tablet   Take 1 tablet by mouth daily (with breakfast). Do not start before August 14, 2021.     furosemide 20 MG tablet  Commonly known as: LASIX   TAKE 1 TABLET BY MOUTH TWICE DAILY. CONTINUE HOLDING TILL SEE PCP OR CARDIOLOGY IN. FOLLOW UP APPOINTMENTS     gabapentin 600 MG tablet  Commonly known as: NEURONTIN   Take 1 tablet by mouth in the morning and 1 tablet at noon and 1 tablet in the evening.     HumaLOG KwikPen 100 UNIT/ML pen-injector   Generic drug: Insulin Lispro (1 Unit Dial)  INJECT 4-6 UNITS DAILY- SLIDING SCALE     ICAPS MV PO   Take 1 tablet by mouth 2 times daily.     insulin glargine 100 UNIT/ML vial solution  Commonly known as: LANTUS   Inject 8 Units into the skin every morning AND 6 Units every evening.     isosorbide mononitrate 30 MG 24 hr tablet  Commonly known as: IMDUR   Take 30 mg by mouth daily.     losartan 25 MG tablet  Commonly known as: COZAAR   Take 25 mg by mouth daily.     metoPROLOL succinate 25 MG 24 hr tablet  Commonly known as: TOPROL-XL   Take 25 mg by mouth daily.     omeprazole 40 MG capsule  Commonly known as: PriLOSEC   TAKE 1 CAPSULE BY MOUTH  TWICE DAILY     oxybutynin 5 MG tablet  Commonly known as: DITROPAN   Take 5 mg by mouth 2 times daily.     oxygen gas  Commonly known as: O2   Inhale 2 L/min into the lungs continuous.     polyethylene glycol 17 g packet  Commonly known as: MIRALAX   Take 17 g by mouth daily as needed (constipation). Stir and dissolve powder in any 4 to 8 ounces of beverage, then drink.     simvastatin 80 MG tablet  Commonly known as: ZOCOR   Take 80 mg by mouth every other day.               Discharge instructions, medications and followup appointment were discussed with the patient and after visit summary was given.        Benjamin Costello MD

## 2025-06-23 ENCOUNTER — HOSPITAL ENCOUNTER (INPATIENT)
Age: 50
LOS: 4 days | Discharge: HOME | End: 2025-06-27
Attending: STUDENT IN AN ORGANIZED HEALTH CARE EDUCATION/TRAINING PROGRAM | Admitting: STUDENT IN AN ORGANIZED HEALTH CARE EDUCATION/TRAINING PROGRAM
Payer: MEDICAID

## 2025-06-23 PROBLEM — N12 PYELONEPHRITIS: Status: ACTIVE | Noted: 2025-06-23

## 2025-06-23 LAB
BASOPHILS # BLD: 0.07 K/UL (ref 0–0.2)
BASOPHILS NFR BLD: 1 % (ref 0–2)
EOSINOPHIL # BLD: 0.24 K/UL (ref 0–0.44)
EOSINOPHILS RELATIVE PERCENT: 3 % (ref 1–4)
ERYTHROCYTE [DISTWIDTH] IN BLOOD BY AUTOMATED COUNT: 15.2 % (ref 11.8–14.4)
HCT VFR BLD AUTO: 31.2 % (ref 36.3–47.1)
HGB BLD-MCNC: 9.8 G/DL (ref 11.9–15.1)
IMM GRANULOCYTES # BLD AUTO: 0.03 K/UL (ref 0–0.3)
IMM GRANULOCYTES NFR BLD: 0 %
LYMPHOCYTES NFR BLD: 2.97 K/UL (ref 1.1–3.7)
LYMPHOCYTES RELATIVE PERCENT: 33 % (ref 24–43)
MCH RBC QN AUTO: 29.8 PG (ref 25.2–33.5)
MCHC RBC AUTO-ENTMCNC: 31.4 G/DL (ref 28.4–34.8)
MCV RBC AUTO: 94.8 FL (ref 82.6–102.9)
MONOCYTES NFR BLD: 0.6 K/UL (ref 0.1–1.2)
MONOCYTES NFR BLD: 7 % (ref 3–12)
NEUTROPHILS NFR BLD: 56 % (ref 36–65)
NEUTS SEG NFR BLD: 5.23 K/UL (ref 1.5–8.1)
NRBC BLD-RTO: 0 PER 100 WBC
PLATELET # BLD AUTO: 284 K/UL (ref 138–453)
PMV BLD AUTO: 11 FL (ref 8.1–13.5)
RBC # BLD AUTO: 3.29 M/UL (ref 3.95–5.11)
RBC # BLD: ABNORMAL 10*6/UL
WBC OTHER # BLD: 9.1 K/UL (ref 3.5–11.3)

## 2025-06-23 PROCEDURE — 99285 EMERGENCY DEPT VISIT HI MDM: CPT

## 2025-06-23 PROCEDURE — 1200000000 HC SEMI PRIVATE

## 2025-06-23 PROCEDURE — 6360000002 HC RX W HCPCS: Performed by: NURSE PRACTITIONER

## 2025-06-23 PROCEDURE — 80053 COMPREHEN METABOLIC PANEL: CPT

## 2025-06-23 PROCEDURE — 85025 COMPLETE CBC W/AUTO DIFF WBC: CPT

## 2025-06-23 PROCEDURE — 2500000003 HC RX 250 WO HCPCS: Performed by: NURSE PRACTITIONER

## 2025-06-23 RX ORDER — ACETAMINOPHEN 325 MG/1
650 TABLET ORAL EVERY 6 HOURS PRN
Status: DISCONTINUED | OUTPATIENT
Start: 2025-06-23 | End: 2025-06-27 | Stop reason: HOSPADM

## 2025-06-23 RX ORDER — BUSPIRONE HYDROCHLORIDE 15 MG/1
15 TABLET ORAL 3 TIMES DAILY
Status: DISCONTINUED | OUTPATIENT
Start: 2025-06-23 | End: 2025-06-25

## 2025-06-23 RX ORDER — POTASSIUM CHLORIDE 1500 MG/1
40 TABLET, EXTENDED RELEASE ORAL PRN
Status: DISCONTINUED | OUTPATIENT
Start: 2025-06-23 | End: 2025-06-27 | Stop reason: HOSPADM

## 2025-06-23 RX ORDER — MULTIVITAMIN WITH IRON
500 TABLET ORAL DAILY
COMMUNITY

## 2025-06-23 RX ORDER — HEPARIN SODIUM 5000 [USP'U]/ML
5000 INJECTION, SOLUTION INTRAVENOUS; SUBCUTANEOUS EVERY 8 HOURS SCHEDULED
Status: DISCONTINUED | OUTPATIENT
Start: 2025-06-23 | End: 2025-06-25

## 2025-06-23 RX ORDER — M-VIT,TX,IRON,MINS/CALC/FOLIC 27MG-0.4MG
1 TABLET ORAL DAILY
COMMUNITY

## 2025-06-23 RX ORDER — ACETAMINOPHEN 650 MG/1
650 SUPPOSITORY RECTAL EVERY 6 HOURS PRN
Status: DISCONTINUED | OUTPATIENT
Start: 2025-06-23 | End: 2025-06-27 | Stop reason: HOSPADM

## 2025-06-23 RX ORDER — SODIUM CHLORIDE 0.9 % (FLUSH) 0.9 %
10 SYRINGE (ML) INJECTION PRN
Status: DISCONTINUED | OUTPATIENT
Start: 2025-06-23 | End: 2025-06-27 | Stop reason: HOSPADM

## 2025-06-23 RX ORDER — SODIUM CHLORIDE 0.9 % (FLUSH) 0.9 %
5-40 SYRINGE (ML) INJECTION EVERY 12 HOURS SCHEDULED
Status: DISCONTINUED | OUTPATIENT
Start: 2025-06-23 | End: 2025-06-27 | Stop reason: HOSPADM

## 2025-06-23 RX ORDER — ONDANSETRON 2 MG/ML
4 INJECTION INTRAMUSCULAR; INTRAVENOUS EVERY 6 HOURS PRN
Status: DISCONTINUED | OUTPATIENT
Start: 2025-06-23 | End: 2025-06-27 | Stop reason: HOSPADM

## 2025-06-23 RX ORDER — ONDANSETRON 4 MG/1
4 TABLET, ORALLY DISINTEGRATING ORAL EVERY 8 HOURS PRN
Status: DISCONTINUED | OUTPATIENT
Start: 2025-06-23 | End: 2025-06-27 | Stop reason: HOSPADM

## 2025-06-23 RX ORDER — FERROUS SULFATE 325(65) MG
325 TABLET, DELAYED RELEASE (ENTERIC COATED) ORAL 2 TIMES DAILY WITH MEALS
Status: DISCONTINUED | OUTPATIENT
Start: 2025-06-24 | End: 2025-06-27 | Stop reason: HOSPADM

## 2025-06-23 RX ORDER — SODIUM CHLORIDE 9 MG/ML
INJECTION, SOLUTION INTRAVENOUS PRN
Status: DISCONTINUED | OUTPATIENT
Start: 2025-06-23 | End: 2025-06-27 | Stop reason: HOSPADM

## 2025-06-23 RX ORDER — POLYETHYLENE GLYCOL 3350 17 G/17G
17 POWDER, FOR SOLUTION ORAL DAILY PRN
Status: DISCONTINUED | OUTPATIENT
Start: 2025-06-23 | End: 2025-06-25

## 2025-06-23 RX ORDER — HYDROXYZINE HYDROCHLORIDE 25 MG/1
50 TABLET, FILM COATED ORAL 3 TIMES DAILY PRN
Status: DISCONTINUED | OUTPATIENT
Start: 2025-06-23 | End: 2025-06-27 | Stop reason: HOSPADM

## 2025-06-23 RX ORDER — POTASSIUM CHLORIDE 7.45 MG/ML
10 INJECTION INTRAVENOUS PRN
Status: DISCONTINUED | OUTPATIENT
Start: 2025-06-23 | End: 2025-06-27 | Stop reason: HOSPADM

## 2025-06-23 RX ORDER — MAGNESIUM SULFATE 1 G/100ML
1000 INJECTION INTRAVENOUS PRN
Status: DISCONTINUED | OUTPATIENT
Start: 2025-06-23 | End: 2025-06-27 | Stop reason: HOSPADM

## 2025-06-23 RX ADMIN — SODIUM CHLORIDE, PRESERVATIVE FREE 10 ML: 5 INJECTION INTRAVENOUS at 23:28

## 2025-06-23 RX ADMIN — HEPARIN SODIUM 5000 UNITS: 5000 INJECTION INTRAVENOUS; SUBCUTANEOUS at 23:27

## 2025-06-23 RX ADMIN — WATER 1000 MG: 1 INJECTION INTRAMUSCULAR; INTRAVENOUS; SUBCUTANEOUS at 23:27

## 2025-06-23 ASSESSMENT — PAIN SCALES - GENERAL: PAINLEVEL_OUTOF10: 5

## 2025-06-23 ASSESSMENT — PAIN DESCRIPTION - LOCATION: LOCATION: FLANK;BACK

## 2025-06-23 ASSESSMENT — PAIN - FUNCTIONAL ASSESSMENT: PAIN_FUNCTIONAL_ASSESSMENT: ACTIVITIES ARE NOT PREVENTED

## 2025-06-23 ASSESSMENT — PAIN DESCRIPTION - FREQUENCY: FREQUENCY: CONTINUOUS

## 2025-06-23 ASSESSMENT — LIFESTYLE VARIABLES
HOW OFTEN DO YOU HAVE A DRINK CONTAINING ALCOHOL: MONTHLY OR LESS
HOW MANY STANDARD DRINKS CONTAINING ALCOHOL DO YOU HAVE ON A TYPICAL DAY: 1 OR 2

## 2025-06-23 ASSESSMENT — PAIN DESCRIPTION - ONSET: ONSET: ON-GOING

## 2025-06-23 ASSESSMENT — PAIN DESCRIPTION - DESCRIPTORS: DESCRIPTORS: ACHING;TIGHTNESS;DISCOMFORT

## 2025-06-23 ASSESSMENT — PAIN DESCRIPTION - ORIENTATION: ORIENTATION: RIGHT

## 2025-06-23 ASSESSMENT — PAIN DESCRIPTION - PAIN TYPE: TYPE: ACUTE PAIN

## 2025-06-24 ENCOUNTER — APPOINTMENT (OUTPATIENT)
Dept: INTERVENTIONAL RADIOLOGY/VASCULAR | Age: 50
End: 2025-06-24
Attending: STUDENT IN AN ORGANIZED HEALTH CARE EDUCATION/TRAINING PROGRAM
Payer: MEDICAID

## 2025-06-24 ENCOUNTER — APPOINTMENT (OUTPATIENT)
Dept: ULTRASOUND IMAGING | Age: 50
End: 2025-06-24
Attending: STUDENT IN AN ORGANIZED HEALTH CARE EDUCATION/TRAINING PROGRAM
Payer: MEDICAID

## 2025-06-24 LAB
ALBUMIN SERPL-MCNC: 3.2 G/DL (ref 3.5–5.2)
ALBUMIN SERPL-MCNC: 3.3 G/DL (ref 3.5–5.2)
ALBUMIN/GLOB SERPL: 1 {RATIO} (ref 1–2.5)
ALBUMIN/GLOB SERPL: 1 {RATIO} (ref 1–2.5)
ALP SERPL-CCNC: 186 U/L (ref 35–104)
ALP SERPL-CCNC: 193 U/L (ref 35–104)
ALT SERPL-CCNC: 270 U/L (ref 10–35)
ALT SERPL-CCNC: 300 U/L (ref 10–35)
ANION GAP SERPL CALCULATED.3IONS-SCNC: 11 MMOL/L (ref 9–16)
ANION GAP SERPL CALCULATED.3IONS-SCNC: 9 MMOL/L (ref 9–16)
AST SERPL-CCNC: 327 U/L (ref 10–35)
AST SERPL-CCNC: 498 U/L (ref 10–35)
BILIRUB DIRECT SERPL-MCNC: 0.1 MG/DL (ref 0–0.2)
BILIRUB INDIRECT SERPL-MCNC: 0.1 MG/DL (ref 0–1)
BILIRUB SERPL-MCNC: 0.2 MG/DL (ref 0–1.2)
BILIRUB SERPL-MCNC: 0.3 MG/DL (ref 0–1.2)
BUN SERPL-MCNC: 16 MG/DL (ref 6–20)
BUN SERPL-MCNC: 17 MG/DL (ref 6–20)
CALCIUM SERPL-MCNC: 8.5 MG/DL (ref 8.6–10.4)
CALCIUM SERPL-MCNC: 8.7 MG/DL (ref 8.6–10.4)
CHLORIDE SERPL-SCNC: 104 MMOL/L (ref 98–107)
CHLORIDE SERPL-SCNC: 104 MMOL/L (ref 98–107)
CO2 SERPL-SCNC: 26 MMOL/L (ref 20–31)
CO2 SERPL-SCNC: 27 MMOL/L (ref 20–31)
CREAT SERPL-MCNC: 1.1 MG/DL (ref 0.6–0.9)
CREAT SERPL-MCNC: 1.2 MG/DL (ref 0.6–0.9)
ERYTHROCYTE [DISTWIDTH] IN BLOOD BY AUTOMATED COUNT: 15.2 % (ref 11.8–14.4)
GFR, ESTIMATED: 55 ML/MIN/1.73M2
GFR, ESTIMATED: 62 ML/MIN/1.73M2
GLOBULIN SER CALC-MCNC: 3.3 G/DL
GLUCOSE SERPL-MCNC: 103 MG/DL (ref 74–99)
GLUCOSE SERPL-MCNC: 106 MG/DL (ref 74–99)
HCT VFR BLD AUTO: 31.2 % (ref 36.3–47.1)
HGB BLD-MCNC: 9.7 G/DL (ref 11.9–15.1)
INR PPP: 1.1
MCH RBC QN AUTO: 29.5 PG (ref 25.2–33.5)
MCHC RBC AUTO-ENTMCNC: 31.1 G/DL (ref 28.4–34.8)
MCV RBC AUTO: 94.8 FL (ref 82.6–102.9)
NRBC BLD-RTO: 0 PER 100 WBC
PLATELET # BLD AUTO: 273 K/UL (ref 138–453)
PMV BLD AUTO: 10.7 FL (ref 8.1–13.5)
POTASSIUM SERPL-SCNC: 3.8 MMOL/L (ref 3.7–5.3)
POTASSIUM SERPL-SCNC: 3.9 MMOL/L (ref 3.7–5.3)
PROT SERPL-MCNC: 6.5 G/DL (ref 6.6–8.7)
PROT SERPL-MCNC: 6.6 G/DL (ref 6.6–8.7)
PROTHROMBIN TIME: 14.3 SEC (ref 11.7–14.9)
RBC # BLD AUTO: 3.29 M/UL (ref 3.95–5.11)
SODIUM SERPL-SCNC: 139 MMOL/L (ref 136–145)
SODIUM SERPL-SCNC: 142 MMOL/L (ref 136–145)
WBC OTHER # BLD: 8.5 K/UL (ref 3.5–11.3)

## 2025-06-24 PROCEDURE — 6370000000 HC RX 637 (ALT 250 FOR IP): Performed by: NURSE PRACTITIONER

## 2025-06-24 PROCEDURE — 80048 BASIC METABOLIC PNL TOTAL CA: CPT

## 2025-06-24 PROCEDURE — 2500000003 HC RX 250 WO HCPCS: Performed by: NURSE PRACTITIONER

## 2025-06-24 PROCEDURE — C1769 GUIDE WIRE: HCPCS

## 2025-06-24 PROCEDURE — 50435 EXCHANGE NEPHROSTOMY CATH: CPT

## 2025-06-24 PROCEDURE — 6360000004 HC RX CONTRAST MEDICATION: Performed by: STUDENT IN AN ORGANIZED HEALTH CARE EDUCATION/TRAINING PROGRAM

## 2025-06-24 PROCEDURE — 0T25X0Z CHANGE DRAINAGE DEVICE IN KIDNEY, EXTERNAL APPROACH: ICD-10-PCS | Performed by: RADIOLOGY

## 2025-06-24 PROCEDURE — 99213 OFFICE O/P EST LOW 20 MIN: CPT

## 2025-06-24 PROCEDURE — 85027 COMPLETE CBC AUTOMATED: CPT

## 2025-06-24 PROCEDURE — 1200000000 HC SEMI PRIVATE

## 2025-06-24 PROCEDURE — 6360000002 HC RX W HCPCS: Performed by: NURSE PRACTITIONER

## 2025-06-24 PROCEDURE — 6370000000 HC RX 637 (ALT 250 FOR IP): Performed by: STUDENT IN AN ORGANIZED HEALTH CARE EDUCATION/TRAINING PROGRAM

## 2025-06-24 PROCEDURE — 76705 ECHO EXAM OF ABDOMEN: CPT

## 2025-06-24 PROCEDURE — 85610 PROTHROMBIN TIME: CPT

## 2025-06-24 PROCEDURE — 80076 HEPATIC FUNCTION PANEL: CPT

## 2025-06-24 PROCEDURE — 99222 1ST HOSP IP/OBS MODERATE 55: CPT | Performed by: INTERNAL MEDICINE

## 2025-06-24 RX ORDER — M-VIT,TX,IRON,MINS/CALC/FOLIC 27MG-0.4MG
1 TABLET ORAL DAILY
Status: DISCONTINUED | OUTPATIENT
Start: 2025-06-24 | End: 2025-06-27 | Stop reason: HOSPADM

## 2025-06-24 RX ORDER — OXYCODONE HYDROCHLORIDE 5 MG/1
5 TABLET ORAL EVERY 4 HOURS PRN
Refills: 0 | Status: DISCONTINUED | OUTPATIENT
Start: 2025-06-24 | End: 2025-06-27 | Stop reason: HOSPADM

## 2025-06-24 RX ORDER — MULTIVITAMIN WITH IRON
500 TABLET ORAL DAILY
Status: DISCONTINUED | OUTPATIENT
Start: 2025-06-24 | End: 2025-06-27 | Stop reason: HOSPADM

## 2025-06-24 RX ADMIN — FERROUS SULFATE TAB EC 325 MG (65 MG FE EQUIVALENT) 325 MG: 325 (65 FE) TABLET DELAYED RESPONSE at 18:55

## 2025-06-24 RX ADMIN — IOHEXOL 12 ML: 240 INJECTION, SOLUTION INTRATHECAL; INTRAVASCULAR; INTRAVENOUS; ORAL at 14:59

## 2025-06-24 RX ADMIN — HEPARIN SODIUM 5000 UNITS: 5000 INJECTION INTRAVENOUS; SUBCUTANEOUS at 05:43

## 2025-06-24 RX ADMIN — OXYCODONE 5 MG: 5 TABLET ORAL at 17:25

## 2025-06-24 RX ADMIN — ACETAMINOPHEN 650 MG: 325 TABLET ORAL at 05:42

## 2025-06-24 RX ADMIN — SODIUM CHLORIDE, PRESERVATIVE FREE 10 ML: 5 INJECTION INTRAVENOUS at 09:00

## 2025-06-24 RX ADMIN — WATER 1000 MG: 1 INJECTION INTRAMUSCULAR; INTRAVENOUS; SUBCUTANEOUS at 22:29

## 2025-06-24 RX ADMIN — SODIUM CHLORIDE, PRESERVATIVE FREE 10 ML: 5 INJECTION INTRAVENOUS at 22:29

## 2025-06-24 ASSESSMENT — PAIN SCALES - GENERAL
PAINLEVEL_OUTOF10: 5
PAINLEVEL_OUTOF10: 3

## 2025-06-24 NOTE — BRIEF OP NOTE
Brief Postoperative Note    Isabela Rapp  YOB: 1975  [id number]    Pre-operative Diagnosis: Routine nephrostomy tube exchange    Post-operative Diagnosis: Same    Procedure: right Nephrostomy Tube Exchange     Anesthesia: 1% Lidocaine     Surgeons/Assistants: Shubham Young PA-C    Estimated Blood Loss: Minimal    Complications: none    Specimens: were not obtained    8 Fr Nephrostomy exchanged for new 8 Fr Nephrostomy tube.  New nephrostomy tube was sutured to the skin.  Dressing and gravity drainage bag applied.  Vital signs were reviewed and were stable after the procedure.     Electronically signed by TOÑITO Lepe on 6/24/2025 at 3:07 PM

## 2025-06-24 NOTE — CONSULTS
Urology Consult Note      Patient:  Isabela Rapp  MRN: [id number]  YOB: 1975    CHIEF COMPLAINT: Right ureteral stricture with percutaneous nephrostomy tubes placed possible renal abscess    HISTORY OF PRESENT ILLNESS:   The patient is a 49 y.o. female who presents with wound infection.  Patient has a significant past medical history including history of rectal cancer in 2014 status post ex lap, colostomy.  She has a history of a right ureteral stricture managed with stents and now a percutaneous nephrostomy tube.  Unknown when last time percutaneous nephrostomy tube was exchanged.    Patient follows with Dr. Rojas at [facility].  There was a schedule surgery for a possible antegrade nephrostogram with Lasix renal scan to assess function of the possible nephrectomy in the future.  Patient is scheduled for follow-up in several weeks.    She presented to hospital due to wound check.  CT scan shows pyelonephritis with possible pyelitis and renal abscess along the right side.    On examination, right nephrostomy tube in place draining urine.  Will plan to forward CT scans for further examination.    Creatinine 1.1  Hemoglobin 9.7  WC 8.5    Patient's old records, notes and chart reviewed and summarized above.    Past Medical History:    Past Medical History:   Diagnosis Date    Bipolar 1 disorder (HCC)     Cancer (HCC)     Rectal    Chronic kidney disease     History of blood transfusion     no reactions noted    Pelvis, female abscess     pre sacral drain put in 6/19/2020    Psychiatric problem     UTI (urinary tract infection)     stent place june 2020       Past Surgical History:    Past Surgical History:   Procedure Laterality Date    ABDOMEN SURGERY  08/14/2021    EUA/colostomy    ABSCESS DRAINAGE      pre sacral pelvic abscess with drain placement to right buttock    COLOSTOMY  08/14/2021    eua; creation end colostomy    COLOSTOMY N/A 8/14/2021    EUA, CREATION END COLOSTOMY performed by Aron LANGSTON

## 2025-06-24 NOTE — PROGRESS NOTES
[facility] Ostomy Continence Nurse         NAME:  Isabela Rapp  MEDICAL RECORD NUMBER:  [id number]  AGE: 49 y.o.   GENDER: female  : 1975  TODAY'S DATE:  2025    Subjective:     Reason for WOCN Evaluation and Assessment: \"nephrostomy tube wound\"      Isabela Rapp is a 49 y.o. female referred by:   [x] Physician  [] Nursing  [] Other:     Wound Identification:  Wound Type: intertrigo with ulceration  Contributing Factors: shear force and moisture, friction            Objective:      /62   Pulse 56   Temp 97.9 °F (36.6 °C)   Resp 15   Ht 1.524 m (5')   Wt 48.5 kg (107 lb)   LMP 2017   SpO2 100%   BMI 20.90 kg/m²   Yoni Risk Score: Yoni Scale Score: 21    LABS    CBC:   Lab Results   Component Value Date/Time    WBC 8.5 2025 05:37 AM    RBC 3.29 2025 05:37 AM    HGB 9.7 2025 05:37 AM    HCT 31.2 2025 05:37 AM     CMP:  Albumin:  No results found for: \"LABALBU\"  PT/INR:    Lab Results   Component Value Date/Time    PROTIME 14.3 2025 05:37 AM    INR 1.1 2025 05:37 AM     HgBA1c:    Lab Results   Component Value Date/Time    LABA1C 5.6 2020 06:25 AM     PTT: No components found for: \"LABPTT\"      Assessment:       Measurements:     25 1010   Colostomy LLQ    Placement Date: 21   Pre-existing: No  Timeout: Patient;Procedure;Site/Side;Appropriate Equipment;Consent Confirmed;Sterile Technique using full body drape;Site prepped with chlorhexidine;Sterile drsg with biopatch;Availability of Implant;Correc...   Stomal Appliance 1 piece;Convex;Clean, dry & intact   Stoma  Assessment Pink   Stool Appearance Soft   Stool Color Brown   Stool Amount Small   Wound 25 Back Lateral;Lower;Right   Date First Assessed: 25   Present on Original Admission: Yes  Location: Back  Wound Location Orientation: Lateral;Lower;Right   Wound Image    Wound Etiology Other   Dressing Status New dressing applied   Wound Cleansed Soap and water

## 2025-06-24 NOTE — ED NOTES
ED to inpatient nurses report      Chief Complaint:  Chief Complaint   Patient presents with    Wound Infection     Pilonephritis and pressure wound from a tie pt placed around abdomen to hold colostomy bag in place       Present to ED from:     MOA:     LOC: alert and orientated to name, place, date  Mobility: Independent  Oxygen Baseline: RA    Current needs required: RA   Pending ED orders: none  Present condition: stable    Why did the patient come to the ED? Wound infection   What is the plan? Admit for wound care   Any procedures or intervention occur? Labs, IR nephrostomy tube replacement   Any safety concerns??    Mental Status:  Level of Consciousness: Alert (0)    Psych Assessment:   Psychosocial  Psychosocial (WDL): Within Defined Limits  Vital signs   Vitals:    06/24/25 1445 06/24/25 1456 06/24/25 1706 06/24/25 1854   BP: (!) 93/59 120/68 (!) 124/104    Pulse: 67 60 97 92   Resp:   15 19   Temp:       TempSrc:       SpO2: 99% 100% 100% 97%   Weight:       Height:            Vitals:  Patient Vitals for the past 24 hrs:   BP Temp Temp src Pulse Resp SpO2 Height Weight   06/24/25 1854 -- -- -- 92 19 97 % -- --   06/24/25 1706 (!) 124/104 -- -- 97 15 100 % -- --   06/24/25 1456 120/68 -- -- 60 -- 100 % -- --   06/24/25 1445 (!) 93/59 -- -- 67 -- 99 % -- --   06/24/25 1434 121/67 -- -- 77 16 97 % -- --   06/24/25 0906 -- -- -- 56 15 100 % -- --   06/24/25 0400 114/62 -- -- 66 18 97 % -- --   06/24/25 0027 (!) 101/58 97.9 °F (36.6 °C) -- 54 12 99 % -- --   06/24/25 0000 103/66 -- -- 55 15 -- -- --   06/23/25 2200 (!) 116/53 -- -- 67 11 97 % -- --   06/23/25 2145 102/65 -- -- 75 10 100 % -- --   06/23/25 2131 (!) 105/59 98 °F (36.7 °C) Oral 66 14 99 % 1.524 m (5') 48.5 kg (107 lb)      Visit Vitals  BP (!) 124/104   Pulse 92   Temp 97.9 °F (36.6 °C)   Resp 19   Ht 1.524 m (5')   Wt 48.5 kg (107 lb)   SpO2 97%   BMI 20.90 kg/m²        LDAs:   Peripheral IV 12/28/20 (Active)       Ambulatory Status:  No data

## 2025-06-24 NOTE — H&P
[facility]  Office: [phone]  Wong Judd DO, Ezio Hathaway, DO, Luciano Pena DO, Kelby Eid, DO, Christel Lopes MD, Gina Merlos MD, Conner Whitlock MD, Sandy Aguayo MD,  Roger Christy MD, Jyoti Leiva MD, Pravin Villavicencio MD,  Papa Raphael DO, Donnie Lawrence MD, Shai Uriostegui MD, Josh Judd DO, Starla Mas MD,  Jan Barrios DO, Lori Wong MD, Sepideh Ferro MD, Johan Falcon MD,  Jarrod Gan MD, Simone Esquivel MD, Lazara Montana MD, Janie Thorpe MD, Chung Spears MD, Pau Thompson MD, Pasquale Artis, DO, Karla Ramirez MD, Althea Simmons DO, Jayy Wiley MD, Papa Meneses MD, Mohsin Reza, MD, Jewell Valdivia MD, Shirley Waterhouse, CNP,  Adelia More, CNP, Pasquale Herrera, CNP,  Kenya Nunes, GAIL, Merary Rojas, CNP, Alison Beatty, CNP, Natalie Nicole, CNP, Rody Cameron, CNP, Jazz Temple, PA-C, Paula Thomas, CNP, Deja Carballo, CNP,  Mary Grace Fowler, CNP, Jil Rosales, CNP, Emory Blackman, PA-C, Viridiana Pal, PA-C, Monica Carey, CNP,        Yvrose Wilson, MARY ELLEN, Crystal Mireles, CNP, Summer Ignacio, CNP         [facility]   IN-PATIENT SERVICE   [facility]    HISTORY AND PHYSICAL EXAMINATION            Date:   6/24/2025  Patient name:  Isabela Rapp  Date of admission:  6/23/2025  9:44 PM  MRN:   [id number]  Account:  [id number]  YOB: 1975  PCP:    Archana Aranda APRN - CNP  Room:   50PED/50PED  Code Status:    Full Code    Chief Complaint:     Chief Complaint   Patient presents with    Wound Infection     Pilonephritis and pressure wound from a tie pt placed around abdomen to hold colostomy bag in place     \" I was supposed to be seen by my doctor\"    History Obtained From:     patient    History of Present Illness:     Isabela Rapp is a 49 y.o.  female w/ bipolar, BRCA1 + w/ rectal ca in 2014 s/p ex lap w/ LAR, XRT, chemo (in remission) w/ history of rectovaginal fistula s/p end colostomy

## 2025-06-25 PROBLEM — K83.8 DILATION OF BILIARY TRACT: Status: ACTIVE | Noted: 2025-06-25

## 2025-06-25 PROBLEM — R74.01 TRANSAMINITIS: Status: ACTIVE | Noted: 2025-06-25

## 2025-06-25 PROBLEM — N39.0 COMPLICATED UTI (URINARY TRACT INFECTION): Status: ACTIVE | Noted: 2025-06-25

## 2025-06-25 PROBLEM — R79.89 ELEVATED LFTS: Status: ACTIVE | Noted: 2025-06-25

## 2025-06-25 LAB
ALBUMIN SERPL-MCNC: 3.4 G/DL (ref 3.5–5.2)
ALBUMIN/GLOB SERPL: 0.9 {RATIO} (ref 1–2.5)
ALP SERPL-CCNC: 174 U/L (ref 35–104)
ALT SERPL-CCNC: 182 U/L (ref 10–35)
ANION GAP SERPL CALCULATED.3IONS-SCNC: 12 MMOL/L (ref 9–16)
AST SERPL-CCNC: 104 U/L (ref 10–35)
BILIRUB DIRECT SERPL-MCNC: <0.1 MG/DL (ref 0–0.2)
BILIRUB INDIRECT SERPL-MCNC: ABNORMAL MG/DL (ref 0–1)
BILIRUB SERPL-MCNC: 0.2 MG/DL (ref 0–1.2)
BUN SERPL-MCNC: 17 MG/DL (ref 6–20)
CALCIUM SERPL-MCNC: 9.1 MG/DL (ref 8.6–10.4)
CHLORIDE SERPL-SCNC: 104 MMOL/L (ref 98–107)
CO2 SERPL-SCNC: 24 MMOL/L (ref 20–31)
CREAT SERPL-MCNC: 0.9 MG/DL (ref 0.6–0.9)
ERYTHROCYTE [DISTWIDTH] IN BLOOD BY AUTOMATED COUNT: 15.2 % (ref 11.8–14.4)
GFR, ESTIMATED: 78 ML/MIN/1.73M2
GLOBULIN SER CALC-MCNC: 3.6 G/DL
GLUCOSE SERPL-MCNC: 101 MG/DL (ref 74–99)
HCT VFR BLD AUTO: 33.4 % (ref 36.3–47.1)
HGB BLD-MCNC: 10.6 G/DL (ref 11.9–15.1)
MCH RBC QN AUTO: 29.7 PG (ref 25.2–33.5)
MCHC RBC AUTO-ENTMCNC: 31.7 G/DL (ref 28.4–34.8)
MCV RBC AUTO: 93.6 FL (ref 82.6–102.9)
NRBC BLD-RTO: 0 PER 100 WBC
PLATELET # BLD AUTO: 293 K/UL (ref 138–453)
PMV BLD AUTO: 11.1 FL (ref 8.1–13.5)
POTASSIUM SERPL-SCNC: 4.1 MMOL/L (ref 3.7–5.3)
PROT SERPL-MCNC: 7 G/DL (ref 6.6–8.7)
RBC # BLD AUTO: 3.57 M/UL (ref 3.95–5.11)
SODIUM SERPL-SCNC: 140 MMOL/L (ref 136–145)
WBC OTHER # BLD: 9.9 K/UL (ref 3.5–11.3)

## 2025-06-25 PROCEDURE — APPSS30 APP SPLIT SHARED TIME 16-30 MINUTES: Performed by: NURSE PRACTITIONER

## 2025-06-25 PROCEDURE — 85027 COMPLETE CBC AUTOMATED: CPT

## 2025-06-25 PROCEDURE — 6370000000 HC RX 637 (ALT 250 FOR IP): Performed by: NURSE PRACTITIONER

## 2025-06-25 PROCEDURE — 80076 HEPATIC FUNCTION PANEL: CPT

## 2025-06-25 PROCEDURE — 6360000002 HC RX W HCPCS: Performed by: NURSE PRACTITIONER

## 2025-06-25 PROCEDURE — 36415 COLL VENOUS BLD VENIPUNCTURE: CPT

## 2025-06-25 PROCEDURE — 87086 URINE CULTURE/COLONY COUNT: CPT

## 2025-06-25 PROCEDURE — 6370000000 HC RX 637 (ALT 250 FOR IP): Performed by: STUDENT IN AN ORGANIZED HEALTH CARE EDUCATION/TRAINING PROGRAM

## 2025-06-25 PROCEDURE — 80048 BASIC METABOLIC PNL TOTAL CA: CPT

## 2025-06-25 PROCEDURE — 6370000000 HC RX 637 (ALT 250 FOR IP): Performed by: INTERNAL MEDICINE

## 2025-06-25 PROCEDURE — 2500000003 HC RX 250 WO HCPCS: Performed by: NURSE PRACTITIONER

## 2025-06-25 PROCEDURE — 99223 1ST HOSP IP/OBS HIGH 75: CPT | Performed by: STUDENT IN AN ORGANIZED HEALTH CARE EDUCATION/TRAINING PROGRAM

## 2025-06-25 PROCEDURE — 99222 1ST HOSP IP/OBS MODERATE 55: CPT | Performed by: INTERNAL MEDICINE

## 2025-06-25 PROCEDURE — 1200000000 HC SEMI PRIVATE

## 2025-06-25 PROCEDURE — 2580000003 HC RX 258: Performed by: STUDENT IN AN ORGANIZED HEALTH CARE EDUCATION/TRAINING PROGRAM

## 2025-06-25 PROCEDURE — 99232 SBSQ HOSP IP/OBS MODERATE 35: CPT | Performed by: STUDENT IN AN ORGANIZED HEALTH CARE EDUCATION/TRAINING PROGRAM

## 2025-06-25 RX ORDER — HEPARIN SODIUM 5000 [USP'U]/ML
5000 INJECTION, SOLUTION INTRAVENOUS; SUBCUTANEOUS EVERY 8 HOURS SCHEDULED
Status: DISCONTINUED | OUTPATIENT
Start: 2025-06-26 | End: 2025-06-27 | Stop reason: HOSPADM

## 2025-06-25 RX ORDER — SODIUM CHLORIDE 9 MG/ML
INJECTION, SOLUTION INTRAVENOUS CONTINUOUS
Status: DISCONTINUED | OUTPATIENT
Start: 2025-06-26 | End: 2025-06-27

## 2025-06-25 RX ORDER — POLYETHYLENE GLYCOL 3350 17 G/17G
17 POWDER, FOR SOLUTION ORAL DAILY
Status: DISCONTINUED | OUTPATIENT
Start: 2025-06-25 | End: 2025-06-27 | Stop reason: HOSPADM

## 2025-06-25 RX ORDER — SENNOSIDES 8.6 MG/1
1 TABLET ORAL NIGHTLY
Status: DISCONTINUED | OUTPATIENT
Start: 2025-06-25 | End: 2025-06-27 | Stop reason: HOSPADM

## 2025-06-25 RX ADMIN — WATER 2000 MG: 1 INJECTION INTRAMUSCULAR; INTRAVENOUS; SUBCUTANEOUS at 14:59

## 2025-06-25 RX ADMIN — Medication 1 TABLET: at 08:36

## 2025-06-25 RX ADMIN — SODIUM CHLORIDE, PRESERVATIVE FREE 10 ML: 5 INJECTION INTRAVENOUS at 08:36

## 2025-06-25 RX ADMIN — OXYCODONE 5 MG: 5 TABLET ORAL at 05:54

## 2025-06-25 RX ADMIN — POLYETHYLENE GLYCOL 3350 17 G: 17 POWDER, FOR SOLUTION ORAL at 11:31

## 2025-06-25 RX ADMIN — OXYCODONE 5 MG: 5 TABLET ORAL at 15:07

## 2025-06-25 RX ADMIN — SODIUM CHLORIDE: 0.9 INJECTION, SOLUTION INTRAVENOUS at 23:52

## 2025-06-25 RX ADMIN — CYANOCOBALAMIN TAB 500 MCG 500 MCG: 500 TAB at 08:36

## 2025-06-25 RX ADMIN — SENNOSIDES 8.6 MG: 8.6 TABLET, FILM COATED ORAL at 20:25

## 2025-06-25 RX ADMIN — FERROUS SULFATE TAB EC 325 MG (65 MG FE EQUIVALENT) 325 MG: 325 (65 FE) TABLET DELAYED RESPONSE at 08:36

## 2025-06-25 RX ADMIN — SODIUM CHLORIDE, PRESERVATIVE FREE 10 ML: 5 INJECTION INTRAVENOUS at 20:25

## 2025-06-25 ASSESSMENT — ENCOUNTER SYMPTOMS
CONSTIPATION: 1
ABDOMINAL PAIN: 0
RESPIRATORY NEGATIVE: 1

## 2025-06-25 ASSESSMENT — PAIN DESCRIPTION - ORIENTATION: ORIENTATION: LOWER;RIGHT

## 2025-06-25 ASSESSMENT — PAIN SCALES - GENERAL
PAINLEVEL_OUTOF10: 2
PAINLEVEL_OUTOF10: 2
PAINLEVEL_OUTOF10: 5
PAINLEVEL_OUTOF10: 5

## 2025-06-25 ASSESSMENT — PAIN DESCRIPTION - LOCATION: LOCATION: BACK

## 2025-06-25 ASSESSMENT — PAIN - FUNCTIONAL ASSESSMENT: PAIN_FUNCTIONAL_ASSESSMENT: PREVENTS OR INTERFERES SOME ACTIVE ACTIVITIES AND ADLS

## 2025-06-25 ASSESSMENT — PAIN DESCRIPTION - DESCRIPTORS: DESCRIPTORS: STABBING

## 2025-06-25 NOTE — CARE COORDINATION
Case Management   Daily Progress Note       Patient Name: Isabela Rapp                   YOB: 1975  Diagnosis: Pyelonephritis [N12]                       GMLOS: 2.8 days  Length of Stay: 2  days    Anticipated Discharge Date: One day until discharge    Readmission Risk (Low < 19, Mod (19-27), High > 27): Readmission Risk Score: 9.9        Current Transitional Plan    [x] Home Independently    [] Home with HC    [] Skilled Nursing Facility    [] Acute Rehabilitation    [] Long Term Acute Care (LTAC)    [] Other:     Plan for the Stay (Medical Management) :          Workflow Continuation (Additional Notes) :    Home no needs    Angie Young RN  June 25, 2025

## 2025-06-25 NOTE — PROGRESS NOTES
[facility]  Office: [phone]  Wong Judd, DO, Ezio Hathaway, DO, Luciano Pena DO, Kelby Eid, DO, Christel Lopes MD, Gina Merlos MD, Conner Whitlock MD, Sandy Aguayo MD,  Roger Christy MD, Jyoti Leiva MD, Pravin Villavicencio MD,  Papa Raphael DO, Donnie Lawrence MD, Shai Uriostegui MD, Josh Judd DO, Starla Mas MD,  Jan Barrios DO, Lori Wong MD, Sepideh Ferro MD, Johan Falcon MD,  Jarrod Gan MD, Simone Esquivel MD, Lazara Montana MD, Janie Thorpe MD, Chnug Spears MD, Pau Thompson MD, Pasquale Artis, DO, Karla Ramirez MD, Althea Simmons DO, Jayy Wiley MD, Papa Meneses MD, Mohsin Reza, MD, Jewell Valdivia MD, Shirley Waterhouse, CNP,  Adelia More, CNP, Pasquale Herrera, CNP,  Kenya Nunes, GAIL, Merary Rojas, CNP, Alison Beatty, CNP, Natalie Nicole, CNP, Rody Cameron, CNP, Jazz Temple, PA-C, Paula Thomas, CNP, Deja Carballo, CNP,  Mary Grace Fowler, CNP, Jil Rosales, CNP, Emory Blackman, PA-C, Viridiana Pal, PA-C, Monica Carey, CNP,        Yvrose Wilson, CNS, Crystal Mireles, CNP, Summer Ignacio, CNP         [facility]   IN-PATIENT SERVICE   [facility]    Progress Note    6/25/2025    3:20 PM    Name:   Isabela Rapp  MRN:     [id number]     Acct:      [id number]   Room:   0343/0343-02   Day:  2  Admit Date:  6/23/2025  9:44 PM    PCP:   Archana Aranda APRN - CNP  Code Status:  Full Code    Subjective:     C/C:   Chief Complaint   Patient presents with    Wound Infection     Pilonephritis and pressure wound from a tie pt placed around abdomen to hold colostomy bag in place       Interval History Status: improved.     Vitals reviewed, afebrile and hemodynamically stable.  Saturating well on room air.  Labs reviewed, creatinine has improved, LFTs are downtrending, no leukocytosis, hemoglobin stable, platelets are stable.  Urine culture pending.  Overnight patient had no significant events.    On

## 2025-06-25 NOTE — CONSULTS
measuring up to 1.3 x 3.8 cm. No significant pelvic lymphadenopathy. Uterus and adnexa appear unremarkable. There is no significant pelvic free fluid. Urinary bladder appears normal. There is a mild levoscoliosis of the lumbar spine.  No suspicious osseous lesions are seen. The superficial soft tissues appear otherwise unremarkable.    1. Moderate thickening of the right renal pelvis with adjacent stranding is suspicious for pyelitis.  There is also evidence of ureteritis involving the proximal right ureter.  No renal or ureteral calculi are seen.  A percutaneous nephrostomy tube is present terminating in the right middle calyx.  There is no abnormal collection along the course of the percutaneous nephrostomy tube. 2. Walled-off gas and fluid collection in the pelvis posteriorly on the right is suspicious for an abscess measuring up to 3.8 cm. 3. Cholelithiasis without evidence of cholecystitis.  Surgical changes of partial colectomy.  There is an end colostomy in the left lower anterior abdominal wall. Interpretation by Josh Luu M.D. Professional Interpretation by  Radiology Reading Workstation: [id number]      Medical Decision Oecalj-Pqqisugr-Jdaxh:     Results       Procedure Component Value Units Date/Time    Culture, Urine [[id number]] Collected: 06/25/25 0600    Order Status: Sent Specimen: Urine, clean catch Updated: 06/25/25 0601              Medical Decision Making-Other:     Note:      Thank you for allowing us to participate in the care of this patient. Please call with questions.    GRAY Arenas - ALEXANDRE  Pager: [phone] - Office: [phone]

## 2025-06-25 NOTE — PROGRESS NOTES
Comprehensive Nutrition Assessment    Type and Reason for Visit:  Initial, Consult    Nutrition Recommendations/Plan:   Continue current diet.  Start high poornima/high pro ONS (Ensure Plus) BID.  Will monitor labs, weights, intake, GI status, and plan of care.     Malnutrition Assessment:  Malnutrition Status:  At risk for malnutrition (06/25/25 1211)    Context:  Acute Illness     Findings of the 6 clinical characteristics of malnutrition:  Energy Intake:  No decrease in energy intake  Weight Loss:  Greater than 7.5% over 3 months     Body Fat Loss:  Unable to assess     Muscle Mass Loss:  Unable to assess    Fluid Accumulation:  No fluid accumulation (trace)     Strength:  Not Performed    Nutrition Assessment:    Pt admit for  management of Pyelonephritis. RD consulted for wounds. PMH: CKD, rectal cancer. Pt states normal body wt ~107#, per EMR review pt wt of 112# at doctors appointment in March. This is significant wt loss of 11% x3 months. Pt denies appetite loss. Pt has open wound, discussed addition of ONS to aid in healing. Pt agreeable.    Nutrition Related Findings:    Trace edema BLE. LBM 6/22. Labs/meds reviewed. Wound Type: Open Wounds       Current Nutrition Intake & Therapies:    Average Meal Intake: Unable to assess  Average Supplements Intake: Unable to assess  ADULT DIET; Regular  ADULT ORAL NUTRITION SUPPLEMENT; Breakfast, Dinner; Standard High Calorie/High Protein Oral Supplement    Anthropometric Measures:  Height: 152.4 cm (5')  Ideal Body Weight (IBW): 100 lbs (45 kg)       Current Body Weight: 45.4 kg (100 lb 1.4 oz), 100.1 % IBW.    Current BMI (kg/m2): 19.5  Usual Body Weight: 50.8 kg (112 lb) (3/31/25)     % Weight Change (Calculated): -10.6    BMI Categories: Normal Weight (BMI 18.5-24.9)    Estimated Daily Nutrient Needs:  Energy Requirements Based On: Kcal/kg  Weight Used for Energy Requirements: Current  Energy (kcal/day): 1008-3012 kcal/d per 25-30 kcal/kg  Weight Used for Protein

## 2025-06-25 NOTE — CONSULTS
[facility]    GASTROENTEROLOGY CONSULT    Patient:   Isabela Rapp   :    1975   Facility:   [facility]   Date:    2025  Admission Dx:  Pyelonephritis [N12]  Requesting physician: Jan Barrios DO  Reason for consult:  Dilated CBD, elevated LFTS   CC : \"abdominal pain, wound\"    SUBJECTIVE     HISTORY OF PRESENT ILLNESS  This is a 49 y.o.  female who was admitted 2025 with Pyelonephritis [N12]. We have been asked to see the patient in consultation by Jan Barrios DO for elevated LFTs and dilated CBD. She originally presented with a wound infection and right flank pain with poor output from her nephrostomy tube. She was instructed to return to the ED after follow up with her PCP and found to have pyelonephritis and hydronephrosis. Her LFTs were found to be elevated and ultrasound showed cholelithiasis with CBD  9 mm. She denies abdominal pain, nausea/vomiting, jaundice, fever or chills. She is currently tolerating regular diet and her pain has improved. The nephrostomy tube was exchanged per IR . She has a significant history of rectal cancer in  treated with chemo, radiation and LAR. She is s/p transverse loop colostomy  and Hartmans  with repair of rectovaginal fistula. Her last scopes were in .       Previous GI history:      Last EGD       Last colonoscopy     sigmoidoscopy          Primary GI physician              OBJECTIVE:     PAST MEDICAL/SURGICAL HISTORY  Past Medical History:   Diagnosis Date    Bipolar 1 disorder (HCC)     Cancer (HCC)     Rectal    Chronic kidney disease     History of blood transfusion     no reactions noted    Pelvis, female abscess     pre sacral drain put in 2020    Psychiatric problem     UTI (urinary tract infection)     stent place 2020     Past Surgical History:   Procedure Laterality Date    ABDOMEN SURGERY  2021    EUA/colostomy    ABSCESS DRAINAGE      pre sacral  No results found for: \"\"  Ca 19-9:   No results found for: \"\"  AFP: No results found for: \"AFP\"    Lactic acid:No results for input(s): \"LACTACIDWB\" in the last 72 hours.        IMAGING  IR TUBE/CATH CHANGE W CONTRAST  Result Date: 6/24/2025  PROCEDURE: CHANGE NEPHRO TUBE 6/24/2025 HISTORY: ORDERING SYSTEM PROVIDED HISTORY: PCN exchange-Rt TECHNOLOGIST PROVIDED HISTORY: PCN exchange-Rt TECHNIQUE: None CONTRAST: 12 mL of Omnipaque 240 SEDATION: None FLUOROSCOPY DOSE AND TYPE: Fluoro time 11.6 minutes .97 uGy m 2 Views: 3 DESCRIPTION OF PROCEDURE: This procedure was performed by Shubham Young PA-C under indirect supervision of Dr. Johnston.  Informed consent was obtained after a detailed explanation of the procedure including risks, benefits, and alternatives. Universal protocol was observed using maximum barrier technique. All elements of maximal sterile barrier technique, including cap, mask, sterile gown, sterile gloves, a large sterile sheet, hand hygiene and 2% chlorhexidine for cutaneous antisepsis were followed. The patient's right flank was prepped and draped in sterile fashion.  Contrast was injected through the existing nephrostomy tube to confirm placement in the renal collecting system.  The nephrostomy tube was then cut to release the pigtail retention suture.  A 0.035 guidewire was used for an over-the-wire exchange of a 8 Greek percutaneous nephrostomy tube with a new 8 Greek percutaneous nephrostomy tube.  Retention loop was formed in the renal pelvis.  Contrast was administered to confirm placement in the collecting system.  The catheter was sutured to the skin and a stay fix was applied. Estimated blood loss was less than 1 mL. The patient tolerated the procedure well.  The new nephrostomy tube was then attached to a gravity leg bag.     Successful exchange of right nephrostomy tube.     US ABDOMEN LIMITED Specify organ? LIVER, PANCREAS, GALLBLADDER, SPLEEN  Result Date:

## 2025-06-25 NOTE — CARE COORDINATION
Case Management Assessment  Initial Evaluation    Date/Time of Evaluation: 6/25/2025 10:01 AM  Assessment Completed by: Angie Young RN    If patient is discharged prior to next notation, then this note serves as note for discharge by case management.    Patient Name: Isabela Rapp                   YOB: 1975  Diagnosis: Pyelonephritis [N12]                   Date / Time: 6/23/2025  9:44 PM    Patient Admission Status: Inpatient   Readmission Risk (Low < 19, Mod (19-27), High > 27): Readmission Risk Score: 9.8    Current PCP: Archana Aranda APRN - CNP  PCP verified by CM? (P) Yes    Chart Reviewed: Yes      History Provided by: (P) Patient  Patient Orientation: (P) Alert and Oriented    Patient Cognition: (P) Alert    Hospitalization in the last 30 days (Readmission):  No    If yes, Readmission Assessment in CM Navigator will be completed.    Advance Directives:      Code Status: Full Code   Patient's Primary Decision Maker is: (P) Legal Next of Kin    Primary Decision Maker: VEENA OCHOA [place]catr[place]ita - [phone]    Discharge Planning:    Patient lives with: (P) Spouse/Significant Other Type of Home: (P) House  Primary Care Giver: (P) Self  Patient Support Systems include: (P) Spouse/Significant Other   Current Financial resources: (P) Medicaid  Current community resources:    Current services prior to admission: (P) None            Current DME:              Type of Home Care services:  (P) None    ADLS  Prior functional level: (P) Independent in ADLs/IADLs  Current functional level: (P) Independent in ADLs/IADLs    PT AM-PAC:   /24  OT AM-PAC:   /24    Family can provide assistance at DC:    Would you like Case Management to discuss the discharge plan with any other family members/significant others, and if so, who?    Plans to Return to Present Housing: (P) Yes  Other Identified Issues/Barriers to RETURNING to current housing: none  Potential Assistance needed at discharge: (P) N/A

## 2025-06-25 NOTE — PROGRESS NOTES
Shamar Marley, Joy, Cali, Curtis, Hollis, Nallely   Urology Progress Note     Subjective:   AF HDS  NAEO  S/P nephrostomy tube exchange   Cr 0.9     Patient Vitals for the past 24 hrs:   BP Temp Temp src Pulse Resp SpO2 Height Weight   06/25/25 0754 95/61 98.2 °F (36.8 °C) Oral 70 16 97 % -- --   06/25/25 0624 -- -- -- -- 18 -- -- --   06/24/25 2323 116/64 98.6 °F (37 °C) Oral 77 18 98 % 1.524 m (5') 45.4 kg (100 lb)   06/24/25 2322 116/64 98.6 °F (37 °C) Oral 75 20 97 % -- --   06/24/25 2300 118/71 -- -- 79 23 97 % -- --   06/24/25 2254 -- -- -- 75 23 97 % -- --   06/24/25 2217 -- -- -- 72 18 100 % -- --   06/24/25 2000 118/71 -- -- 76 27 100 % -- --   06/24/25 1915 113/67 -- -- 83 23 98 % -- --   06/24/25 1900 -- -- -- 78 19 100 % -- --   06/24/25 1857 -- -- -- 81 20 98 % -- --   06/24/25 1855 122/70 -- -- (!) 103 23 96 % -- --   06/24/25 1854 -- -- -- 92 19 97 % -- --   06/24/25 1706 (!) 124/104 -- -- 97 15 100 % -- --   06/24/25 1456 120/68 -- -- 60 -- 100 % -- --   06/24/25 1445 (!) 93/59 -- -- 67 -- 99 % -- --   06/24/25 1434 121/67 -- -- 77 16 97 % -- --   06/24/25 0906 -- -- -- 56 15 100 % -- --       Intake/Output Summary (Last 24 hours) at 6/25/2025 0801  Last data filed at 6/25/2025 0555  Gross per 24 hour   Intake 50 ml   Output 400 ml   Net -350 ml       Recent Labs     06/23/25  2340 06/24/25  0537 06/25/25  0611   WBC 9.1 8.5 9.9   HGB 9.8* 9.7* 10.6*   HCT 31.2* 31.2* 33.4*   MCV 94.8 94.8 93.6    273 293     Recent Labs     06/23/25  2340 06/24/25  0537 06/25/25  0611    139 140   K 3.9 3.8 4.1    104 104   CO2 27 26 24   BUN 16 17 17   CREATININE 1.2* 1.1* 0.9       No results for input(s): \"COLORU\", \"PHUR\", \"LABCAST\", \"WBCUA\", \"RBCUA\", \"MUCUS\", \"TRICHOMONAS\", \"YEAST\", \"BACTERIA\", \"CLARITYU\", \"SPECGRAV\", \"LEUKOCYTESUR\", \"UROBILINOGEN\", \"BILIRUBINUR\", \"BLOODU\" in the last 72 hours.    Invalid input(s): \"NITRATE\", \"GLUCOSEUKETONESUAMORPHOUS\"    Additional Lab/culture

## 2025-06-26 ENCOUNTER — APPOINTMENT (OUTPATIENT)
Dept: MRI IMAGING | Age: 50
End: 2025-06-26
Attending: STUDENT IN AN ORGANIZED HEALTH CARE EDUCATION/TRAINING PROGRAM
Payer: MEDICAID

## 2025-06-26 LAB
ANION GAP SERPL CALCULATED.3IONS-SCNC: 13 MMOL/L (ref 9–16)
BUN SERPL-MCNC: 18 MG/DL (ref 6–20)
CALCIUM SERPL-MCNC: 9.1 MG/DL (ref 8.6–10.4)
CHLORIDE SERPL-SCNC: 105 MMOL/L (ref 98–107)
CO2 SERPL-SCNC: 23 MMOL/L (ref 20–31)
CREAT SERPL-MCNC: 0.9 MG/DL (ref 0.6–0.9)
ERYTHROCYTE [DISTWIDTH] IN BLOOD BY AUTOMATED COUNT: 15.3 % (ref 11.8–14.4)
GFR, ESTIMATED: 78 ML/MIN/1.73M2
GLUCOSE SERPL-MCNC: 112 MG/DL (ref 74–99)
HCT VFR BLD AUTO: 33.5 % (ref 36.3–47.1)
HGB BLD-MCNC: 10.5 G/DL (ref 11.9–15.1)
MCH RBC QN AUTO: 30.3 PG (ref 25.2–33.5)
MCHC RBC AUTO-ENTMCNC: 31.3 G/DL (ref 28.4–34.8)
MCV RBC AUTO: 96.5 FL (ref 82.6–102.9)
MICROORGANISM SPEC CULT: NORMAL
NRBC BLD-RTO: 0 PER 100 WBC
PLATELET # BLD AUTO: 292 K/UL (ref 138–453)
PMV BLD AUTO: 10.8 FL (ref 8.1–13.5)
POTASSIUM SERPL-SCNC: 4.2 MMOL/L (ref 3.7–5.3)
RBC # BLD AUTO: 3.47 M/UL (ref 3.95–5.11)
SERVICE CMNT-IMP: NORMAL
SODIUM SERPL-SCNC: 141 MMOL/L (ref 136–145)
SPECIMEN DESCRIPTION: NORMAL
WBC OTHER # BLD: 10.9 K/UL (ref 3.5–11.3)

## 2025-06-26 PROCEDURE — APPSS30 APP SPLIT SHARED TIME 16-30 MINUTES: Performed by: NURSE PRACTITIONER

## 2025-06-26 PROCEDURE — A9576 INJ PROHANCE MULTIPACK: HCPCS

## 2025-06-26 PROCEDURE — 2500000003 HC RX 250 WO HCPCS: Performed by: NURSE PRACTITIONER

## 2025-06-26 PROCEDURE — 6370000000 HC RX 637 (ALT 250 FOR IP): Performed by: NURSE PRACTITIONER

## 2025-06-26 PROCEDURE — 6360000004 HC RX CONTRAST MEDICATION

## 2025-06-26 PROCEDURE — 97116 GAIT TRAINING THERAPY: CPT

## 2025-06-26 PROCEDURE — 74183 MRI ABD W/O CNTR FLWD CNTR: CPT

## 2025-06-26 PROCEDURE — 6370000000 HC RX 637 (ALT 250 FOR IP): Performed by: INTERNAL MEDICINE

## 2025-06-26 PROCEDURE — 99232 SBSQ HOSP IP/OBS MODERATE 35: CPT | Performed by: INTERNAL MEDICINE

## 2025-06-26 PROCEDURE — 80048 BASIC METABOLIC PNL TOTAL CA: CPT

## 2025-06-26 PROCEDURE — 6360000002 HC RX W HCPCS: Performed by: NURSE PRACTITIONER

## 2025-06-26 PROCEDURE — 2500000003 HC RX 250 WO HCPCS

## 2025-06-26 PROCEDURE — 85027 COMPLETE CBC AUTOMATED: CPT

## 2025-06-26 PROCEDURE — 1200000000 HC SEMI PRIVATE

## 2025-06-26 PROCEDURE — 6370000000 HC RX 637 (ALT 250 FOR IP): Performed by: STUDENT IN AN ORGANIZED HEALTH CARE EDUCATION/TRAINING PROGRAM

## 2025-06-26 PROCEDURE — 99232 SBSQ HOSP IP/OBS MODERATE 35: CPT | Performed by: STUDENT IN AN ORGANIZED HEALTH CARE EDUCATION/TRAINING PROGRAM

## 2025-06-26 PROCEDURE — 6360000002 HC RX W HCPCS: Performed by: STUDENT IN AN ORGANIZED HEALTH CARE EDUCATION/TRAINING PROGRAM

## 2025-06-26 PROCEDURE — 36415 COLL VENOUS BLD VENIPUNCTURE: CPT

## 2025-06-26 PROCEDURE — 97161 PT EVAL LOW COMPLEX 20 MIN: CPT

## 2025-06-26 RX ORDER — SODIUM CHLORIDE 0.9 % (FLUSH) 0.9 %
10 SYRINGE (ML) INJECTION PRN
Status: DISCONTINUED | OUTPATIENT
Start: 2025-06-26 | End: 2025-06-27 | Stop reason: HOSPADM

## 2025-06-26 RX ORDER — GADOTERIDOL 279.3 MG/ML
8 INJECTION INTRAVENOUS
Status: COMPLETED | OUTPATIENT
Start: 2025-06-26 | End: 2025-06-26

## 2025-06-26 RX ADMIN — HEPARIN SODIUM 5000 UNITS: 5000 INJECTION INTRAVENOUS; SUBCUTANEOUS at 06:01

## 2025-06-26 RX ADMIN — HEPARIN SODIUM 5000 UNITS: 5000 INJECTION INTRAVENOUS; SUBCUTANEOUS at 15:16

## 2025-06-26 RX ADMIN — GADOTERIDOL 8 ML: 279.3 INJECTION, SOLUTION INTRAVENOUS at 09:13

## 2025-06-26 RX ADMIN — HEPARIN SODIUM 5000 UNITS: 5000 INJECTION INTRAVENOUS; SUBCUTANEOUS at 22:05

## 2025-06-26 RX ADMIN — WATER 2000 MG: 1 INJECTION INTRAMUSCULAR; INTRAVENOUS; SUBCUTANEOUS at 15:16

## 2025-06-26 RX ADMIN — FERROUS SULFATE TAB EC 325 MG (65 MG FE EQUIVALENT) 325 MG: 325 (65 FE) TABLET DELAYED RESPONSE at 16:37

## 2025-06-26 RX ADMIN — OXYCODONE 5 MG: 5 TABLET ORAL at 19:45

## 2025-06-26 RX ADMIN — SODIUM CHLORIDE 10 ML: 9 INJECTION INTRAMUSCULAR; INTRAVENOUS; SUBCUTANEOUS at 09:13

## 2025-06-26 RX ADMIN — CYANOCOBALAMIN TAB 500 MCG 500 MCG: 500 TAB at 15:16

## 2025-06-26 RX ADMIN — Medication 1 TABLET: at 15:16

## 2025-06-26 RX ADMIN — OXYCODONE 5 MG: 5 TABLET ORAL at 03:56

## 2025-06-26 RX ADMIN — SENNOSIDES 8.6 MG: 8.6 TABLET, FILM COATED ORAL at 22:05

## 2025-06-26 ASSESSMENT — PAIN SCALES - GENERAL
PAINLEVEL_OUTOF10: 3
PAINLEVEL_OUTOF10: 5
PAINLEVEL_OUTOF10: 5
PAINLEVEL_OUTOF10: 3

## 2025-06-26 ASSESSMENT — PAIN DESCRIPTION - DESCRIPTORS: DESCRIPTORS: ACHING

## 2025-06-26 ASSESSMENT — PAIN DESCRIPTION - ORIENTATION
ORIENTATION: LEFT;LOWER
ORIENTATION: LEFT

## 2025-06-26 ASSESSMENT — PAIN DESCRIPTION - LOCATION
LOCATION: BACK
LOCATION: BACK

## 2025-06-26 ASSESSMENT — PAIN - FUNCTIONAL ASSESSMENT: PAIN_FUNCTIONAL_ASSESSMENT: ACTIVITIES ARE NOT PREVENTED

## 2025-06-26 NOTE — PLAN OF CARE
Problem: Pain  Goal: Verbalizes/displays adequate comfort level or baseline comfort level  Outcome: Progressing     Problem: Skin/Tissue Integrity  Goal: Skin integrity remains intact  Description: 1.  Monitor for areas of redness and/or skin breakdown  2.  Assess vascular access sites hourly  3.  Every 4-6 hours minimum:  Change oxygen saturation probe site  4.  Every 4-6 hours:  If on nasal continuous positive airway pressure, respiratory therapy assess nares and determine need for appliance change or resting period  Outcome: Progressing     Problem: Nutrition Deficit:  Goal: Optimize nutritional status  Outcome: Progressing

## 2025-06-26 NOTE — PLAN OF CARE
Problem: Pain  Goal: Verbalizes/displays adequate comfort level or baseline comfort level  6/26/2025 1711 by Ana Rosa Rico RN  Outcome: Progressing  6/26/2025 0424 by Dilip Paulino RN  Outcome: Progressing     Problem: Skin/Tissue Integrity  Goal: Skin integrity remains intact  Description: 1.  Monitor for areas of redness and/or skin breakdown  2.  Assess vascular access sites hourly  3.  Every 4-6 hours minimum:  Change oxygen saturation probe site  4.  Every 4-6 hours:  If on nasal continuous positive airway pressure, respiratory therapy assess nares and determine need for appliance change or resting period  6/26/2025 1711 by Ana Rosa Rico, RN  Outcome: Progressing  6/26/2025 0424 by Dilip Paulino RN  Outcome: Progressing     Problem: Nutrition Deficit:  Goal: Optimize nutritional status  6/26/2025 1711 by Ana Rosa Rico RN  Outcome: Progressing  6/26/2025 0424 by Dilip Paulino RN  Outcome: Progressing

## 2025-06-26 NOTE — PROGRESS NOTES
10.7 11.1 10.8   INR 1.1  --   --      Chemistry:  Recent Labs     06/24/25  0537 06/25/25  0611 06/26/25  0540    140 141   K 3.8 4.1 4.2    104 105   CO2 26 24 23   GLUCOSE 106* 101* 112*   BUN 17 17 18   CREATININE 1.1* 0.9 0.9   ANIONGAP 9 12 13   LABGLOM 62 78 78   CALCIUM 8.7 9.1 9.1     Recent Labs     06/23/25  2340 06/24/25  0537 06/25/25  0611   * 327* 104*   * 270* 182*   ALKPHOS 193* 186* 174*   BILITOT 0.3 0.2 0.2   BILIDIR  --  0.1 <0.1     ABG:No results found for: \"POCPH\", \"PHART\", \"PH\", \"POCPCO2\", \"QDG8TJW\", \"PCO2\", \"POCPO2\", \"PO2ART\", \"PO2\", \"POCHCO3\", \"IID4WHE\", \"HCO3\", \"NBEA\", \"PBEA\", \"BEART\", \"BE\", \"THGBART\", \"THB\", \"FDR8PTH\", \"QBGR2QSA\", \"K9NBJKOG\", \"O2SAT\", \"FIO2\"  Lab Results   Component Value Date/Time    SPECIAL NOT REPORTED 09/02/2021 02:02 PM     Lab Results   Component Value Date/Time    CULTURE NO SIGNIFICANT GROWTH 09/02/2021 02:02 PM       Radiology:  IR TUBE/CATH CHANGE W CONTRAST  Result Date: 6/24/2025  Successful exchange of right nephrostomy tube.     US ABDOMEN LIMITED Specify organ? LIVER, PANCREAS, GALLBLADDER, SPLEEN  Result Date: 6/24/2025  Common bile duct is dilated to 9 mm.  No intrahepatic biliary dilatation.  If there is concern for choledocholithiasis, MRCP can be obtained for further evaluation. Cholelithiasis without cholecystitis.. Partially visualized right nephrostomy tube.  Prominent right renal pelvis without hydronephrosis.     CT Abdomen Pelvis With IV CONTRAST ONLY (No Oral Contrast)  Result Date: 6/23/2025  1. Moderate thickening of the right renal pelvis with adjacent stranding is suspicious for pyelitis.  There is also evidence of ureteritis involving the proximal right ureter.  No renal or ureteral calculi are seen.  A percutaneous nephrostomy tube is present terminating in the right middle calyx.  There is no abnormal collection along the course of the percutaneous nephrostomy tube. 2. Walled-off gas and fluid collection in the  with depression.  Not on BuSpar.  Discontinue.  Continues on hydroxyzine 50 mg 3 times daily.  Will need to confirm patient's medications.    History of rectal cancer.  Continue outpatient follow-up.    DVT prophylaxis: Heparin held until cleared by urology.  Will resume following MRCP and ERCP planning.  GI prophylaxis: None.     Discharge planning: Pending continued improvement as well as MRCP and ERCP.    Jan Barrios DO  6/26/2025  7:50 AM

## 2025-06-26 NOTE — PROGRESS NOTES
[facility] - Progress Note    Today's Date and Time: 6/26/2025, 2:38 PM    Impression :   Right sided pyelonephritis  Ureteritis  Concern for right nephrostomy tube obstruction  S/p right nephrostomy tube exchange on 6/24/25  Concern for right pelvic abscess  Elevated liver enzymes  CBD   Hx of rectal cancer in 2014  Hx of rectovaginal fistula  S/p colectomy with end colostomy formation in 2021  Chronic right obstructive uropathy with ureteral stricture s/p right ureteral dilation and multiple stent exchanges  Right percutaneous nephrostomy tube placed in 2024  Hx of MDRO    Recommendations:   IV rocephin pending finalized culture data  Urology recommendations    Medical Decision Making/Summary/Discussion:6/26/2025       Infection Control Recommendations   [place] Precautions  Contact Precautions    Antimicrobial Stewardship Recommendations     Simplification of therapy  Targeted therapy    Coordination of Outpatient Care:   Estimated Length of IV antimicrobials:TBD  Patient will need Midline Catheter Insertion: TBD  Patient will need PICC line Insertion:TBD  Patient will need: Home IV , Infusion Center,  SNF,  LTAC: TBD  Patient will need outpatient wound care:     Chief complaint/reason for consultation:   Pyelonephritis with abscess      History of Present Illness:   Isabela Rapp is a 49 y.o.-year-old female who was initially admitted on 6/23/2025. Patient seen at the request of Dr. Barrios    INITIAL HISTORY:    This patient has a history of:  Hx of rectal cancer in 2014  Hx of rectovaginal fistula  S/p colectomy with end colostomy formation in 2021  Chronic obstructive uropathy with ureteral stricture s/p ureteral dilation and multiple stent exchanges  Percutaneous nephrostomy tube placed in 2024  Hx of MDRO    She presented to [facility] ED on 6/23/25 with complaints of right flank pain for the past couple of weeks as well as decreased drainage from the right

## 2025-06-26 NOTE — PROGRESS NOTES
bedrails?: None  How much help is needed moving to and from a bed to a chair?: None  How much help is needed standing up from a chair using your arms?: None  How much help is needed walking in hospital room?: A Little  How much help is needed climbing 3-5 steps with a railing?: A Little  AM-[facility] Inpatient Mobility Raw Score : 22  AM-PAC Inpatient T-Scale Score : 53.28  Mobility Inpatient CMS 0-100% Score: 20.91  Mobility Inpatient CMS G-Code Modifier : CJ    Restrictions/Precautions  Restrictions/Precautions  Activity Level: Up as Tolerated  Required Braces or Orthoses?: No       Subjective  General  Chart Reviewed: Yes  Patient assessed for rehabilitation services?: Yes  Family/Caregiver Present: No  Follows Commands: Within Functional Limits  General  General Comments: RN and pt agreeable to treatment session  Subjective  Subjective: Pt reports no pain, numbness or tingling.       Home Setup/Prior Level of Function  Social/Functional History  Lives With: Significant other  Type of Home: House  Home Layout: Two level  Home Access: Stairs to enter with rails  Entrance Stairs - Number of Steps: 3  Entrance Stairs - Rails: Both  Bathroom Shower/Tub: Tub/Shower unit  Bathroom Equipment: Grab bars in shower  Home Equipment: None (did not use device)  Has the patient had two or more falls in the past year or any fall with injury in the past year?: No  Prior Level of Assist for ADLs: Independent  Prior Level of Assist for Homemaking: Independent  Homemaking Responsibilities: Yes  Prior Level of Assist for Ambulation: Independent household ambulator, with or without device, Independent community ambulator, with or without device  Prior Level of Assist for Transfers: Independent  Active : No  Patient's  Info: fiance  Occupation: On disability  Additional Comments: fiance home to assist as needed    Vision/Hearing  Vision  Vision: Impaired  Vision Exceptions: Wears glasses at all times  Hearing  Hearing: Within  functional limits    Objective  Orientation  Overall Orientation Status: Within Normal Limits  Orientation Level: Oriented X4  Cognition  Overall Cognitive Status: WNL         AROM RLE (degrees)  RLE AROM: WFL  AROM LLE (degrees)  LLE AROM : WFL    Strength RLE  Strength RLE: WFL  Strength LLE  Strength LLE: WFL    Mobility   Bed mobility  Rolling to Right: Modified independent  Supine to Sit: Modified independent  Sit to Supine: Modified independent         Transfers  Sit to Stand: Modified independent  Stand to Sit: Modified independent  Comment: up without device    Ambulation  Surface: Level tile  Device: No Device  Assistance: Supervision  Quality of Gait: steady gait  Distance: 200 ft  More Ambulation?: No    Stairs/Curb  Stairs?: Yes  Stairs  # Steps : 3  Stairs Height: 6\"  Rails: Left ascending  Assistance: Supervision  Comment: limited by IV line    Balance   Balance  Posture: Good  Sitting - Static: Good  Sitting - Dynamic: Good  Standing - Static: Good  Standing - Dynamic: Good;-  Comments: No device      Patient Education  Patient Education  Education Given To: Patient  Education Provided: Role of Therapy;Plan of Care  Education Method: Verbal  Barriers to Learning: None  Education Outcome: Verbalized understanding;Demonstrated understanding    Plan  Physical Therapy Plan  General Plan: Discharge with evaluation only, pt reports no concerns for return home    Goals  Short Term Goals  Time Frame for Short Term Goals: Discharge    Minutes  PT Individual Minutes  Time In: 1541  Time Out: 1558  Minutes: 17  Time Code Minutes  Timed Code Treatment Minutes: 8 Minutes    Electronically signed by Jil Jackson PT on 6/26/25 at 4:11 PM EDT

## 2025-06-27 VITALS
SYSTOLIC BLOOD PRESSURE: 115 MMHG | DIASTOLIC BLOOD PRESSURE: 73 MMHG | HEART RATE: 60 BPM | TEMPERATURE: 97.9 F | OXYGEN SATURATION: 99 % | HEIGHT: 60 IN | WEIGHT: 100 LBS | RESPIRATION RATE: 18 BRPM | BODY MASS INDEX: 19.63 KG/M2

## 2025-06-27 LAB
ALBUMIN SERPL-MCNC: 3.1 G/DL (ref 3.5–5.2)
ALBUMIN/GLOB SERPL: 0.9 {RATIO} (ref 1–2.5)
ALP SERPL-CCNC: 120 U/L (ref 35–104)
ALT SERPL-CCNC: 72 U/L (ref 10–35)
AST SERPL-CCNC: 24 U/L (ref 10–35)
BILIRUB DIRECT SERPL-MCNC: <0.1 MG/DL (ref 0–0.2)
BILIRUB INDIRECT SERPL-MCNC: ABNORMAL MG/DL (ref 0–1)
BILIRUB SERPL-MCNC: <0.2 MG/DL (ref 0–1.2)
GLOBULIN SER CALC-MCNC: 3.4 G/DL
PROT SERPL-MCNC: 6.5 G/DL (ref 6.6–8.7)

## 2025-06-27 PROCEDURE — 6370000000 HC RX 637 (ALT 250 FOR IP): Performed by: INTERNAL MEDICINE

## 2025-06-27 PROCEDURE — 6370000000 HC RX 637 (ALT 250 FOR IP): Performed by: NURSE PRACTITIONER

## 2025-06-27 PROCEDURE — 94760 N-INVAS EAR/PLS OXIMETRY 1: CPT

## 2025-06-27 PROCEDURE — 99232 SBSQ HOSP IP/OBS MODERATE 35: CPT | Performed by: INTERNAL MEDICINE

## 2025-06-27 PROCEDURE — 99212 OFFICE O/P EST SF 10 MIN: CPT

## 2025-06-27 PROCEDURE — 2580000003 HC RX 258: Performed by: STUDENT IN AN ORGANIZED HEALTH CARE EDUCATION/TRAINING PROGRAM

## 2025-06-27 PROCEDURE — 6360000002 HC RX W HCPCS: Performed by: STUDENT IN AN ORGANIZED HEALTH CARE EDUCATION/TRAINING PROGRAM

## 2025-06-27 PROCEDURE — 2500000003 HC RX 250 WO HCPCS: Performed by: NURSE PRACTITIONER

## 2025-06-27 PROCEDURE — 6360000002 HC RX W HCPCS: Performed by: NURSE PRACTITIONER

## 2025-06-27 PROCEDURE — 36415 COLL VENOUS BLD VENIPUNCTURE: CPT

## 2025-06-27 PROCEDURE — 99232 SBSQ HOSP IP/OBS MODERATE 35: CPT | Performed by: STUDENT IN AN ORGANIZED HEALTH CARE EDUCATION/TRAINING PROGRAM

## 2025-06-27 PROCEDURE — 80076 HEPATIC FUNCTION PANEL: CPT

## 2025-06-27 RX ORDER — HEPARIN 100 UNIT/ML
500 SYRINGE INTRAVENOUS PRN
Status: DISCONTINUED | OUTPATIENT
Start: 2025-06-27 | End: 2025-06-27 | Stop reason: HOSPADM

## 2025-06-27 RX ADMIN — FERROUS SULFATE TAB EC 325 MG (65 MG FE EQUIVALENT) 325 MG: 325 (65 FE) TABLET DELAYED RESPONSE at 08:53

## 2025-06-27 RX ADMIN — SODIUM CHLORIDE: 0.9 INJECTION, SOLUTION INTRAVENOUS at 02:13

## 2025-06-27 RX ADMIN — HEPARIN SODIUM 5000 UNITS: 5000 INJECTION INTRAVENOUS; SUBCUTANEOUS at 13:52

## 2025-06-27 RX ADMIN — Medication 1 TABLET: at 08:53

## 2025-06-27 RX ADMIN — CYANOCOBALAMIN TAB 500 MCG 500 MCG: 500 TAB at 08:53

## 2025-06-27 RX ADMIN — HEPARIN SODIUM 5000 UNITS: 5000 INJECTION INTRAVENOUS; SUBCUTANEOUS at 05:52

## 2025-06-27 RX ADMIN — HEPARIN 500 UNITS: 100 SYRINGE at 17:07

## 2025-06-27 RX ADMIN — WATER 2000 MG: 1 INJECTION INTRAMUSCULAR; INTRAVENOUS; SUBCUTANEOUS at 13:52

## 2025-06-27 NOTE — PROGRESS NOTES
[facility]  Office: [phone]  Wong Judd, DO, Ezio Hathaway, DO, Luciano Pena DO, Kelby Eid, DO, Christel Lopes MD, Gina Merlos MD, Conner Whitlock MD, Sandy Aguayo MD,  Roger Christy MD, Jyoti Leiva MD, Pravin Villavicencio MD,  Papa Raphael DO, Donnie Lawrence MD, Shai Uriostegui MD, Josh Judd DO, Starla Mas MD,  Jan Barrios DO, Lori Wong MD, Sepideh Ferro MD, Johan Falcon MD,  Jarrod Gan MD, Simone Esquivel MD, Lazara Montana MD, Janie Thorpe MD, Chung Spears MD, Pau Thompson MD, Pasquale Artis, DO, Karla Ramirez MD, Althea Simmons DO, Jayy Wiley MD, Papa Meneses MD, Mohsin Reza, MD, Jewell Valdivia MD, Shirley Waterhouse, CNP,  Adelia More, CNP, Pasquale Herrera, CNP,  Kenya Nunes, GAIL, Merary Rojas, CNP, Alison Beatty, CNP, Natalie Nicole, CNP, Rody Cameron, CNP, Jazz Temple, PA-C, Paula Thomas, CNP, Deja Carballo, CNP,  Mary Grace Fowler, CNP, Jil Rosales, CNP, Emory Blackman, PA-C, Viridiana Pal, PA-C, Monica Carey, CNP,        Yvrose Wilson, CNS, Crystal Mireles, CNP, Summer Ignacio, CNP         [facility]   IN-PATIENT SERVICE   [facility]    Progress Note    6/27/2025    7:42 AM    Name:   Isabela Rapp  MRN:     [id number]     Acct:      [id number]   Room:   0343/0343-02   Day:  4  Admit Date:  6/23/2025  9:44 PM    PCP:   Archana Aranda APRN - CNP  Code Status:  Full Code    Subjective:     C/C:   Chief Complaint   Patient presents with    Wound Infection     Pilonephritis and pressure wound from a tie pt placed around abdomen to hold colostomy bag in place       Interval History Status: improved.     Vitals reviewed, afebrile and hemodynamically stable. Saturating well on room air.  Labs reviewed, no leukocytosis, hemoglobin stable, platelets are stable.  Urine culture with no significant growth?  Overnight patient had no significant events.    On examination patient resting  abscess measuring up to 3.8 cm. 3. Cholelithiasis without evidence of cholecystitis.  Surgical changes of partial colectomy.  There is an end colostomy in the left lower anterior abdominal wall. Interpretation by Josh Luu M.D. Professional Interpretation by  Radiology Reading Workstation: [id number]    Physical Examination:        General appearance:  alert, cooperative and no distress  Mental Status:  oriented to person, place and time and normal affect  Lungs:  clear to auscultation bilaterally, normal effort  Heart:  regular rate and rhythm, no murmur  Abdomen:  soft, nontender, nondistended, right nephrostomy tube in place no drainage in current bag. Ostomy with liquid/formed stool.   Extremities:  no edema, redness, tenderness in the calves  Skin:  no gross lesions, rashes, induration on exposed skin    Assessment:        Hospital Problems           Last Modified POA    * (Principal) Pyelonephritis 6/23/2025 Yes    H/O malignant neoplasm of colon 6/25/2025 Yes    Bipolar I disorder, most recent episode mixed (HCC) (Chronic) 6/25/2025 Yes    Major depression with psychotic features (HCC) 6/25/2025 Yes    Normocytic anemia 6/25/2025 Yes    Schizoaffective disorder (HCC) 6/25/2025 Yes    Elevated LFTs 6/25/2025 Yes    Complicated UTI (urinary tract infection) 6/25/2025 Yes    Dilation of biliary tract 6/25/2025 Yes    Transaminitis 6/25/2025 Yes     Plan:        Acute pyelonephritis with chronic obstructive uropathy.  Urology following.  Status post interventional radiology with replacement of right nephrostomy tube.  Infectious disease consulted.  Remains on Rocephin pending culture data however urine culture with no growth. DC today pending ID recs for antibiotics.     Right flank wound.  Wound care following.  Infectious disease consulted.  Currently on Rocephin.    Elevated liver enzymes.  CT imaging with common bile duct dilation.  LFTs are however improving.  Will get GI opinion > MRCP

## 2025-06-27 NOTE — CARE COORDINATION
[employer] cab booked to facilitate discharge.    Confirmation #:[id number]  Customer:Tamela Mar  Phone Number:[phone]  Voucher Number:[id number]  Trip Date/Time:2025 6:15:00 PM  Pickup Address:[address] [place] [address]  Drop Off Address:[address], [place], [place]        Discharge Report    [facility]  Clinical Case Management Department  Written by: Deborah Evans RN    Patient Name: Isabela Rapp  Attending Provider: Jan Barrios DO  Admit Date: 2025  9:44 PM  MRN: [id number]  Account: [id number]                     : 1975  Discharge Date: 2025      Disposition: home    Deborah Evans RN

## 2025-06-27 NOTE — PLAN OF CARE
Problem: Pain  Goal: Verbalizes/displays adequate comfort level or baseline comfort level  6/27/2025 1709 by Ana Rosa Rico RN  Outcome: Completed  6/27/2025 0437 by Airam Holden RN  Outcome: Progressing     Problem: Skin/Tissue Integrity  Goal: Skin integrity remains intact  Description: 1.  Monitor for areas of redness and/or skin breakdown  2.  Assess vascular access sites hourly  3.  Every 4-6 hours minimum:  Change oxygen saturation probe site  4.  Every 4-6 hours:  If on nasal continuous positive airway pressure, respiratory therapy assess nares and determine need for appliance change or resting period  6/27/2025 1709 by Ana Rosa Rico, RN  Outcome: Completed  6/27/2025 0437 by Airam Holden RN  Outcome: Progressing     Problem: Nutrition Deficit:  Goal: Optimize nutritional status  6/27/2025 1709 by Ana Rosa Rico RN  Outcome: Completed  6/27/2025 0437 by Aiarm Holden RN  Outcome: Progressing

## 2025-06-27 NOTE — DISCHARGE SUMMARY
Discharge order received. IV removed. Writer went over d/c instructions with pt, including medications and follow up appointments. Pt verbalized understanding. Pt was discharged with all documented belongings. Pt transported off unit via wheelchair.

## 2025-06-27 NOTE — PROGRESS NOTES
Reason for WOCN Evaluation and Assessment: \"nephrostomy tube wound\"          06/27/25 1230   Wound 06/24/25 Back Lateral;Lower;Right   Date First Assessed: 06/24/25   Present on Original Admission: Yes  Location: Back  Wound Location Orientation: Lateral;Lower;Right   Wound Image    Wound Etiology Other   Dressing Status (S)  New dressing applied   Wound Cleansed Cleansed with saline   Dressing/Treatment Hydrofiber Ag;Foam   Dressing Change Due 06/30/25   Wound Assessment Subcutaneous;Pink/red   Drainage Amount Scant (moist but unmeasurable)   Drainage Description Serosanguinous   Wound Thickness Description not for Pressure Injury Full thickness     Right flank skin fold erosion. Drainage from the nephrostomy tube insertion site allows moisture to sit in the skin fold. The moisture along with friction and presence of the ostomy belt has created a chronically wounded area of skin.

## 2025-06-27 NOTE — PROGRESS NOTES
[facility] Gastroenterology Progress Note    Isabela Rapp is a 49 y.o. female patient.  Hospitalization Day:4      Chief consult reason:    Dilated CBD, elevated LFTS     Subjective:  Patient seen and examined at the bedside.  No acute events reported overnight.  Patient denies abdominal pain, nausea, vomiting.  Patient is tolerating regular diet.    Objective:   VITALS:  /73   Pulse 60   Temp 97.9 °F (36.6 °C) (Oral)   Resp 18   Ht 1.524 m (5')   Wt 45.4 kg (100 lb)   LMP 2017   SpO2 99%   BMI 19.53 kg/m²   TEMPERATURE:  Current - Temp: 97.9 °F (36.6 °C); Max - Temp  Av.9 °F (36.6 °C)  Min: 97.9 °F (36.6 °C)  Max: 97.9 °F (36.6 °C)    Physical Assessment:  Physical Exam  Constitutional:       General: She is not in acute distress.     Appearance: She is not ill-appearing.   HENT:      Mouth/Throat:      Mouth: Mucous membranes are moist.      Pharynx: Oropharynx is clear.   Eyes:      Pupils: Pupils are equal, round, and reactive to light.   Cardiovascular:      Rate and Rhythm: Normal rate.   Pulmonary:      Effort: Pulmonary effort is normal. No respiratory distress.   Abdominal:      General: Abdomen is flat. Bowel sounds are normal. There is no distension.      Palpations: Abdomen is soft.      Tenderness: There is no abdominal tenderness. There is no guarding.      Comments: Ostomy in place   Skin:     General: Skin is warm and dry.      Coloration: Skin is not jaundiced or pale.   Neurological:      Mental Status: She is alert and oriented to person, place, and time.         Review of Systems:  Review of Systems   All other systems reviewed and are negative.      CURRENT MEDICATIONS:  Scheduled Meds:   senna  1 tablet Oral Nightly    polyethylene glycol  17 g Oral Daily    cefTRIAXone (ROCEPHIN) IV  2,000 mg IntraVENous Q24H    heparin (porcine)  5,000 Units SubCUTAneous 3 times per day    therapeutic multivitamin-minerals  1 tablet Oral Daily    vitamin B-12  500 mcg

## 2025-06-27 NOTE — PLAN OF CARE
Problem: Pain  Goal: Verbalizes/displays adequate comfort level or baseline comfort level  6/27/2025 0437 by Airam Holden, RN  Outcome: Progressing     Problem: Skin/Tissue Integrity  Goal: Skin integrity remains intact  Description: 1.  Monitor for areas of redness and/or skin breakdown  2.  Assess vascular access sites hourly  3.  Every 4-6 hours minimum:  Change oxygen saturation probe site  4.  Every 4-6 hours:  If on nasal continuous positive airway pressure, respiratory therapy assess nares and determine need for appliance change or resting period  6/27/2025 0437 by Airam Holden, RN  Outcome: Progressing     Problem: Nutrition Deficit:  Goal: Optimize nutritional status  6/27/2025 0437 by Airam Holden, RN  Outcome: Progressing

## 2025-06-30 ENCOUNTER — TELEPHONE (OUTPATIENT)
Dept: GASTROENTEROLOGY | Age: 50
End: 2025-06-30

## 2025-06-30 ENCOUNTER — PREP FOR PROCEDURE (OUTPATIENT)
Dept: GASTROENTEROLOGY | Age: 50
End: 2025-06-30

## 2025-06-30 DIAGNOSIS — K83.8 COMMON BILE DUCT DILATION: ICD-10-CM

## 2025-06-30 NOTE — TELEPHONE ENCOUNTER
----- Message from Dr. Irvin Lopez MD sent at 6/27/2025 11:06 AM EDT -----  Regarding: RE: Outpatient follow-up, schedule ERCP  EUS/ERCP  ----- Message -----  From: Carine Murphy, NP-C  Sent: 6/27/2025  10:34 AM EDT  To: Carmelita Vasquez; Irvin Lopez MD  Subject: Outpatient follow-up, schedule ERCP              This patient will need outpatient ERCP in 1 to 2 weeks with Dr. Lopez    Thank you, Shayla

## 2025-06-30 NOTE — DISCHARGE SUMMARY
[facility]  Office: [phone]  Wong Judd DO, Ezio Hathaway, DO, Luciano Pena DO, Kelby Eid DO, Christel Lopes MD, Gina Merlos MD, Conner Whitlock MD, Sandy Aguayo MD,  Roger Christy MD, Jyoti Leiva MD, Pravin Villavicencio MD,  Papa Raphael DO, Donnie Lawrence MD, Shai Uriostegui MD, Josh Judd DO, Starla Mas MD,  Jan Barrios DO, Lori Wong MD, Sepideh Ferro MD, Johan Falcon MD,  Jarrod Gan MD, Simone Esquivel MD, Lazara Montana MD, Janie Thorpe MD, Chung Spears MD, Pau Thompson MD, Pasquale Artis, DO, Karla Ramirez MD, Althea Simmons DO, Jayy Wiley MD, Papa Meneses MD, Mohsin Reza, MD, Jewell Valdivia MD, Shirley Waterhouse, CNP,  Adelia More, CNP, Pasquale Herrera, CNP,  Kenya Nunes, GAIL, Merary Rojas, CNP, Alison Beatty, CNP, Natalie Nicole, CNP, Rody Cameron, CNP, Jazz Temple, PA-C, Paula Thomas, CNP, Deja Carballo, CNP,  Mary Grace Fowler, CNP, Jil Rosales, CNP, Emory Blackman, PA-C, Viridiana Pal, PA-C, Monica Carey, CNP,        Yvrose Wilson, CNS, Crystal Mireles, CNP, Summer Ignacio, CNP         [facility]   IN-PATIENT SERVICE   [facility]    Discharge Summary     Patient ID: Isabela Rapp  :  1975   MRN: [id number]     ACCOUNT:  [id number]   Patient's PCP: Archana Aranda APRN - CNP  Admit Date: 2025   Discharge Date: 2025     Length of Stay: 4  Code Status:  Prior  Admitting Physician: Jan Barrios DO  Discharge Physician: Jan Barrios DO     Active Discharge Diagnoses:     Hospital Problem Lists:  Principal Problem:    Pyelonephritis  Active Problems:    H/O malignant neoplasm of colon    Bipolar I disorder, most recent episode mixed (HCC)    Major depression with psychotic features (HCC)    Normocytic anemia    Schizoaffective disorder (HCC)    Elevated LFTs    Complicated UTI (urinary tract infection)    Dilation of biliary tract    Transaminitis  Resolved

## 2025-06-30 NOTE — TELEPHONE ENCOUNTER
Procedure scheduled/Hamdani  Procedure: EGD/EUS/ERCP  Dx: dilated CBD, Elevated LFT  Referring: Chacorta  Date: 07/09/25  Time: 12:30pm/arrive 11:00am  Hospital:  [facility]  Bowel Prep: none  Patient advised by phone: phone/all instructions discussed verbally/mailed instructions  Clearance: none  GLP-1:  none

## 2025-07-01 NOTE — TELEPHONE ENCOUNTER
Writer spoke with patient.  Patient advised procedure time has been moved to 1:15pm/arrive 11:45am.

## 2025-07-10 RX ORDER — BENZTROPINE MESYLATE 2 MG/1
2 TABLET ORAL 2 TIMES DAILY
COMMUNITY

## 2025-07-11 ENCOUNTER — ANESTHESIA (OUTPATIENT)
Dept: ENDOSCOPY | Age: 50
End: 2025-07-11
Payer: MEDICAID

## 2025-07-11 ENCOUNTER — APPOINTMENT (OUTPATIENT)
Dept: GENERAL RADIOLOGY | Age: 50
End: 2025-07-11
Attending: INTERNAL MEDICINE
Payer: MEDICAID

## 2025-07-11 ENCOUNTER — ANESTHESIA EVENT (OUTPATIENT)
Dept: ENDOSCOPY | Age: 50
End: 2025-07-11
Payer: MEDICAID

## 2025-07-11 ENCOUNTER — HOSPITAL ENCOUNTER (OUTPATIENT)
Dept: ULTRASOUND IMAGING | Age: 50
Discharge: HOME | End: 2025-07-13
Payer: MEDICAID

## 2025-07-11 ENCOUNTER — HOSPITAL ENCOUNTER (OUTPATIENT)
Age: 50
Setting detail: OUTPATIENT SURGERY
Discharge: HOME | End: 2025-07-11
Attending: INTERNAL MEDICINE | Admitting: INTERNAL MEDICINE
Payer: MEDICAID

## 2025-07-11 VITALS
HEIGHT: 60 IN | RESPIRATION RATE: 11 BRPM | HEART RATE: 61 BPM | OXYGEN SATURATION: 100 % | DIASTOLIC BLOOD PRESSURE: 68 MMHG | SYSTOLIC BLOOD PRESSURE: 113 MMHG | TEMPERATURE: 97.7 F | BODY MASS INDEX: 21.6 KG/M2 | WEIGHT: 110 LBS

## 2025-07-11 DIAGNOSIS — R79.89 ELEVATED LFTS: ICD-10-CM

## 2025-07-11 DIAGNOSIS — R10.84 ABDOMINAL PAIN, GENERALIZED: ICD-10-CM

## 2025-07-11 DIAGNOSIS — K83.8 COMMON BILE DUCT DILATION: ICD-10-CM

## 2025-07-11 LAB — HCG, PREGNANCY URINE (POC): NEGATIVE

## 2025-07-11 PROCEDURE — 2500000003 HC RX 250 WO HCPCS

## 2025-07-11 PROCEDURE — 6360000004 HC RX CONTRAST MEDICATION: Performed by: INTERNAL MEDICINE

## 2025-07-11 PROCEDURE — 2709999900 HC NON-CHARGEABLE SUPPLY: Performed by: INTERNAL MEDICINE

## 2025-07-11 PROCEDURE — 3609015200 HC ERCP REMOVE CALCULI/DEBRIS BILIARY/PANCREAS DUCT: Performed by: INTERNAL MEDICINE

## 2025-07-11 PROCEDURE — 7100000001 HC PACU RECOVERY - ADDTL 15 MIN: Performed by: INTERNAL MEDICINE

## 2025-07-11 PROCEDURE — 6360000002 HC RX W HCPCS: Performed by: NURSE ANESTHETIST, CERTIFIED REGISTERED

## 2025-07-11 PROCEDURE — C1753 CATH, INTRAVAS ULTRASOUND: HCPCS | Performed by: INTERNAL MEDICINE

## 2025-07-11 PROCEDURE — 43262 ENDO CHOLANGIOPANCREATOGRAPH: CPT | Performed by: INTERNAL MEDICINE

## 2025-07-11 PROCEDURE — 3700000000 HC ANESTHESIA ATTENDED CARE: Performed by: INTERNAL MEDICINE

## 2025-07-11 PROCEDURE — 43239 EGD BIOPSY SINGLE/MULTIPLE: CPT | Performed by: INTERNAL MEDICINE

## 2025-07-11 PROCEDURE — 2580000003 HC RX 258

## 2025-07-11 PROCEDURE — 7100000010 HC PHASE II RECOVERY - FIRST 15 MIN: Performed by: INTERNAL MEDICINE

## 2025-07-11 PROCEDURE — 6360000002 HC RX W HCPCS

## 2025-07-11 PROCEDURE — 7100000000 HC PACU RECOVERY - FIRST 15 MIN: Performed by: INTERNAL MEDICINE

## 2025-07-11 PROCEDURE — 43259 EGD US EXAM DUODENUM/JEJUNUM: CPT | Performed by: INTERNAL MEDICINE

## 2025-07-11 PROCEDURE — 81025 URINE PREGNANCY TEST: CPT

## 2025-07-11 PROCEDURE — 3609012400 HC EGD TRANSORAL BIOPSY SINGLE/MULTIPLE: Performed by: INTERNAL MEDICINE

## 2025-07-11 PROCEDURE — 43264 ERCP REMOVE DUCT CALCULI: CPT | Performed by: INTERNAL MEDICINE

## 2025-07-11 PROCEDURE — 7100000011 HC PHASE II RECOVERY - ADDTL 15 MIN: Performed by: INTERNAL MEDICINE

## 2025-07-11 PROCEDURE — C1769 GUIDE WIRE: HCPCS | Performed by: INTERNAL MEDICINE

## 2025-07-11 PROCEDURE — 3700000001 HC ADD 15 MINUTES (ANESTHESIA): Performed by: INTERNAL MEDICINE

## 2025-07-11 PROCEDURE — 76975 GI ENDOSCOPIC ULTRASOUND: CPT | Performed by: INTERNAL MEDICINE

## 2025-07-11 PROCEDURE — 3609018500 HC EGD US SCOPE W/ADJACENT STRUCTURES: Performed by: INTERNAL MEDICINE

## 2025-07-11 PROCEDURE — 2720000010 HC SURG SUPPLY STERILE: Performed by: INTERNAL MEDICINE

## 2025-07-11 PROCEDURE — 3609014900 HC ERCP W/SPHINCTEROTOMY &/OR PAPILLOTOMY: Performed by: INTERNAL MEDICINE

## 2025-07-11 PROCEDURE — 6360000002 HC RX W HCPCS: Performed by: ANESTHESIOLOGY

## 2025-07-11 PROCEDURE — 88305 TISSUE EXAM BY PATHOLOGIST: CPT

## 2025-07-11 RX ORDER — MIDAZOLAM HYDROCHLORIDE 1 MG/ML
INJECTION, SOLUTION INTRAMUSCULAR; INTRAVENOUS
Status: DISCONTINUED | OUTPATIENT
Start: 2025-07-11 | End: 2025-07-11 | Stop reason: SDUPTHER

## 2025-07-11 RX ORDER — CEFAZOLIN SODIUM 1 G/3ML
INJECTION, POWDER, FOR SOLUTION INTRAMUSCULAR; INTRAVENOUS
Status: DISCONTINUED | OUTPATIENT
Start: 2025-07-11 | End: 2025-07-11 | Stop reason: SDUPTHER

## 2025-07-11 RX ORDER — PROPOFOL 10 MG/ML
INJECTION, EMULSION INTRAVENOUS
Status: DISCONTINUED | OUTPATIENT
Start: 2025-07-11 | End: 2025-07-11 | Stop reason: SDUPTHER

## 2025-07-11 RX ORDER — FENTANYL CITRATE 50 UG/ML
25 INJECTION, SOLUTION INTRAMUSCULAR; INTRAVENOUS EVERY 5 MIN PRN
Status: DISCONTINUED | OUTPATIENT
Start: 2025-07-11 | End: 2025-07-11 | Stop reason: HOSPADM

## 2025-07-11 RX ORDER — LIDOCAINE HYDROCHLORIDE 10 MG/ML
INJECTION, SOLUTION EPIDURAL; INFILTRATION; INTRACAUDAL; PERINEURAL
Status: DISCONTINUED | OUTPATIENT
Start: 2025-07-11 | End: 2025-07-11 | Stop reason: SDUPTHER

## 2025-07-11 RX ORDER — DEXAMETHASONE SODIUM PHOSPHATE 10 MG/ML
INJECTION, SOLUTION INTRAMUSCULAR; INTRAVENOUS
Status: DISCONTINUED | OUTPATIENT
Start: 2025-07-11 | End: 2025-07-11 | Stop reason: SDUPTHER

## 2025-07-11 RX ORDER — FENTANYL CITRATE 50 UG/ML
INJECTION, SOLUTION INTRAMUSCULAR; INTRAVENOUS
Status: DISCONTINUED | OUTPATIENT
Start: 2025-07-11 | End: 2025-07-11 | Stop reason: SDUPTHER

## 2025-07-11 RX ORDER — LABETALOL HYDROCHLORIDE 5 MG/ML
10 INJECTION, SOLUTION INTRAVENOUS
Status: DISCONTINUED | OUTPATIENT
Start: 2025-07-11 | End: 2025-07-11 | Stop reason: HOSPADM

## 2025-07-11 RX ORDER — SODIUM CHLORIDE 9 MG/ML
INJECTION, SOLUTION INTRAVENOUS PRN
Status: DISCONTINUED | OUTPATIENT
Start: 2025-07-11 | End: 2025-07-11 | Stop reason: HOSPADM

## 2025-07-11 RX ORDER — ROCURONIUM BROMIDE 10 MG/ML
INJECTION, SOLUTION INTRAVENOUS
Status: DISCONTINUED | OUTPATIENT
Start: 2025-07-11 | End: 2025-07-11 | Stop reason: SDUPTHER

## 2025-07-11 RX ORDER — SODIUM CHLORIDE 0.9 % (FLUSH) 0.9 %
5-40 SYRINGE (ML) INJECTION PRN
Status: DISCONTINUED | OUTPATIENT
Start: 2025-07-11 | End: 2025-07-11 | Stop reason: HOSPADM

## 2025-07-11 RX ORDER — METOCLOPRAMIDE HYDROCHLORIDE 5 MG/ML
5 INJECTION INTRAMUSCULAR; INTRAVENOUS
Status: DISCONTINUED | OUTPATIENT
Start: 2025-07-11 | End: 2025-07-11 | Stop reason: HOSPADM

## 2025-07-11 RX ORDER — ONDANSETRON 2 MG/ML
4 INJECTION INTRAMUSCULAR; INTRAVENOUS
Status: DISCONTINUED | OUTPATIENT
Start: 2025-07-11 | End: 2025-07-11 | Stop reason: HOSPADM

## 2025-07-11 RX ORDER — SODIUM CHLORIDE, SODIUM LACTATE, POTASSIUM CHLORIDE, CALCIUM CHLORIDE 600; 310; 30; 20 MG/100ML; MG/100ML; MG/100ML; MG/100ML
INJECTION, SOLUTION INTRAVENOUS
Status: DISCONTINUED | OUTPATIENT
Start: 2025-07-11 | End: 2025-07-11 | Stop reason: SDUPTHER

## 2025-07-11 RX ORDER — ONDANSETRON 2 MG/ML
INJECTION INTRAMUSCULAR; INTRAVENOUS
Status: DISCONTINUED | OUTPATIENT
Start: 2025-07-11 | End: 2025-07-11 | Stop reason: SDUPTHER

## 2025-07-11 RX ORDER — SODIUM CHLORIDE 0.9 % (FLUSH) 0.9 %
5-40 SYRINGE (ML) INJECTION EVERY 12 HOURS SCHEDULED
Status: DISCONTINUED | OUTPATIENT
Start: 2025-07-11 | End: 2025-07-11 | Stop reason: HOSPADM

## 2025-07-11 RX ORDER — ACETAMINOPHEN 325 MG/1
650 TABLET ORAL
Status: DISCONTINUED | OUTPATIENT
Start: 2025-07-11 | End: 2025-07-11 | Stop reason: HOSPADM

## 2025-07-11 RX ADMIN — CEFAZOLIN 2 G: 1 INJECTION, POWDER, FOR SOLUTION INTRAMUSCULAR; INTRAVENOUS at 12:57

## 2025-07-11 RX ADMIN — FENTANYL CITRATE 50 MCG: 50 INJECTION, SOLUTION INTRAMUSCULAR; INTRAVENOUS at 13:51

## 2025-07-11 RX ADMIN — FENTANYL CITRATE 50 MCG: 50 INJECTION, SOLUTION INTRAMUSCULAR; INTRAVENOUS at 13:02

## 2025-07-11 RX ADMIN — ONDANSETRON 4 MG: 2 INJECTION, SOLUTION INTRAMUSCULAR; INTRAVENOUS at 12:38

## 2025-07-11 RX ADMIN — PROPOFOL 180 MG: 10 INJECTION, EMULSION INTRAVENOUS at 12:28

## 2025-07-11 RX ADMIN — SODIUM CHLORIDE, POTASSIUM CHLORIDE, SODIUM LACTATE AND CALCIUM CHLORIDE: 600; 310; 30; 20 INJECTION, SOLUTION INTRAVENOUS at 12:20

## 2025-07-11 RX ADMIN — SUGAMMADEX 100 MG: 100 INJECTION, SOLUTION INTRAVENOUS at 13:32

## 2025-07-11 RX ADMIN — ROCURONIUM BROMIDE 50 MG: 10 INJECTION, SOLUTION INTRAVENOUS at 12:28

## 2025-07-11 RX ADMIN — LIDOCAINE HYDROCHLORIDE 50 MG: 10 INJECTION, SOLUTION EPIDURAL; INFILTRATION; INTRACAUDAL; PERINEURAL at 12:28

## 2025-07-11 RX ADMIN — MIDAZOLAM 2 MG: 1 INJECTION INTRAMUSCULAR; INTRAVENOUS at 12:23

## 2025-07-11 RX ADMIN — DEXAMETHASONE SODIUM PHOSPHATE 10 MG: 10 INJECTION, SOLUTION INTRAMUSCULAR; INTRAVENOUS at 12:38

## 2025-07-11 RX ADMIN — FENTANYL CITRATE 25 MCG: 50 INJECTION INTRAMUSCULAR; INTRAVENOUS at 14:20

## 2025-07-11 ASSESSMENT — PAIN - FUNCTIONAL ASSESSMENT: PAIN_FUNCTIONAL_ASSESSMENT: 0-10

## 2025-07-11 ASSESSMENT — PAIN DESCRIPTION - LOCATION
LOCATION: BACK
LOCATION: BACK

## 2025-07-11 ASSESSMENT — PAIN DESCRIPTION - DESCRIPTORS
DESCRIPTORS: TENDER

## 2025-07-11 ASSESSMENT — PAIN SCALES - GENERAL
PAINLEVEL_OUTOF10: 0
PAINLEVEL_OUTOF10: 4

## 2025-07-11 ASSESSMENT — PAIN DESCRIPTION - ORIENTATION
ORIENTATION: RIGHT
ORIENTATION: RIGHT

## 2025-07-11 NOTE — DISCHARGE INSTRUCTIONS
[facility]    POST-ENDOSCOPY INSTRUCTIONS    1. ACTIVITY   No driving, operating machinery, or making important decisions for 24 hours.    Resume normal activity after 24 hours.  You may return to work after 24 hours.    2. DIET        Resume your usual diet unless specified below.   ***    3. MEDICATIONS    Resume your usual medications.     Do not consume alcohol, tranquilizers, or sleeping medications for 24 hour unless advised by your physician.                 4. PHYSICIAN FOLLOW-UP / INSTRUCTIONS    Please call the office/clinic in 10 days for biopsy results:      [  ] GI office:  [phone] option #2          Follow up with your family physician as planned.    6. NORMAL CHANGES YOU MAY EXPERIENCE AFTER ENDOSCOPY:         EGD/ERCP/EUS         Sore throat after EGD/ERCP/EUS       A bloated feeling and belching from       air in stomach               You may feel fatigued for the next 24-48   hours due to the prep and sedation    7. CALL YOUR PHYSICIAN IF YOU EXPERIENCE ANY OF THE FOLLOWING      A.  Passing blood rectally or vomiting blood (color may be red or black)      B.  Severe abdominal pain or tenderness (that is not relieved by passing air)      C.  Fever, chills, or excessive sweating      D.  Persistent nausea or vomiting      E.  Redness or swelling at the IV site    If you have additional questions, PLEASE call your doctor or the [facility] GI Unit ([phone] option #2)        No alcoholic beverages, no driving or operating machinery, no making important decisions for 24 hours.   You may have a normal diet but should eat lightly day of surgery.  Drink plenty of fluids.  Urinate within 8 hours after surgery, if unable to urinate call your doctor

## 2025-07-11 NOTE — PROGRESS NOTES
Patient discharged in stable condition via wheelchair.  transport to take pt home. Discharge instructions reviewed with patient and Fiance over the phone. All questions answered at this time and they both verbalized understanding.

## 2025-07-11 NOTE — OP NOTE
Operative Note      Patient: Isabela Rapp  YOB: 1975  MRN: [id number]    Date of Procedure: 7/11/2025    Pre-Op Diagnosis Codes:      * Common bile duct dilation [K83.8]     * Elevated LFTs [R79.89]        Choledocholithiasis    Post-Op Diagnosis: Sphincterotomy, balloon sweep, stone and sludge removal, occlusion cholangiogram.       Procedure(s):  ENDOSCOPIC RETROGRADE CHOLANGIOPANCREATOGRAPHY SPHINCTER/PAPILLOTOMY  ENDOSCOPIC ULTRASOUND  ESOPHAGOGASTRODUODENOSCOPY BIOPSY  ENDOSCOPIC RETROGRADE CHOLANGIOPANCREATOGRAPHY STONE REMOVAL    Surgeon(s):  Irvin Lopez MD    Assistant:   * No surgical staff found *    Anesthesia: General    Estimated Blood Loss (mL): Minimal    Complications: None    Specimens:   ID Type Source Tests Collected by Time Destination   A : Duodenum BX Tissue Duodenum SURGICAL PATHOLOGY Irvin Lopez MD 7/11/2025 1240    B : Stomach BX Tissue Gastric SURGICAL PATHOLOGY Irvin Lopez MD 7/11/2025 1241    C : Esophagus BX Tissue Esophagus SURGICAL PATHOLOGY Irvin Lopez MD 7/11/2025 1243        Implants:  * No implants in log *      Drains:   Nephrostomy Tube 1 Right Flank 8 fr (Active)   Site Assessment Moist 06/27/25 1600   Dressing Status Clean, dry & intact 06/27/25 1600   Dressing Type Split gauze 06/27/25 1600   Collection Container Leg bag 06/27/25 1600   Securement Method Tape 06/27/25 1600   Status Patent;Draining 07/11/25 1153   Urine Color Yellow 06/27/25 0639   Urine Appearance Clear 06/27/25 0639   Urine Odor Other (Comment) 06/26/25 1934   Output (mL) 25 mL 07/11/25 1153       Colostomy LLQ  (Active)   Stomal Appliance 1 piece;Intact 07/11/25 1153   Stoma  Assessment [place] 06/27/25 1600   Peristomal Assessment Clean, dry & intact 06/27/25 1600   Treatment Ostomy belt 06/26/25 2000   Stool Appearance Watery 06/27/25 1600   Stool Color Brown 06/27/25 1600   Stool Amount Small 06/27/25 0800   Output (mL) 15 ml 06/27/25 0800        [REMOVED] Nephrostomy Tube Right Flank (Removed)   Dressing Status Clean, dry & intact 06/24/25 0000   Dressing Type Split gauze 06/24/25 0000   Collection Container Leg bag 06/24/25 0000   Status Patent;Draining 06/24/25 0000   Urine Color Yellow 06/24/25 0000       Findings:  Present At Time Of Surgery (PATOS) (choose all levels that have infection present):  No infection present            Description of Procedure:  Prior to the procedure, a history and physical exam was performed and informed consent was obtained. The risks were discussed including pancreatitis, bleeding, and perforation.  After the patient was placed in the prone position eved, the therapeutic duodenoscope scope was inserted into the mouth and advanced to the second portion of the duodenum allowing the papilla to be visualized.      Using the a wire guided approach with the sphincterotome, the CBD was cannulated and a cholangiogram was performed.        Findings:  The initial cholangiogram revealed mildly dilated CBD and CBD with filling defect seen in the distal CBD    A 7 mm sphincterotomy was performed.    The sphincterotome was exchanged, over a wire for a 9-12 above  injection balloon.  Multiple balloon sweeps were performed revealing 1 small yellow stone and sludge    A terminal balloon occlusion cholangiongram revealed mild dilated CBD and CHD with no further filling defect    The duodenoscope was removed and the patient tolerated the procedure well. The pancreatic duct was not manipulated during the procedure.      Plan: Watch for post ERCP complications            Follow LFTs.            Consider cholecystectomy             Follow-up with Dr. Whatley and PCP        Electronically signed by Irvin Lopez MD on 7/11/2025 at 1:25 PM

## 2025-07-11 NOTE — H&P
History and Physical    Pt Name: Isabela Rapp  MRN: [id number]  YOB: 1975  Date of evaluation: 7/11/2025  Primary Care Physician: Archana Aranda APRN - CNP    SUBJECTIVE:   History of Chief Complaint:    Isabela Rapp is a 49 y.o. female who is scheduled today for ENDOSCOPIC RETROGRADE CHOLANGIOPANCREATOGRAPHY   General  ENDOSCOPIC ULTRASOUND, EGD, General. Patient is a limited historian but she states she became sick with a kidney infection about one week ago. She states at the time, she was having diarrhea and constipation. She denies any abdominal pain, nausea or vomiting. Patient states she had a right nephrotomy tube placed recently as well. She has a history of colostomy placed about two years ago due to rectal cancer. Patient has been diagnosed with common bile duct dilation, elevated LFTs.  Allergies  has no known allergies.  Medications  Prior to Admission medications    Medication Sig Start Date End Date Taking? Authorizing Provider   benztropine (COGENTIN) 2 MG tablet Take 1 tablet by mouth 2 times daily   Yes Phil Osorio MD   Multiple Vitamins-Minerals (THERAPEUTIC MULTIVITAMIN-MINERALS) tablet Take 1 tablet by mouth daily   Yes Phil Osorio MD   vitamin B-12 (CYANOCOBALAMIN) 500 MCG tablet Take 1 tablet by mouth daily   Yes Phil Osorio MD   OLANZapine (ZYPREXA) 10 MG tablet Take 1 tablet by mouth nightly 7/9/20  Yes Jayme Clifford MD   ferrous sulfate (IRON 325) 325 (65 Fe) MG tablet Take 1 tablet by mouth 2 times daily (with meals) 7/9/20  Yes Jayme Clifford MD     Past Medical History    has a past medical history of Anxiety and depression, Bipolar 1 disorder (HCC), Cancer (HCC), Chronic kidney disease, COVID-19 vaccine series not administered, History of blood transfusion, History of kidney stones, Pelvis, female abscess, Psychiatric problem, UTI (urinary tract infection), Wears glasses, and Wellness examination.  Past Surgical History   has a past

## 2025-07-11 NOTE — PROGRESS NOTES
Kerline Junior called and discharge instructions reviewed. All questions answered at this time and Vernon verbalized understanding.

## 2025-07-11 NOTE — OP NOTE
Operative Note      Patient: Isabela Rapp  YOB: 1975  MRN: [id number]    Date of Procedure: 7/11/2025    Pre-Op Diagnosis Codes:      * Common bile duct dilation [K83.8]     * Elevated LFTs [R79.89]    Post-Op Diagnosis: Irregular Z-line, gastritis, choledocholithiasis, cholelithiasis.       Procedure(s):  ENDOSCOPIC RETROGRADE CHOLANGIOPANCREATOGRAPHY  ENDOSCOPIC ULTRASOUND, EGD  ESOPHAGOGASTRODUODENOSCOPY BIOPSY    Surgeon(s):  Irvin Lopez MD    Assistant:   * No surgical staff found *    Anesthesia: General    Estimated Blood Loss (mL): Minimal    Complications: None    Specimens:   ID Type Source Tests Collected by Time Destination   A : Duodenum BX Tissue Duodenum SURGICAL PATHOLOGY Irvin Lopez MD 7/11/2025 1240    B : Stomach BX Tissue Gastric SURGICAL PATHOLOGY Irvin Lopez MD 7/11/2025 1241    C : Esophagus BX Tissue Esophagus SURGICAL PATHOLOGY Irvin Lopez MD 7/11/2025 1243        Implants:  * No implants in log *      Drains:   Nephrostomy Tube 1 Right Flank 8 fr (Active)   Site Assessment Moist 06/27/25 1600   Dressing Status Clean, dry & intact 06/27/25 1600   Dressing Type Split gauze 06/27/25 1600   Collection Container Leg bag 06/27/25 1600   Securement Method Tape 06/27/25 1600   Status Patent;Draining 07/11/25 1153   Urine Color Yellow 06/27/25 0639   Urine Appearance Clear 06/27/25 0639   Urine Odor Other (Comment) 06/26/25 1934   Output (mL) 25 mL 07/11/25 1153       Colostomy LLQ  (Active)   Stomal Appliance 1 piece;Intact 07/11/25 1153   Stoma  Assessment [place] 06/27/25 1600   Peristomal Assessment Clean, dry & intact 06/27/25 1600   Treatment Ostomy belt 06/26/25 2000   Stool Appearance Watery 06/27/25 1600   Stool Color Brown 06/27/25 1600   Stool Amount Small 06/27/25 0800   Output (mL) 15 ml 06/27/25 0800       [REMOVED] Nephrostomy Tube Right Flank (Removed)   Dressing Status Clean, dry & intact 06/24/25 0000   Dressing Type Split

## 2025-07-14 LAB — SURGICAL PATHOLOGY REPORT: NORMAL

## 2025-07-14 NOTE — ANESTHESIA POSTPROCEDURE EVALUATION
Department of Anesthesiology  Postprocedure Note    Patient: Isabela Rapp  MRN: [id number]  YOB: 1975  Date of evaluation: 7/14/2025    Procedure Summary       Date: 07/11/25 Room / Location: [place] [address] / [facility]    Anesthesia Start: 1220 Anesthesia Stop: 1359    Procedures:       ENDOSCOPIC RETROGRADE CHOLANGIOPANCREATOGRAPHY SPHINCTER/PAPILLOTOMY      ENDOSCOPIC ULTRASOUND      ESOPHAGOGASTRODUODENOSCOPY BIOPSY      ENDOSCOPIC RETROGRADE CHOLANGIOPANCREATOGRAPHY STONE REMOVAL Diagnosis:       Common bile duct dilation      Elevated LFTs      (Common bile duct dilation [K83.8])      (Elevated LFTs [R79.89])    Surgeons: Irivn Lopez MD Responsible Provider: Juan Carlos Christianson MD    Anesthesia Type: TIVA, general ASA Status: 3            Anesthesia Type: No value filed.    Kinjal Phase I: Kinjal Score: 10    Kinjal Phase II: Kinjal Score: 10    Anesthesia Post Evaluation    Patient location during evaluation: PACU  Patient participation: complete - patient participated  Level of consciousness: awake  Airway patency: patent  Nausea & Vomiting: no nausea and no vomiting  Cardiovascular status: blood pressure returned to baseline  Respiratory status: acceptable  Hydration status: euvolemic  Multimodal analgesia pain management approach  Pain management: adequate    No notable events documented.

## 2025-07-18 ENCOUNTER — TELEPHONE (OUTPATIENT)
Dept: GASTROENTEROLOGY | Age: 50
End: 2025-07-18

## 2025-07-18 DIAGNOSIS — K80.50 CHOLEDOCHOLITHIASIS: Primary | ICD-10-CM

## 2025-07-31 ENCOUNTER — TELEPHONE (OUTPATIENT)
Dept: GASTROENTEROLOGY | Age: 50
End: 2025-07-31

## 2025-07-31 NOTE — TELEPHONE ENCOUNTER
----- Message from Dr. Irvin Lopez MD sent at 7/30/2025  3:50 PM EDT -----  Biopsies from your stomach and esophagus shows inflammation with no evidence of infection.  Biopsies from your small bowel were within normal limit.  You should follow-up with your PCP

## 2025-08-22 ENCOUNTER — INITIAL CONSULT (OUTPATIENT)
Dept: BARIATRICS/WEIGHT MGMT | Age: 50
End: 2025-08-22
Payer: MEDICAID

## 2025-08-22 VITALS
OXYGEN SATURATION: 98 % | HEART RATE: 55 BPM | SYSTOLIC BLOOD PRESSURE: 118 MMHG | HEIGHT: 60 IN | DIASTOLIC BLOOD PRESSURE: 72 MMHG | BODY MASS INDEX: 21.6 KG/M2 | WEIGHT: 110 LBS

## 2025-08-22 DIAGNOSIS — K83.8 COMMON BILE DUCT DILATION: Primary | ICD-10-CM

## 2025-08-22 DIAGNOSIS — K80.20 CALCULUS OF GALLBLADDER WITHOUT CHOLECYSTITIS WITHOUT OBSTRUCTION: ICD-10-CM

## 2025-08-22 DIAGNOSIS — K80.50 CHOLEDOCHOLITHIASIS: ICD-10-CM

## 2025-08-22 DIAGNOSIS — N20.0 RIGHT NEPHROLITHIASIS: ICD-10-CM

## 2025-08-22 DIAGNOSIS — R10.11 RUQ ABDOMINAL PAIN: ICD-10-CM

## 2025-08-22 PROCEDURE — 99204 OFFICE O/P NEW MOD 45 MIN: CPT | Performed by: SURGERY

## (undated) DEVICE — DECANTER BAG 9": Brand: MEDLINE INDUSTRIES, INC.

## (undated) DEVICE — SUTURE PDS II SZ 0 L60IN ABSRB VLT L65MM TP-1 1/2 CIR Z991G

## (undated) DEVICE — SEALER ENDOSCP NANO COAT OPN DIV CRV L JAW LIGASURE IMPACT

## (undated) DEVICE — Device: Brand: BALLOON3

## (undated) DEVICE — GOWN,AURORA,NONREINFORCED,LARGE: Brand: MEDLINE

## (undated) DEVICE — SUTURE PERMAHAND SZ 3-0 L18IN NONABSORBABLE BLK L26MM SH C013D

## (undated) DEVICE — SUTURE VCRL SZ 3-0 L18IN ABSRB UD L26MM SH 1/2 CIR J864D

## (undated) DEVICE — SHEET, T, LAPAROTOMY, STERILE: Brand: MEDLINE

## (undated) DEVICE — APPLICATOR MEDICATED 26 CC SOLUTION HI LT ORNG CHLORAPREP

## (undated) DEVICE — CLIP LIG M BLU TI HRT SHP WIRE HORZ 180 PER BX

## (undated) DEVICE — Device: Brand: SENSURA MIO

## (undated) DEVICE — SOLUTION IV IRRIG POUR BRL 0.9% SODIUM CHL 2F7124

## (undated) DEVICE — ELECTRODE PT RET AD L9FT HI MOIST COND ADH HYDRGEL CORDED

## (undated) DEVICE — GARMENT,MEDLINE,DVT,INT,CALF,MED, GEN2: Brand: MEDLINE

## (undated) DEVICE — MERCY HEALTH ST CHARLES: Brand: MEDLINE INDUSTRIES, INC.

## (undated) DEVICE — LEGGINGS, PAIR, 31X48, STERILE: Brand: MEDLINE

## (undated) DEVICE — CLIP INT L ORNG TI TRNSVRS GRV CHEVRON SHP W/ PRECIS TIP TO

## (undated) DEVICE — STAPLER INT L75MM CUT LN L73MM STPL LN L77MM BLU B FRM 8

## (undated) DEVICE — BLANKET WRM W29.9XL79.1IN UP BODY FORC AIR MISTRAL-AIR

## (undated) DEVICE — GLOVE ORANGE PI 7 1/2   MSG9075

## (undated) DEVICE — DRAPE IRRIG FLD WRM W44XL66IN W/ AORN STD PRTBL INTRATEMP

## (undated) DEVICE — TOWEL,OR,DSP,ST,NATURAL,DLX,4/PK,20PK/CS: Brand: MEDLINE

## (undated) DEVICE — COVER,MAYO STAND,STERILE: Brand: MEDLINE

## (undated) DEVICE — DRESSING BORDERED ADH GZ UNIV GEN USE 8INX4IN AND 6INX2IN

## (undated) DEVICE — BLADE CLIPPER GEN PURP NS

## (undated) DEVICE — MITT SURG PREP L ADH DISPOSABLE

## (undated) DEVICE — MARKER,SKIN,WI/RULER AND LABELS: Brand: MEDLINE

## (undated) DEVICE — SUTURE VCRL + SZ 3-0 L18IN ABSRB VLT SH-1 L22MM 1/2 CIR VCP772D

## (undated) DEVICE — RETRIEVAL BALLOON CATHETER: Brand: EXTRACTOR™ PRO RX

## (undated) DEVICE — PROTECTOR ULN NRV PUR FOAM HK LOOP STRP ANATOMICALLY

## (undated) DEVICE — SINGLE USE DISTAL COVER MAJ-2315: Brand: SINGLE USE DISTAL COVER

## (undated) DEVICE — SPHINCTEROTOME: Brand: DREAMTOME™ RX 44

## (undated) DEVICE — ROD OST L65MM LOOP FLNG FOR SURFIT NATURA AUTOLOK GENTLE

## (undated) DEVICE — ST CHARLES MINOR ABDOMINAL PK: Brand: MEDLINE INDUSTRIES, INC.

## (undated) DEVICE — DECANTER FLD 9IN ST BG FOR ASEP TRNSF OF FLD

## (undated) DEVICE — STRAP,POSITIONING,KNEE/BODY,FOAM,4X60": Brand: MEDLINE

## (undated) DEVICE — DRAPE,UNDRBUT,WHT GRAD PCH,CAPPORT,20/CS: Brand: MEDLINE

## (undated) DEVICE — LOCKING DEVICE AND BIOPSY CAP FOR USE WITH RX BILIARY SYSTEM: Brand: RX LOCKING DEVICE AND BIOPSY CAP

## (undated) DEVICE — SINGLE PORT MANIFOLD: Brand: NEPTUNE 2

## (undated) DEVICE — SUTURE ETHLN SZ 3-0 L18IN NONABSORBABLE BLK FS-1 L24MM 3/8 663H

## (undated) DEVICE — SPONGE LAP W18XL18IN WHT COT 4 PLY FLD STRUNG RADPQ DISP ST

## (undated) DEVICE — YANKAUER,POOLE TIP,STERILE,50/CS: Brand: MEDLINE

## (undated) DEVICE — 3M™ IOBAN™ 2 ANTIMICROBIAL INCISE DRAPE 6650EZ: Brand: IOBAN™ 2

## (undated) DEVICE — GLOVE ORTHO 8   MSG9480

## (undated) DEVICE — GLOVE ORTHO 7 1/2   MSG9475

## (undated) DEVICE — TOTAL TRAY, 16FR 10ML SIL FOLEY, URN: Brand: MEDLINE